# Patient Record
Sex: FEMALE | Race: WHITE | NOT HISPANIC OR LATINO | Employment: OTHER | URBAN - METROPOLITAN AREA
[De-identification: names, ages, dates, MRNs, and addresses within clinical notes are randomized per-mention and may not be internally consistent; named-entity substitution may affect disease eponyms.]

---

## 2017-01-09 DIAGNOSIS — F41.1 GENERALIZED ANXIETY DISORDER: ICD-10-CM

## 2017-01-09 DIAGNOSIS — G89.29 CHRONIC BILATERAL LOW BACK PAIN WITHOUT SCIATICA: Primary | ICD-10-CM

## 2017-01-09 DIAGNOSIS — M54.50 CHRONIC BILATERAL LOW BACK PAIN WITHOUT SCIATICA: Primary | ICD-10-CM

## 2017-01-10 NOTE — TELEPHONE ENCOUNTER
Ativan      Last Written Prescription Date:  11/29/16  Last Fill Quantity: 90,   # refills: 0  Last Office Visit with [facility], [facility] or [facility] prescribing provider: 11/28/16 Dr. Michael    Future Office visit:       Routing refill request to provider for review/approval because:  Drug not on the [facility], [facility] or [facility] refill protocol or controlled substance    Gabapentin      Last Written Prescription Date:  12/9/16  Last Fill Quantity: 270,   # refills: 0  Last Office Visit with [facility], [facility] or [facility] prescribing provider: 11/28/16 Dr. Michael    Future Office visit:       Routing refill request to provider for review/approval because:  Drug not on the [facility], [facility] or [facility] refill protocol or controlled substance    BESS [place] (R)

## 2017-01-11 RX ORDER — GABAPENTIN 300 MG/1
CAPSULE ORAL
Qty: 270 CAPSULE | Refills: 0 | Status: SHIPPED | OUTPATIENT
Start: 2017-01-11 | End: 2017-05-09

## 2017-01-12 RX ORDER — LORAZEPAM 1 MG/1
TABLET ORAL
Qty: 90 TABLET | Refills: 1 | Status: SHIPPED | OUTPATIENT
Start: 2017-01-12 | End: 2017-03-06

## 2017-01-18 DIAGNOSIS — M54.50 CHRONIC BILATERAL LOW BACK PAIN WITHOUT SCIATICA: Primary | ICD-10-CM

## 2017-01-18 DIAGNOSIS — G89.29 CHRONIC BILATERAL LOW BACK PAIN WITHOUT SCIATICA: Primary | ICD-10-CM

## 2017-01-18 NOTE — TELEPHONE ENCOUNTER
Needs a prescription for OxyContin and would also like the prescription for Percocet ready too so they don't have to drive to the clinic twice. She said that Lowell Miller will be picking up the prescriptions.     Thank you,  Jenny PELAYO   Central Scheduler

## 2017-01-19 RX ORDER — OXYCODONE AND ACETAMINOPHEN 10; 325 MG/1; MG/1
1 TABLET ORAL EVERY 6 HOURS PRN
Qty: 150 TABLET | Refills: 0 | Status: SHIPPED | OUTPATIENT
Start: 2017-01-19 | End: 2017-02-27

## 2017-01-19 RX ORDER — OXYCODONE HYDROCHLORIDE 60 MG/1
1 TABLET, FILM COATED, EXTENDED RELEASE ORAL EVERY 12 HOURS
Qty: 60 EACH | Refills: 0 | Status: SHIPPED | OUTPATIENT
Start: 2017-01-19 | End: 2017-02-20

## 2017-01-19 NOTE — TELEPHONE ENCOUNTER
Routing refill request to provider for review/approval because:  Drug not on the [facility] refill protocol   Jenny Olivia RN

## 2017-01-19 NOTE — TELEPHONE ENCOUNTER
oxycontin     60  Last Written Prescription Date:  12-20-16  Last Fill Quantity: 60,   # refills: 0  Last Office Visit with [facility] prescribing provider: 11-28-16  Future Office visit:       Routing refill request to provider for review/approval because:  Drug not on the [facility] or [facility] refill protocol or controlled substance      Oxycodone       Last Written Prescription Date:  12-27-16  Last Fill Quantity: 150,   # refills: 0  Last Office Visit with [facility] or  [facility] prescribing provider: 11-28-16  Future Office visit:       Routing refill request to provider for review/approval because:  Drug not on the [facility] or  [facility] refill protocol or controlled substance

## 2017-01-26 ENCOUNTER — TELEPHONE (OUTPATIENT)
Dept: FAMILY MEDICINE | Facility: CLINIC | Age: 42
End: 2017-01-26

## 2017-01-26 NOTE — TELEPHONE ENCOUNTER
Patient called regarding (reason for call): [facility] did not fill script for PERCOCET. Patient was told provider must complete prior authorization in order to be filled. Please contact patient once request is completed.  Is this regarding a medication?: yes  If yes, which medication?: oxyCODONE-acetaminophen (PERCOCET)  MG  Pt Provider?: Dr. Michael  Phone Number Pt can be reached at: [phone]  Best Time: any  Can we leave a detailed message on this number?: yes    Central Scheduling  Kim PRUITT

## 2017-01-27 NOTE — TELEPHONE ENCOUNTER
Started PA as requested - will await response from insurance  Disla: [id number]     Nelson Victor MA

## 2017-01-30 NOTE — TELEPHONE ENCOUNTER
..Reason for Call:  Checking status on the prior auth on the (percocet)    Detailed comments: [facility] prior auth shows denied on their end; they have some questions about frequency, etc; they are refaxing form    *Pts insurance expires tomorrow per caller    Phone Number Patient can be reached at: Other phone number:  [phone], Kaycee calling    Best Time: anytime    Can we leave a detailed message on this number? Not Applicable    Call taken on 1/30/2017 at 10:08 AM by Leah Nunn

## 2017-02-17 ENCOUNTER — TELEPHONE (OUTPATIENT)
Dept: FAMILY MEDICINE | Facility: CLINIC | Age: 42
End: 2017-02-17

## 2017-02-17 DIAGNOSIS — G89.29 CHRONIC BILATERAL LOW BACK PAIN WITHOUT SCIATICA: ICD-10-CM

## 2017-02-17 DIAGNOSIS — M54.50 CHRONIC BILATERAL LOW BACK PAIN WITHOUT SCIATICA: ICD-10-CM

## 2017-02-18 NOTE — TELEPHONE ENCOUNTER
Patient called regarding (reason for call): Refill request. Verbal consent given to have Lowell Crawford  prescription.  Is this regarding a medication?: yes  If yes, which medication?: oxyCODONE (OXYCONTIN) 60 MG T12A 12 hr  Pt Provider?: Dr. Michael  Phone Number Pt can be reached at: n/a  Best Time: n/a  Can we leave a detailed message on this number?: n/a    Central Scheduling  Kim PRUITT

## 2017-02-20 RX ORDER — OXYCODONE HYDROCHLORIDE 60 MG/1
1 TABLET, FILM COATED, EXTENDED RELEASE ORAL EVERY 12 HOURS
Qty: 60 EACH | Refills: 0 | Status: SHIPPED | OUTPATIENT
Start: 2017-02-20 | End: 2017-03-16

## 2017-02-20 NOTE — TELEPHONE ENCOUNTER
Script available for , notify patient/family - script to .  PSK    Ok for pickup as requested by patient -   Verbal consent given to have Lowell Crawford  prescription

## 2017-02-20 NOTE — TELEPHONE ENCOUNTER
Rx placed at . Called pt but phone gives dial tone.      Joana Javier, [facility]

## 2017-02-20 NOTE — TELEPHONE ENCOUNTER
Oxycodone      Last Written Prescription Date: 1/19/17  Last Fill Quantity: 60,  # refills: 0   Last Office Visit with [facility], [facility] or [facility] prescribing provider:  11/28/16    Nelson Victor MA

## 2017-02-21 NOTE — TELEPHONE ENCOUNTER
..Reason for Call:    checking status of the prior auth//oxycodone(oxycontin) 60 mg    Detailed comments: doesn't understand why this is the same thing every month;     Phone Number Patient can be reached at: Home number on file [phone] (home)    Best Time: anytime    Can we leave a detailed message on this number? YES    Call taken on 2/21/2017 at 12:17 PM by Leah Nunn

## 2017-02-21 NOTE — TELEPHONE ENCOUNTER
Reason for Call:  Medication or medication refill:    Do you use a [facility]?  Name of the pharmacy and phone number for the current request:      Name of the medication requested: oxycodone needs a prior authorization, unfortunately not sure what pharmacy script is at and called the patient back within 2 minutes when i did not see a pharmacy listed and her mail box was full.    Other request:     Can we leave a detailed message on this number? YES    Phone number patient can be reached at: Home number on file [phone] (home)    Best Time: any    Call taken on 2/21/2017 at 8:38 AM by Flor Mcwilliams

## 2017-02-22 NOTE — TELEPHONE ENCOUNTER
Received PA approval     Faxed to pharmacy. Placed in scan pile.    Informed pt    Will Kayleigh BRAVO

## 2017-02-27 DIAGNOSIS — M54.50 CHRONIC BILATERAL LOW BACK PAIN WITHOUT SCIATICA: ICD-10-CM

## 2017-02-27 DIAGNOSIS — G89.29 CHRONIC BILATERAL LOW BACK PAIN WITHOUT SCIATICA: ICD-10-CM

## 2017-02-27 RX ORDER — OXYCODONE AND ACETAMINOPHEN 10; 325 MG/1; MG/1
1 TABLET ORAL EVERY 6 HOURS PRN
Qty: 150 TABLET | Refills: 0 | Status: SHIPPED | OUTPATIENT
Start: 2017-02-27 | End: 2017-03-16

## 2017-02-27 NOTE — TELEPHONE ENCOUNTER
Reason for Call:  Medication or medication refill:    Do you use a [facility]?  Name of the pharmacy and phone number for the current request:  Pt will come to clinic to  hard copy of Rx    Name of the medication requested: Oxycodone-acetaminophen (PERCOCET)  MG per tablet    Can we leave a detailed message on this number? YES    Phone number patient can be reached at: Home number on file [phone] (home)    Best Time: Anytime    Call taken on 2/27/2017 at 8:10 AM by Nilson Buchanan

## 2017-03-06 DIAGNOSIS — F41.1 GENERALIZED ANXIETY DISORDER: ICD-10-CM

## 2017-03-07 RX ORDER — LORAZEPAM 1 MG/1
1 TABLET ORAL EVERY 8 HOURS PRN
Qty: 90 TABLET | Refills: 0 | Status: SHIPPED | OUTPATIENT
Start: 2017-03-07 | End: 2017-04-10

## 2017-03-16 DIAGNOSIS — M54.50 CHRONIC BILATERAL LOW BACK PAIN WITHOUT SCIATICA: ICD-10-CM

## 2017-03-16 DIAGNOSIS — G89.29 CHRONIC BILATERAL LOW BACK PAIN WITHOUT SCIATICA: ICD-10-CM

## 2017-03-16 RX ORDER — OXYCODONE HYDROCHLORIDE 60 MG/1
1 TABLET, FILM COATED, EXTENDED RELEASE ORAL EVERY 12 HOURS
Qty: 60 EACH | Refills: 0 | Status: SHIPPED | OUTPATIENT
Start: 2017-03-16 | End: 2017-04-19

## 2017-03-16 RX ORDER — OXYCODONE AND ACETAMINOPHEN 10; 325 MG/1; MG/1
1 TABLET ORAL EVERY 6 HOURS PRN
Qty: 150 TABLET | Refills: 0 | Status: SHIPPED | OUTPATIENT
Start: 2017-03-16 | End: 2017-04-19

## 2017-03-16 NOTE — TELEPHONE ENCOUNTER
Script available for , notify patient/family - script to .  Also notify her we need a drug screen in the next 24 hours for our periodic screening.    DAIVD

## 2017-03-16 NOTE — TELEPHONE ENCOUNTER
Reason for Call:  Medication or medication refill:    Do you use a [facility]?  Name of the pharmacy and phone number for the current request:     Name of the medication requested:  oxyCODONE-acetaminophen (PERCOCET)  MG per tablet, oxyCODONE (OXYCONTIN) 60 MG T12A 12 hr tablet      Other request: Patient will  at     Can we leave a detailed message on this number? YES    Phone number patient can be reached at: Cell number on file:    Telephone Information:   Mobile [phone]       Best Time: Any     Call taken on 3/16/2017 at 1:20 PM by Brian Oconnor

## 2017-03-16 NOTE — TELEPHONE ENCOUNTER
oxyCODONE (OXYCONTIN) 60 MG T12A 12 hr tablet      Last Written Prescription Date:  2/20/17  Last Fill Quantity: 60,   # refills: 0  Last Office Visit with [facility] or [facility] prescribing provider: 11/28/16  Future Office visit:       Routing refill request to provider for review/approval because:  Drug not on the [facility], [facility] or  [facility] refill protocol or controlled substance      oxyCODONE-acetaminophen (PERCOCET)  MG per tablet      Last Written Prescription Date:  2/27/17  Last Fill Quantity: 150,   # refills: 0  Last Office Visit with [facility], [facility] or [facility] prescribing provider: 11/28/16  Future Office visit:       Routing refill request to provider for review/approval because:  Drug not on the [facility], [facility] or  [facility] refill protocol or controlled substance

## 2017-03-17 DIAGNOSIS — M54.50 CHRONIC BILATERAL LOW BACK PAIN WITHOUT SCIATICA: ICD-10-CM

## 2017-03-17 DIAGNOSIS — G89.29 CHRONIC BILATERAL LOW BACK PAIN WITHOUT SCIATICA: ICD-10-CM

## 2017-03-17 LAB
AMPHETAMINES UR QL: ABNORMAL NG/ML
BARBITURATES UR QL SCN: ABNORMAL NG/ML
BENZODIAZ UR QL SCN: ABNORMAL NG/ML
BUPRENORPHINE UR QL: ABNORMAL NG/ML
CANNABINOIDS UR QL: ABNORMAL NG/ML
COCAINE UR QL SCN: ABNORMAL NG/ML
D-METHAMPHET UR QL: ABNORMAL NG/ML
METHADONE UR QL SCN: ABNORMAL NG/ML
OPIATES UR QL SCN: ABNORMAL NG/ML
OXYCODONE UR QL SCN: ABNORMAL NG/ML
PCP UR QL SCN: ABNORMAL NG/ML
PROPOXYPH UR QL: ABNORMAL NG/ML
TRICYCLICS UR QL SCN: ABNORMAL NG/ML

## 2017-03-17 PROCEDURE — 80306 DRUG TEST PRSMV INSTRMNT: CPT | Performed by: FAMILY MEDICINE

## 2017-03-19 NOTE — PROGRESS NOTES
Your urine drug screen result is as expected.  Please call or [employer] message me if you have any questions.   PSK

## 2017-03-22 ENCOUNTER — MYC MEDICAL ADVICE (OUTPATIENT)
Dept: FAMILY MEDICINE | Facility: CLINIC | Age: 42
End: 2017-03-22

## 2017-03-22 NOTE — TELEPHONE ENCOUNTER
Pt got her scripts and said she needs a prior authorization  Can you call her and let her know if that was started?    Thank you

## 2017-03-22 NOTE — TELEPHONE ENCOUNTER
Call back:  Please call Ria @ home [phone]  (cell) doesn't have enough minutes  Re: Prior auth  Thank you  NIKOS ROGERS

## 2017-03-22 NOTE — TELEPHONE ENCOUNTER
Pt adding to message:    Please check on meds, there are two similar meds, looking for the time release Oxycontin, not  to be confused to the percocet  Pt called [employer] today, waiting for the approval from the Dr for the time release  [phone]  Thank you  NIKOS Nunn  [facility]

## 2017-03-22 NOTE — TELEPHONE ENCOUNTER
Phone goes straight to busy    Sent My Chart to pt informing her PA was already completed for Oxycontin. Was approved on 2/22/17    Nelson Victor MA

## 2017-03-22 NOTE — TELEPHONE ENCOUNTER
Called patient - faxed PA approval to pharmacy per pt request. Pt will follow up if there are any other issues    Will Kayleigh BRAVO

## 2017-04-10 ENCOUNTER — TELEPHONE (OUTPATIENT)
Dept: FAMILY MEDICINE | Facility: CLINIC | Age: 42
End: 2017-04-10

## 2017-04-10 ENCOUNTER — OFFICE VISIT (OUTPATIENT)
Dept: FAMILY MEDICINE | Facility: CLINIC | Age: 42
End: 2017-04-10
Payer: MEDICARE

## 2017-04-10 ENCOUNTER — RADIANT APPOINTMENT (OUTPATIENT)
Dept: GENERAL RADIOLOGY | Facility: CLINIC | Age: 42
End: 2017-04-10
Attending: FAMILY MEDICINE
Payer: MEDICARE

## 2017-04-10 VITALS
HEART RATE: 102 BPM | OXYGEN SATURATION: 99 % | SYSTOLIC BLOOD PRESSURE: 124 MMHG | BODY MASS INDEX: 24.46 KG/M2 | WEIGHT: 147 LBS | TEMPERATURE: 97.8 F | DIASTOLIC BLOOD PRESSURE: 87 MMHG

## 2017-04-10 DIAGNOSIS — M25.552 BILATERAL HIP PAIN: ICD-10-CM

## 2017-04-10 DIAGNOSIS — F41.1 GENERALIZED ANXIETY DISORDER: ICD-10-CM

## 2017-04-10 DIAGNOSIS — F11.20 CONTINUOUS OPIOID DEPENDENCE (H): ICD-10-CM

## 2017-04-10 DIAGNOSIS — M25.551 BILATERAL HIP PAIN: ICD-10-CM

## 2017-04-10 DIAGNOSIS — G89.29 CHRONIC BILATERAL LOW BACK PAIN WITHOUT SCIATICA: ICD-10-CM

## 2017-04-10 DIAGNOSIS — M25.551 BILATERAL HIP PAIN: Primary | ICD-10-CM

## 2017-04-10 DIAGNOSIS — M54.50 CHRONIC BILATERAL LOW BACK PAIN WITHOUT SCIATICA: ICD-10-CM

## 2017-04-10 DIAGNOSIS — M25.552 BILATERAL HIP PAIN: Primary | ICD-10-CM

## 2017-04-10 PROCEDURE — 99213 OFFICE O/P EST LOW 20 MIN: CPT | Performed by: FAMILY MEDICINE

## 2017-04-10 PROCEDURE — 73522 X-RAY EXAM HIPS BI 3-4 VIEWS: CPT

## 2017-04-10 NOTE — TELEPHONE ENCOUNTER
Pt requesting for Lorazepam med refill.  Please send Rx to [facility] on [address]    What is the best number to contact you? Cell [phone]  What time works best to contact you? Anytime. Ok to flores Pazo

## 2017-04-10 NOTE — MR AVS SNAPSHOT
After Visit Summary   4/10/2017    Ria Nj    MRN: [id number]           Patient Information     Date Of Birth          1975        Visit Information        Provider Department      4/10/2017 11:20 AM Lyudmila Rojo MD [facility]        Today's Diagnoses     Bilateral hip pain    -  1    Continuous opioid dependence (H)           Follow-ups after your visit        Additional Services     PAIN MANAGEMENT CENTER ([place]) REFERRAL       Your provider has referred you to the [facility].    Reason for Referral: Comprehensive Evaluation and Management    Please complete the following questions:    What is your diagnosis for the patient's pain? Chronic back pain    Do you have any specific questions for the pain specialist? No    Are there any red flags that may impact the assessment or management of the patient? None    **ANY DIAGNOSTIC TESTS THAT ARE NOT IN EPIC SHOULD BE SENT TO THE PAIN CENTER**    Please note the Pre-Op Pain Consult must be scheduled 2-3 weeks prior to the patient's surgery.  Patient's trying to schedule within 2 weeks of surgery may not be accommodated.     Pre-Op Pain Consults are only good for 30 days.    REGARDING OPIOID MEDICATIONS:  We will always address appropriateness of opioid pain medications, but we generally will not automatically take on a prescribing role. When we do take on prescribing of opioids for chronic pain, it is in collaboration with the referring physician for an intermediate period of time (months), with an expectation that the primary physician or provider will assume the prescribing role if medications are effective at stable doses with demonstrated compliance.  Therefore, please do not assume that your prescribing responsibilities end on the day of pain clinic consultation.  Is this agreeable to you? YES    For any questions, contact the [facility] at [phone].    Please be  aware that coverage of these services is subject to the terms and limitations of your health insurance plan.  Call member services at your health plan with any benefit or coverage questions.      Please bring the following with you to your appointment:    (1) Any X-Rays, CTs or MRIs which have been performed.  Contact the facility where they were done to arrange for  prior to your scheduled appointment.    (2) List of current medications   (3) This referral request   (4) Any documents/labs given to you for this referral                  Who to contact     If you have questions or need follow up information about today's clinic visit or your schedule please contact [facility] directly at [phone].  Normal or non-critical lab and imaging results will be communicated to you by MyChart, letter or phone within 4 business days after the clinic has received the results. If you do not hear from us within 7 days, please contact the clinic through [employer] or phone. If you have a critical or abnormal lab result, we will notify you by phone as soon as possible.  Submit refill requests through [employer] or call your pharmacy and they will forward the refill request to us. Please allow 3 business days for your refill to be completed.          Additional Information About Your Visit        MyChart Information     [employer] gives you secure access to your electronic health record. If you see a primary care provider, you can also send messages to your care team and make appointments. If you have questions, please call your primary care clinic.  If you do not have a primary care provider, please call [phone] and they will assist you.        Care EveryWhere ID     This is your Care EveryWhere ID. This could be used by other organizations to access your [facility] medical records  [id number]        Your Vitals Were     Pulse Temperature Pulse Oximetry BMI (Body Mass Index)          102 97.8  F (36.6  C)  (Tympanic) 99% 24.46 kg/m2         Blood Pressure from Last 3 Encounters:   04/10/17 124/87   11/28/16 140/82   11/26/16 127/87    Weight from Last 3 Encounters:   04/10/17 147 lb (66.7 kg)   11/28/16 140 lb (63.5 kg)   11/26/16 140 lb (63.5 kg)              We Performed the Following     PAIN MANAGEMENT CENTER ([place]) REFERRAL        Primary Care Provider Office Phone # Fax #    Sunshine Michael [phone]       [facility] [address]  [place] [address]        Thank you!     Thank you for choosing [facility]  for your care. Our goal is always to provide you with excellent care. Hearing back from our patients is one way we can continue to improve our services. Please take a few minutes to complete the written survey that you may receive in the mail after your visit with us. Thank you!             Your Updated Medication List - Protect others around you: Learn how to safely use, store and throw away your medicines at www.disposemymeds.org.          This list is accurate as of: 4/10/17 12:30 PM.  Always use your most recent med list.                   Brand Name Dispense Instructions for use    * albuterol 108 (90 BASE) MCG/ACT Inhaler    PROAIR HFA/PROVENTIL HFA/VENTOLIN HFA    1 Inhaler    Inhale 2 puffs into the lungs every 4 hours as needed for shortness of breath / dyspnea or wheezing       * albuterol (2.5 MG/3ML) 0.083% neb solution     30 vial    Take 1 vial (2.5 mg) by nebulization every 4 hours as needed for shortness of breath / dyspnea or wheezing       calcium carbonate 500 MG tablet    OS-ADOLFO 500 mg [place]    100 tablet    Take 1 tablet by mouth 2 times daily.       EPINEPHrine 0.3 MG/0.3ML injection     2 each    Inject 0.3 mLs (0.3 mg) into the muscle once as needed for anaphylaxis       * gabapentin 300 MG capsule    NEURONTIN    270 capsule    TAKE 1 CAPSULE BY MOUTH THREE TIMES DAILY       * gabapentin 300 MG capsule    NEURONTIN    270  capsule    TAKE 1 CAPSULE BY MOUTH THREE TIMES DAILY       ibuprofen 200 MG tablet    ADVIL/MOTRIN     Pt is taking 4 TABLET EVERY 4 TO 6 HOURS AS NEEDED       LORazepam 1 MG tablet    ATIVAN    90 tablet    Take 1 tablet (1 mg) by mouth every 8 hours as needed for anxiety Fill on or after 3/13/17       magnesium 250 MG tablet     100 tablet    Take 1 tablet by mouth daily.       NICOTINE STEP 2 14 MG/24HR 24 hr patch   Generic drug:  nicotine     28 patch    PLACE 1 PATCH ONTO THE SKIN EVERY 24 HOURS       oxyCODONE 60 MG T12a 12 hr tablet    OXYCONTIN    60 each    Take 1 tablet by mouth every 12 hours Fill on or after 3/22/17       oxyCODONE-acetaminophen  MG per tablet    PERCOCET    150 tablet    Take 1 tablet by mouth every 6 hours as needed for pain Will approve up to 5 tablets a day or 150 tablets a month.  Fill on or after 3/29/17       prochlorperazine 10 MG tablet    COMPAZINE    10 tablet    Take 1 tablet (10 mg) by mouth every 6 hours as needed for nausea or vomiting       tiZANidine 4 MG tablet    ZANAFLEX    270 tablet    TAKE ONE TABLET BY MOUTH THREE TIMES DAILY AS NEEDED FOR SHOULDER AND UPPER BACK PAIN       vitamin D 2000 UNITS tablet     100 tablet    Take 2,000 Units by mouth daily       * Notice:  This list has 4 medication(s) that are the same as other medications prescribed for you. Read the directions carefully, and ask your doctor or other care provider to review them with you.

## 2017-04-10 NOTE — PROGRESS NOTES
SUBJECTIVE:                                                    Ria Nj is a 41 year old female who presents to clinic today for the following health issues:      Pt has been seeing [facility] and would like to transition to the . Pt has been experiencing bilateral hip pain for the last few months. She feels that she can't get out of bed due to the pain. She has been on the pain medication for 7 years, and wondering if she can adjust medication. Hip pain is constant, severe, aggravated by walking and not relieved with current pain medication. Pt has a h/o chronic back pain and is currently on opiods. Pt is currently using walker.       Problem list and histories reviewed & adjusted, as indicated.  Additional history: as documented        Reviewed and updated as needed this visit by clinical staff       Reviewed and updated as needed this visit by Provider         ROS:  Constitutional, HEENT, cardiovascular, pulmonary, gi and gu systems are negative, except as otherwise noted.    OBJECTIVE:                                                    /87 (BP Location: Right arm, Patient Position: Chair, Cuff Size: Adult Regular)  Pulse 102  Temp 97.8  F (36.6  C) (Tympanic)  Wt 147 lb (66.7 kg)  SpO2 99%  BMI 24.46 kg/m2  Body mass index is 24.46 kg/(m^2).  GENERAL: healthy, alert and no distress  EYES: Eyes grossly normal to inspection  HENT: nose and mouth without ulcers or lesions  MS: + bilateral posterior and anterior hip tenderness, limited flexion/extension of lower extremities due to pain    Diagnostic Test Results:  Xray pending      ASSESSMENT/PLAN:                                                      ## Bilateral hip pain  - xray pending     ## Chronic bilateral low back pain without sciatica: a/s continuous opioid dependence   - Pt has been on chronic narcotics for 7 years and wanted medication adjusted, referred to pain clinic for further evaluation.  - She also wanted to see specialist  to discuss if she is a good candidate for back surgery.   - ORTHO  REFERRAL  - PAIN MANAGEMENT CENTER ([place]) REFERRAL    Lyudmila Rojo MD  [facility]

## 2017-04-10 NOTE — TELEPHONE ENCOUNTER
Ativan      Last Written Prescription Date: 03/07/17  Last Fill Quantity: 90,  # refills: 0   Last Office Visit with [facility], [facility] or [facility] prescribing provider: 11/27/16                                             Routing refill request to provider for review/approval because:  Drug not on the [facility] refill protocol   Lynette Rajput RN

## 2017-04-10 NOTE — NURSING NOTE
"Chief Complaint   Patient presents with     Musculoskeletal Problem     hip change     Recheck Medication     nair medication        Initial /87 (BP Location: Right arm, Patient Position: Chair, Cuff Size: Adult Regular)  Pulse 102  Temp 97.8  F (36.6  C) (Tympanic)  Wt 147 lb (66.7 kg)  SpO2 99%  BMI 24.46 kg/m2 Estimated body mass index is 24.46 kg/(m^2) as calculated from the following:    Height as of 11/26/16: 5' 5\" (1.651 m).    Weight as of this encounter: 147 lb (66.7 kg).  Medication Reconciliation: complete     Shahriar Beck MA      "

## 2017-04-10 NOTE — LETTER
My Depression Action Plan  Name: Ria Nj   Date of Birth 1975  Date: 4/10/2017    My doctor: Sunshine Michael   My clinic: [address] [address] [phone]          GREEN    ZONE   Good Control    What it looks like:     Things are going generally well. You have normal up s and down s. You may even feel depressed from time to time, but bad moods usually last less than a day.   What you need to do:  1. Continue to care for yourself (see self care plan)  2. Check your depression survival kit and update it as needed  3. Follow your physician s recommendations including any medication.  4. Do not stop taking medication unless you consult with your physician first.           YELLOW         ZONE Getting Worse    What it looks like:     Depression is starting to interfere with your life.     It may be hard to get out of bed; you may be starting to isolate yourself from others.    Symptoms of depression are starting to last most all day and this has happened for several days.     You may have suicidal thoughts but they are not constant.   What you need to do:     1. Call your care team, your response to treatment will improve if you keep your care team informed of your progress. Yellow periods are signs an adjustment may need to be made.     2. Continue your self-care, even if you have to fake it!    3. Talk to someone in your support network    4. Open up your depression survival kit           RED    ZONE Medical Alert - Get Help    What it looks like:     Depression is seriously interfering with your life.     You may experience these or other symptoms: You can t get out of bed most days, can t work or engage in other necessary activities, you have trouble taking care of basic hygiene, or basic responsibilities, thoughts of suicide or death that will not go away, self-injurious behavior.     What you need to do:  1. Call your care team  and request a same-day appointment. If they are not available (weekends or after hours) call your local crisis line, emergency room or 911.      Electronically signed by: Shahriar Beck, April 10, 2017    Depression Self Care Plan / Survival Kit    Self-Care for Depression  Here s the deal. Your body and mind are really not as separate as most people think.  What you do and think affects how you feel and how you feel influences what you do and think. This means if you do things that people who feel good do, it will help you feel better.  Sometimes this is all it takes.  There is also a place for medication and therapy depending on how severe your depression is, so be sure to consult with your medical provider and/ or Behavioral Health Consultant if your symptoms are worsening or not improving.     In order to better manage my stress, I will:    Exercise  Get some form of exercise, every day. This will help reduce pain and release endorphins, the  feel good  chemicals in your brain. This is almost as good as taking antidepressants!  This is not the same as joining a gym and then never going! (they count on that by the way ) It can be as simple as just going for a walk or doing some gardening, anything that will get you moving.      Hygiene   Maintain good hygiene (Get out of bed in the morning, Make your bed, Brush your teeth, Take a shower, and Get dressed like you were going to work, even if you are unemployed).  If your clothes don't fit try to get ones that do.    Diet  I will strive to eat foods that are good for me, drink plenty of water, and avoid excessive sugar, caffeine, alcohol, and other mood-altering substances.  Some foods that are helpful in depression are: complex carbohydrates, B vitamins, flaxseed, fish or fish oil, fresh fruits and vegetables.    Psychotherapy  I agree to participate in Individual Therapy (if recommended).    Medication  If prescribed medications, I agree to take them.  Missing doses can  result in serious side effects.  I understand that drinking alcohol, or other illicit drug use, may cause potential side effects.  I will not stop my medication abruptly without first discussing it with my provider.    Staying Connected With Others  I will stay in touch with my friends, family members, and my primary care provider/team.    Use your imagination  Be creative.  We all have a creative side; it doesn t matter if it s oil painting, sand castles, or mud pies! This will also kick up the endorphins.    Witness Beauty  (AKA stop and smell the roses) Take a look outside, even in mid-winter. Notice colors, textures. Watch the squirrels and birds.     Service to others  Be of service to others.  There is always someone else in need.  By helping others we can  get out of ourselves  and remember the really important things.  This also provides opportunities for practicing all the other parts of the program.    Humor  Laugh and be silly!  Adjust your TV habits for less news and crime-drama and more comedy.    Control your stress  Try breathing deep, massage therapy, biofeedback, and meditation. Find time to relax each day.     My support system    Clinic Contact:  Phone number:    Contact 1:  Phone number:    Contact 2:  Phone number:    [employer]/:  Phone number:    Therapist:  Phone number:    Local crisis center:    Phone number:    Other community support:  Phone number:

## 2017-04-11 RX ORDER — LORAZEPAM 1 MG/1
1 TABLET ORAL EVERY 8 HOURS PRN
Qty: 90 TABLET | Refills: 0 | Status: SHIPPED | OUTPATIENT
Start: 2017-04-11 | End: 2017-05-09

## 2017-04-11 RX ORDER — LORAZEPAM 1 MG/1
TABLET ORAL
Qty: 90 TABLET | Refills: 0 | OUTPATIENT
Start: 2017-04-11

## 2017-04-11 ASSESSMENT — PATIENT HEALTH QUESTIONNAIRE - PHQ9: SUM OF ALL RESPONSES TO PHQ QUESTIONS 1-9: 9

## 2017-04-11 NOTE — TELEPHONE ENCOUNTER
Ativan      Last Written Prescription Date: 04/11/17  Last Fill Quantity: 90,  # refills: 0   [place] with [facility] or [facility] prescribing provider: 04/10/17                                           Request denied. Medication already filled today.    Lynette Rajput RN

## 2017-04-11 NOTE — TELEPHONE ENCOUNTER
Reason for Call:  Other prescription    Detailed comments: [facility] calling for they  received two med directions for ativan one to fill one for the 04/13/17 and one for today and they are not sure which one to go with.  They would like to get clarification as soon as possible as Pt has been waiting in pharmacy.    Phone Number Pharmacy can be reached at: Other phone number:  [phone]    Best Time: Anytime    Can we leave a detailed message on this number? YES    Call taken on 4/11/2017 at 2:31 PM by Nilson Buchanan

## 2017-04-12 NOTE — TELEPHONE ENCOUNTER
Reason for Call:  Other prescription    Detailed comments: Pt calling to remind Dr. Michael that the start date was written incorrectly for Ativan and she would like to see if Dr. Michael can get that corrected and sent to the pharmacy as soon as possible for Pt has been without medication for two days.    Phone Number Patient can be reached at: Home number on file [phone] (home)    Best Time: Anytime    Can we leave a detailed message on this number? NO    Call taken on 4/12/2017 at 9:20 AM by Nilson Buchanan

## 2017-04-19 ENCOUNTER — TELEPHONE (OUTPATIENT)
Dept: FAMILY MEDICINE | Facility: CLINIC | Age: 42
End: 2017-04-19

## 2017-04-19 DIAGNOSIS — M54.50 CHRONIC BILATERAL LOW BACK PAIN WITHOUT SCIATICA: ICD-10-CM

## 2017-04-19 DIAGNOSIS — G89.29 CHRONIC BILATERAL LOW BACK PAIN WITHOUT SCIATICA: ICD-10-CM
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
## 2017-04-20 RX ORDER — OXYCODONE AND ACETAMINOPHEN 10; 325 MG/1; MG/1
1 TABLET ORAL EVERY 6 HOURS PRN
Qty: 150 TABLET | Refills: 0 | Status: SHIPPED | OUTPATIENT
Start: 2017-04-20 | End: 2017-05-18

## 2017-04-20 RX ORDER — OXYCODONE HYDROCHLORIDE 60 MG/1
1 TABLET, FILM COATED, EXTENDED RELEASE ORAL EVERY 12 HOURS
Qty: 60 EACH | Refills: 0 | Status: SHIPPED | OUTPATIENT
Start: 2017-04-20 | End: 2017-05-18

## 2017-04-20 NOTE — TELEPHONE ENCOUNTER
Script available for , notify patient/family - script to . Notify patient due for follow up next month.     PSK
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
## 2017-04-20 NOTE — TELEPHONE ENCOUNTER
Patient called to check on status of request for Oxycodone and Percocet.    Please call patient to advise at [phone].  Ok to leave message.      Thank you,    Casie Schmidt

## 2017-05-09 DIAGNOSIS — F41.1 GENERALIZED ANXIETY DISORDER: ICD-10-CM

## 2017-05-09 DIAGNOSIS — G89.29 CHRONIC BILATERAL LOW BACK PAIN WITHOUT SCIATICA: ICD-10-CM

## 2017-05-09 DIAGNOSIS — M54.50 CHRONIC BILATERAL LOW BACK PAIN WITHOUT SCIATICA: ICD-10-CM

## 2017-05-10 RX ORDER — GABAPENTIN 300 MG/1
CAPSULE ORAL
Qty: 270 CAPSULE | Refills: 0 | Status: SHIPPED | OUTPATIENT
Start: 2017-05-10 | End: 2017-05-18

## 2017-05-10 RX ORDER — LORAZEPAM 1 MG/1
TABLET ORAL
Qty: 90 TABLET | Refills: 0 | OUTPATIENT
Start: 2017-05-10

## 2017-05-10 RX ORDER — LORAZEPAM 1 MG/1
1 TABLET ORAL EVERY 8 HOURS PRN
Qty: 90 TABLET | Refills: 0 | Status: SHIPPED | OUTPATIENT
Start: 2017-05-10 | End: 2017-06-12

## 2017-05-10 NOTE — TELEPHONE ENCOUNTER
tiZANidine (ZANAFLEX) 4 MG tablet      Last Written Prescription Date:  10/24/16  Last Fill Quantity: 270,   # refills: 0  Last Office Visit with [facility], [facility] or [facility] prescribing provider: 4/10/17  Future Office visit:    Next 5 appointments (look out 90 days)     May 16, 2017 12:40 PM CDT   Office Visit with Sunshine Michael MD   [facility] ([address] [address]   [phone]                   Routing refill request to provider for review/approval because:  Drug not on the [facility], [facility] or [facility] refill protocol or controlled substance      gabapentin (NEURONTIN) 300 MG capsule      Last Written Prescription Date:  1/11/17  Last Fill Quantity: 270,   # refills: 0  Last Office Visit with [facility], [facility] or [facility] prescribing provider: 4/10/17  Future Office visit:    Next 5 appointments (look out 90 days)     May 16, 2017 12:40 PM CDT   Office Visit with Sunshine Michael MD   [facility] ([address] [address]   [phone]                   Routing refill request to provider for review/approval because:  Drug not on the [facility], [facility] or [facility] refill protocol or controlled substance    LORazepam (ATIVAN) 1 MG tablet      Last Written Prescription Date:  4/11/17  Last Fill Quantity: 90,   # refills: 0  Last Office Visit with [facility], [facility] or [facility] prescribing provider: 4/10/17 Dr. Michael    Future Office visit:    Next 5 appointments (look out 90 days)     May 16, 2017 12:40 PM CDT   Office Visit with Sunshine Michael MD   [facility] ([address] [address]   [phone]                   Routing refill request to provider for review/approval because:  Drug not on the [facility], [facility] or [facility] refill protocol or controlled substance

## 2017-05-10 NOTE — TELEPHONE ENCOUNTER
Reason for Call:  Other prescription    Detailed comments: Ria called to follow up on the LORazepam (ATIVAN) 1 MG tablet refill. She says she has been out and has not taken any since 05/08    Phone Number Patient can be reached at: Home number on file [phone] (home)    Best Time: Any     Can we leave a detailed message on this number? YES    Call taken on 5/10/2017 at 4:31 PM by Brian Oconnor

## 2017-05-10 NOTE — TELEPHONE ENCOUNTER
Notify patient that the tizanidine and neurontin is sent in for her.  The xanax will be faxed tomorrow BUT it is not due for refill until 5/13/17 since last fill was for 4/13/17 (verified on )  It will be available for filling then.   DAVID

## 2017-05-11 NOTE — TELEPHONE ENCOUNTER
Called pt.  No answer.  Unable to LM as VM is full.  Ativan faxed to pharmacy.    Kimberlee NOEL, Patient Care

## 2017-05-18 ENCOUNTER — OFFICE VISIT (OUTPATIENT)
Dept: FAMILY MEDICINE | Facility: CLINIC | Age: 42
End: 2017-05-18
Payer: MEDICARE

## 2017-05-18 ENCOUNTER — TELEPHONE (OUTPATIENT)
Dept: PALLIATIVE MEDICINE | Facility: CLINIC | Age: 42
End: 2017-05-18

## 2017-05-18 VITALS
TEMPERATURE: 98.2 F | BODY MASS INDEX: 24.63 KG/M2 | WEIGHT: 148 LBS | HEART RATE: 97 BPM | DIASTOLIC BLOOD PRESSURE: 74 MMHG | OXYGEN SATURATION: 100 % | SYSTOLIC BLOOD PRESSURE: 114 MMHG

## 2017-05-18 DIAGNOSIS — M54.50 CHRONIC BILATERAL LOW BACK PAIN WITHOUT SCIATICA: ICD-10-CM

## 2017-05-18 DIAGNOSIS — G89.29 CHRONIC BILATERAL LOW BACK PAIN WITHOUT SCIATICA: ICD-10-CM

## 2017-05-18 DIAGNOSIS — M54.2 NECK PAIN: ICD-10-CM

## 2017-05-18 DIAGNOSIS — F33.1 MAJOR DEPRESSIVE DISORDER, RECURRENT EPISODE, MODERATE (H): ICD-10-CM

## 2017-05-18 DIAGNOSIS — M62.838 MUSCLE SPASMS OF NECK: ICD-10-CM

## 2017-05-18 DIAGNOSIS — F41.1 GENERALIZED ANXIETY DISORDER: Primary | ICD-10-CM

## 2017-05-18 PROCEDURE — 99215 OFFICE O/P EST HI 40 MIN: CPT | Performed by: FAMILY MEDICINE

## 2017-05-18 RX ORDER — OXYCODONE AND ACETAMINOPHEN 10; 325 MG/1; MG/1
1 TABLET ORAL EVERY 6 HOURS PRN
Qty: 150 TABLET | Refills: 0 | Status: SHIPPED | OUTPATIENT
Start: 2017-05-18 | End: 2017-06-16

## 2017-05-18 RX ORDER — OXYCODONE HYDROCHLORIDE 60 MG/1
1 TABLET, FILM COATED, EXTENDED RELEASE ORAL EVERY 12 HOURS
Qty: 60 EACH | Refills: 0 | Status: SHIPPED | OUTPATIENT
Start: 2017-05-18 | End: 2017-06-16

## 2017-05-18 ASSESSMENT — ANXIETY QUESTIONNAIRES
2. NOT BEING ABLE TO STOP OR CONTROL WORRYING: NEARLY EVERY DAY
1. FEELING NERVOUS, ANXIOUS, OR ON EDGE: NEARLY EVERY DAY
7. FEELING AFRAID AS IF SOMETHING AWFUL MIGHT HAPPEN: NEARLY EVERY DAY
6. BECOMING EASILY ANNOYED OR IRRITABLE: NEARLY EVERY DAY
3. WORRYING TOO MUCH ABOUT DIFFERENT THINGS: SEVERAL DAYS
5. BEING SO RESTLESS THAT IT IS HARD TO SIT STILL: NEARLY EVERY DAY
GAD7 TOTAL SCORE: 17

## 2017-05-18 ASSESSMENT — PATIENT HEALTH QUESTIONNAIRE - PHQ9: 5. POOR APPETITE OR OVEREATING: SEVERAL DAYS

## 2017-05-18 NOTE — NURSING NOTE
"Chief Complaint   Patient presents with     Recheck Medication       Initial /74 (BP Location: Right arm, Patient Position: Chair, Cuff Size: Adult Regular)  Pulse 97  Temp 98.2  F (36.8  C) (Oral)  Wt 67.1 kg (148 lb)  SpO2 100%  BMI 24.63 kg/m2 Estimated body mass index is 24.63 kg/(m^2) as calculated from the following:    Height as of 11/26/16: 1.651 m (5' 5\").    Weight as of this encounter: 67.1 kg (148 lb).  Medication Reconciliation: complete       Joana Javier CMA      "

## 2017-05-18 NOTE — MR AVS SNAPSHOT
After Visit Summary   5/18/2017    Ria Nj    MRN: [id number]           Patient Information     Date Of Birth          1975        Visit Information        Provider Department      5/18/2017 10:00 AM Sunshine Michael MD [facility]        Today's Diagnoses     Generalized anxiety disorder    -  1    Chronic bilateral low back pain without sciatica        Major depressive disorder, recurrent episode, moderate (H)        Neck pain        Muscle spasms of neck          Care Instructions    Referral for counseling recommended.  Someone will contact you to set up appointment.    Repeat MRI to update on back pain.  Referral to back specialist will be planned after the MRI is completed.    Refill medications today.    Ice after activity recommended.  Gentle heat in AM prior to activity can help to loosen up the muscles for the neck area.  Referral to PHYSICAL THERAPY for the neck recommended.    Referral to pain clinic for evaluation as well.          Follow-ups after your visit        Additional Services     [facility] PT, HAND, AND CHIROPRACTIC REFERRAL       **This order will print in the [facility] Scheduling Office**    Physical Therapy, Hand Therapy and Chiropractic Care are available through:    *[facility]  *[facility]  *[facility]    Call one number to schedule at any of the above locations: [phone].    Your provider has referred you to: Physical Therapy at [facility] or [facility]    Indication/Reason for Referral: Neck Pain  Onset of Illness: mid April  Therapy Orders: Evaluate and Treat  Special Programs: None  Special Request: None    Sarah Smith      Additional Comments for the Therapist or Chiropractor: HEP    Please be aware that coverage of these services is subject to the terms and limitations of your health insurance plan.  Call member services at your health plan with any benefit or coverage questions.      Please bring  the following to your appointment:    *Your personal calendar for scheduling future appointments  *Comfortable clothing            MENTAL HEALTH REFERRAL       Your provider has referred you to: [facility]: [facility] - Counseling (Individual/Couples/Family) - [facility] [phone]   [email or web address]   *Patient will be contacted by [facility] scheduling partner, [facility]), to schedule an appointment.  Patients may also call [facility] to schedule.    All scheduling is subject to the client's specific insurance plan & benefits, provider/location availability, and provider clinical specialities.  Please arrive 15 minutes early for your first appointment and bring your completed paperwork.    Please be aware that coverage of these services is subject to the terms and limitations of your health insurance plan.  Call member services at your health plan with any benefit or coverage questions.            PAIN MANAGEMENT CENTER ([place]) REFERRAL       Your provider has referred you to the [facility].    Reason for Referral: Comprehensive Evaluation and Management    Please complete the following questions:    What is your diagnosis for the patient's pain? Low back pain, narcotic dependence    Do you have any specific questions for the pain specialist? No    Are there any red flags that may impact the assessment or management of the patient? None    **ANY DIAGNOSTIC TESTS THAT ARE NOT IN EPIC SHOULD BE SENT TO THE PAIN CENTER**    Please note the Pre-Op Pain Consult must be scheduled 2-3 weeks prior to the patient's surgery.  Patient's trying to schedule within 2 weeks of surgery may not be accommodated.     Pre-Op Pain Consults are only good for 30 days.    REGARDING OPIOID MEDICATIONS:  We will always address appropriateness of opioid pain medications, but we generally will not automatically  take on a prescribing role. When we do take on prescribing of opioids for chronic pain, it is in collaboration with the referring physician for an intermediate period of time (months), with an expectation that the primary physician or provider will assume the prescribing role if medications are effective at stable doses with demonstrated compliance.  Therefore, please do not assume that your prescribing responsibilities end on the day of pain clinic consultation.  Is this agreeable to you? YES    For any questions, contact the [facility] at [phone].    Please be aware that coverage of these services is subject to the terms and limitations of your health insurance plan.  Call member services at your health plan with any benefit or coverage questions.      Please bring the following with you to your appointment:    (1) Any X-Rays, CTs or MRIs which have been performed.  Contact the facility where they were done to arrange for  prior to your scheduled appointment.    (2) List of current medications   (3) This referral request   (4) Any documents/labs given to you for this referral                  Future tests that were ordered for you today     Open Future Orders        Priority Expected Expires Ordered    MR Lumbar Spine w/o Contrast Routine  5/18/2018 5/18/2017            Who to contact     If you have questions or need follow up information about today's clinic visit or your schedule please contact [facility] directly at [phone].  Normal or non-critical lab and imaging results will be communicated to you by MyChart, letter or phone within 4 business days after the clinic has received the results. If you do not hear from us within 7 days, please contact the clinic through MyChart or phone. If you have a critical or abnormal lab result, we will notify you by phone as soon as possible.  Submit refill requests through [employer] or call your pharmacy and they will  "forward the refill request to us. Please allow 3 business days for your refill to be completed.          Additional Information About Your Visit        Managed Objectshar[employer]o Information     [employer] lets you send messages to your doctor, view your test results, renew your prescriptions, schedule appointments and more. To sign up, go to [email or web address] . Click on \"Log in\" on the left side of the screen, which will take you to the Welcome page. Then click on \"Sign up Now\" on the right side of the page.     You will be asked to enter the access code listed below, as well as some personal information. Please follow the directions to create your username and password.     Your access code is: [email or web address]  Expires: 2017 11:06 AM     Your access code will  in 90 days. If you need help or a new code, please call your [place] clinic or [phone].        Care EveryWhere ID     This is your Care EveryWhere ID. This could be used by other organizations to access your [place] medical records  [id number]        Your Vitals Were     Pulse Temperature Pulse Oximetry BMI (Body Mass Index)          97 98.2  F (36.8  C) (Oral) 100% 24.63 kg/m2         Blood Pressure from Last 3 Encounters:   17 114/74   04/10/17 124/87   16 140/82    Weight from Last 3 Encounters:   17 67.1 kg (148 lb)   04/10/17 66.7 kg (147 lb)   16 63.5 kg (140 lb)              We Performed the Following     [facility] PT, HAND, AND CHIROPRACTIC REFERRAL     MENTAL [facility] REFERRAL     PAIN MANAGEMENT CENTER ([place]) REFERRAL          Today's Medication Changes          These changes are accurate as of: 17 11:06 AM.  If you have any questions, ask your nurse or doctor.               These medicines have changed or have updated prescriptions.        Dose/Directions    oxyCODONE 60 MG T12a 12 hr tablet   Commonly known as:  OXYCONTIN   This may have changed:  additional instructions   Used for:  Chronic bilateral low back pain " without sciatica   Changed by:  Sunshine Michael MD        Dose:  1 tablet   Take 1 tablet by mouth every 12 hours Fill on or after 5/20/17   Quantity:  60 each   Refills:  0       oxyCODONE-acetaminophen  MG per tablet   Commonly known as:  PERCOCET   This may have changed:  additional instructions   Used for:  Chronic bilateral low back pain without sciatica   Changed by:  Sunshine Michael MD        Dose:  1 tablet   Take 1 tablet by mouth every 6 hours as needed for pain Will approve up to 5 tablets a day or 150 tablets a month.  Fill on or after 5/20/17   Quantity:  150 tablet   Refills:  0            Where to get your medicines      Some of these will need a paper prescription and others can be bought over the counter.  Ask your nurse if you have questions.     Bring a paper prescription for each of these medications     oxyCODONE 60 MG T12a 12 hr tablet    oxyCODONE-acetaminophen  MG per tablet                Primary Care Provider Office Phone # Fax #    Sunshine Michael [phone]       [address]  [facility] [address]        Thank you!     Thank you for choosing [facility]  for your care. Our goal is always to provide you with excellent care. Hearing back from our patients is one way we can continue to improve our services. Please take a few minutes to complete the written survey that you may receive in the mail after your visit with us. Thank you!             Your Updated Medication List - Protect others around you: Learn how to safely use, store and throw away your medicines at www.disposemymeds.org.          This list is accurate as of: 5/18/17 11:06 AM.  Always use your most recent med list.                   Brand Name Dispense Instructions for use    * albuterol 108 (90 BASE) MCG/ACT Inhaler    PROAIR HFA/PROVENTIL HFA/VENTOLIN HFA    1 Inhaler    Inhale 2 puffs into the lungs every 4 hours as needed for shortness of breath /  dyspnea or wheezing       * albuterol (2.5 MG/3ML) 0.083% neb solution     30 vial    Take 1 vial (2.5 mg) by nebulization every 4 hours as needed for shortness of breath / dyspnea or wheezing       calcium carbonate 500 MG tablet    OS-ADOLFO 500 mg [place]. [place]    100 tablet    Take 1 tablet by mouth 2 times daily.       EPINEPHrine 0.3 MG/0.3ML injection     2 each    Inject 0.3 mLs (0.3 mg) into the muscle once as needed for anaphylaxis       gabapentin 300 MG capsule    NEURONTIN    270 capsule    TAKE 1 CAPSULE BY MOUTH THREE TIMES DAILY       ibuprofen 200 MG tablet    ADVIL/MOTRIN     Pt is taking 4 TABLET EVERY 4 TO 6 HOURS AS NEEDED       LORazepam 1 MG tablet    ATIVAN    90 tablet    Take 1 tablet (1 mg) by mouth every 8 hours as needed for anxiety Fill on or after 5/13/17.       magnesium 250 MG tablet     100 tablet    Take 1 tablet by mouth daily.       NICOTINE STEP 2 14 MG/24HR 24 hr patch   Generic drug:  nicotine     28 patch    PLACE 1 PATCH ONTO THE SKIN EVERY 24 HOURS       oxyCODONE 60 MG T12a 12 hr tablet    OXYCONTIN    60 each    Take 1 tablet by mouth every 12 hours Fill on or after 5/20/17       oxyCODONE-acetaminophen  MG per tablet    PERCOCET    150 tablet    Take 1 tablet by mouth every 6 hours as needed for pain Will approve up to 5 tablets a day or 150 tablets a month.  Fill on or after 5/20/17       prochlorperazine 10 MG tablet    COMPAZINE    10 tablet    Take 1 tablet (10 mg) by mouth every 6 hours as needed for nausea or vomiting       tiZANidine 4 MG tablet    ZANAFLEX    270 tablet    TAKE ONE TABLET BY MOUTH THREE TIMES DAILY AS NEEDED FOR SHOULDER AND UPPER BACK PAIN       vitamin D 2000 UNITS tablet     100 tablet    Take 2,000 Units by mouth daily       * Notice:  This list has 2 medication(s) that are the same as other medications prescribed for you. Read the directions carefully, and ask your doctor or other care provider to review them with you.
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
## 2017-05-18 NOTE — LETTER
June 1, 2017    Ria Nj  [address] [place] [id number]    Dear Ria,                                                                 Welcome to the [facility] at the [facility] [place]. We are located on the 6th floor, [address], of the [facility] located at [address] [place] [address]. For general parking, the [address] is the closest to our building.     Your appointment at the [facility] has been scheduled on Wednesday July 5, 2017 at 1:00 PM with Sumi Ga NP and Francis Marie PhD.    At your first visit, you will meet your team of caregivers who will help you to develop pain management strategies that will last a lifetime. You will meet with our support staff to validate parking at a reduced rate, review your insurance information, and collect your co-payment if required by your insurance company. You will also meet with a medical pain specialist, health psychologist, and care coordinator who will assess your pain and develop a plan of care for your successful pain rehabilitation. You should expect to spend 2-3 hours at your first visit with us. Usually, patients work with us for a period of 6-12 months, and eventually return to their primary doctor once their pain management has stabilized.      To help us make your visit go as smoothly as possible, please bring the following items with you on your visit:   Completed Pain Questionnaire enclosed in this packet.  If you do not bring the completed questionnaire, we may have to reschedule your appointment.  List of any medicines that you are currently taking or have been prescribed  Important NON-[facility] medical information such as medical records or tests results (X-rays, or laboratory tests)  Your health insurance card  Financial resources to cover your co-payment or balance due at the time of service (cash,  personal check, Visa, and MasterCard are acceptable methods of payment)     Due to the demand for new patient evaluations, you must notify the scheduling department 48 hours in advance if you are not able to keep this appointment.  Failure to do so could affect your ability to reschedule with our clinic. Please do not assume that you will  receive any prescription medications at your first visit.    Please call [phone] with any questions regarding your appointment.  We look forward to meeting you and working to address your health care needs.       Sincerely,    [facility]

## 2017-05-18 NOTE — PATIENT INSTRUCTIONS
Referral for counseling recommended.  Someone will contact you to set up appointment.    Repeat MRI to update on back pain.  Referral to back specialist will be planned after the MRI is completed.    Refill medications today.    Ice after activity recommended.  Gentle heat in AM prior to activity can help to loosen up the muscles for the neck area.  Referral to PHYSICAL THERAPY for the neck recommended.    Referral to pain clinic for evaluation as well.

## 2017-05-18 NOTE — LETTER
May 18, 2017    Ria Nj  [address] [place] [id number]    Dear Ria,                 You have been referred to [facility]. We have been unable to contact you by telephone to schedule your appointment. Please call our clinic at [phone] to schedule this appointment.       Sincerely,        [facility]

## 2017-05-19 ASSESSMENT — ANXIETY QUESTIONNAIRES: GAD7 TOTAL SCORE: 17

## 2017-05-30 ENCOUNTER — MEDICAL CORRESPONDENCE (OUTPATIENT)
Dept: HEALTH INFORMATION MANAGEMENT | Facility: CLINIC | Age: 42
End: 2017-05-30

## 2017-05-30 ENCOUNTER — THERAPY VISIT (OUTPATIENT)
Dept: PHYSICAL THERAPY | Facility: CLINIC | Age: 42
End: 2017-05-30
Payer: MEDICARE

## 2017-05-30 DIAGNOSIS — M54.2 CERVICALGIA: Primary | ICD-10-CM

## 2017-05-30 PROCEDURE — G8982 BODY POS GOAL STATUS: HCPCS | Mod: GP | Performed by: PHYSICAL THERAPIST

## 2017-05-30 PROCEDURE — 97110 THERAPEUTIC EXERCISES: CPT | Mod: GP | Performed by: PHYSICAL THERAPIST

## 2017-05-30 PROCEDURE — G8981 BODY POS CURRENT STATUS: HCPCS | Mod: GP | Performed by: PHYSICAL THERAPIST

## 2017-05-30 PROCEDURE — 97163 PT EVAL HIGH COMPLEX 45 MIN: CPT | Mod: GP | Performed by: PHYSICAL THERAPIST

## 2017-05-30 NOTE — MR AVS SNAPSHOT
"              After Visit Summary   2017    iRa Nj    MRN: [id number]           Patient Information     Date Of Birth          1975        Visit Information        Provider Department      2017 12:50 PM Jo Dewey PT [facility] Athletic [facility] Physical [facility]        Today's Diagnoses     Cervicalgia    -  1       Follow-ups after your visit        Who to contact     If you have questions or need follow up information about today's clinic visit or your schedule please contact [facility] PHYSICAL [facility] directly at [phone].  Normal or non-critical lab and imaging results will be communicated to you by Vollyhart, letter or phone within 4 business days after the clinic has received the results. If you do not hear from us within 7 days, please contact the clinic through Vollyhart or phone. If you have a critical or abnormal lab result, we will notify you by phone as soon as possible.  Submit refill requests through [facility] or call your pharmacy and they will forward the refill request to us. Please allow 3 business days for your refill to be completed.          Additional Information About Your Visit        MyChart Information     [facility] lets you send messages to your doctor, view your test results, renew your prescriptions, schedule appointments and more. To sign up, go to [email or web address] . Click on \"Log in\" on the left side of the screen, which will take you to the Welcome page. Then click on \"Sign up Now\" on the right side of the page.     You will be asked to enter the access code listed below, as well as some personal information. Please follow the directions to create your username and password.     Your access code is: [email or web address]  Expires: 2017 11:06 AM     Your access code will  in 90 days. If you need help or a new code, please call your [facility] clinic or [phone].        Care EveryWhere ID     " This is your Care EveryWhere ID. This could be used by other organizations to access your [facility] medical records  [id number]         Blood Pressure from Last 3 Encounters:   05/18/17 114/74   04/10/17 124/87   11/28/16 140/82    Weight from Last 3 Encounters:   05/18/17 67.1 kg (148 lb)   04/10/17 66.7 kg (147 lb)   11/28/16 63.5 kg (140 lb)              We Performed the Following     [facility] CERT REPORT     PT Eval, High Complexity (09971)     Therapeutic Exercises        Primary Care Provider Office Phone # Fax #    Sunshine Michael [phone]       [facility] [address] [place] [address] N  [facility] [address]        Thank you!     Thank you for choosing [facility] PHYSICAL THERAPY  for your care. Our goal is always to provide you with excellent care. Hearing back from our patients is one way we can continue to improve our services. Please take a few minutes to complete the written survey that you may receive in the mail after your visit with us. Thank you!             Your Updated Medication List - Protect others around you: Learn how to safely use, store and throw away your medicines at www.disposemymeds.org.          This list is accurate as of: 5/30/17 11:59 PM.  Always use your most recent med list.                   Brand Name Dispense Instructions for use    * albuterol 108 (90 BASE) MCG/ACT Inhaler    PROAIR HFA/PROVENTIL HFA/VENTOLIN HFA    1 Inhaler    Inhale 2 puffs into the lungs every 4 hours as needed for shortness of breath / dyspnea or wheezing       * albuterol (2.5 MG/3ML) 0.083% neb solution     30 vial    Take 1 vial (2.5 mg) by nebulization every 4 hours as needed for shortness of breath / dyspnea or wheezing       calcium carbonate 500 MG tablet    OS-ADOLFO 500 mg [place]. Ca    100 tablet    Take 1 tablet by mouth 2 times daily.       EPINEPHrine 0.3 MG/0.3ML injection     2 each    Inject 0.3 mLs (0.3 mg) into the muscle once as needed for  anaphylaxis       gabapentin 300 MG capsule    NEURONTIN    270 capsule    TAKE 1 CAPSULE BY MOUTH THREE TIMES DAILY       ibuprofen 200 MG tablet    ADVIL/MOTRIN     Pt is taking 4 TABLET EVERY 4 TO 6 HOURS AS NEEDED       LORazepam 1 MG tablet    ATIVAN    90 tablet    Take 1 tablet (1 mg) by mouth every 8 hours as needed for anxiety Fill on or after 5/13/17.       magnesium 250 MG tablet     100 tablet    Take 1 tablet by mouth daily.       NICOTINE STEP 2 14 MG/24HR 24 hr patch   Generic drug:  nicotine     28 patch    PLACE 1 PATCH ONTO THE SKIN EVERY 24 HOURS       oxyCODONE 60 MG T12a 12 hr tablet    OXYCONTIN    60 each    Take 1 tablet by mouth every 12 hours Fill on or after 5/20/17       oxyCODONE-acetaminophen  MG per tablet    PERCOCET    150 tablet    Take 1 tablet by mouth every 6 hours as needed for pain Will approve up to 5 tablets a day or 150 tablets a month.  Fill on or after 5/20/17       prochlorperazine 10 MG tablet    COMPAZINE    10 tablet    Take 1 tablet (10 mg) by mouth every 6 hours as needed for nausea or vomiting       tiZANidine 4 MG tablet    ZANAFLEX    270 tablet    TAKE ONE TABLET BY MOUTH THREE TIMES DAILY AS NEEDED FOR SHOULDER AND UPPER BACK PAIN       vitamin D 2000 UNITS tablet     100 tablet    Take 2,000 Units by mouth daily       * Notice:  This list has 2 medication(s) that are the same as other medications prescribed for you. Read the directions carefully, and ask your doctor or other care provider to review them with you.
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
## 2017-05-31 PROBLEM — M54.2 CERVICALGIA: Status: ACTIVE | Noted: 2017-05-31

## 2017-06-06 ENCOUNTER — TELEPHONE (OUTPATIENT)
Dept: FAMILY MEDICINE | Facility: CLINIC | Age: 42
End: 2017-06-06

## 2017-06-12 DIAGNOSIS — F41.1 GENERALIZED ANXIETY DISORDER: ICD-10-CM

## 2017-06-12 NOTE — TELEPHONE ENCOUNTER
Ativan      Last Written Prescription Date: 05/10/17  Last Fill Quantity: 90,  # refills: 0   Last Office Visit with [facility], [facility] or [facility] prescribing provider: 05/18/17    Routing refill request to provider for review/approval because:  Drug not on the [facility] refill protocol   Lynette Rajput RN

## 2017-06-13 RX ORDER — LORAZEPAM 1 MG/1
TABLET ORAL
Qty: 90 TABLET | Refills: 0 | Status: SHIPPED | OUTPATIENT
Start: 2017-06-13 | End: 2017-07-20

## 2017-06-16 DIAGNOSIS — G89.29 CHRONIC BILATERAL LOW BACK PAIN WITHOUT SCIATICA: ICD-10-CM

## 2017-06-16 DIAGNOSIS — M54.50 CHRONIC BILATERAL LOW BACK PAIN WITHOUT SCIATICA: ICD-10-CM

## 2017-06-16 NOTE — TELEPHONE ENCOUNTER
oxyCODONE-acetaminophen (PERCOCET)  MG per tablet      Last Written Prescription Date:  5/18/17  Last Fill Quantity: 150,   # refills: 0  Last Office Visit with [facility], [facility] or [facility] prescribing provider: 5/18/17 Dr. Michael  Future Office visit:       Routing refill request to provider for review/approval because:  Drug not on the [facility], [facility] or  Health refill protocol or controlled substance        oxyCODONE (OXYCONTIN) 60 MG T12A 12 hr tablet      Last Written Prescription Date:  5/18/17  Last Fill Quantity: 60,   # refills: 0  Last Office Visit with [facility], [facility] or [facility] prescribing provider: 5/18/17 Dr. Michael  Future Office visit:       Routing refill request to provider for review/approval because:  Drug not on the [facility], [facility] or  Health refill protocol or controlled substance

## 2017-06-16 NOTE — TELEPHONE ENCOUNTER
Reason for Call:  Medication or medication refill:    Do you use a [facility]?  Name of the pharmacy and phone number for the current request:  Written RX    Name of the medication requested: Oxycontin 60 mg, Percocet  mg    Other request: Father - Toni read pickup please call when available. Father will only be able to pickup on Monday 19 so can they be ready then thank you    Can we leave a detailed message on this number? YES    Phone number patient can be reached at: Cell number on file:    Telephone Information:   Mobile [phone]       Best Time: any    Call taken on 6/16/2017 at 1:03 PM by Oneyda Nj

## 2017-06-19 RX ORDER — OXYCODONE AND ACETAMINOPHEN 10; 325 MG/1; MG/1
1 TABLET ORAL EVERY 6 HOURS PRN
Qty: 150 TABLET | Refills: 0 | Status: SHIPPED | OUTPATIENT
Start: 2017-06-19 | End: 2017-07-19

## 2017-06-19 RX ORDER — OXYCODONE HYDROCHLORIDE 60 MG/1
1 TABLET, FILM COATED, EXTENDED RELEASE ORAL EVERY 12 HOURS
Qty: 60 EACH | Refills: 0 | Status: SHIPPED | OUTPATIENT
Start: 2017-06-19 | End: 2017-07-19

## 2017-07-07 ENCOUNTER — TELEPHONE (OUTPATIENT)
Dept: FAMILY MEDICINE | Facility: CLINIC | Age: 42
End: 2017-07-07

## 2017-07-07 DIAGNOSIS — M54.50 CHRONIC BILATERAL LOW BACK PAIN WITHOUT SCIATICA: ICD-10-CM

## 2017-07-07 DIAGNOSIS — M54.5 LOW BACK PAIN, UNSPECIFIED BACK PAIN LATERALITY, UNSPECIFIED CHRONICITY, WITH SCIATICA PRESENCE UNSPECIFIED: ICD-10-CM

## 2017-07-07 DIAGNOSIS — G89.29 CHRONIC BILATERAL LOW BACK PAIN WITHOUT SCIATICA: ICD-10-CM

## 2017-07-07 DIAGNOSIS — F41.1 GENERALIZED ANXIETY DISORDER: ICD-10-CM
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
## 2017-07-08 ENCOUNTER — NURSE TRIAGE (OUTPATIENT)
Dept: NURSING | Facility: CLINIC | Age: 42
End: 2017-07-08

## 2017-07-08 ENCOUNTER — OFFICE VISIT (OUTPATIENT)
Dept: URGENT CARE | Facility: URGENT CARE | Age: 42
End: 2017-07-08
Payer: MEDICARE

## 2017-07-08 VITALS
WEIGHT: 140 LBS | SYSTOLIC BLOOD PRESSURE: 154 MMHG | OXYGEN SATURATION: 97 % | BODY MASS INDEX: 21.22 KG/M2 | HEIGHT: 68 IN | DIASTOLIC BLOOD PRESSURE: 100 MMHG | TEMPERATURE: 98.1 F | HEART RATE: 117 BPM

## 2017-07-08 DIAGNOSIS — G89.4 CHRONIC PAIN SYNDROME: Primary | ICD-10-CM

## 2017-07-08 PROCEDURE — 99212 OFFICE O/P EST SF 10 MIN: CPT | Performed by: FAMILY MEDICINE

## 2017-07-08 RX ORDER — KETOROLAC TROMETHAMINE 30 MG/ML
60 INJECTION, SOLUTION INTRAMUSCULAR; INTRAVENOUS ONCE
Qty: 2 ML | Refills: 0 | OUTPATIENT
Start: 2017-07-08 | End: 2017-07-08

## 2017-07-08 RX ORDER — KETOROLAC TROMETHAMINE 30 MG/ML
60 INJECTION, SOLUTION INTRAMUSCULAR; INTRAVENOUS ONCE
Qty: 2 ML | Refills: 0
Start: 2017-07-08 | End: 2017-07-08

## 2017-07-08 NOTE — TELEPHONE ENCOUNTER
See phone note documentation sent to clinic care team.    Gini Zimmerman RN  [facility]
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
## 2017-07-08 NOTE — MR AVS SNAPSHOT
After Visit Summary   7/8/2017    Ria Nj    MRN: [id number]           Patient Information     Date Of Birth          1975        Visit Information        Provider Department      7/8/2017 4:05 PM Freida Edwards MD [facility]        Today's Diagnoses     Chronic pain syndrome    -  1       Follow-ups after your visit        Follow-up notes from your care team     Return if symptoms worsen or fail to improve.      Your next 10 appointments already scheduled     Jul 13, 2017  6:00 PM CDT   MR LUMBAR SPINE W/O CONTRAST with [id number]   [facility] ([facility])    [address] [place] [id number] [phone]           Take your medicines as usual, unless your doctor tells you not to. Bring a list of your current medicines to your exam (including vitamins, minerals and over-the-counter drugs). Also bring the results of similar scans you may have had.  Please remove any body piercings and hair extensions before you arrive.  Follow your doctor s orders. If you do not, we may have to postpone your exam.  You will not have contrast for this exam. You do not need to do anything special to prepare.  The MRI machine uses a strong magnet. Please wear clothes without metal (snaps, zippers). A sweatsuit works well, or we may give you a hospital gown.   **IMPORTANT** THE INSTRUCTIONS BELOW ARE ONLY FOR THOSE PATIENTS WHO HAVE BEEN TOLD THEY WILL RECEIVE SEDATION OR GENERAL ANESTHESIA DURING THEIR MRI PROCEDURE:  IF YOU WILL RECEIVE SEDATION (take medicine to help you relax during your exam):   You must get the medicine from your doctor before you arrive. Bring the medicine to the exam. Do not take it at home.   Arrive one hour early. Bring someone who can take you home after the test. Your medicine will make you sleepy. After the exam, you may not drive, take a bus or take a taxi by yourself.   No eating 8 hours before  your exam. You may have clear liquids up until 4 hours before your exam. (Clear liquids include water, clear tea, black coffee and fruit juice without pulp.)  IF YOU WILL RECEIVE ANESTHESIA (be asleep for your exam):   Arrive 1 1/2 hours early. Bring someone who can take you home after the test. You may not drive, take a bus or take a taxi by yourself.   No eating 8 hours before your exam. You may have clear liquids up until 4 hours before your exam. (Clear liquids include water, clear tea, black coffee and fruit juice without pulp.)   You will spend four to five hours in the recovery room.  Please call the Imaging Department at your exam site with any questions.            Aug 09, 2017  1:00 PM CDT   New Visit with ELEANOR Conroy [facility] ([facility])    [address]  [address]  [facility] [address] [phone]            Aug 09, 2017  2:00 PM CDT   New Visit with Francis Arrington, PhD [facility] ([facility])    [address]  [address]  [facility] [id number] [phone]              Who to contact     If you have questions or need follow up information about today's clinic visit or your schedule please contact [facility] directly at [phone].  Normal or non-critical lab and imaging results will be communicated to you by [employer], letter or phone within 4 business days after the clinic has received the results. If you do not hear from us within 7 days, please contact the clinic through [employer] or phone. If you have a critical or abnormal lab result, we will notify you by phone as soon as possible.  Submit refill requests through [employer] or call your pharmacy and they will forward the refill request to us. Please allow 3 business days for your refill to be completed.          Additional Information About Your Visit        [employer] Information     [employer] lets you send messages to  "your doctor, view your test results, renew your prescriptions, schedule appointments and more. To sign up, go to [email or web address] . Click on \"Log in\" on the left side of the screen, which will take you to the Welcome page. Then click on \"Sign up Now\" on the right side of the page.     You will be asked to enter the access code listed below, as well as some personal information. Please follow the directions to create your username and password.     Your access code is: [email or web address]  Expires: 2017 11:06 AM     Your access code will  in 90 days. If you need help or a new code, please call your [facility] clinic or [phone].        Care EveryWhere ID     This is your Care EveryWhere ID. This could be used by other organizations to access your [facility] medical records  [id number]        Your Vitals Were     Pulse Temperature Height Pulse Oximetry BMI (Body Mass Index)       117 98.1  F (36.7  C) (Oral) 5' 8\" (1.727 m) 97% 21.29 kg/m2        Blood Pressure from Last 3 Encounters:   17 (!) 154/100   17 114/74   04/10/17 124/87    Weight from Last 3 Encounters:   17 140 lb (63.5 kg)   17 148 lb (67.1 kg)   04/10/17 147 lb (66.7 kg)              Today, you had the following     No orders found for display         Today's Medication Changes          These changes are accurate as of: 17  5:31 PM.  If you have any questions, ask your nurse or doctor.               Start taking these medicines.        Dose/Directions    ketorolac 60 MG/2ML Soln injection   Commonly known as:  TORADOL   Used for:  Chronic pain syndrome   Started by:  Freida Edwards MD        Dose:  60 mg   Inject 2 mLs (60 mg) into the muscle once for 1 dose   Quantity:  2 mL   Refills:  0            Where to get your medicines      Some of these will need a paper prescription and others can be bought over the counter.  Ask your nurse if you have questions.     You don't need a prescription " for these medications     ketorolac 60 MG/2ML Soln injection                Primary Care Provider Office Phone # Fax #    Sunshine Michael [phone]       [facility] [address] [place] [address] N  [facility] [address]        Equal Access to Services     MONO WATERS : Hadii aad ku hadasho Soomaali, waaxda luqadaha, qaybta kaalmada adeegyada, waxay idiin hayaan adeeg ellynnamita miranda. So [facility] [phone].    ATENCIÓN: Si habla español, tiene a freire disposición servicios gratuitos de asistencia lingüística. Llame al [phone].    We comply with applicable federal civil rights laws and [place] laws. We do not discriminate on the basis of race, color, national origin, age, disability sex, sexual orientation or gender identity.            Thank you!     Thank you for choosing [facility]  for your care. Our goal is always to provide you with excellent care. Hearing back from our patients is one way we can continue to improve our services. Please take a few minutes to complete the written survey that you may receive in the mail after your visit with us. Thank you!             Your Updated Medication List - Protect others around you: Learn how to safely use, store and throw away your medicines at www.disposemymeds.org.          This list is accurate as of: 7/8/17  5:31 PM.  Always use your most recent med list.                   Brand Name Dispense Instructions for use Diagnosis    * albuterol 108 (90 BASE) MCG/ACT Inhaler    PROAIR HFA/PROVENTIL HFA/VENTOLIN HFA    1 Inhaler    Inhale 2 puffs into the lungs every 4 hours as needed for shortness of breath / dyspnea or wheezing    Acute bronchospasm       * albuterol (2.5 MG/3ML) 0.083% neb solution     30 vial    Take 1 vial (2.5 mg) by nebulization every 4 hours as needed for shortness of breath / dyspnea or wheezing    Acute bronchospasm       calcium carbonate 1250 MG tablet    OS-ADOLFO 500 mg [place]. [place]    100 tablet    Take 1  tablet by mouth 2 times daily.    Routine general medical examination at a health care facility       EPINEPHrine 0.3 MG/0.3ML injection     2 each    Inject 0.3 mLs (0.3 mg) into the muscle once as needed for anaphylaxis    Bee sting-induced anaphylaxis, accidental or unintentional, subsequent encounter       gabapentin 300 MG capsule    NEURONTIN    270 capsule    TAKE 1 CAPSULE BY MOUTH THREE TIMES DAILY    Low back pain, unspecified back pain laterality, unspecified chronicity, with sciatica presence unspecified       ibuprofen 200 MG tablet    ADVIL/MOTRIN     Pt is taking 4 TABLET EVERY 4 TO 6 HOURS AS NEEDED        ketorolac 60 MG/2ML Soln injection    TORADOL    2 mL    Inject 2 mLs (60 mg) into the muscle once for 1 dose    Chronic pain syndrome       LORazepam 1 MG tablet    ATIVAN    90 tablet    TAKE 1 TABLET BY MOUTH EVERY 8 HOURS AS NEEDED FOR ANXIETY    Generalized anxiety disorder       magnesium 250 MG tablet     100 tablet    Take 1 tablet by mouth daily.    Routine general medical examination at a health care facility       NICOTINE STEP 2 14 MG/24HR 24 hr patch   Generic drug:  nicotine     28 patch    PLACE 1 PATCH ONTO THE SKIN EVERY 24 HOURS    Tobacco use disorder       oxyCODONE 60 MG T12a 12 hr tablet    OXYCONTIN    60 each    Take 1 tablet by mouth every 12 hours Fill on or after 6/19/17    Chronic bilateral low back pain without sciatica       oxyCODONE-acetaminophen  MG per tablet    PERCOCET    150 tablet    Take 1 tablet by mouth every 6 hours as needed for pain Will approve up to 5 tablets a day or 150 tablets a month.  Fill on or after 6/19/17    Chronic bilateral low back pain without sciatica       prochlorperazine 10 MG tablet    COMPAZINE    10 tablet    Take 1 tablet (10 mg) by mouth every 6 hours as needed for nausea or vomiting        tiZANidine 4 MG tablet    ZANAFLEX    270 tablet    TAKE ONE TABLET BY MOUTH THREE TIMES DAILY AS NEEDED FOR SHOULDER AND UPPER BACK PAIN     Chronic bilateral low back pain without sciatica       vitamin D 2000 UNITS tablet     100 tablet    Take 2,000 Units by mouth daily    Major depressive disorder, recurrent episode, moderate (H)       * Notice:  This list has 2 medication(s) that are the same as other medications prescribed for you. Read the directions carefully, and ask your doctor or other care provider to review them with you.
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
## 2017-07-09 ENCOUNTER — NURSE TRIAGE (OUTPATIENT)
Dept: NURSING | Facility: CLINIC | Age: 42
End: 2017-07-09

## 2017-07-09 ENCOUNTER — TELEPHONE (OUTPATIENT)
Dept: NURSING | Facility: CLINIC | Age: 42
End: 2017-07-09

## 2017-07-10 DIAGNOSIS — G89.29 CHRONIC BILATERAL LOW BACK PAIN WITHOUT SCIATICA: ICD-10-CM

## 2017-07-10 DIAGNOSIS — M54.50 CHRONIC BILATERAL LOW BACK PAIN WITHOUT SCIATICA: ICD-10-CM

## 2017-07-10 RX ORDER — GABAPENTIN 300 MG/1
CAPSULE ORAL
Qty: 270 CAPSULE | Refills: 0 | Status: SHIPPED | OUTPATIENT
Start: 2017-07-10 | End: 2017-08-17

## 2017-07-10 RX ORDER — TRAMADOL HYDROCHLORIDE 50 MG/1
50 TABLET ORAL EVERY 8 HOURS PRN
Qty: 24 TABLET | Refills: 0 | Status: SHIPPED | OUTPATIENT
Start: 2017-07-10 | End: 2017-08-17

## 2017-07-10 RX ORDER — OXYCODONE HYDROCHLORIDE 60 MG/1
1 TABLET, FILM COATED, EXTENDED RELEASE ORAL EVERY 12 HOURS
Qty: 60 EACH | Refills: 0 | Status: CANCELLED | OUTPATIENT
Start: 2017-07-10

## 2017-07-10 RX ORDER — OXYCODONE AND ACETAMINOPHEN 10; 325 MG/1; MG/1
1 TABLET ORAL EVERY 6 HOURS PRN
Qty: 150 TABLET | Refills: 0 | Status: CANCELLED | OUTPATIENT
Start: 2017-07-10

## 2017-07-10 RX ORDER — LORAZEPAM 1 MG/1
TABLET ORAL
Qty: 90 TABLET | Refills: 0 | Status: CANCELLED | OUTPATIENT
Start: 2017-07-10

## 2017-07-10 NOTE — TELEPHONE ENCOUNTER
Clinic Action Needed:Yes  Reason for Call: Patient would like a call about her pain, medications.  Patient Recommendations/Teaching:Referred to ER - another person to drive  Teaching per [employer] guidelines.  Routed to: MG AD[facility]BERTO [facility]
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
## 2017-07-10 NOTE — TELEPHONE ENCOUNTER
The tizanidine and neurontin can be filled as those have not been filled recently.  Verify pharmacy.  The ativan is due on 7/13/17.   For the pain medications, we cannot give early refill.   They are not due for filling until 7/19/17.    PSK

## 2017-07-10 NOTE — TELEPHONE ENCOUNTER
Fax script.  Call patient re:  I will send a script for Tramadol in for her - she can use it every 8 hours as needed.  Follow up appointment in office in next week is recommended.    DAVID

## 2017-07-10 NOTE — TELEPHONE ENCOUNTER
Patient is calling back and states due to her insurance they will not give her Gabapentin due to early refill along with Tizandidine. The only medication they'll accept is Tylenol 3's or Oxycontin. Patient is requesting a call back from care team. Please call and advise. Thanks.

## 2017-07-10 NOTE — TELEPHONE ENCOUNTER
This writer attempted to contact Ria on 07/10/17      Reason for call triage and left message to return call.      When patient calls back, please contact clinic RN team. If no one available, document that pt called and route to care team. routine priority.          Lynette Martinez, RN

## 2017-07-10 NOTE — TELEPHONE ENCOUNTER
Called patient - gave her this information. Verified pharmacy.    Pt wonders if anything can be given in the meantime - as an alternative?    Will Kayleigh BRAVO

## 2017-07-10 NOTE — TELEPHONE ENCOUNTER
Reason for Disposition    Nursing judgment    Protocols used: NO GUIDELINE OR REFERENCE AVAILABLE-ADULT-AH

## 2017-07-11 NOTE — TELEPHONE ENCOUNTER
Received PA form for pt's Tramadol - placed on Dr. Michael's desk for review    Will Kayleigh BRAVO

## 2017-07-11 NOTE — TELEPHONE ENCOUNTER
Denied. Pharmacy asking for 90 day supply, which was already sent and faxed on 7/10/17.    Zuleyma Mendoza RN

## 2017-07-11 NOTE — TELEPHONE ENCOUNTER
"Patient called back, was crying throughout conversation (greater than 20 minutes was spent on call with patient).  Patient reported that she is in extreme pain (rated 10 out of 10 on pain scale) and reported that due to the severity of the pain she is having difficulty getting out of bed.  Patient was informed of provider's message below - contact Joana at [facility] who stated prescription was on provider's desk waiting for signature - once signed prescription would be faxed tonight.  Patient verbalized understanding of this information but stated that the pain has gotten \"out of hand\" with not having her medications for the past few days that she does not think the medication will help.  Offered to schedule patient an office visit with primary provider for 7/11 or 7/13 - patient stated she was in too much pain to think right now and would call back to schedule an appointment later this week.  Patient was strongly verbalized that she did not feel the Tramadol would help her pain and stated she knew she was going to have to go to the ER for pain relief.  This nurse advised that if pain was 10/10 patient could be evaluated in ER setting but recommended she have another person drive her.  Patient requested the address of [facility] in [place] and stated that was likely where she was going to go tonight.  Recommended that patient inform ER staff who her primary provider is so that medical records of visit could be obtained to ensure continuity of care.  Advised that all of this information would be forwarded onto her provider.  Ruthie Souza RN    "
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
## 2017-07-19 ENCOUNTER — OFFICE VISIT (OUTPATIENT)
Dept: FAMILY MEDICINE | Facility: CLINIC | Age: 42
End: 2017-07-19
Payer: MEDICARE

## 2017-07-19 DIAGNOSIS — G89.29 CHRONIC BILATERAL LOW BACK PAIN WITHOUT SCIATICA: ICD-10-CM

## 2017-07-19 DIAGNOSIS — M54.50 CHRONIC BILATERAL LOW BACK PAIN WITHOUT SCIATICA: ICD-10-CM

## 2017-07-19 PROCEDURE — 99207 ZZC NO CHARGE LOS: CPT | Performed by: PHYSICIAN ASSISTANT

## 2017-07-19 RX ORDER — OXYCODONE HYDROCHLORIDE 60 MG/1
1 TABLET, FILM COATED, EXTENDED RELEASE ORAL EVERY 12 HOURS
Qty: 60 EACH | Refills: 0 | Status: SHIPPED | OUTPATIENT
Start: 2017-07-19 | End: 2017-08-17

## 2017-07-19 RX ORDER — OXYCODONE AND ACETAMINOPHEN 10; 325 MG/1; MG/1
1 TABLET ORAL EVERY 6 HOURS PRN
Qty: 150 TABLET | Refills: 0 | Status: SHIPPED | OUTPATIENT
Start: 2017-07-19 | End: 2017-08-17

## 2017-07-19 NOTE — MR AVS SNAPSHOT
After Visit Summary   7/19/2017    Ria Nj    MRN: [id number]           Patient Information     Date Of Birth          1975        Visit Information        Provider Department      7/19/2017 4:40 PM Luanne Horton PA-C [facility]        Today's Diagnoses     Chronic bilateral low back pain without sciatica           Follow-ups after your visit        Your next 10 appointments already scheduled     Jul 27, 2017  2:00 PM CDT   Office Visit with Sunshine Michael MD   [facility] ([facility])    [address] [facility] [id number] [phone]           Bring a current list of meds and any records pertaining to this visit.  For Physicals, please bring immunization records and any forms needing to be filled out.  Please arrive 10 minutes early to complete paperwork.            Aug 09, 2017  1:00 PM CDT   New Visit with ELEANOR Conroy CNP   [facility] ([facility]    [address]  [address]  [facility] [address] [phone]            Aug 09, 2017  2:00 PM CDT   New Visit with Francis Arrington, PhD LP   [facility] ([facility])    [address]  [address]  [facility] [address] [phone]              Who to contact     If you have questions or need follow up information about today's clinic visit or your schedule please contact [facility] directly at [phone].  Normal or non-critical lab and imaging results will be communicated to you by MyChart, letter or phone within 4 business days after the clinic has received the results. If you do not hear from us within 7 days, please contact the clinic through MyChart or phone. If you have a critical or abnormal lab result, we will notify you by phone as soon as possible.  Submit refill requests through [employer] or call your pharmacy and they will forward the  "refill request to us. Please allow 3 business days for your refill to be completed.          Additional Information About Your Visit        Consultant [employer] Information     [employer] lets you send messages to your doctor, view your test results, renew your prescriptions, schedule appointments and more. To sign up, go to [email or web address] . Click on \"Log in\" on the left side of the screen, which will take you to the Welcome page. Then click on \"Sign up Now\" on the right side of the page.     You will be asked to enter the access code listed below, as well as some personal information. Please follow the directions to create your username and password.     Your access code is: [email or web address]  Expires: 2017 11:06 AM     Your access code will  in 90 days. If you need help or a new code, please call your [facility] clinic or [phone].        Care EveryWhere ID     This is your Care EveryWhere ID. This could be used by other organizations to access your [facility] medical records  [id number]         Blood Pressure from Last 3 Encounters:   17 (!) 154/100   17 114/74   04/10/17 124/87    Weight from Last 3 Encounters:   17 63.5 kg (140 lb)   17 67.1 kg (148 lb)   04/10/17 66.7 kg (147 lb)              Today, you had the following     No orders found for display         Today's Medication Changes          These changes are accurate as of: 17 11:59 PM.  If you have any questions, ask your nurse or doctor.               These medicines have changed or have updated prescriptions.        Dose/Directions    oxyCODONE 60 MG T12a 12 hr tablet   Commonly known as:  OXYCONTIN   This may have changed:  additional instructions   Used for:  Chronic bilateral low back pain without sciatica        Dose:  1 tablet   Take 1 tablet by mouth every 12 hours Fill on or after 17   Quantity:  60 each   Refills:  0       oxyCODONE-acetaminophen  MG per tablet   Commonly known as:  PERCOCET   This may " have changed:  additional instructions   Used for:  Chronic bilateral low back pain without sciatica        Dose:  1 tablet   Take 1 tablet by mouth every 6 hours as needed for pain Will approve up to 5 tablets a day or 150 tablets a month.  Fill on or after 7/19/17   Quantity:  150 tablet   Refills:  0            Where to get your medicines      Some of these will need a paper prescription and others can be bought over the counter.  Ask your nurse if you have questions.     Bring a paper prescription for each of these medications     oxyCODONE 60 MG T12a 12 hr tablet    oxyCODONE-acetaminophen  MG per tablet                Primary Care Provider Office Phone # Fax #    Sunshine Michael [phone]       [facility] [address] [place] [address] N  [facility] [address]        Equal Access to Services     MONO WATERS : Hadii kandy beltrano Sosimon, waaxda luqadaha, qaybta kaalmada adeegyada, toy bowling . So [facility] [phone].    ATENCIÓN: Si habla español, tiene a freire disposición servicios gratuitos de asistencia lingüística. Llame al [phone].    We comply with applicable federal civil rights laws and [place] laws. We do not discriminate on the basis of race, color, national origin, age, disability sex, sexual orientation or gender identity.            Thank you!     Thank you for choosing [facility]  for your care. Our goal is always to provide you with excellent care. Hearing back from our patients is one way we can continue to improve our services. Please take a few minutes to complete the written survey that you may receive in the mail after your visit with us. Thank you!             Your Updated Medication List - Protect others around you: Learn how to safely use, store and throw away your medicines at www.disposemymeds.org.          This list is accurate as of: 7/19/17 11:59 PM.  Always use your most recent med list.                   Brand  Name Dispense Instructions for use Diagnosis    * albuterol 108 (90 BASE) MCG/ACT Inhaler    PROAIR HFA/PROVENTIL HFA/VENTOLIN HFA    1 Inhaler    Inhale 2 puffs into the lungs every 4 hours as needed for shortness of breath / dyspnea or wheezing    Acute bronchospasm       * albuterol (2.5 MG/3ML) 0.083% neb solution     30 vial    Take 1 vial (2.5 mg) by nebulization every 4 hours as needed for shortness of breath / dyspnea or wheezing    Acute bronchospasm       calcium carbonate 1250 MG tablet    OS-ADOLFO 500 mg [place]. Ca    100 tablet    Take 1 tablet by mouth 2 times daily.    Routine general medical examination at a health care facility       EPINEPHrine 0.3 MG/0.3ML injection 2-pack    EPIPEN/ADRENACLICK/or ANY BX GENERIC EQUIV    2 each    Inject 0.3 mLs (0.3 mg) into the muscle once as needed for anaphylaxis    Bee sting-induced anaphylaxis, accidental or unintentional, subsequent encounter       gabapentin 300 MG capsule    NEURONTIN    270 capsule    TAKE 1 CAPSULE BY MOUTH THREE TIMES DAILY    Low back pain, unspecified back pain laterality, unspecified chronicity, with sciatica presence unspecified       ibuprofen 200 MG tablet    ADVIL/MOTRIN     Pt is taking 4 TABLET EVERY 4 TO 6 HOURS AS NEEDED        magnesium 250 MG tablet     100 tablet    Take 1 tablet by mouth daily.    Routine general medical examination at a health care facility       NICOTINE STEP 2 14 MG/24HR 24 hr patch   Generic drug:  nicotine     28 patch    PLACE 1 PATCH ONTO THE SKIN EVERY 24 HOURS    Tobacco use disorder       oxyCODONE 60 MG T12a 12 hr tablet    OXYCONTIN    60 each    Take 1 tablet by mouth every 12 hours Fill on or after 7/19/17    Chronic bilateral low back pain without sciatica       oxyCODONE-acetaminophen  MG per tablet    PERCOCET    150 tablet    Take 1 tablet by mouth every 6 hours as needed for pain Will approve up to 5 tablets a day or 150 tablets a month.  Fill on or after 7/19/17    Chronic bilateral  low back pain without sciatica       prochlorperazine 10 MG tablet    COMPAZINE    10 tablet    Take 1 tablet (10 mg) by mouth every 6 hours as needed for nausea or vomiting        tiZANidine 4 MG tablet    ZANAFLEX    270 tablet    TAKE ONE TABLET BY MOUTH THREE TIMES DAILY AS NEEDED FOR SHOULDER AND UPPER BACK PAIN    Chronic bilateral low back pain without sciatica       traMADol 50 MG tablet    ULTRAM    24 tablet    Take 1 tablet (50 mg) by mouth every 8 hours as needed for pain maximum 3 tablet(s) per day    Chronic bilateral low back pain without sciatica, Low back pain, unspecified back pain laterality, unspecified chronicity, with sciatica presence unspecified       vitamin D 2000 UNITS tablet     100 tablet    Take 2,000 Units by mouth daily    Major depressive disorder, recurrent episode, moderate (H)       * Notice:  This list has 2 medication(s) that are the same as other medications prescribed for you. Read the directions carefully, and ask your doctor or other care provider to review them with you.

## 2017-07-19 NOTE — PROGRESS NOTES
Patient late for appointment- advised needs to reschedule.  Due for narcotic refills.  Medications refilled.  Follow up with PCP

## 2017-07-20 DIAGNOSIS — F41.1 GENERALIZED ANXIETY DISORDER: ICD-10-CM

## 2017-07-20 RX ORDER — LORAZEPAM 1 MG/1
TABLET ORAL
Qty: 90 TABLET | Refills: 0 | Status: SHIPPED | OUTPATIENT
Start: 2017-07-20 | End: 2017-08-17

## 2017-07-20 NOTE — TELEPHONE ENCOUNTER
LORazepam (ATIVAN) 1 MG tablet      Last Written Prescription Date:  6/13/17  Last Fill Quantity: 90,   # refills: 0  Last Office Visit with [facility] or [facility] prescribing provider: 7/19/17  Future Office visit:    Next 5 appointments (look out 90 days)     Jul 27, 2017  2:00 PM CDT   [facility] with Sunshine [facility] MD   [facility]    [address] [place] [id number] [phone]                   Routing refill request to provider for review/approval because:  Drug not on the [facility], [facility] or [facility] refill protocol or controlled substance

## 2017-08-17 ENCOUNTER — OFFICE VISIT (OUTPATIENT)
Dept: FAMILY MEDICINE | Facility: CLINIC | Age: 42
End: 2017-08-17
Payer: MEDICARE

## 2017-08-17 VITALS
OXYGEN SATURATION: 98 % | BODY MASS INDEX: 23.64 KG/M2 | SYSTOLIC BLOOD PRESSURE: 130 MMHG | TEMPERATURE: 97.9 F | WEIGHT: 156 LBS | HEIGHT: 68 IN | DIASTOLIC BLOOD PRESSURE: 86 MMHG | RESPIRATION RATE: 17 BRPM | HEART RATE: 90 BPM

## 2017-08-17 DIAGNOSIS — M54.50 CHRONIC BILATERAL LOW BACK PAIN WITHOUT SCIATICA: ICD-10-CM

## 2017-08-17 DIAGNOSIS — G89.29 CHRONIC BILATERAL LOW BACK PAIN WITHOUT SCIATICA: ICD-10-CM

## 2017-08-17 DIAGNOSIS — M54.5 LOW BACK PAIN, UNSPECIFIED BACK PAIN LATERALITY, UNSPECIFIED CHRONICITY, WITH SCIATICA PRESENCE UNSPECIFIED: ICD-10-CM

## 2017-08-17 DIAGNOSIS — F17.200 TOBACCO USE DISORDER: ICD-10-CM

## 2017-08-17 DIAGNOSIS — F41.1 GENERALIZED ANXIETY DISORDER: ICD-10-CM

## 2017-08-17 PROCEDURE — 99214 OFFICE O/P EST MOD 30 MIN: CPT | Performed by: PHYSICIAN ASSISTANT

## 2017-08-17 PROCEDURE — 80307 DRUG TEST PRSMV CHEM ANLYZR: CPT | Mod: 90 | Performed by: PHYSICIAN ASSISTANT

## 2017-08-17 PROCEDURE — 99000 SPECIMEN HANDLING OFFICE-LAB: CPT | Performed by: PHYSICIAN ASSISTANT

## 2017-08-17 RX ORDER — OXYCODONE AND ACETAMINOPHEN 10; 325 MG/1; MG/1
1 TABLET ORAL EVERY 6 HOURS PRN
Qty: 150 TABLET | Refills: 0 | Status: SHIPPED | OUTPATIENT
Start: 2017-08-17 | End: 2017-09-18

## 2017-08-17 RX ORDER — OXYCODONE HYDROCHLORIDE 60 MG/1
1 TABLET, FILM COATED, EXTENDED RELEASE ORAL EVERY 12 HOURS
Qty: 60 EACH | Refills: 0 | Status: SHIPPED | OUTPATIENT
Start: 2017-08-17 | End: 2017-09-18

## 2017-08-17 RX ORDER — LORAZEPAM 1 MG/1
TABLET ORAL
Qty: 90 TABLET | Refills: 0 | Status: SHIPPED | OUTPATIENT
Start: 2017-08-17 | End: 2017-09-20

## 2017-08-17 RX ORDER — NICOTINE 21 MG/24HR
PATCH, TRANSDERMAL 24 HOURS TRANSDERMAL
Qty: 30 PATCH | Refills: 0 | Status: SHIPPED | OUTPATIENT
Start: 2017-08-17 | End: 2017-10-17 | Stop reason: DRUGHIGH

## 2017-08-17 RX ORDER — GABAPENTIN 300 MG/1
CAPSULE ORAL
Qty: 270 CAPSULE | Refills: 0 | Status: SHIPPED | OUTPATIENT
Start: 2017-08-17 | End: 2017-10-17

## 2017-08-17 ASSESSMENT — ANXIETY QUESTIONNAIRES
3. WORRYING TOO MUCH ABOUT DIFFERENT THINGS: MORE THAN HALF THE DAYS
1. FEELING NERVOUS, ANXIOUS, OR ON EDGE: NEARLY EVERY DAY
2. NOT BEING ABLE TO STOP OR CONTROL WORRYING: NEARLY EVERY DAY
6. BECOMING EASILY ANNOYED OR IRRITABLE: NEARLY EVERY DAY
7. FEELING AFRAID AS IF SOMETHING AWFUL MIGHT HAPPEN: NEARLY EVERY DAY
GAD7 TOTAL SCORE: 20
IF YOU CHECKED OFF ANY PROBLEMS ON THIS QUESTIONNAIRE, HOW DIFFICULT HAVE THESE PROBLEMS MADE IT FOR YOU TO DO YOUR WORK, TAKE CARE OF THINGS AT HOME, OR GET ALONG WITH OTHER PEOPLE: SOMEWHAT DIFFICULT
5. BEING SO RESTLESS THAT IT IS HARD TO SIT STILL: NEARLY EVERY DAY

## 2017-08-17 ASSESSMENT — PATIENT HEALTH QUESTIONNAIRE - PHQ9
SUM OF ALL RESPONSES TO PHQ QUESTIONS 1-9: 11
5. POOR APPETITE OR OVEREATING: NEARLY EVERY DAY

## 2017-08-17 ASSESSMENT — PAIN SCALES - GENERAL: PAINLEVEL: MODERATE PAIN (5)
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
## 2017-08-17 NOTE — MR AVS SNAPSHOT
After Visit Summary   8/17/2017    Ria Nj    MRN: [id number]           Patient Information     Date Of Birth          1975        Visit Information        Provider Department      8/17/2017 11:40 AM Luanne Horton PA-C [facility]        Today's Diagnoses     Low back pain, unspecified back pain laterality, unspecified chronicity, with sciatica presence unspecified        Chronic bilateral low back pain without sciatica        Generalized anxiety disorder        Tobacco use disorder          Care Instructions    Follow up with pain clinic as scheduled.     Continue current medications              Follow-ups after your visit        Your next 10 appointments already scheduled     Sep 27, 2017  1:00 PM CDT   New Visit with ELEANOR Conroy CNP   [facility] ([facility])    [address]  [address]  [facility] [address] [phone]            Sep 27, 2017  2:00 PM CDT   New Visit with Francis Arrington, PhD [facility] ([facility])    [address]  [address]  [facility] [address] [phone]              Who to contact     If you have questions or need follow up information about today's clinic visit or your schedule please contact [facility] directly at [phone].  Normal or non-critical lab and imaging results will be communicated to you by MyChart, letter or phone within 4 business days after the clinic has received the results. If you do not hear from us within 7 days, please contact the clinic through MyChart or phone. If you have a critical or abnormal lab result, we will notify you by phone as soon as possible.  Submit refill requests through [employer] or call your pharmacy and they will forward the refill request to us. Please allow 3 business days for your refill to be completed.          Additional Information About Your Visit        [facility]  "Information     [employer] lets you send messages to your doctor, view your test results, renew your prescriptions, schedule appointments and more. To sign up, go to [email or web address] . Click on \"Log in\" on the left side of the screen, which will take you to the Welcome page. Then click on \"Sign up Now\" on the right side of the page.     You will be asked to enter the access code listed below, as well as some personal information. Please follow the directions to create your username and password.     Your access code is: [email or web address]  Expires: 11/15/2017 12:11 PM     Your access code will  in 90 days. If you need help or a new code, please call your [place] clinic or [phone].        Care EveryWhere ID     This is your Care EveryWhere ID. This could be used by other organizations to access your [place] medical records  [id number]        Your Vitals Were     Pulse Temperature Respirations Height Pulse Oximetry Breastfeeding?    90 97.9  F (36.6  C) (Oral) 17 1.727 m (5' 8\") 98% No    BMI (Body Mass Index)                   23.72 kg/m2            Blood Pressure from Last 3 Encounters:   17 130/86   17 (!) 154/100   17 114/74    Weight from Last 3 Encounters:   17 70.8 kg (156 lb)   17 63.5 kg (140 lb)   17 67.1 kg (148 lb)              We Performed the Following     Drug  Screen Comprehensive, Urine w/o Reported Meds (Pain Care Package)          Today's Medication Changes          These changes are accurate as of: 17 12:11 PM.  If you have any questions, ask your nurse or doctor.               These medicines have changed or have updated prescriptions.        Dose/Directions    LORazepam 1 MG tablet   Commonly known as:  ATIVAN   This may have changed:  See the new instructions.   Used for:  Generalized anxiety disorder   Changed by:  Luanne Horton PA-C        TAKE 1 TABLET BY MOUTH EVERY 8 HOURS AS NEEDED FOR ANXIETY   Quantity:  90 tablet   Refills:  " 0       nicotine 14 MG/24HR 24 hr patch   Commonly known as:  NICOTINE STEP 2   This may have changed:  See the new instructions.   Used for:  Tobacco use disorder   Changed by:  Luanne Horton PA-C        PLACE 1 PATCH ONTO THE SKIN EVERY 24 HOURS   Quantity:  30 patch   Refills:  0       oxyCODONE 60 MG T12a 12 hr tablet   Commonly known as:  OXYCONTIN   This may have changed:  additional instructions   Used for:  Chronic bilateral low back pain without sciatica   Changed by:  Luanne Horton PA-C        Dose:  1 tablet   Take 1 tablet by mouth every 12 hours Fill on or after 8/18/17   Quantity:  60 each   Refills:  0       oxyCODONE-acetaminophen  MG per tablet   Commonly known as:  PERCOCET   This may have changed:  additional instructions   Used for:  Chronic bilateral low back pain without sciatica   Changed by:  Luanne Horton PA-C        Dose:  1 tablet   Take 1 tablet by mouth every 6 hours as needed for pain Will approve up to 5 tablets a day or 150 tablets a month.  Fill on or after 8/18/17   Quantity:  150 tablet   Refills:  0            Where to get your medicines      These medications were sent to [facility] [address] [place] [address] AT [address] [address]    Hours:  24-hours Phone:  [phone]     gabapentin 300 MG capsule    nicotine 14 MG/24HR 24 hr patch    tiZANidine 4 MG tablet         Some of these will need a paper prescription and others can be bought over the counter.  Ask your nurse if you have questions.     Bring a paper prescription for each of these medications     LORazepam 1 MG tablet    oxyCODONE 60 MG T12a 12 hr tablet    oxyCODONE-acetaminophen  MG per tablet                Primary Care Provider Office Phone # Fax #    Sunshine Michael [phone] [address] [facility] [address]        Equal Access to Services     MONO WATERS AH: Vladimir  kandy Moreno, waavisda elinaadaha, qaarmandota kadevaughn zacklashonda, waxkatarina manasa edmondsonfranknamita bowling ruben. So [facility] [phone].    ATENCIÓN: Si habla riky, tiene a freire disposición servicios gratuitos de asistencia lingüística. Jono al [phone].    We comply with applicable federal civil rights laws and [place] laws. We do not discriminate on the basis of race, color, national origin, age, disability sex, sexual orientation or gender identity.            Thank you!     Thank you for choosing [facility]  for your care. Our goal is always to provide you with excellent care. Hearing back from our patients is one way we can continue to improve our services. Please take a few minutes to complete the written survey that you may receive in the mail after your visit with us. Thank you!             Your Updated Medication List - Protect others around you: Learn how to safely use, store and throw away your medicines at www.disposemymeds.org.          This list is accurate as of: 8/17/17 12:11 PM.  Always use your most recent med list.                   Brand Name Dispense Instructions for use Diagnosis    * albuterol 108 (90 BASE) MCG/ACT Inhaler    PROAIR HFA/PROVENTIL HFA/VENTOLIN HFA    1 Inhaler    Inhale 2 puffs into the lungs every 4 hours as needed for shortness of breath / dyspnea or wheezing    Acute bronchospasm       * albuterol (2.5 MG/3ML) 0.083% neb solution     30 vial    Take 1 vial (2.5 mg) by nebulization every 4 hours as needed for shortness of breath / dyspnea or wheezing    Acute bronchospasm       calcium carbonate 1250 MG tablet    OS-ADOLFO 500 mg [place]. Ca    100 tablet    Take 1 tablet by mouth 2 times daily.    Routine general medical examination at a health care facility       EPINEPHrine 0.3 MG/0.3ML injection 2-pack    EPIPEN/ADRENACLICK/or ANY BX GENERIC EQUIV    2 each    Inject 0.3 mLs (0.3 mg) into the muscle once as needed for anaphylaxis    Bee sting-induced  anaphylaxis, accidental or unintentional, subsequent encounter       gabapentin 300 MG capsule    NEURONTIN    270 capsule    TAKE 1 CAPSULE BY MOUTH THREE TIMES DAILY    Low back pain, unspecified back pain laterality, unspecified chronicity, with sciatica presence unspecified       ibuprofen 200 MG tablet    ADVIL/MOTRIN     Pt is taking 4 TABLET EVERY 4 TO 6 HOURS AS NEEDED        LORazepam 1 MG tablet    ATIVAN    90 tablet    TAKE 1 TABLET BY MOUTH EVERY 8 HOURS AS NEEDED FOR ANXIETY    Generalized anxiety disorder       magnesium 250 MG tablet     100 tablet    Take 1 tablet by mouth daily.    Routine general medical examination at a health care facility       nicotine 14 MG/24HR 24 hr patch    NICOTINE STEP 2    30 patch    PLACE 1 PATCH ONTO THE SKIN EVERY 24 HOURS    Tobacco use disorder       oxyCODONE 60 MG T12a 12 hr tablet    OXYCONTIN    60 each    Take 1 tablet by mouth every 12 hours Fill on or after 8/18/17    Chronic bilateral low back pain without sciatica       oxyCODONE-acetaminophen  MG per tablet    PERCOCET    150 tablet    Take 1 tablet by mouth every 6 hours as needed for pain Will approve up to 5 tablets a day or 150 tablets a month.  Fill on or after 8/18/17    Chronic bilateral low back pain without sciatica       tiZANidine 4 MG tablet    ZANAFLEX    270 tablet    TAKE ONE TABLET BY MOUTH THREE TIMES DAILY AS NEEDED FOR SHOULDER AND UPPER BACK PAIN    Chronic bilateral low back pain without sciatica       vitamin D 2000 UNITS tablet     100 tablet    Take 2,000 Units by mouth daily    Major depressive disorder, recurrent episode, moderate (H)       * Notice:  This list has 2 medication(s) that are the same as other medications prescribed for you. Read the directions carefully, and ask your doctor or other care provider to review them with you.

## 2017-08-17 NOTE — NURSING NOTE
"Chief Complaint   Patient presents with     Pain     Depression       Initial /86 (BP Location: Right arm, Patient Position: Chair, Cuff Size: Adult Large)  Pulse 90  Temp 97.9  F (36.6  C) (Oral)  Resp 17  Ht 1.727 m (5' 8\")  Wt 70.8 kg (156 lb)  SpO2 98%  Breastfeeding? No  BMI 23.72 kg/m2 Estimated body mass index is 23.72 kg/(m^2) as calculated from the following:    Height as of this encounter: 1.727 m (5' 8\").    Weight as of this encounter: 70.8 kg (156 lb).  Medication Reconciliation: complete     Marisol Molina MA       "

## 2017-08-18 ASSESSMENT — ANXIETY QUESTIONNAIRES: GAD7 TOTAL SCORE: 20

## 2017-08-25 LAB — COMPREHEN DRUG ANALYSIS UR: NORMAL

## 2017-09-18 DIAGNOSIS — M54.50 CHRONIC BILATERAL LOW BACK PAIN WITHOUT SCIATICA: ICD-10-CM

## 2017-09-18 DIAGNOSIS — G89.29 CHRONIC BILATERAL LOW BACK PAIN WITHOUT SCIATICA: ICD-10-CM

## 2017-09-18 RX ORDER — OXYCODONE HYDROCHLORIDE 60 MG/1
1 TABLET, FILM COATED, EXTENDED RELEASE ORAL EVERY 12 HOURS
Qty: 60 EACH | Refills: 0 | Status: SHIPPED | OUTPATIENT
Start: 2017-09-18 | End: 2017-10-17

## 2017-09-18 RX ORDER — OXYCODONE AND ACETAMINOPHEN 10; 325 MG/1; MG/1
1 TABLET ORAL EVERY 6 HOURS PRN
Qty: 150 TABLET | Refills: 0 | Status: SHIPPED | OUTPATIENT
Start: 2017-09-18 | End: 2017-10-17

## 2017-09-18 NOTE — TELEPHONE ENCOUNTER
Script available for , notify patient/family - script to . Patient has appointment with pain clinic later this month.  Confirm her appointment with her.   JEANK

## 2017-09-18 NOTE — TELEPHONE ENCOUNTER
oxyCODONE (OXYCONTIN) 60 MG T12A 12 hr tablet      Last Written Prescription Date:  8/17/17  Last Fill Quantity: 60,   # refills: 0  Last Office Visit with [facility], [facility] or  Health prescribing provider: 8/17/17  Future Office visit:       Routing refill request to provider for review/approval because:  Drug not on the [facility], [facility] or  [facility] refill protocol or controlled substance        oxyCODONE-acetaminophen (PERCOCET)  MG per tablet      Last Written Prescription Date:  8/17/17  Last Fill Quantity: 150,   # refills: 0  Last Office Visit with [facility], [facility] or  [facility] prescribing provider: 8/17/17  Future Office visit:       Routing refill request to provider for review/approval because:  Drug not on the [facility], [facility] or  [facility] refill protocol or controlled substance

## 2017-09-18 NOTE — TELEPHONE ENCOUNTER
Pending Prescriptions:                       Disp   Refills    oxyCODONE-acetaminophen (PERCOCET) *150 ta*0            Sig: Take 1 tablet by mouth every 6 hours as needed           for pain Will approve up to 5 tablets a day or           150 tablets a month.  Fill on or after 8/18/17    oxyCODONE (OXYCONTIN) 60 MG T12A 12 hr ta*60 each0            Sig: Take 1 tablet by mouth every 12 hours Fill on or           after 8/18/17    Call when ready to  and her dad will be coming  Toni Weiss will     Ok to leave voicemail  [phone]

## 2017-09-19 NOTE — TELEPHONE ENCOUNTER
Rx placed at .  LM informing pt.    Kimberlee Donohue, Patient Care     Pt has appt with pain clinic 9/27/17.    Kimberlee NOEL, Patient Care

## 2017-09-20 ENCOUNTER — TELEPHONE (OUTPATIENT)
Dept: FAMILY MEDICINE | Facility: CLINIC | Age: 42
End: 2017-09-20

## 2017-09-20 DIAGNOSIS — F41.1 GENERALIZED ANXIETY DISORDER: ICD-10-CM

## 2017-09-20 RX ORDER — LORAZEPAM 1 MG/1
TABLET ORAL
Qty: 90 TABLET | Refills: 0 | Status: SHIPPED | OUTPATIENT
Start: 2017-09-20 | End: 2017-10-17

## 2017-09-20 NOTE — TELEPHONE ENCOUNTER
LORazepam (ATIVAN) 1 MG tablet      Last Written Prescription Date:  8/17/17  Last Fill Quantity: 90,   # refills: 0  Last Office Visit with [facility] or [facility] prescribing provider: 8/17/17  Future Office visit:       Routing refill request to provider for review/approval because:  Drug not on the [facility], [facility] or [facility] refill protocol or controlled substance

## 2017-10-17 ENCOUNTER — OFFICE VISIT (OUTPATIENT)
Dept: FAMILY MEDICINE | Facility: CLINIC | Age: 42
End: 2017-10-17
Payer: MEDICARE

## 2017-10-17 VITALS
BODY MASS INDEX: 23.51 KG/M2 | TEMPERATURE: 98.3 F | DIASTOLIC BLOOD PRESSURE: 80 MMHG | WEIGHT: 155.1 LBS | HEART RATE: 95 BPM | HEIGHT: 68 IN | SYSTOLIC BLOOD PRESSURE: 118 MMHG | OXYGEN SATURATION: 98 %

## 2017-10-17 DIAGNOSIS — D75.89 MACROCYTOSIS WITHOUT ANEMIA: ICD-10-CM

## 2017-10-17 DIAGNOSIS — J98.01 ACUTE BRONCHOSPASM: ICD-10-CM

## 2017-10-17 DIAGNOSIS — F41.1 GENERALIZED ANXIETY DISORDER: Primary | ICD-10-CM

## 2017-10-17 DIAGNOSIS — Z71.6 TOBACCO ABUSE COUNSELING: ICD-10-CM

## 2017-10-17 DIAGNOSIS — F32.1 MODERATE MAJOR DEPRESSION (H): ICD-10-CM

## 2017-10-17 DIAGNOSIS — M79.10 MYALGIA: ICD-10-CM

## 2017-10-17 DIAGNOSIS — F41.1 GENERALIZED ANXIETY DISORDER: ICD-10-CM

## 2017-10-17 DIAGNOSIS — R59.9 ENLARGED LYMPH NODES: ICD-10-CM

## 2017-10-17 DIAGNOSIS — M54.50 CHRONIC BILATERAL LOW BACK PAIN WITHOUT SCIATICA: ICD-10-CM

## 2017-10-17 DIAGNOSIS — F17.200 TOBACCO USE DISORDER: ICD-10-CM

## 2017-10-17 DIAGNOSIS — Z72.0 TOBACCO ABUSE: ICD-10-CM

## 2017-10-17 DIAGNOSIS — G89.29 CHRONIC BILATERAL LOW BACK PAIN WITHOUT SCIATICA: ICD-10-CM

## 2017-10-17 LAB
ALBUMIN SERPL-MCNC: 4.1 G/DL (ref 3.4–5)
ALP SERPL-CCNC: 62 U/L (ref 40–150)
ALT SERPL W P-5'-P-CCNC: 16 U/L (ref 0–50)
AMPHETAMINES UR QL: NOT DETECTED NG/ML
ANION GAP SERPL CALCULATED.3IONS-SCNC: 7 MMOL/L (ref 3–14)
AST SERPL W P-5'-P-CCNC: 5 U/L (ref 0–45)
BARBITURATES UR QL SCN: NOT DETECTED NG/ML
BENZODIAZ UR QL SCN: ABNORMAL NG/ML
BILIRUB SERPL-MCNC: 0.1 MG/DL (ref 0.2–1.3)
BUN SERPL-MCNC: 12 MG/DL (ref 7–30)
BUPRENORPHINE UR QL: NOT DETECTED NG/ML
CALCIUM SERPL-MCNC: 9 MG/DL (ref 8.5–10.1)
CANNABINOIDS UR QL: NOT DETECTED NG/ML
CHLORIDE SERPL-SCNC: 101 MMOL/L (ref 94–109)
CO2 SERPL-SCNC: 26 MMOL/L (ref 20–32)
COCAINE UR QL SCN: NOT DETECTED NG/ML
CREAT SERPL-MCNC: 0.8 MG/DL (ref 0.52–1.04)
CRP SERPL-MCNC: <2.9 MG/L (ref 0–8)
D-METHAMPHET UR QL: NOT DETECTED NG/ML
ERYTHROCYTE [DISTWIDTH] IN BLOOD BY AUTOMATED COUNT: 13.2 % (ref 10–15)
ERYTHROCYTE [SEDIMENTATION RATE] IN BLOOD BY WESTERGREN METHOD: 6 MM/H (ref 0–20)
FOLATE SERPL-MCNC: 10.5 NG/ML
GFR SERPL CREATININE-BSD FRML MDRD: 79 ML/MIN/1.7M2
GLUCOSE SERPL-MCNC: 91 MG/DL (ref 70–99)
HCT VFR BLD AUTO: 45.4 % (ref 35–47)
HGB BLD-MCNC: 15 G/DL (ref 11.7–15.7)
MCH RBC QN AUTO: 33.6 PG (ref 26.5–33)
MCHC RBC AUTO-ENTMCNC: 33 G/DL (ref 31.5–36.5)
MCV RBC AUTO: 102 FL (ref 78–100)
METHADONE UR QL SCN: NOT DETECTED NG/ML
OPIATES UR QL SCN: NOT DETECTED NG/ML
OXYCODONE UR QL SCN: ABNORMAL NG/ML
PCP UR QL SCN: NOT DETECTED NG/ML
PLATELET # BLD AUTO: 278 10E9/L (ref 150–450)
POTASSIUM SERPL-SCNC: 4.2 MMOL/L (ref 3.4–5.3)
PROPOXYPH UR QL: NOT DETECTED NG/ML
PROT SERPL-MCNC: 8 G/DL (ref 6.8–8.8)
RBC # BLD AUTO: 4.46 10E12/L (ref 3.8–5.2)
SODIUM SERPL-SCNC: 134 MMOL/L (ref 133–144)
TRICYCLICS UR QL SCN: NOT DETECTED NG/ML
VIT B12 SERPL-MCNC: 292 PG/ML (ref 193–986)
WBC # BLD AUTO: 10 10E9/L (ref 4–11)

## 2017-10-17 PROCEDURE — 82607 VITAMIN B-12: CPT | Performed by: FAMILY MEDICINE

## 2017-10-17 PROCEDURE — 80306 DRUG TEST PRSMV INSTRMNT: CPT | Performed by: FAMILY MEDICINE

## 2017-10-17 PROCEDURE — 86140 C-REACTIVE PROTEIN: CPT | Performed by: FAMILY MEDICINE

## 2017-10-17 PROCEDURE — 99215 OFFICE O/P EST HI 40 MIN: CPT | Performed by: FAMILY MEDICINE

## 2017-10-17 PROCEDURE — 85652 RBC SED RATE AUTOMATED: CPT | Performed by: FAMILY MEDICINE

## 2017-10-17 PROCEDURE — 82746 ASSAY OF FOLIC ACID SERUM: CPT | Performed by: FAMILY MEDICINE

## 2017-10-17 PROCEDURE — 85027 COMPLETE CBC AUTOMATED: CPT | Performed by: FAMILY MEDICINE

## 2017-10-17 PROCEDURE — 80053 COMPREHEN METABOLIC PANEL: CPT | Performed by: FAMILY MEDICINE

## 2017-10-17 PROCEDURE — 36415 COLL VENOUS BLD VENIPUNCTURE: CPT | Performed by: FAMILY MEDICINE

## 2017-10-17 RX ORDER — VENLAFAXINE HYDROCHLORIDE 37.5 MG/1
CAPSULE, EXTENDED RELEASE ORAL
Qty: 60 CAPSULE | Refills: 1 | Status: SHIPPED | OUTPATIENT
Start: 2017-10-17 | End: 2017-11-03

## 2017-10-17 RX ORDER — LORAZEPAM 1 MG/1
TABLET ORAL
Qty: 90 TABLET | Refills: 0 | Status: SHIPPED | OUTPATIENT
Start: 2017-10-17 | End: 2017-11-18

## 2017-10-17 RX ORDER — NICOTINE 21 MG/24HR
1 PATCH, TRANSDERMAL 24 HOURS TRANSDERMAL EVERY 24 HOURS
Qty: 30 PATCH | Refills: 0 | Status: SHIPPED | OUTPATIENT
Start: 2017-10-17 | End: 2018-11-19

## 2017-10-17 RX ORDER — GABAPENTIN 300 MG/1
CAPSULE ORAL
Qty: 270 CAPSULE | Refills: 0 | Status: SHIPPED | OUTPATIENT
Start: 2017-10-17 | End: 2018-01-29

## 2017-10-17 RX ORDER — OXYCODONE HYDROCHLORIDE 60 MG/1
1 TABLET, FILM COATED, EXTENDED RELEASE ORAL EVERY 12 HOURS
Qty: 60 EACH | Refills: 0 | Status: SHIPPED | OUTPATIENT
Start: 2017-10-17 | End: 2017-11-15

## 2017-10-17 RX ORDER — OXYCODONE AND ACETAMINOPHEN 10; 325 MG/1; MG/1
1 TABLET ORAL EVERY 6 HOURS PRN
Qty: 150 TABLET | Refills: 0 | Status: SHIPPED | OUTPATIENT
Start: 2017-10-17 | End: 2017-11-15

## 2017-10-17 RX ORDER — NICOTINE 21 MG/24HR
PATCH, TRANSDERMAL 24 HOURS TRANSDERMAL
Qty: 30 PATCH | Refills: 0 | Status: CANCELLED | OUTPATIENT
Start: 2017-10-17

## 2017-10-17 RX ORDER — ALBUTEROL SULFATE 0.83 MG/ML
1 SOLUTION RESPIRATORY (INHALATION) EVERY 4 HOURS PRN
Qty: 30 VIAL | Refills: 11 | Status: SHIPPED | OUTPATIENT
Start: 2017-10-17

## 2017-10-17 RX ORDER — OXYCODONE AND ACETAMINOPHEN 10; 325 MG/1; MG/1
1 TABLET ORAL EVERY 6 HOURS PRN
Qty: 150 TABLET | Refills: 0 | Status: SHIPPED | OUTPATIENT
Start: 2017-10-17 | End: 2017-10-17

## 2017-10-17 RX ORDER — OXYCODONE HYDROCHLORIDE 60 MG/1
1 TABLET, FILM COATED, EXTENDED RELEASE ORAL EVERY 12 HOURS
Qty: 60 EACH | Refills: 0 | Status: SHIPPED | OUTPATIENT
Start: 2017-10-17 | End: 2017-10-17

## 2017-10-17 ASSESSMENT — ANXIETY QUESTIONNAIRES
6. BECOMING EASILY ANNOYED OR IRRITABLE: NEARLY EVERY DAY
GAD7 TOTAL SCORE: 18
7. FEELING AFRAID AS IF SOMETHING AWFUL MIGHT HAPPEN: NOT AT ALL
3. WORRYING TOO MUCH ABOUT DIFFERENT THINGS: NEARLY EVERY DAY
IF YOU CHECKED OFF ANY PROBLEMS ON THIS QUESTIONNAIRE, HOW DIFFICULT HAVE THESE PROBLEMS MADE IT FOR YOU TO DO YOUR WORK, TAKE CARE OF THINGS AT HOME, OR GET ALONG WITH OTHER PEOPLE: EXTREMELY DIFFICULT
5. BEING SO RESTLESS THAT IT IS HARD TO SIT STILL: NEARLY EVERY DAY
2. NOT BEING ABLE TO STOP OR CONTROL WORRYING: NEARLY EVERY DAY
1. FEELING NERVOUS, ANXIOUS, OR ON EDGE: NEARLY EVERY DAY

## 2017-10-17 ASSESSMENT — PATIENT HEALTH QUESTIONNAIRE - PHQ9
5. POOR APPETITE OR OVEREATING: NEARLY EVERY DAY
SUM OF ALL RESPONSES TO PHQ QUESTIONS 1-9: 7

## 2017-10-17 NOTE — MR AVS SNAPSHOT
After Visit Summary   10/17/2017    Ria Nj    MRN: [id number]           Patient Information     Date Of Birth          1975        Visit Information        Provider Department      10/17/2017 10:00 AM Sunshine Michael MD [facility]        Today's Diagnoses     Need for prophylactic vaccination and inoculation against influenza    -  1    Low back pain, unspecified back pain laterality, unspecified chronicity, with sciatica presence unspecified        Generalized anxiety disorder        Chronic bilateral low back pain without sciatica        Tobacco use disorder        Acute bronchospasm        Moderate major depression (H)        Myalgia          Care Instructions    Schedule MRI of back as soon as possible. I will place a new order. When the MRI has been completed, I can place referral for appropriate orthopedic specialist     Start taking Venlafaxine 37.5 mg. Take 1 capsule (37.5 mg) daily for 1 week. Then increase to 2 capsules (75 mg) daily. Follow up in 4 weeks for a medication check.     We will recheck lump on your neck in 4 weeks as well. If it has not improved by then, will place an order for ultrasound.     Goal in the coming months will be to change in the management of your pain.     Refills of other medications provided.                   Follow-ups after your visit        Who to contact     If you have questions or need follow up information about today's clinic visit or your schedule please contact [facility] directly at [phone].  Normal or non-critical lab and imaging results will be communicated to you by MyChart, letter or phone within 4 business days after the clinic has received the results. If you do not hear from us within 7 days, please contact the clinic through MyChart or phone. If you have a critical or abnormal lab result, we will notify you by phone as soon as possible.  Submit refill requests through [employer] or call your  "pharmacy and they will forward the refill request to us. Please allow 3 business days for your refill to be completed.          Additional Information About Your Visit        [employer] Information     [employer] lets you send messages to your doctor, view your test results, renew your prescriptions, schedule appointments and more. To sign up, go to [email or web address] . Click on \"Log in\" on the left side of the screen, which will take you to the Welcome page. Then click on \"Sign up Now\" on the right side of the page.     You will be asked to enter the access code listed below, as well as some personal information. Please follow the directions to create your username and password.     Your access code is: [email or web address]  Expires: 1/15/2018 11:19 AM     Your access code will  in 90 days. If you need help or a new code, please call your [place] clinic or [phone].        Care EveryWhere ID     This is your [facility] EveryWhere ID. This could be used by other organizations to access your [place] medical records  [id number]        Your Vitals Were     Pulse Temperature Height Pulse Oximetry BMI (Body Mass Index)       95 98.3  F (36.8  C) (Oral) 1.727 m (5' 8\") 98% 23.58 kg/m2        Blood Pressure from Last 3 Encounters:   10/17/17 118/80   17 130/86   17 (!) 154/100    Weight from Last 3 Encounters:   10/17/17 70.4 kg (155 lb 1.6 oz)   17 70.8 kg (156 lb)   17 63.5 kg (140 lb)              We Performed the Following     CBC with platelets     Comprehensive metabolic panel (BMP + Alb, Alk Phos, ALT, AST, Total. Bili, TP)     CRP, inflammation     Drug Abuse Screen Panel 13, Urine (Pain Care Package)     ESR: Erythrocyte sedimentation rate          Today's Medication Changes          These changes are accurate as of: 10/17/17 11:21 AM.  If you have any questions, ask your nurse or doctor.               Start taking these medicines.        Dose/Directions    nicotine 21 MG/24HR 24 hr patch "   Commonly known as:  NICODERM CQ   Used for:  Tobacco use disorder   Replaces:  nicotine 14 MG/24HR 24 hr patch   Started by:  Sunshine Michael MD        Dose:  1 patch   Place 1 patch onto the skin every 24 hours   Quantity:  30 patch   Refills:  0       oxyCODONE 60 MG T12a 12 hr tablet   Commonly known as:  OXYCONTIN   Used for:  Chronic bilateral low back pain without sciatica   Started by:  Sunshine Michael MD        Dose:  1 tablet   Take 1 tablet by mouth every 12 hours Fill on or after 10/18/17   Quantity:  60 each   Refills:  0       oxyCODONE-acetaminophen  MG per tablet   Commonly known as:  PERCOCET   Used for:  Chronic bilateral low back pain without sciatica   Started by:  Sunshine Michael MD        Dose:  1 tablet   Take 1 tablet by mouth every 6 hours as needed for pain Will approve up to 5 tablets a day or 150 tablets a month.  Fill on or after 10/18/17   Quantity:  150 tablet   Refills:  0       venlafaxine 37.5 MG 24 hr capsule   Commonly known as:  EFFEXOR-XR   Used for:  Generalized anxiety disorder, Moderate major depression (H)   Started by:  Sunshine Michael MD        Take 1 capsule daily for 7 days, then take 2 capsules daily.   Quantity:  60 capsule   Refills:  1         Stop taking these medicines if you haven't already. Please contact your care team if you have questions.     nicotine 14 MG/24HR 24 hr patch   Commonly known as:  NICOTINE STEP 2   Replaced by:  nicotine 21 MG/24HR 24 hr patch   Stopped by:  Sunshine Michael MD                Where to get your medicines      These medications were sent to [facility] [id number] [place] [address] AT [facility] & [address] [place] [address]    Hours:  24-hours Phone:  [phone]     albuterol (2.5 MG/3ML) 0.083% neb solution    gabapentin 300 MG capsule    nicotine 21 MG/24HR 24 hr patch    venlafaxine 37.5 MG 24 hr capsule         Some of these will need a paper  prescription and others can be bought over the counter.  Ask your nurse if you have questions.     Bring a paper prescription for each of these medications     LORazepam 1 MG tablet    oxyCODONE 60 MG T12a 12 hr tablet    oxyCODONE-acetaminophen  MG per tablet                Primary Care Provider Office Phone # Fax #    Sunshine Michael [phone] [address] [facility] [address]  [facility] [address]        Equal Access to Services     [facility] : Hadii aad ku hadasho Soomaali, waaxda luqadaha, qaybta kaalmada adeegyada, waxay idiin hayaan adeeg kharash lalizzy . So [facility] [phone].    ATENCIÓN: Si ajitla espsamantha, tiene a freire disposición servicios gratuitos de asistencia lingüística. [facility] [phone].    We comply with applicable federal civil rights laws and [place] laws. We do not discriminate on the basis of race, color, national origin, age, disability, sex, sexual orientation, or gender identity.            Thank you!     Thank you for choosing [facility]  for your care. Our goal is always to provide you with excellent care. Hearing back from our patients is one way we can continue to improve our services. Please take a few minutes to complete the written survey that you may receive in the mail after your visit with us. Thank you!             Your Updated Medication List - Protect others around you: Learn how to safely use, store and throw away your medicines at www.disposemymeds.org.          This list is accurate as of: 10/17/17 11:21 AM.  Always use your most recent med list.                   Brand Name Dispense Instructions for use Diagnosis    * albuterol 108 (90 BASE) MCG/ACT Inhaler    PROAIR HFA/PROVENTIL HFA/VENTOLIN HFA    1 Inhaler    Inhale 2 puffs into the lungs every 4 hours as needed for shortness of breath / dyspnea or wheezing    Acute bronchospasm       * albuterol (2.5 MG/3ML) 0.083% neb solution     30 vial    Take 1 vial (2.5 mg) by nebulization  every 4 hours as needed for shortness of breath / dyspnea or wheezing    Acute bronchospasm       calcium carbonate 1250 MG tablet    OS-ADOLFO 500 mg [place]. Ca    100 tablet    Take 1 tablet by mouth 2 times daily.    Routine general medical examination at a health care facility       EPINEPHrine 0.3 MG/0.3ML injection 2-pack    EPIPEN/ADRENACLICK/or ANY BX GENERIC EQUIV    2 each    Inject 0.3 mLs (0.3 mg) into the muscle once as needed for anaphylaxis    Bee sting-induced anaphylaxis, accidental or unintentional, subsequent encounter       gabapentin 300 MG capsule    NEURONTIN    270 capsule    TAKE 1 CAPSULE BY MOUTH THREE TIMES DAILY    Low back pain, unspecified back pain laterality, unspecified chronicity, with sciatica presence unspecified       ibuprofen 200 MG tablet    ADVIL/MOTRIN     Pt is taking 4 TABLET EVERY 4 TO 6 HOURS AS NEEDED        LORazepam 1 MG tablet    ATIVAN    90 tablet    TAKE 1 TABLET BY MOUTH EVERY 8 HOURS AS NEEDED FOR ANXIETY    Generalized anxiety disorder       magnesium 250 MG tablet     100 tablet    Take 1 tablet by mouth daily.    Routine general medical examination at a health care facility       nicotine 21 MG/24HR 24 hr patch    NICODERM CQ    30 patch    Place 1 patch onto the skin every 24 hours    Tobacco use disorder       oxyCODONE 60 MG T12a 12 hr tablet    OXYCONTIN    60 each    Take 1 tablet by mouth every 12 hours Fill on or after 10/18/17    Chronic bilateral low back pain without sciatica       oxyCODONE-acetaminophen  MG per tablet    PERCOCET    150 tablet    Take 1 tablet by mouth every 6 hours as needed for pain Will approve up to 5 tablets a day or 150 tablets a month.  Fill on or after 10/18/17    Chronic bilateral low back pain without sciatica       tiZANidine 4 MG tablet    ZANAFLEX    270 tablet    TAKE ONE TABLET BY MOUTH THREE TIMES DAILY AS NEEDED FOR SHOULDER AND UPPER BACK PAIN    Chronic bilateral low back pain without sciatica        venlafaxine 37.5 MG 24 hr capsule    EFFEXOR-XR    60 capsule    Take 1 capsule daily for 7 days, then take 2 capsules daily.    Generalized anxiety disorder, Moderate major depression (H)       vitamin D 2000 UNITS tablet     100 tablet    Take 2,000 Units by mouth daily    Major depressive disorder, recurrent episode, moderate (H)       * Notice:  This list has 2 medication(s) that are the same as other medications prescribed for you. Read the directions carefully, and ask your doctor or other care provider to review them with you.

## 2017-10-17 NOTE — LETTER
October 23, 2017      Ria Nj  [address]  [facility] [id number]        Dear ,    We are writing to inform you of your test results.    Attached you will find a copy of your recent laboratory report.   Your urine testing shows signs of the oxycodone and lorazepam but no other substances.   Your vitamin B12 level is normal.   The testing for inflammation is negative/normal.  This would suggest that there is not an inflammatory or rheumatologic cause for pain.     Your blood sugar, kidney function and liver function are normal.     Your blood cell counts are normal.  The cells are mildly enlarged (which can be due to vitamin B deficiency which is why that was tested). I would recommend a recheck in 6 months.   Please call or Shoot Extremehart message me if you have any questions.     Resulted Orders   Drug Abuse Screen Panel 13, Urine (Pain Care Package)   Result Value Ref Range    Cannabinoids (16-wqy-5-carboxy-9-THC) Not Detected NDET^Not Detected ng/mL      Comment:      Cutoff for a negative cannabinoid is 50 ng/mL or less.    Phencyclidine (Phencyclidine) Not Detected NDET^Not Detected ng/mL      Comment:      Cutoff for a negative PCP is 25 ng/mL or less.    Cocaine (Benzoylecgonine) Not Detected NDET^Not Detected ng/mL      Comment:      Cutoff for a negative cocaine is 150 ng/ml or less.    Methamphetamine (d-Methamphetamine) Not Detected NDET^Not Detected ng/mL      Comment:      Cutoff for a negative methamphetamine is 500 ng/ml or less.    Opiates (Morphine) Not Detected NDET^Not Detected ng/mL      Comment:      Cutoff for a negative opiate is 100 ng/ml or less.    Amphetamine (d-Amphetamine) Not Detected NDET^Not Detected ng/mL      Comment:      Cutoff for a negative amphetamine is 500 ng/mL or less.    Benzodiazepines (Nordiazepam) Detected, Abnormal Result (A) NDET^Not Detected ng/mL      Comment:      Cutoff for a positive benzodiazepines is greater than 150 ng/ml.  This is an  unconfirmed screening result to be used for medical purposes only.   Order [id number] for confirmation or individual confirmation tests to [facility].      Tricyclic Antidepressants (Desipramine) Not Detected NDET^Not Detected ng/mL      Comment:      Cutoff for a negative tricyclic antidepressant is 300 ng/ml or less.    Methadone (Methadone) Not Detected NDET^Not Detected ng/mL      Comment:      Cutoff for a negative methadone is 200 ng/ml or less.    Barbiturates (Butalbital) Not Detected NDET^Not Detected ng/mL      Comment:      Cutoff for a negative barbituate is 200 ng/ml or less.    Oxycodone (Oxycodone) Detected, Abnormal Result (A) NDET^Not Detected ng/mL      Comment:      Cutoff for a positive oxycodone is greater than 100 ng/ml.  This is an unconfirmed screening result to be used for medical purposes only.   Order [id number] for confirmation or individual confirmation tests to [facility].      Propoxyphene (Norpropoxyphene) Not Detected NDET^Not Detected ng/mL      Comment:      Cutoff for a negative propoxyphene is 300 ng/ml or less    Buprenorphine (Buprenorphine) Not Detected NDET^Not Detected ng/mL      Comment:      Cutoff for a negative buprenorphine is 10 ng/ml or less   ESR: Erythrocyte sedimentation rate   Result Value Ref Range    Sed Rate 6 0 - 20 mm/h   CRP, inflammation   Result Value Ref Range    CRP Inflammation <2.9 0.0 - 8.0 mg/L   Comprehensive metabolic panel (BMP + Alb, Alk Phos, ALT, AST, Total. Bili, TP)   Result Value Ref Range    Sodium 134 133 - 144 mmol/L    Potassium 4.2 3.4 - 5.3 mmol/L    Chloride 101 94 - 109 mmol/L    Carbon Dioxide 26 20 - 32 mmol/L    Anion Gap 7 3 - 14 mmol/L    Glucose 91 70 - 99 mg/dL    Urea Nitrogen 12 7 - 30 mg/dL    Creatinine 0.80 0.52 - 1.04 mg/dL    GFR Estimate 79 >60 mL/min/1.7m2      Comment:      Non  GFR Calc    GFR Estimate If Black >90 >60 mL/min/1.7m2      Comment:       GFR Calc    Calcium 9.0 8.5 - 10.1 mg/dL     Bilirubin Total 0.1 (L) 0.2 - 1.3 mg/dL    Albumin 4.1 3.4 - 5.0 g/dL    Protein Total 8.0 6.8 - 8.8 g/dL    Alkaline Phosphatase 62 40 - 150 U/L    ALT 16 0 - 50 U/L    AST 5 0 - 45 U/L   CBC with platelets   Result Value Ref Range    WBC 10.0 4.0 - 11.0 10e9/L    RBC Count 4.46 3.8 - 5.2 10e12/L    Hemoglobin 15.0 11.7 - 15.7 g/dL    Hematocrit 45.4 35.0 - 47.0 %     (H) 78 - 100 fl    MCH 33.6 (H) 26.5 - 33.0 pg    MCHC 33.0 31.5 - 36.5 g/dL    RDW 13.2 10.0 - 15.0 %    Platelet Count 278 150 - 450 10e9/L   Vitamin B12   Result Value Ref Range    Vitamin B12 292 193 - 986 pg/mL   Folate   Result Value Ref Range    Folate 10.5 >5.4 ng/mL       If you have any questions or concerns, please call the clinic at the number listed above.       Sincerely,        Sunshine Michael MD

## 2017-10-17 NOTE — TELEPHONE ENCOUNTER
[facility] Referral      1. Confirmed address with patient? Yes  2. Confirmed phone number with patient? Yes  3. Confirmed referring provider? Yes  4. Is the PCP the same as the referring provider? Yes  5. Has the patient been to any previous pain clinics? Yes-NOT SURE WHERE OR WHEN   (If yes, send ANT with welcome letter)  6. Which insurance are we to bill for this appointment?  MEDICA/MEDICARE    7. Informed pt of cancellation (48 hour) policy? Yes    REGARDING OPIOID MEDICATIONS: We will always address appropriateness of opioid pain medications, but we generally will not automatically take on a prescribing role. When we do take on prescribing of opioids for chronic pain, it is in collaboration with the referring physician for an intermediate period of time (months), with an expectation that the primary physician or provider will assume the prescribing role if medications are effective at stable doses with demonstrated compliance. Therefore, please do not assume that your prescribing responsibilities end on the day of pain clinic consultation.  7. Informed pt of prescribing policy? Yes      8. Referring Provider: Susnhine Michael     9. Criteria for Triage Eval:   -Missed/Failed 1st DUAL appointment? N/A   -Medication Focused? N/A   -Mental Health Concerns? (e.g. Recent psych hospitalization/snap shot)? N/A   -Active substance abuse? N/A   -Patient behaviors (e.g. Offensive language/raised voice)? N/A    
Called pt to schedule new eval. Mailbox full. Unable to LM. Letter sent.    Miracle Owens    [facility]    
unchanged from 9/2017

## 2017-10-17 NOTE — NURSING NOTE
"Chief Complaint   Patient presents with     Refill Request     Back Pain     Anxiety       Initial /80 (BP Location: Right arm, Patient Position: Sitting, Cuff Size: Adult Regular)  Pulse 95  Temp 98.3  F (36.8  C) (Oral)  Ht 1.727 m (5' 8\")  Wt 70.4 kg (155 lb 1.6 oz)  SpO2 98%  BMI 23.58 kg/m2 Estimated body mass index is 23.58 kg/(m^2) as calculated from the following:    Height as of this encounter: 1.727 m (5' 8\").    Weight as of this encounter: 70.4 kg (155 lb 1.6 oz).  Medication Reconciliation: complete   Emilia Ye MA      "

## 2017-10-17 NOTE — PROGRESS NOTES
SUBJECTIVE:   Ria Nj is a 42 year old female who presents to clinic today for the following health issues:      Anxiety Follow-Up  Ria feels depressed and anxious secondary to chronic pain. She has not attended any counseling session. She was supposed to be scheduled for counseling through Pain Clinic, however her appointment had been rescheduled. Takes lorazepam as needed for panic attacks. She is not currently taking a preventative medication. Previously has taken Paxil (discontinued as she was unable to stop crying), Prozac (did not like), and Cymbalta (no benefit).      Status since last visit: No change    Other associated symptoms: None    Complicating factors:   Significant life event: YES - death of close friend - she suspects it may have been an overdose  Current substance abuse: None  Depression symptoms: No  MICHAEL-7 SCORE 5/18/2017 8/17/2017 10/17/2017   Total Score - - -   Total Score 17 20 18     PHQ-9 SCORE 4/10/2017 8/17/2017 10/17/2017   Total Score - - -   Total Score 9 11 7       GAD7    Back Pain Follow Up  She has been unable to reschedule her Pain Clinic appointment because she has missed several appointments. Patient requests referral for orthopedics. She has not had MRI of back that was previously ordered in May 2017. She takes gabapentin 300 mg TID, and takes 60 mg Oxycontin at midnight, and 11 in the morning. She takes Percocet was needed for pain. Patient reports constant pain daily that is interfering with quality of life.     Description:   Location of pain:  bilateral  Character of pain: sharp, dull ache, stabbing, burning and cramping  Pain radiation: radiates below the knee   Since last visit, pain is:  Unchanged, getting worse because of weather change   New numbness or weakness in legs, not attributed to pain:  no     Intensity: Currently 6/10, At its worst 10/10, moderate    History:   Pain interferes with job: YES  Therapies tried without relief: none  Therapies tried  "with relief: cold     Accompanying Signs & Symptoms:  Risk of Fracture:  None  Risk of Cauda Equina:  None  Risk of Infection:  None  Risk of Cancer:  None      Amount of exercise or physical activity: Active throughout the day, rides bike     Problems taking medications regularly: No    Medication side effects: none    Diet: regular (no restrictions), low salt, low fat/cholesterol and carbohydrate counting      Bilateral Hip Pain  Patient reports bilateral hip pain, that is exacerbated with walking and being sedentary. Pain improves with biking. She had XR of hips that was unremarkable in 2016.     Tobacco use disorder  She smokes up to 2 packs per day. She is interested in smoking cessation.     Wheezing  Uses nebulizer twice daily for wheezing and shortness of breath. Wheezing has worsened since she has been smoking more frequently. Denies post nasal drainage.     Neck lump  Lump on right side of neck for 4 months. She states it is slightly tender to touch. It is reducing in size and becoming \"tighter\". No tooth pain.       Problem list and histories reviewed & adjusted, as indicated.  Additional history: as documented    BP Readings from Last 3 Encounters:   10/17/17 118/80   08/17/17 130/86   07/08/17 (!) 154/100    Wt Readings from Last 3 Encounters:   10/17/17 70.4 kg (155 lb 1.6 oz)   08/17/17 70.8 kg (156 lb)   07/08/17 63.5 kg (140 lb)          Reviewed and updated as needed this visit by clinical staff  Tobacco  Allergies  Meds  Med Hx  Surg Hx  Fam Hx  Soc Hx      Reviewed and updated as needed this visit by Provider  Tobacco  Allergies  Meds  Med Hx  Surg Hx  Fam Hx  Soc Hx      ROS:  Constitutional, HEENT, cardiovascular, pulmonary, GI, , musculoskeletal, neuro, skin, endocrine and psych systems are negative, except as otherwise noted.    This document serves as a record of the services and decisions personally performed and made by Sunshine Michael MD. It was created on her behalf by " "Hyacinth Desai, a trained medical scribe. The creation of this document is based on the provider's statements to the medical scribe.  Hyacinth Desai 10:32 AM October 17, 2017    OBJECTIVE:   /80 (BP Location: Right arm, Patient Position: Sitting, Cuff Size: Adult Regular)  Pulse 95  Temp 98.3  F (36.8  C) (Oral)  Ht 1.727 m (5' 8\")  Wt 70.4 kg (155 lb 1.6 oz)  SpO2 98%  BMI 23.58 kg/m2  Body mass index is 23.58 kg/(m^2).  GENERAL: healthy, alert and no distress  HENT: ear canals and TM's normal, nose and mouth without ulcers or lesions  NECK: 2 cm mass on right side of neck - anterior cervical, tender to palpation, no asymmetry, masses, or scars and thyroid normal to palpation  RESP: lungs clear to auscultation - no rales, rhonchi or wheezes, coarse  CV: regular rate and rhythm, normal S1 S2, no S3 or S4, no murmur, click or rub, no peripheral edema and peripheral pulses strong  ABDOMEN: soft, nontender, no hepatosplenomegaly, no masses and bowel sounds normal  MS: complaints of discomfort with palpation of the paravertebral muscles lumbar spine.  Negative SLR.  multiple soft tissue tender spots noted: lateral epicondyles, greater trochanters, biceps tendons, paravertebral muscles of Cervical and upper thoracic spine, medial tibial plateau.     no gross musculoskeletal defects noted, no edema  SKIN: no suspicious lesions or rashes  BACK: no CVA tenderness.  PSYCH: mentation appears normal, affect flat with anxiety noted.     Diagnostic Test Results:  Results for orders placed or performed in visit on 10/17/17 (from the past 24 hour(s))   ESR: Erythrocyte sedimentation rate   Result Value Ref Range    Sed Rate 6 0 - 20 mm/h   CBC with platelets   Result Value Ref Range    WBC 10.0 4.0 - 11.0 10e9/L    RBC Count 4.46 3.8 - 5.2 10e12/L    Hemoglobin 15.0 11.7 - 15.7 g/dL    Hematocrit 45.4 35.0 - 47.0 %     (H) 78 - 100 fl    MCH 33.6 (H) 26.5 - 33.0 pg    MCHC 33.0 31.5 - 36.5 g/dL    RDW " 13.2 10.0 - 15.0 %    Platelet Count 278 150 - 450 10e9/L       ASSESSMENT/PLAN:     Tobacco Cessation:   reports that she has been smoking Cigarettes.  She has a 30.00 pack-year smoking history. She has never used smokeless tobacco. She has been gradually smoking more and more daily, up to 2 packs per day.  Tobacco Cessation Action Plan: Pharmacotherapies : Nicotine patch        3. Generalized anxiety disorder/7. Moderate major depression (H)  She reports worsening anxiety and depression lately due to chronic pain and recent loss of a close friend. Starting Venlafaxine, tapering up to 75 mg. Refill provided of lorazepam to take as needed for panic attacks. Follow up in 4 weeks for medication check.   - LORazepam (ATIVAN) 1 MG tablet; TAKE 1 TABLET BY MOUTH EVERY 8 HOURS AS NEEDED FOR ANXIETY  Dispense: 90 tablet; Refill: 0  - venlafaxine (EFFEXOR-XR) 37.5 MG 24 hr capsule; Take 1 capsule daily for 7 days, then take 2 capsules daily.  Dispense: 60 capsule; Refill: 1    4. Chronic bilateral low back pain without sciatica  Chronic pain is not well controlled with current medications. Pain is interfering significantly with quality of life. Reviewed pain medication use. Discussed with patient that eventually she will need to decrease pain medication dosages. Plan is to obtain MRI, follow up with orthopedics and pain clinic. Patient has previously been referred to pain clinic however she missed two appointments and is unable to schedule another appointment there. Repeat order placed for MRI as patient did not schedule MRI in 5/2017. Encouraged patient to have MRI done prior to our next visit in 4 weeks.   - Drug Abuse Screen Panel 13, Urine (Pain Care Package)  - oxyCODONE (OXYCONTIN) 60 MG T12A 12 hr tablet; Take 1 tablet by mouth every 12 hours Fill on or after 10/18/17  Dispense: 60 each; Refill: 0  - oxyCODONE-acetaminophen (PERCOCET)  MG per tablet; Take 1 tablet by mouth every 6 hours as needed for pain Will  approve up to 5 tablets a day or 150 tablets a month.  Fill on or after 10/18/17  Dispense: 150 tablet; Refill: 0  - gabapentin (NEURONTIN) 300 MG capsule; TAKE 1 CAPSULE BY MOUTH THREE TIMES DAILY  Dispense: 270 capsule; Refill: 0    5. Tobacco use disorder/ tobacco abuse counseling  Patient is interested in tobacco cessation. Script provided for nicotine patches. Stressed the importance of not smoking cigarettes while nicotine patch is in place due to risk of palpitations with large amount of nicotine.   - nicotine (NICODERM CQ) 21 MG/24HR 24 hr patch; Place 1 patch onto the skin every 24 hours  Dispense: 30 patch; Refill: 0    6. Acute bronchospasm - nebs for prn use.   Exacerbated wheezing recently. She has been smoking more frequently. She requests refill of neb solution.   - albuterol (2.5 MG/3ML) 0.083% neb solution; Take 1 vial (2.5 mg) by nebulization every 4 hours as needed for shortness of breath / dyspnea or wheezing  Dispense: 30 vial; Refill: 11    8. Myalgia - chronic - ?worsening.    Myalgia reported by patient, primarily localized to bilateral hips. Will check if there is any inflammatory process involved.   - ESR: Erythrocyte sedimentation rate  - CRP, inflammation  - Comprehensive metabolic panel (BMP + Alb, Alk Phos, ALT, AST, Total. Bili, TP)  - CBC with platelets    Enlarged LN neck -   Patient reports decreased in size over the last month.  Was previously twice as big per her report.  Discussed U/S evaluation.  Patient prefers to wait until next appointment to recheck lump in 4 weeks.    Patient Instructions   Schedule MRI of back as soon as possible. I will place a new order. When the MRI has been completed, I can place referral for appropriate orthopedic specialist     Start taking Venlafaxine 37.5 mg. Take 1 capsule (37.5 mg) daily for 1 week. Then increase to 2 capsules (75 mg) daily. Follow up in 4 weeks for a medication check.     We will recheck lump on your neck in 4 weeks as well. If  it has not improved by then, will place an order for ultrasound.     Goal in the coming months will be to change in the management of your pain.     Refills of other medications provided.       Time with patient 50 minutes, greater than 50% of which was counseling and coordination of care.     The information in this document, created by the medical scribe for me, accurately reflects the services I personally performed and the decisions made by me. I have reviewed and approved this document for accuracy prior to leaving the patient care area.  October 17, 2017 11:21 AM    Sunshine Michael MD  [facility]

## 2017-10-17 NOTE — PATIENT INSTRUCTIONS
Schedule MRI of back as soon as possible. I will place a new order. When the MRI has been completed, I can place referral for appropriate orthopedic specialist     Start taking Venlafaxine 37.5 mg. Take 1 capsule (37.5 mg) daily for 1 week. Then increase to 2 capsules (75 mg) daily. Follow up in 4 weeks for a medication check.     We will recheck lump on your neck in 4 weeks as well. If it has not improved by then, will place an order for ultrasound.     Goal in the coming months will be to change in the management of your pain.     Refills of other medications provided.

## 2017-10-18 RX ORDER — VENLAFAXINE HYDROCHLORIDE 37.5 MG/1
CAPSULE, EXTENDED RELEASE ORAL
Qty: 180 CAPSULE | Refills: 1 | OUTPATIENT
Start: 2017-10-18

## 2017-10-18 ASSESSMENT — ANXIETY QUESTIONNAIRES: GAD7 TOTAL SCORE: 18

## 2017-10-23 NOTE — PROGRESS NOTES
Please print letter and attach results.  PSK      Attached you will find a copy of your recent laboratory report.  Your urine testing shows signs of the oxycodone and lorazepam but no other substances.  Your vitamin B12 level is normal.  The testing for inflammation is negative/normal.  This would suggest that there is not an inflammatory or rheumatologic cause for pain.    Your blood sugar, kidney function and liver function are normal.    Your blood cell counts are normal.  The cells are mildly enlarged (which can be due to vitamin B deficiency which is why that was tested). I would recommend a recheck in 6 months.  Please call or MyChart message me if you have any questions.    Sincerely,         Sunshine Michael MD

## 2017-10-31 ENCOUNTER — TELEPHONE (OUTPATIENT)
Dept: FAMILY MEDICINE | Facility: CLINIC | Age: 42
End: 2017-10-31

## 2017-10-31 DIAGNOSIS — F41.1 GENERALIZED ANXIETY DISORDER: ICD-10-CM

## 2017-10-31 DIAGNOSIS — F32.1 MODERATE MAJOR DEPRESSION (H): ICD-10-CM

## 2017-10-31 NOTE — TELEPHONE ENCOUNTER
Prior Auth Needed per [place] on  Venlafaxine ER 37.5MG capsules.    Message: Plan does not cover this medication at the dose prescribed.  Please call plan at [phone] to initiate prior auth or call/fax pharmacy to change medication.  Patient ID# is [id number].        Precious KELLOGG (R)(JORDAN)

## 2017-11-01 NOTE — TELEPHONE ENCOUNTER
Spoke with pharmacy and she states the rx is covered but there has been a Rejection for quantity limits.     Would need PA for this to be covered.    Awaiting PA form    Carlee Kumar MA

## 2017-11-01 NOTE — TELEPHONE ENCOUNTER
Please call insurance - may be that I need to send 1 week of 37.5  And another script for 75 instead of having her take 2 pills for the rest of the month.  JEANK

## 2017-11-03 ENCOUNTER — TELEPHONE (OUTPATIENT)
Dept: FAMILY MEDICINE | Facility: CLINIC | Age: 42
End: 2017-11-03

## 2017-11-03 DIAGNOSIS — F41.1 GENERALIZED ANXIETY DISORDER: ICD-10-CM

## 2017-11-03 DIAGNOSIS — F32.1 MODERATE MAJOR DEPRESSION (H): ICD-10-CM

## 2017-11-03 RX ORDER — VENLAFAXINE HYDROCHLORIDE 75 MG/1
75 TABLET, EXTENDED RELEASE ORAL DAILY
Qty: 30 TABLET | Refills: 0 | Status: SHIPPED | OUTPATIENT
Start: 2017-11-03 | End: 2018-05-15

## 2017-11-03 RX ORDER — VENLAFAXINE HYDROCHLORIDE 37.5 MG/1
CAPSULE, EXTENDED RELEASE ORAL
Qty: 7 CAPSULE | Refills: 0 | Status: SHIPPED | OUTPATIENT
Start: 2017-11-03 | End: 2018-05-15

## 2017-11-03 NOTE — TELEPHONE ENCOUNTER
Spoke to Duyen at [facility] and doing an opoid review and this patient has received 90 on consecutive days from different providers (Alec and Clifford- advised they are both in the same office and cover for one another), Also state patient is using multiple pharmacies:    Oxycodone 10/325 mg #150 on 7/19; 8/17; 9/19  Morphine 15 mg  OcyContin #60 on 7/20; 8/19, 9/19  Tramadol #24 7/11    Faxing a detailed report and would like a response faxed back to [phone]. Sent to [place] fax number  Routing to providers to review and advise.  Lynette Martinez RN.

## 2017-11-03 NOTE — TELEPHONE ENCOUNTER
Call patient re:  I am sending a new script for 1 week of the 37.5 mg pills then she should start the 75 mg pills - new script for this.  DAVID

## 2017-11-03 NOTE — TELEPHONE ENCOUNTER
..Reason for Call:  Other Opioid Concerns    Detailed comments: Sherry from [facility] calling about patient Opioid concerns and they will fax forms for the provider to complete and fax it back to them. They are concerns that the patient is prescribe to many Opioid medications and they have some safety concerns and would like for someone to contact them about it.      Phone Number Patient can be reached at: Other phone number:  [phone] and fax # [phone]    Best Time: any    Can we leave a detailed message on this number? Not Applicable    Call taken on 11/3/2017 at 12:02 PM by Aide Blanc

## 2017-11-06 NOTE — TELEPHONE ENCOUNTER
Have not received form.  Patient has not been prescribed morphine through our office since 2011.  No morphine noted on the [facility] website.    DAVID

## 2017-11-07 RX ORDER — VENLAFAXINE HYDROCHLORIDE 37.5 MG/1
CAPSULE, EXTENDED RELEASE ORAL
Qty: 90 CAPSULE | Refills: 0 | OUTPATIENT
Start: 2017-11-07

## 2017-11-07 RX ORDER — VENLAFAXINE HYDROCHLORIDE 75 MG/1
TABLET, EXTENDED RELEASE ORAL
Qty: 90 TABLET | Refills: 0 | OUTPATIENT
Start: 2017-11-07

## 2017-11-07 NOTE — TELEPHONE ENCOUNTER
Refill request was for 90 day supply. Sent back denied - patient was started on this medication and told to follow up in 4 weeks for recheck. Noted in comments attached to denial

## 2017-11-15 ENCOUNTER — NURSE TRIAGE (OUTPATIENT)
Dept: NURSING | Facility: CLINIC | Age: 42
End: 2017-11-15

## 2017-11-15 DIAGNOSIS — G89.29 CHRONIC BILATERAL LOW BACK PAIN WITHOUT SCIATICA: ICD-10-CM

## 2017-11-15 DIAGNOSIS — M54.50 CHRONIC BILATERAL LOW BACK PAIN WITHOUT SCIATICA: ICD-10-CM

## 2017-11-15 NOTE — TELEPHONE ENCOUNTER
Patient requesting prescriptions for oxycontin and percocet be at clinic  so her father, Toni Weiss, can pick them up as she needs refills.  Please call patient at [phone] when ready for  or if any questions/concerns.   Patient would also like PCP advice regarding lab tests done on 10/17/17 as far as what is the next step for her pain.  Routed to Clinic Pool.

## 2017-11-16 ENCOUNTER — TELEPHONE (OUTPATIENT)
Dept: FAMILY MEDICINE | Facility: CLINIC | Age: 42
End: 2017-11-16

## 2017-11-16 RX ORDER — OXYCODONE AND ACETAMINOPHEN 10; 325 MG/1; MG/1
1 TABLET ORAL EVERY 6 HOURS PRN
Qty: 150 TABLET | Refills: 0 | Status: SHIPPED | OUTPATIENT
Start: 2017-11-16 | End: 2017-12-14

## 2017-11-16 RX ORDER — OXYCODONE HYDROCHLORIDE 60 MG/1
1 TABLET, FILM COATED, EXTENDED RELEASE ORAL EVERY 12 HOURS
Qty: 60 EACH | Refills: 0 | Status: SHIPPED | OUTPATIENT
Start: 2017-11-16 | End: 2017-12-14

## 2017-11-16 NOTE — TELEPHONE ENCOUNTER
Routing comments from FNA nurse copied below:    Clinic Action Needed:  Please call patient at [phone].   Reason for Call:  Patient requesting prescriptions for oxycontin and percocet be at clinic  so her father, Toni Weiss, can pick them up as she needs refills.  Please call patient at [phone] when ready for  or if any questions/concerns.   Patient would also like PCP advice regarding lab tests done on 10/17/17 as far as what is the next step for her pain.   Routed to:  Clinic Pool (Routing comment)

## 2017-11-16 NOTE — TELEPHONE ENCOUNTER
Reason for Call:  Medication or medication refill:    Do you use a [facility]?  Name of the pharmacy and phone number for the current request:      Name of the medication requested:     Other request: OPIOID SAFETY FROM [facility] IS FAXING A DETAILED REPORT TODAY AND NEEDS THIS RETURNED WITHIN 72 HOURS. FAX [phone], ALSO SENDING A REPORT TO DR BASSETT. PATIENT HAS EXCEEDED THE OPOID AMOUNT    Can we leave a detailed message on this number? YES    Phone number patient can be reached at: Other phone number:    [facility] [place]/JOSE [phone]         Best Time: ANY    Call taken on 11/16/2017 at 8:55 AM by Flor Mcwilliams

## 2017-11-16 NOTE — TELEPHONE ENCOUNTER
Script available for , notify patient/family - script to .  PSK  Call patient re:  Regarding her pain symptoms - see message sent with lab results below.  Her MRI result note is below as well.    I would recommend referral to pain clinic to manage her pain.  I can give her a referral to ortho as well if she desires.  Injection may be beneficial for pain but I know she has been resistant to this in past.    If she does not want to pursue pain clinic - she can follow up with me here but I will be starting a weaning process for her pain medication here.  PSK          Attached you will find a copy of your recent laboratory report.  Your urine testing shows signs of the oxycodone and lorazepam but no other substances.  Your vitamin B12 level is normal.  The testing for inflammation is negative/normal.  This would suggest that there is not an inflammatory or rheumatologic cause for pain.    Your blood sugar, kidney function and liver function are normal.    Your blood cell counts are normal.  The cells are mildly enlarged (which can be due to vitamin B deficiency which is why that was tested). I would recommend a recheck in 6 months.  Please call or MyChart message me if you have any questions.    Sincerely,         Sunshine Michael MD         Attached you will find a copy of your recent MRI report.  Your scan shows disc bulging at L3-4, L4-5 and L5-S1.  There is not significant change from back in 2015.  We need to find a better way to deal with your ongoing pain than the current narcotic doses you are using.  As we have discussed you are not getting good relief from the medication and it is not advisable to use this ongoing as you have been.    I would recommend follow up with pain clinic.  I can provide a referral for this.  You can certainly see orthopedics to discuss other treatment with them if you would like.  I would recommend a follow up appointment with me if you have concerns about the  recommended appointment with pain clinic OR have other questions.  Please call or MyChart message me if you have any questions.     Sincerely,         Sunshine Michael MD

## 2017-11-18 DIAGNOSIS — F41.1 GENERALIZED ANXIETY DISORDER: ICD-10-CM

## 2017-11-20 RX ORDER — LORAZEPAM 1 MG/1
TABLET ORAL
Qty: 90 TABLET | Refills: 0 | Status: SHIPPED | OUTPATIENT
Start: 2017-11-20 | End: 2017-12-14

## 2017-11-20 NOTE — TELEPHONE ENCOUNTER
LORazepam (ATIVAN) 1 MG tablet      Last Written Prescription Date:  10/17/17  Last Fill Quantity: 90,   # refills: 0  Future Office visit:       Routing refill request to provider for review/approval because:  Drug not on the [facility] or [facility] refill protocol or controlled substance

## 2017-12-14 ENCOUNTER — TELEPHONE (OUTPATIENT)
Dept: FAMILY MEDICINE | Facility: CLINIC | Age: 42
End: 2017-12-14

## 2017-12-14 DIAGNOSIS — M54.50 CHRONIC BILATERAL LOW BACK PAIN WITHOUT SCIATICA: ICD-10-CM

## 2017-12-14 DIAGNOSIS — F41.1 GENERALIZED ANXIETY DISORDER: ICD-10-CM

## 2017-12-14 DIAGNOSIS — G89.29 CHRONIC BILATERAL LOW BACK PAIN WITHOUT SCIATICA: ICD-10-CM

## 2017-12-14 RX ORDER — LORAZEPAM 1 MG/1
TABLET ORAL
Qty: 90 TABLET | Refills: 0 | Status: SHIPPED | OUTPATIENT
Start: 2017-12-14 | End: 2018-01-18

## 2017-12-14 RX ORDER — OXYCODONE HYDROCHLORIDE 60 MG/1
1 TABLET, FILM COATED, EXTENDED RELEASE ORAL EVERY 12 HOURS
Qty: 60 EACH | Refills: 0 | Status: SHIPPED | OUTPATIENT
Start: 2017-12-14 | End: 2018-03-16

## 2017-12-14 RX ORDER — OXYCODONE AND ACETAMINOPHEN 10; 325 MG/1; MG/1
1 TABLET ORAL EVERY 6 HOURS PRN
Qty: 150 TABLET | Refills: 0 | Status: SHIPPED | OUTPATIENT
Start: 2017-12-14 | End: 2018-01-15

## 2017-12-14 NOTE — TELEPHONE ENCOUNTER
Provider will need to advise and romie up medications as there are BPA that popped up and RN could not reorder.    Bethany Polanco RN, [facility]

## 2017-12-14 NOTE — TELEPHONE ENCOUNTER
..Reason for Call:  Medication or medication refill:    Do you use a [facility]?  Name of the pharmacy and phone number for the current request:  BA     Name of the medication requested: Lorazepam, OxyContin 60, and Percocet     Other request: Patient stated that she will send her Dad Toni Barajas to pick her medication.     Can we leave a detailed message on this number? YES    Phone number patient can be reached at: Cell number on file:    Telephone Information:   Mobile [phone]       Best Time: any    Call taken on 12/14/2017 at 1:18 PM by Aide Blanc

## 2017-12-15 NOTE — TELEPHONE ENCOUNTER
Called pt multiple times, # is busy.    Rxs placed at .    If pt calls back please relay message.    .  Notify patient that either referral to pain clinic or follow up here for weaning of pain medication is recommended for next script.      Kimberlee NOEL, Patient Care

## 2017-12-15 NOTE — TELEPHONE ENCOUNTER
Patient returned call    Best number to reach caller: Home number on file [phone] (home)    Is it ok to leave a detailed message: Not Applicable   *Pt notified of message below, Patient states she will f/u with pcp

## 2017-12-15 NOTE — TELEPHONE ENCOUNTER
Reason for Call:  Other prescription    Detailed comments: Pt calling to follow up on Rx requests and would like to be notified when Rx's have been brought up to the  so that she can notify her Father to come to clinic to  .    Phone Number Patient can be reached at: Home number on file [phone] (home)    Best Time: anytime    Can we leave a detailed message on this number? YES    Call taken on 12/15/2017 at 10:39 AM by Nilson Buchanan

## 2017-12-15 NOTE — TELEPHONE ENCOUNTER
Refill printed.  Notify patient that either referral to pain clinic or follow up here for weaning of pain medication is recommended for next script.      JEANK

## 2017-12-18 ENCOUNTER — TELEPHONE (OUTPATIENT)
Dept: FAMILY MEDICINE | Facility: CLINIC | Age: 42
End: 2017-12-18

## 2017-12-18 NOTE — TELEPHONE ENCOUNTER
Reason for Call:  Other Referral    Detailed comments: Pt would like a referral to see a back specialist and she was notified that she has enlarged blood vessels but never received word regarding those results and would also like a call back to discuss.    Phone Number Patient can be reached at: Work number on file:  [phone] (work)    Best Time: anytime    Can we leave a detailed message on this number? NO    Call taken on 12/18/2017 at 8:20 AM by Nilson Buchanan

## 2017-12-18 NOTE — TELEPHONE ENCOUNTER
Pt given number to call ortho.  We reviewed last lab letter she received.  Advised to f/u in 6 mo as advised in the letter.  Pt verbalized understanding.  Janessa Hollins RN

## 2018-01-08 ENCOUNTER — OFFICE VISIT (OUTPATIENT)
Dept: NEUROSURGERY | Facility: CLINIC | Age: 43
End: 2018-01-08
Attending: PHYSICIAN ASSISTANT
Payer: MEDICARE

## 2018-01-08 VITALS — BODY MASS INDEX: 25.09 KG/M2 | WEIGHT: 165 LBS

## 2018-01-08 DIAGNOSIS — M43.06 LUMBAR SPONDYLOLYSIS: Primary | ICD-10-CM

## 2018-01-08 PROCEDURE — 99204 OFFICE O/P NEW MOD 45 MIN: CPT | Performed by: PHYSICIAN ASSISTANT

## 2018-01-08 PROCEDURE — G0463 HOSPITAL OUTPT CLINIC VISIT: HCPCS | Performed by: PHYSICIAN ASSISTANT

## 2018-01-08 ASSESSMENT — PAIN SCALES - GENERAL: PAINLEVEL: SEVERE PAIN (6)

## 2018-01-08 NOTE — NURSING NOTE
"Ria Nj is a 42 year old female who presents for:  Chief Complaint   Patient presents with     Neurologic Problem        Initial Vitals:  Wt 165 lb (74.8 kg)  BMI 25.09 kg/m2 Estimated body mass index is 25.09 kg/(m^2) as calculated from the following:    Height as of 10/17/17: 5' 8\" (1.727 m).    Weight as of this encounter: 165 lb (74.8 kg).. Body surface area is 1.89 meters squared.   Severe Pain (6)    Do you feel safe in your environment?  Yes  Do you need any refills today?     Nursing Comments: chronic low back pain. Injections years ago. PT within last year         Kori Lyon    "

## 2018-01-08 NOTE — PROGRESS NOTES
Dr. Talib Oshea  [facility]  Neurosurgery Clinic Visit      CC: Chronic low back pain    Primary care Provider: Sunshine Michael      Reason For Visit:   I was asked by Sunshine Michael MD to consult on the patient for chronic bilateral low back pain without sciatica.      HPI: Ria Nj is a 42 year old female who presents for evaluation of chronic bilateral low back pain. Pain is located in bilateral low back R>L and radiates down anterior and posterior legs to the feet. She states the pain also radiates up toward her neck. Cannot recall anything that alleviates or aggravates the pain. She completed physical therapy last May without relief. She has been taking taking chronic narcotics which have been mildly helpful. She has had epidural and facet injections which have not been helpful. Denies bladder/bowel incontinence.    Current pain: 6/10 At worst: 10/10    Past Medical History:   Diagnosis Date     Adjustment disorder with anxiety 10/7/2010     Bee sting-induced anaphylaxis      CARDIOVASCULAR SCREENING; LDL GOAL LESS THAN 160 5/9/2010     Chronic low back pain     has narcotic agreement on file     MICHAEL (generalised anxiety disorder)      Lumbar disc herniation with myelopathy 10/28/2010     Moderate major depression (H)      Tobacco use disorder     Smokes a 1/2 ppd. Started         Past Medical History reviewed with patient during visit.    Past Surgical History:   Procedure Laterality Date     OVARY SURGERY N/A 2004    Ovarian cyst removed.      SURGICAL HISTORY OF -   2007    ovarian cyst removal      Past Surgical History reviewed with patient during visit.    Current Outpatient Prescriptions   Medication     LORazepam (ATIVAN) 1 MG tablet     oxyCODONE (OXYCONTIN) 60 MG T12A 12 hr tablet     oxyCODONE-acetaminophen (PERCOCET)  MG per tablet     venlafaxine (EFFEXOR-XR) 37.5 MG 24 hr capsule     venlafaxine (EFFEXOR-ER) 75 MG TB24 24 hr tablet     gabapentin (NEURONTIN) 300  MG capsule     nicotine (NICODERM CQ) 21 MG/24HR 24 hr patch     albuterol (2.5 MG/3ML) 0.083% neb solution     tiZANidine (ZANAFLEX) 4 MG tablet     Cholecalciferol (VITAMIN D) 2000 UNITS tablet     EPINEPHrine (EPIPEN) 0.3 MG/0.3ML injection     albuterol (PROAIR HFA, PROVENTIL HFA, VENTOLIN HFA) 108 (90 BASE) MCG/ACT inhaler     Magnesium 250 MG tablet     calcium carbonate 500 MG tablet     IBUPROFEN 200 MG OR TABS     No current facility-administered medications for this visit.        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     Seat Belt Yes     Self-Exams Yes     Parent/Sibling W/ Cabg, Mi Or Angioplasty Before 65f 55m? No     Social History Narrative       Family History   Problem Relation Age of Onset     CANCER Maternal Grandmother      CANCER Maternal Grandfather      CANCER Paternal Grandmother      CANCER Paternal Grandfather           ROS: 10 point ROS neg other than the symptoms noted above in the HPI.    Vital Signs: Wt 165 lb (74.8 kg)  BMI 25.09 kg/m2    Examination:  Constitutional:  Alert, well nourished, NAD.  HEENT: Normocephalic, atraumatic.   Pulmonary:  Without shortness of breath, normal effort.   Lymph: no lymphadenopathy to low back or LE.   Integumentary: Skin  is free of rashes or lesions.   Cardiovascular:  No pitting edema of BLE.      Neurological:  Awake  Alert  Oriented x 3  Speech clear  Cranial nerves II - XII grossly intact  PERRL  EOMI  Face symmetric  Tongue midline  Motor exam   Hip Flexor:                Right: 5/5  Left:  5/5  Hip Adductor:             Right:  5/5  Left:  5/5  Hip Abductor:             Right:  5/5  Left:  5/5  Gastroc Soleus:        Right:  5/5  Left:  5/5  Tib/Ant:                      Right:  5/5  Left:  5/5  EHL:                          Right:  5/5  Left:  5/5       Sensation normal to bilateral upper and lower extremities.    Reflexes are 2+ in the patellar and Achilles. There is no clonus. Downgoing Babinski.  Musculoskeletal:  Gait: Able to stand from a seated position. Normal non-antalgic, non-myelopathic gait.  Able to heel/toe walk without loss of balance  Lumbar examination reveals no tenderness of the spine. Tenderness noted to right sided paraspinous muscles.  Hip height is symmetrical. Negative SI joint, sciatic notch or greater trochanteric tenderness to palpation bilaterally.  Straight leg raise causes pain in low back bilaterally.    Imaging:   MR LUMBAR SPINE W/O CONTRAST 11/9/2017 7:38 PM     History: chronic low back pain, worsening right sided; Chronic  bilateral low back pain without sciatica; Chronic bilateral low back pain without sciatica.     ICD-10: Chronic bilateral low back pain without sciatica; Chronic  bilateral low back pain without sciatica     Comparison: Spine MRI 8/27/2015     Technique: Sagittal STIR, T1-weighted turbo spin-echo, 3-D volumetric T2-weighted and axial reconstructed T2-weighted images of the lumbar spine were obtained without intravenous contrast.      Findings: There are 5 lumbar-type vertebrae assumed for the purposes of this dictation. The tip of the conus medullaris is at L1. The lumbar vertebral column is normally aligned. Unchanged mild  disc space narrowing and disc degeneration at  L4-5 and L5-S1. Unchanged minimal disc degeneration at L3-4. Normal bone marrow signal throughout the entire lumbar spine. On a level by level basis:     T12-L1: No focal abnormality.     L1-2: No focal abnormality.     L2-3: No spinal canal or neuroforaminal stenosis. Right-sided dorsal synovial cyst associated with the right facet joint measuring 3 mm, without any clinical significance. Findings are unchanged since 8/27/2016.     L3-4: Unchanged disc bulge and bilateral facet hypertrophy resulting in bilateral borderline foraminal narrowing without any significant spinal canal stenosis.      L4-5: Disc bulge and superimposed central disc protrusion with  annular fissure, indenting the ventral thecal sac and partially  effacing both lateral recesses. Findings are unchanged since  8/27/2015. In addition, there is bilateral facet joint hypertrophy,  contributing to bilateral mild foraminal narrowing and borderline  spinal canal narrowing. No neural impingement.     L5-S1: Disc bulge and small central disc protrusion with associated left central annular fissure. In addition the disc slightly indents the ventral thecal sac. This may abut the left S1 nerve root within the lateral recess. No spinal canal or neuroforaminal stenosis.     Visualized paraspinous tissues are unremarkable.     Impression: Multilevel degenerative disc disease, grossly stable since 8/27/2015. The most significant findings are again at L4-5, where there is mild bilateral neural foraminal narrowing. Also, at L5-S1, a left paracentral protrusion may again again slightly abut the left S1 nerve root.     I have personally reviewed the examination and initial interpretation and I agree with the findings.     DEMETRIO PENDLETON MD    Assessment/Plan:   Ria Nj is a 42 year old female who presents for evaluation of chronic bilateral low back pain. Pain is located in bilateral low back R>L and radiates down anterior and posterior legs to the  "feet. She states the pain also radiates up toward her neck. MRI reviewed in detail with patient and she does understand she has a small disc protrusion at L5-S1 possibly abutting the left S1 nerve root, which does not correlate with her worsening right side symptoms. She has done PT and injections in the past without relief and does not wish to have anymore. She states \"can't I just get some surgery or something?\" She does have referral to pain management from her PCP, which I am in agreement with and discussed with patient that she should get set up with them to help with her chronic back pain and ongoing narcotic use. Patient voiced understanding and agreement.           Frances Watts PA-C  [facility]  [address] [address]    Tel [phone]  Pager [phone]    "

## 2018-01-08 NOTE — MR AVS SNAPSHOT
"              After Visit Summary   2018    Ria Nj    MRN: [id number]           Patient Information     Date Of Birth          1975        Visit Information        Provider Department      2018 1:30 PM Frances Watts PA-C [facility] Neurosurgery [facility]        Today's Diagnoses     Lumbar spondylolysis    -  1       Follow-ups after your visit        Who to contact     If you have questions or need follow up information about today's clinic visit or your schedule please contact [facility] directly at [phone].  Normal or non-critical lab and imaging results will be communicated to you by Status4hart, letter or phone within 4 business days after the clinic has received the results. If you do not hear from us within 7 days, please contact the clinic through [employer] or phone. If you have a critical or abnormal lab result, we will notify you by phone as soon as possible.  Submit refill requests through [employer] or call your pharmacy and they will forward the refill request to us. Please allow 3 business days for your refill to be completed.          Additional Information About Your Visit        MyChart Information     [employer] lets you send messages to your doctor, view your test results, renew your prescriptions, schedule appointments and more. To sign up, go to [email or web address] . Click on \"Log in\" on the left side of the screen, which will take you to the Welcome page. Then click on \"Sign up Now\" on the right side of the page.     You will be asked to enter the access code listed below, as well as some personal information. Please follow the directions to create your username and password.     Your access code is: [email or web address]  Expires: 1/15/2018 10:19 AM     Your access code will  in 90 days. If you need help or a new code, please call your [place] clinic or [phone].        Care EveryWhere ID     This is your Care EveryWhere ID. This " could be used by other organizations to access your [place] medical records  [id number]        Your Vitals Were     BMI (Body Mass Index)                   25.09 kg/m2            Blood Pressure from Last 3 Encounters:   10/17/17 118/80   08/17/17 130/86   07/08/17 (!) 154/100    Weight from Last 3 Encounters:   01/08/18 165 lb (74.8 kg)   10/17/17 155 lb 1.6 oz (70.4 kg)   08/17/17 156 lb (70.8 kg)              Today, you had the following     No orders found for display       Primary Care Provider Office Phone # Fax #    Sunshine Michael [phone] [address]  [facility] [address]        Equal Access to Services     MONO WATERS : Hadii kandy carey hadasho Soomaali, waaxda luqadaha, qaybta kaalmada adeegyada, toy bowling . So [facility] [phone].    ATENCIÓN: Si habla español, tiene a freire disposición servicios gratuitos de asistencia lingüística. Llame al [phone].    We comply with applicable federal civil rights laws and [place] laws. We do not discriminate on the basis of race, color, national origin, age, disability, sex, sexual orientation, or gender identity.            Thank you!     Thank you for choosing [facility]  for your care. Our goal is always to provide you with excellent care. Hearing back from our patients is one way we can continue to improve our services. Please take a few minutes to complete the written survey that you may receive in the mail after your visit with us. Thank you!             Your Updated Medication List - Protect others around you: Learn how to safely use, store and throw away your medicines at www.disposemymeds.org.          This list is accurate as of: 1/8/18  2:29 PM.  Always use your most recent med list.                   Brand Name Dispense Instructions for use Diagnosis    * albuterol 108 (90 BASE) MCG/ACT Inhaler    PROAIR HFA/PROVENTIL HFA/VENTOLIN HFA    1 Inhaler    Inhale 2  puffs into the lungs every 4 hours as needed for shortness of breath / dyspnea or wheezing    Acute bronchospasm       * albuterol (2.5 MG/3ML) 0.083% neb solution     30 vial    Take 1 vial (2.5 mg) by nebulization every 4 hours as needed for shortness of breath / dyspnea or wheezing    Acute bronchospasm       calcium carbonate 1250 MG tablet    OS-ADOLFO 500 mg [place]. Ca    100 tablet    Take 1 tablet by mouth 2 times daily.    Routine general medical examination at a health care facility       EPINEPHrine 0.3 MG/0.3ML injection 2-pack    EPIPEN/ADRENACLICK/or ANY BX GENERIC EQUIV    2 each    Inject 0.3 mLs (0.3 mg) into the muscle once as needed for anaphylaxis    Bee sting-induced anaphylaxis, accidental or unintentional, subsequent encounter       gabapentin 300 MG capsule    NEURONTIN    270 capsule    TAKE 1 CAPSULE BY MOUTH THREE TIMES DAILY    Chronic bilateral low back pain without sciatica       ibuprofen 200 MG tablet    ADVIL/MOTRIN     Pt is taking 4 TABLET EVERY 4 TO 6 HOURS AS NEEDED        LORazepam 1 MG tablet    ATIVAN    90 tablet    TAKE 1 TABLET BY MOUTH EVERY 8 HOURS AS NEEDED FOR ANXIETY,  Fill on or after 12/20/17.    Generalized anxiety disorder       magnesium 250 MG tablet     100 tablet    Take 1 tablet by mouth daily.    Routine general medical examination at a health care facility       nicotine 21 MG/24HR 24 hr patch    NICODERM CQ    30 patch    Place 1 patch onto the skin every 24 hours    Tobacco use disorder       oxyCODONE 60 MG T12a 12 hr tablet    OXYCONTIN    60 each    Take 1 tablet by mouth every 12 hours Fill on or after 12/18/17    Chronic bilateral low back pain without sciatica       oxyCODONE-acetaminophen  MG per tablet    PERCOCET    150 tablet    Take 1 tablet by mouth every 6 hours as needed for pain Will approve up to 5 tablets a day or 150 tablets a month.  Fill on or after 12/18/17    Chronic bilateral low back pain without sciatica       tiZANidine 4 MG  tablet    ZANAFLEX    270 tablet    TAKE ONE TABLET BY MOUTH THREE TIMES DAILY AS NEEDED FOR SHOULDER AND UPPER BACK PAIN    Chronic bilateral low back pain without sciatica       * venlafaxine 37.5 MG 24 hr capsule    EFFEXOR-XR    7 capsule    Take 1 capsule daily for 7 days    Generalized anxiety disorder, Moderate major depression (H)       * venlafaxine 75 MG Tb24 24 hr tablet    EFFEXOR-ER    30 tablet    Take 1 tablet (75 mg) by mouth daily Start after the 37.5 mg pills completed.    Generalized anxiety disorder, Moderate major depression (H)       vitamin D 2000 UNITS tablet     100 tablet    Take 2,000 Units by mouth daily    Major depressive disorder, recurrent episode, moderate (H)       * Notice:  This list has 4 medication(s) that are the same as other medications prescribed for you. Read the directions carefully, and ask your doctor or other care provider to review them with you.

## 2018-01-08 NOTE — LETTER
1/8/2018         RE: Ria Nj  [address]  [place] [address]        Dear Colleague,    Thank you for referring your patient, Ria Nj, to the [facility]UR[facility]. Please see a copy of my visit note below.    Dr. Talib Oshea  [facility]  Neurosurgery Clinic Visit      CC: Chronic low back pain    Primary care Provider: Sunshine Michael      Reason For Visit:   I was asked by Sunshine Michael MD to consult on the patient for chronic bilateral low back pain without sciatica.      HPI: Ria Nj is a 42 year old female who presents for evaluation of chronic bilateral low back pain. Pain is located in bilateral low back R>L and radiates down anterior and posterior legs to the feet. She states the pain also radiates up toward her neck. Cannot recall anything that alleviates or aggravates the pain. She completed physical therapy last May without relief. She has been taking taking chronic narcotics which have been mildly helpful. She has had epidural and facet injections which have not been helpful. Denies bladder/bowel incontinence.    Current pain: 6/10 At worst: 10/10    Past Medical History:   Diagnosis Date     Adjustment disorder with anxiety 10/7/2010     Bee sting-induced anaphylaxis      CARDIOVASCULAR SCREENING; LDL GOAL LESS THAN 160 5/9/2010     Chronic low back pain     has narcotic agreement on file     MICHAEL (generalised anxiety disorder)      Lumbar disc herniation with myelopathy 10/28/2010     Moderate major depression (H)      Tobacco use disorder     Smokes a 1/2 ppd. Started         Past Medical History reviewed with patient during visit.    Past Surgical History:   Procedure Laterality Date     OVARY SURGERY N/A 2004    Ovarian cyst removed.      SURGICAL HISTORY OF -   2007    ovarian cyst removal      Past Surgical History reviewed with patient during visit.    Current Outpatient Prescriptions   Medication     LORazepam  (ATIVAN) 1 MG tablet     oxyCODONE (OXYCONTIN) 60 MG T12A 12 hr tablet     oxyCODONE-acetaminophen (PERCOCET)  MG per tablet     venlafaxine (EFFEXOR-XR) 37.5 MG 24 hr capsule     venlafaxine (EFFEXOR-ER) 75 MG TB24 24 hr tablet     gabapentin (NEURONTIN) 300 MG capsule     nicotine (NICODERM CQ) 21 MG/24HR 24 hr patch     albuterol (2.5 MG/3ML) 0.083% neb solution     tiZANidine (ZANAFLEX) 4 MG tablet     Cholecalciferol (VITAMIN D) 2000 UNITS tablet     EPINEPHrine (EPIPEN) 0.3 MG/0.3ML injection     albuterol (PROAIR HFA, PROVENTIL HFA, VENTOLIN HFA) 108 (90 BASE) MCG/ACT inhaler     Magnesium 250 MG tablet     calcium carbonate 500 MG tablet     IBUPROFEN 200 MG OR TABS     No current facility-administered medications for this visit.        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     Seat Belt Yes     Self-Exams Yes     Parent/Sibling W/ Cabg, Mi Or Angioplasty Before 65f 55m? No     Social History Narrative       Family History   Problem Relation Age of Onset     CANCER Maternal Grandmother      CANCER Maternal Grandfather      CANCER Paternal Grandmother      CANCER Paternal Grandfather           ROS: 10 point ROS neg other than the symptoms noted above in the HPI.    Vital  Signs: Wt 165 lb (74.8 kg)  BMI 25.09 kg/m2    Examination:  Constitutional:  Alert, well nourished, NAD.  HEENT: Normocephalic, atraumatic.   Pulmonary:  Without shortness of breath, normal effort.   Lymph: no lymphadenopathy to low back or LE.   Integumentary: Skin is free of rashes or lesions.   Cardiovascular:  No pitting edema of BLE.      Neurological:  Awake  Alert  Oriented x 3  Speech clear  Cranial nerves II - XII grossly intact  PERRL  EOMI  Face symmetric  Tongue midline  Motor exam   Hip Flexor:                Right: 5/5  Left:  5/5  Hip Adductor:             Right:  5/5  Left:  5/5  Hip Abductor:             Right:  5/5  Left:  5/5  Gastroc Soleus:        Right:  5/5  Left:  5/5  Tib/Ant:                      Right:  5/5  Left:  5/5  EHL:                          Right:  5/5  Left:  5/5       Sensation normal to bilateral upper and lower extremities.    Reflexes are 2+ in the patellar and Achilles. There is no clonus. Downgoing Babinski.  Musculoskeletal:  Gait: Able to stand from a seated position. Normal non-antalgic, non-myelopathic gait.  Able to heel/toe walk without loss of balance  Lumbar examination reveals no tenderness of the spine. Tenderness noted to right sided paraspinous muscles.  Hip height is symmetrical. Negative SI joint, sciatic notch or greater trochanteric tenderness to palpation bilaterally.  Straight leg raise causes pain in low back bilaterally.    Imaging:   MR LUMBAR SPINE W/O CONTRAST 11/9/2017 7:38 PM     History: chronic low back pain, worsening right sided; Chronic  bilateral low back pain without sciatica; Chronic bilateral low back pain without sciatica.     ICD-10: Chronic bilateral low back pain without sciatica; Chronic  bilateral low back pain without sciatica     Comparison: Spine MRI 8/27/2015     Technique: Sagittal STIR, T1-weighted turbo spin-echo, 3-D volumetric T2-weighted and axial reconstructed T2-weighted images of the lumbar spine were obtained without  intravenous contrast.      Findings: There are 5 lumbar-type vertebrae assumed for the purposes of this dictation. The tip of the conus medullaris is at L1. The lumbar vertebral column is normally aligned. Unchanged mild  disc space narrowing and disc degeneration at L4-5 and L5-S1. Unchanged minimal disc degeneration at L3-4. Normal bone marrow signal throughout the entire lumbar spine. On a level by level basis:     T12-L1: No focal abnormality.     L1-2: No focal abnormality.     L2-3: No spinal canal or neuroforaminal stenosis. Right-sided dorsal synovial cyst associated with the right facet joint measuring 3 mm, without any clinical significance. Findings are unchanged since 8/27/2016.     L3-4: Unchanged disc bulge and bilateral facet hypertrophy resulting in bilateral borderline foraminal narrowing without any significant spinal canal stenosis.      L4-5: Disc bulge and superimposed central disc protrusion with  annular fissure, indenting the ventral thecal sac and partially  effacing both lateral recesses. Findings are unchanged since  8/27/2015. In addition, there is bilateral facet joint hypertrophy,  contributing to bilateral mild foraminal narrowing and borderline  spinal canal narrowing. No neural impingement.     L5-S1: Disc bulge and small central disc protrusion with associated left central annular fissure. In addition the disc slightly indents the ventral thecal sac. This may abut the left S1 nerve root within the lateral recess. No spinal canal or neuroforaminal stenosis.     Visualized paraspinous tissues are unremarkable.     Impression: Multilevel degenerative disc disease, grossly stable since 8/27/2015. The most significant findings are again at L4-5, where there is mild bilateral neural foraminal narrowing. Also, at L5-S1, a left paracentral protrusion may again again slightly abut the left S1 nerve root.     I have personally reviewed the examination and initial interpretation and I agree  "with the findings.     DEMETRIO PENDLETON MD    Assessment/Plan:   Ria Nj is a 42 year old female who presents for evaluation of chronic bilateral low back pain. Pain is located in bilateral low back R>L and radiates down anterior and posterior legs to the feet. She states the pain also radiates up toward her neck. MRI reviewed in detail with patient and she does understand she has a small disc protrusion at L5-S1 possibly abutting the left S1 nerve root, which does not correlate with her worsening right side symptoms. She has done PT and injections in the past without relief and does not wish to have anymore. She states \"can't I just get some surgery or something?\" She does have referral to pain management from her PCP, which I am in agreement with and discussed with patient that she should get set up with them to help with her chronic back pain and ongoing narcotic use. Patient voiced understanding and agreement.           Frances Watts PA-C  [place] [address]    Tel [phone]  Pager [phone]      Again, thank you for allowing me to participate in the care of your patient.        Sincerely,        Frances Watts PA-C    "

## 2018-01-15 ENCOUNTER — TELEPHONE (OUTPATIENT)
Dept: FAMILY MEDICINE | Facility: CLINIC | Age: 43
End: 2018-01-15

## 2018-01-15 DIAGNOSIS — M54.50 CHRONIC BILATERAL LOW BACK PAIN WITHOUT SCIATICA: ICD-10-CM

## 2018-01-15 DIAGNOSIS — G89.29 CHRONIC BILATERAL LOW BACK PAIN WITHOUT SCIATICA: ICD-10-CM

## 2018-01-15 RX ORDER — OXYCODONE HYDROCHLORIDE 60 MG/1
1 TABLET, FILM COATED, EXTENDED RELEASE ORAL EVERY 12 HOURS
Qty: 60 EACH | Refills: 0 | Status: CANCELLED | OUTPATIENT
Start: 2018-01-15

## 2018-01-16 RX ORDER — OXYCODONE HCL 40 MG/1
40 TABLET, FILM COATED, EXTENDED RELEASE ORAL EVERY 12 HOURS
Qty: 60 TABLET | Refills: 0 | Status: SHIPPED | OUTPATIENT
Start: 2018-01-16 | End: 2018-02-16

## 2018-01-16 RX ORDER — OXYCODONE AND ACETAMINOPHEN 10; 325 MG/1; MG/1
1 TABLET ORAL EVERY 6 HOURS PRN
Qty: 120 TABLET | Refills: 0 | Status: SHIPPED | OUTPATIENT
Start: 2018-01-16 | End: 2018-02-16

## 2018-01-18 ENCOUNTER — TELEPHONE (OUTPATIENT)
Dept: FAMILY MEDICINE | Facility: CLINIC | Age: 43
End: 2018-01-18

## 2018-01-18 DIAGNOSIS — F41.1 GENERALIZED ANXIETY DISORDER: ICD-10-CM

## 2018-01-18 RX ORDER — LORAZEPAM 1 MG/1
TABLET ORAL
Qty: 90 TABLET | Refills: 0 | Status: SHIPPED | OUTPATIENT
Start: 2018-01-18 | End: 2018-02-16

## 2018-01-18 NOTE — TELEPHONE ENCOUNTER
..Reason for Call: prescription    Detailed comments: Patient called to see if her script for LOREZAPAM can be sent to the pharmacy.    Phone Number Patient can be reached at: Home number on file [phone] (home)    Best Time: anytime    Can we leave a detailed message on this number? YES    Call taken on 1/18/2018 at 2:30 PM by Jenny Sequeira

## 2018-01-26 DIAGNOSIS — G89.29 CHRONIC BILATERAL LOW BACK PAIN WITHOUT SCIATICA: ICD-10-CM

## 2018-01-26 DIAGNOSIS — M54.50 CHRONIC BILATERAL LOW BACK PAIN WITHOUT SCIATICA: ICD-10-CM

## 2018-01-26 RX ORDER — GABAPENTIN 300 MG/1
CAPSULE ORAL
Qty: 270 CAPSULE | Refills: 0 | Status: CANCELLED | OUTPATIENT
Start: 2018-01-26

## 2018-01-29 ENCOUNTER — OFFICE VISIT (OUTPATIENT)
Dept: FAMILY MEDICINE | Facility: CLINIC | Age: 43
End: 2018-01-29
Payer: MEDICARE

## 2018-01-29 VITALS
OXYGEN SATURATION: 98 % | TEMPERATURE: 98.7 F | DIASTOLIC BLOOD PRESSURE: 86 MMHG | SYSTOLIC BLOOD PRESSURE: 132 MMHG | HEIGHT: 68 IN | HEART RATE: 117 BPM | BODY MASS INDEX: 24.25 KG/M2 | RESPIRATION RATE: 16 BRPM | WEIGHT: 160 LBS

## 2018-01-29 DIAGNOSIS — F11.20 CONTINUOUS OPIOID DEPENDENCE (H): ICD-10-CM

## 2018-01-29 DIAGNOSIS — M54.50 CHRONIC BILATERAL LOW BACK PAIN WITHOUT SCIATICA: Primary | ICD-10-CM

## 2018-01-29 DIAGNOSIS — G89.4 CHRONIC PAIN SYNDROME: ICD-10-CM

## 2018-01-29 DIAGNOSIS — F11.93 OPIOID WITHDRAWAL (H): ICD-10-CM

## 2018-01-29 DIAGNOSIS — F41.1 GENERALIZED ANXIETY DISORDER: ICD-10-CM

## 2018-01-29 DIAGNOSIS — G89.29 CHRONIC BILATERAL LOW BACK PAIN WITHOUT SCIATICA: Primary | ICD-10-CM

## 2018-01-29 PROCEDURE — 99215 OFFICE O/P EST HI 40 MIN: CPT | Performed by: FAMILY MEDICINE

## 2018-01-29 RX ORDER — GABAPENTIN 300 MG/1
CAPSULE ORAL
Qty: 90 CAPSULE | Refills: 0 | Status: SHIPPED | OUTPATIENT
Start: 2018-01-29 | End: 2018-02-16

## 2018-01-29 RX ORDER — OXYCODONE HCL 10 MG/1
10 TABLET, FILM COATED, EXTENDED RELEASE ORAL EVERY 12 HOURS
Qty: 36 TABLET | Refills: 0 | Status: SHIPPED | OUTPATIENT
Start: 2018-01-29 | End: 2018-02-16

## 2018-01-29 NOTE — MR AVS SNAPSHOT
After Visit Summary   1/29/2018    Ria Nj    MRN: [id number]           Patient Information     Date Of Birth          1975        Visit Information        Provider Department      1/29/2018 11:00 AM Wegener, Joel Daniel Irwin, MD [facility]        Today's Diagnoses     Chronic bilateral low back pain without sciatica    -  1    Continuous opioid dependence (H)        Chronic pain syndrome        Generalized anxiety disorder          Care Instructions    ASSESSMENT AND PLAN  1. Chronic bilateral low back pain without sciatica  Multiple complex issues disscussed today.  With h/o chronic severe back pain from labor/epidural and low back arthritis.     Recent visit with [facility] pain clinic - considering suboxone but does not feel she has to do low back injections to pursue this.     Likely contribution of psychological factors and addiction given chronic use of lorazepam, high level of pain despite clear severe cause.     Was weaned fairly quickly from 170mg oxycodone/day to 120mg/day.     Plan:    1.  Ok to fill oxycodone 10mg twice daily to last until follow up appointment 8:20 am February 16th.     2.  I am also recommending weaning opiods with goal of transitioning to suboxone.  Addiction referral made.  Side benefit of suboxone treatment may be less pain.     3.  We will continue to wean at next visit appropriately.       4.  For me to prescribe opiods even for weaning I recommend working with a psychiatrist to fully address mental health.  Referral given .      - oxyCODONE (OXYCONTIN) 10 MG 12 hr tablet; Take 1 tablet (10 mg) by mouth every 12 hours for 18 days maximum 2 tablet(s) per day  Dispense: 36 tablet; Refill: 0  - gabapentin (NEURONTIN) 300 MG capsule; TAKE 1 CAPSULE BY MOUTH THREE TIMES DAILY  Dispense: 90 capsule; Refill: 0    2. Continuous opioid dependence (H)      3. Chronic pain syndrome          MYCHART SIGNUP FOR E-VISITS AND EASIER COMMUNICATION:   [email or web address]     Zipnosis:  [email or web address].  Sign up for e-visits for common illnesses!     RADIOLOGY:   [facility]:  [phone] to schedule any radiology tests at [facility]: [phone]    Mammograms/colonoscopies:  [phone]    CONSUMER PRICE LINE for estimates of test costs:  [phone]               Follow-ups after your visit        Additional Services     ADDICTION MEDICINE REFERRAL       The Addiction Medicine Service is prepared to provide consultation for and, if necessary, ongoing care for patients with the disease of addiction, ages 16 and up.     For acute detox, please send your patient to the ER or contact [facility] at [phone].     Common problems that may warrant referral to the Addiction Medicine Service are:  Alcohol use disorder - diagnosis, treatment referral, acute and protracted withdrawal management, and ongoing medication assisted treatment including Antabuse and Naltrexone.  Opioid Use Disorder - medication assisted treatment including Buprenorphine (Suboxone) or extended release Naltrexone (Vivitrol)  Benzodiazepine dependence - extended outpatient detoxification  Many other issues pertaining to addiction, relapse, and recovery    Please contact our scheduling staff at [phone] with any questions.    Referrals to the Addiction Medicine Service assume that the referring provider has discussed the referral with the patient.  Referral will be reviewed and if appropriate, the patient will be contacted to schedule appointment.  If not appropriate, the provider will be contacted with further information.    Please answer the following questions so we can better service your patient:    Drug of choice: opioids  Do they have a [facility] PCP:  Yes     Please bring the following to your appointment:  >>   List of current medications   >>   Any relevant history            MENTAL HEALTH REFERRAL  - Adult; Psychiatry and  "Medication Management; Psychiatry; Other: [facility] [phone]; We will contact you to schedule the appointment or please call with any questions       All scheduling is subject to the client's specific insurance plan & benefits, provider/location availability, and provider clinical specialities.  Please arrive 15 minutes early for your first appointment and bring your completed paperwork.    Please be aware that coverage of these services is subject to the terms and limitations of your health insurance plan.  Call member services at your health plan with any benefit or coverage questions.                            Who to contact     If you have questions or need follow up information about today's clinic visit or your schedule please contact [facility] directly at [phone].  Normal or non-critical lab and imaging results will be communicated to you by MyChart, letter or phone within 4 business days after the clinic has received the results. If you do not hear from us within 7 days, please contact the clinic through MyChart or phone. If you have a critical or abnormal lab result, we will notify you by phone as soon as possible.  Submit refill requests through [facility] or call your pharmacy and they will forward the refill request to us. Please allow 3 business days for your refill to be completed.          Additional Information About Your Visit        Care EveryWhere ID     This is your Care EveryWhere ID. This could be used by other organizations to access your [facility] medical records  [id number]        Your Vitals Were     Pulse Temperature Respirations Height Pulse Oximetry BMI (Body Mass Index)    117 98.7  F (37.1  C) (Oral) 16 5' 8\" (1.727 m) 98% 24.33 kg/m2       Blood Pressure from Last 3 Encounters:   01/29/18 132/86   10/17/17 118/80   08/17/17 130/86    Weight from Last 3 Encounters:   01/29/18 160 lb (72.6 kg)   01/08/18 165 lb (74.8 kg)   10/17/17 155 lb " 1.6 oz (70.4 kg)              We Performed the Following     ADDICTION MEDICINE REFERRAL     DEPRESSION ACTION PLAN (DAP)     MENTAL HEALTH REFERRAL  - Adult; Psychiatry and Medication Management; Psychiatry; Other: [facility] [phone]; We will contact you to schedule the appointment or please call with any questions          Today's Medication Changes          These changes are accurate as of 1/29/18 12:36 PM.  If you have any questions, ask your nurse or doctor.               These medicines have changed or have updated prescriptions.        Dose/Directions    * oxyCODONE 60 MG T12a 12 hr tablet   Commonly known as:  OXYCONTIN   This may have changed:  Another medication with the same name was added. Make sure you understand how and when to take each.   Used for:  Chronic bilateral low back pain without sciatica   Changed by:  Wegener, Joel Daniel Irwin, MD        Dose:  1 tablet   Take 1 tablet by mouth every 12 hours Fill on or after 12/18/17   Quantity:  60 each   Refills:  0       * oxyCODONE 40 MG 12 hr tablet   Commonly known as:  OxyCONTIN   This may have changed:  Another medication with the same name was added. Make sure you understand how and when to take each.   Used for:  Chronic bilateral low back pain without sciatica   Changed by:  Wegener, Joel Daniel Irwin, MD        Dose:  40 mg   Take 1 tablet (40 mg) by mouth every 12 hours   Quantity:  60 tablet   Refills:  0       * oxyCODONE 10 MG 12 hr tablet   Commonly known as:  OxyCONTIN   This may have changed:  You were already taking a medication with the same name, and this prescription was added. Make sure you understand how and when to take each.   Used for:  Chronic bilateral low back pain without sciatica   Changed by:  Wegener, Joel Daniel Irwin, MD        Dose:  10 mg   Take 1 tablet (10 mg) by mouth every 12 hours for 18 days maximum 2 tablet(s) per day   Quantity:  36 tablet   Refills:  0       * Notice:  This list  has 3 medication(s) that are the same as other medications prescribed for you. Read the directions carefully, and ask your doctor or other care provider to review them with you.         Where to get your medicines      These medications were sent to [facility] [place], [place] - [address]  [address], [facility] [address]     Phone:  [phone]     gabapentin 300 MG capsule         Some of these will need a paper prescription and others can be bought over the counter.  Ask your nurse if you have questions.     Bring a paper prescription for each of these medications     oxyCODONE 10 MG 12 hr tablet                Primary Care Provider Office Phone # Fax #    Sunshine Michael [phone] [address]  [facility] [address]        Equal Access to Services     MONO WATERS : Hadii kandy carey hadasho Soomaali, waaxda luqadaha, qaybta kaalmada adeegyada, toy bowling . So [facility] [phone].    ATENCIÓN: Si habla español, tiene a freire disposición servicios gratuitos de asistencia lingüística. Llame al [phone].    We comply with applicable federal civil rights laws and [place] laws. We do not discriminate on the basis of race, color, national origin, age, disability, sex, sexual orientation, or gender identity.            Thank you!     Thank you for choosing [facility][note 3]  for your care. Our goal is always to provide you with excellent care. Hearing back from our patients is one way we can continue to improve our services. Please take a few minutes to complete the written survey that you may receive in the mail after your visit with us. Thank you!             Your Updated Medication List - Protect others around you: Learn how to safely use, store and throw away your medicines at www.disposemymeds.org.          This list is accurate as of 1/29/18 12:36 PM.  Always use your most recent med list.                   Brand Name  Dispense Instructions for use Diagnosis    * albuterol 108 (90 BASE) MCG/ACT Inhaler    PROAIR HFA/PROVENTIL HFA/VENTOLIN HFA    1 Inhaler    Inhale 2 puffs into the lungs every 4 hours as needed for shortness of breath / dyspnea or wheezing    Acute bronchospasm       * albuterol (2.5 MG/3ML) 0.083% neb solution     30 vial    Take 1 vial (2.5 mg) by nebulization every 4 hours as needed for shortness of breath / dyspnea or wheezing    Acute bronchospasm       calcium carbonate 1250 MG tablet    OS-ADOLFO 500 mg [place]. [place]    100 tablet    Take 1 tablet by mouth 2 times daily.    Routine general medical examination at a health care facility       EPINEPHrine 0.3 MG/0.3ML injection 2-pack    EPIPEN/ADRENACLICK/or ANY BX GENERIC EQUIV    2 each    Inject 0.3 mLs (0.3 mg) into the muscle once as needed for anaphylaxis    Bee sting-induced anaphylaxis, accidental or unintentional, subsequent encounter       gabapentin 300 MG capsule    NEURONTIN    90 capsule    TAKE 1 CAPSULE BY MOUTH THREE TIMES DAILY    Chronic bilateral low back pain without sciatica       ibuprofen 200 MG tablet    ADVIL/MOTRIN     Pt is taking 4 TABLET EVERY 4 TO 6 HOURS AS NEEDED        LORazepam 1 MG tablet    ATIVAN    90 tablet    TAKE 1 TABLET BY MOUTH EVERY 8 HOURS AS NEEDED FOR ANXIETY,  Fill on or after 1/20/18.    Generalized anxiety disorder       magnesium 250 MG tablet     100 tablet    Take 1 tablet by mouth daily.    Routine general medical examination at a health care facility       nicotine 21 MG/24HR 24 hr patch    NICODERM CQ    30 patch    Place 1 patch onto the skin every 24 hours    Tobacco use disorder       * oxyCODONE 60 MG T12a 12 hr tablet    OXYCONTIN    60 each    Take 1 tablet by mouth every 12 hours Fill on or after 12/18/17    Chronic bilateral low back pain without sciatica       * oxyCODONE 40 MG 12 hr tablet    OxyCONTIN    60 tablet    Take 1 tablet (40 mg) by mouth every 12 hours    Chronic bilateral low back pain  without sciatica       * oxyCODONE 10 MG 12 hr tablet    OxyCONTIN    36 tablet    Take 1 tablet (10 mg) by mouth every 12 hours for 18 days maximum 2 tablet(s) per day    Chronic bilateral low back pain without sciatica       oxyCODONE-acetaminophen  MG per tablet    PERCOCET    120 tablet    Take 1 tablet by mouth every 6 hours as needed for pain Will approve up to 4 tablets a day.  Fill on or after 1/18/18    Chronic bilateral low back pain without sciatica       tiZANidine 4 MG tablet    ZANAFLEX    270 tablet    TAKE ONE TABLET BY MOUTH THREE TIMES DAILY AS NEEDED FOR SHOULDER AND UPPER BACK PAIN    Chronic bilateral low back pain without sciatica       * venlafaxine 37.5 MG 24 hr capsule    EFFEXOR-XR    7 capsule    Take 1 capsule daily for 7 days    Generalized anxiety disorder, Moderate major depression (H)       * venlafaxine 75 MG Tb24 24 hr tablet    EFFEXOR-ER    30 tablet    Take 1 tablet (75 mg) by mouth daily Start after the 37.5 mg pills completed.    Generalized anxiety disorder, Moderate major depression (H)       vitamin D 2000 UNITS tablet     100 tablet    Take 2,000 Units by mouth daily    Major depressive disorder, recurrent episode, moderate (H)       * Notice:  This list has 7 medication(s) that are the same as other medications prescribed for you. Read the directions carefully, and ask your doctor or other care provider to review them with you.

## 2018-01-29 NOTE — TELEPHONE ENCOUNTER
Requested Prescriptions   Pending Prescriptions Disp Refills     gabapentin (NEURONTIN) 300 MG capsule [Pharmacy Med Name: GABAPENTIN 300MG CAPSULES] 270 capsule 0     Sig: TAKE 1 CAPSULE BY MOUTH THREE TIMES DAILY    There is no refill protocol information for this order        gabapentin (NEURONTIN) 300 MG capsule      Last Written Prescription Date:  10/17/17  Last Fill Quantity: 270,   # refills: 0  Last Office Visit: 10/17/17    Future Office visit:    Next 5 appointments (look out 90 days)     Jan 29, 2018 11:00 AM CST   SHORT with Joel Daniel Irwin Wegener, [facility]   [facility]    [address] [place] [address] [phone]                   Routing refill request to provider for review/approval because:  Drug not on the [facility], [facility] or [facility] refill protocol or controlled substance

## 2018-01-29 NOTE — NURSING NOTE
"Chief Complaint   Patient presents with     Eval/Assessment       Initial /86  Pulse 117  Temp 98.7  F (37.1  C) (Oral)  Resp 16  Ht 5' 8\" (1.727 m)  Wt 160 lb (72.6 kg)  SpO2 98%  BMI 24.33 kg/m2 Estimated body mass index is 24.33 kg/(m^2) as calculated from the following:    Height as of this encounter: 5' 8\" (1.727 m).    Weight as of this encounter: 160 lb (72.6 kg).  Medication Reconciliation: complete   Shahriar Beck MA      "

## 2018-01-29 NOTE — PATIENT INSTRUCTIONS
ASSESSMENT AND PLAN  1. Chronic bilateral low back pain without sciatica  Multiple complex issues disscussed today.  With h/o chronic severe back pain from labor/epidural and low back arthritis.     Recent visit with [facility] pain clinic - considering suboxone but does not feel she has to do low back injections to pursue this.     Likely contribution of psychological factors and addiction given chronic use of lorazepam, high level of pain despite clear severe cause.     Was weaned fairly quickly from 170mg oxycodone/day to 120mg/day.     Plan:    1.  Ok to fill oxycodone 10mg twice daily to last until follow up appointment 8:20 am February 16th.     2.  I am also recommending weaning opiods with goal of transitioning to suboxone.  Addiction referral made.  Side benefit of suboxone treatment may be less pain.     3.  We will continue to wean at next visit appropriately.       4.  For me to prescribe opiods even for weaning I recommend working with a psychiatrist to fully address mental health.  Referral given .      - oxyCODONE (OXYCONTIN) 10 MG 12 hr tablet; Take 1 tablet (10 mg) by mouth every 12 hours for 18 days maximum 2 tablet(s) per day  Dispense: 36 tablet; Refill: 0  - gabapentin (NEURONTIN) 300 MG capsule; TAKE 1 CAPSULE BY MOUTH THREE TIMES DAILY  Dispense: 90 capsule; Refill: 0    2. Continuous opioid dependence (H)      3. Chronic pain syndrome          MYCHART SIGNUP FOR E-VISITS AND EASIER COMMUNICATION:  [email or web address]     Zipnosis:  [email or web address].  Sign up for e-visits for common illnesses!     RADIOLOGY:   [facility]:  [phone] to schedule any radiology tests at [facility]: [phone]    Mammograms/colonoscopies:  [phone]    CONSUMER PRICE LINE for estimates of test costs:  [phone]

## 2018-01-29 NOTE — PROGRESS NOTES
SUBJECTIVE:   Ria Nj is a 42 year old female who presents to clinic today for the following health issues:      Pt in to establish care with new provider and want to find other pain management for her back pain. Current medication are not helping her.    Additional history/life update:       Chronic bilateral low back pain without sciatica: Patient is here to establish care with new provider.  She is previously seen by Dr. Vickey BASSETT at [facility].  She states that she appreciated her as a provider but felt that she became less understanding about her back pain.  She is here mainly to discuss care for chronic back pain which started after the birth of her child she attributed attributes the pain to childbirth and epidural and remembers the pain starting immediately after epidural.  She has worked with the pain clinic in the past, done physical therapy, and over the years slowly increased opioid use.  She never felt that her pain was fully addressed however she reports being relatively happy on her previous pain regimen which included 60 mg of OxyContin twice a day and 150 tablets of Percocet 10 mg per month.  It is her perception that the current culture and change in medical care with focus on reducing opioids has specifically affected her and she feels her providers have become less comfortable prescribing for her.  To that end per her report and per chart review of available records and [place] it appears that several times over the past 6-12 months she had been referred to our pain clinic.  However after multiple no shows and scheduling mishaps she was not allowed to return there.  It appears that due to this in December her previous provider began to wean her and decreased her to OxyContin 40 mg twice daily and 120 of the 10 mg Percocet per month these were filled on 1/18/2018.  Per the chart notes she was in agreement with doing this and did see the goal of decreasing opioids.   Today she does feel that she did get to a very high dose she wonders how effective it has been.  She does not specifically endorse drug abuse although wishes she did not have to be reliant on them because she sees how difficult it is to be on these medications now.  She reports since her last contact with Dr. Hurd asked that she has seen a neurosurgeon which is verified in the chart had an MRI.  And also had a visit at [facility] pain clinic.  Those records are not available for me review she does not have them.  She reports however that they are recommending Suboxone therapy which she is interested in.  However for some reason which she is not necessarily clear on their wanting her to try further back injections prior to Suboxone therapy.  She is very hesitant to do so as she feels a spinal injection with epidural that with original cause of her pain and she does not want to do this and thus does not want to follow those recommendations.  Opioid withdrawal (H): In addition her pain which she feels is increased now she is concerned that she is having withdrawal symptoms she has slight loose stools this started about space 5 days after dose reduction and continues now which is about 10 days after her dose reduction.  Continuous opioid dependence (H)  Chronic pain syndrome  Generalized anxiety disorder : Reviewed medication list which she reports as being up-to-date and includes lorazepam.        Problem list, Medication list, Allergies, and Medical/Social/Surgical histories reviewed in [facility] and updated as appropriate.  Labs reviewed in EPIC  BP Readings from Last 3 Encounters:   01/29/18 132/86   10/17/17 118/80   08/17/17 130/86    Wt Readings from Last 3 Encounters:   01/29/18 160 lb (72.6 kg)   01/08/18 165 lb (74.8 kg)   10/17/17 155 lb 1.6 oz (70.4 kg)                  Patient Active Problem List   Diagnosis     CARDIOVASCULAR SCREENING; LDL GOAL LESS THAN 160     Lumbar disc herniation with myelopathy      Chronic low back pain     Bee sting-induced anaphylaxis     Generalized anxiety disorder     Tobacco use disorder     Moderate major depression (H)     Continuous opioid dependence (H)     Cervicalgia     Past Surgical History:   Procedure Laterality Date     OVARY SURGERY N/A 2004    Ovarian cyst removed.      SURGICAL HISTORY OF -   2007    ovarian cyst removal        Social History   Substance Use Topics     Smoking status: Current Every Day Smoker     Packs/day: 2.00     Years: 15.00     Types: Cigarettes     Smokeless tobacco: Never Used     Alcohol use No     Family History   Problem Relation Age of Onset     CANCER Maternal Grandmother      CANCER Maternal Grandfather      CANCER Paternal Grandmother      CANCER Paternal Grandfather          Current Outpatient Prescriptions   Medication Sig Dispense Refill     oxyCODONE (OXYCONTIN) 10 MG 12 hr tablet Take 1 tablet (10 mg) by mouth every 12 hours for 18 days maximum 2 tablet(s) per day 36 tablet 0     gabapentin (NEURONTIN) 300 MG capsule TAKE 1 CAPSULE BY MOUTH THREE TIMES DAILY 90 capsule 0     LORazepam (ATIVAN) 1 MG tablet TAKE 1 TABLET BY MOUTH EVERY 8 HOURS AS NEEDED FOR ANXIETY,  Fill on or after 1/20/18. 90 tablet 0     oxyCODONE-acetaminophen (PERCOCET)  MG per tablet Take 1 tablet by mouth every 6 hours as needed for pain Will approve up to 4 tablets a day.  Fill on or after 1/18/18 120 tablet 0     oxyCODONE (OXYCONTIN) 40 MG 12 hr tablet Take 1 tablet (40 mg) by mouth every 12 hours 60 tablet 0     oxyCODONE (OXYCONTIN) 60 MG T12A 12 hr tablet Take 1 tablet by mouth every 12 hours Fill on or after 12/18/17 60 each 0     venlafaxine (EFFEXOR-XR) 37.5 MG 24 hr capsule Take 1 capsule daily for 7 days 7 capsule 0     venlafaxine (EFFEXOR-ER) 75 MG TB24 24 hr tablet Take 1 tablet (75 mg) by mouth daily Start after the 37.5 mg pills completed. 30 tablet 0     nicotine (NICODERM CQ) 21 MG/24HR 24 hr patch Place 1 patch onto the skin every 24 hours  "30 patch 0     albuterol (2.5 MG/3ML) 0.083% neb solution Take 1 vial (2.5 mg) by nebulization every 4 hours as needed for shortness of breath / dyspnea or wheezing 30 vial 11     tiZANidine (ZANAFLEX) 4 MG tablet TAKE ONE TABLET BY MOUTH THREE TIMES DAILY AS NEEDED FOR SHOULDER AND UPPER BACK PAIN 270 tablet 0     Cholecalciferol (VITAMIN D) 2000 UNITS tablet Take 2,000 Units by mouth daily 100 tablet 3     EPINEPHrine (EPIPEN) 0.3 MG/0.3ML injection Inject 0.3 mLs (0.3 mg) into the muscle once as needed for anaphylaxis 2 each 1     albuterol (PROAIR HFA, PROVENTIL HFA, VENTOLIN HFA) 108 (90 BASE) MCG/ACT inhaler Inhale 2 puffs into the lungs every 4 hours as needed for shortness of breath / dyspnea or wheezing 1 Inhaler 0     Magnesium 250 MG tablet Take 1 tablet by mouth daily. 100 tablet 3     calcium carbonate 500 MG tablet Take 1 tablet by mouth 2 times daily. 100 tablet 3     IBUPROFEN 200 MG OR TABS Pt is taking 4 TABLET EVERY 4 TO 6 HOURS AS NEEDED       Allergies   Allergen Reactions     Bee Venom      Vicodin [Hydrocodone-Acetaminophen] Swelling     Recent Labs   Lab Test  10/17/17   1122   ALT  16   CR  0.80   GFRESTIMATED  79   GFRESTBLACK  >90   POTASSIUM  4.2        ROS:  Constitutional, HEENT, cardiovascular, pulmonary, GI, , musculoskeletal, neuro, skin, endocrine and psych systems are negative, except as otherwise noted.        OBJECTIVE:  /86  Pulse 117  Temp 98.7  F (37.1  C) (Oral)  Resp 16  Ht 5' 8\" (1.727 m)  Wt 160 lb (72.6 kg)  SpO2 98%  BMI 24.33 kg/m2    EXAM:  GENERAL APPEARANCE: healthy, alert and no distress  RESP: lungs clear to auscultation - no rales, rhonchi or wheezes  CV: regular rates and rhythm, normal S1 S2, no S3 or S4 and no murmur, click or rub -  Clear speech today  Follow the conversation well.  She does appear to be in pain and winces when she gets up and sits down she has a normal gait today but does use a cane.  Low back pain with palpation she is able to " stand on her toes and heels.  She does appear anxious and slightly pressured speech but is not tangential.  She is slightly tearful at times and recounting the history of her pain.      ASSESSMENT AND PLAN  Patient Instructions   ASSESSMENT AND PLAN  1. Chronic bilateral low back pain without sciatica  Multiple complex issues disscussed today.  With h/o chronic severe back pain from labor/epidural and low back arthritis.     Recent visit with [facility] pain clinic - considering suboxone but does not feel she has to do low back injections to pursue this.     Likely contribution of psychological factors and addiction given chronic use of lorazepam, high level of pain despite no clear severe cause.     [place] the [place]  however does not reveal a history of prescriptions by multiple providers or filling substantially early.  Reviewing available drug screens does not recently show any evidence of illicit drug abuse. One drug screen 2005 available in care everywhere does have cocaine.     Was weaned fairly quickly from 170mg oxycodone/day to 120mg/day which I do feel was a reasonable attempt/plan but also may have been somewhat too fast for her to tolerate.     Plan:    1.  Ok to fill oxycodone 10mg twice daily to last until follow up appointment 8:20 am February 16th which would be about when her next set of monthly refills would be.     2.  I am also recommending weaning opiods with goal of transitioning to suboxone.  Addiction referral made.  Side benefit of suboxone treatment may be less pain as well.  Alternately if we are able to stabilize on dosing within cdc safety guidelines and potentially decrease high risk lorazepam (increased risk of death with combo therapy) then we may be able to safely continue.     3.  We will continue to wean/discuss at next visit appropriately.       4.  For me to prescribe opiods even for weaning I recommend working with a psychiatrist to fully address mental health.  Referral  given .      - oxyCODONE (OXYCONTIN) 10 MG 12 hr tablet; Take 1 tablet (10 mg) by mouth every 12 hours for 18 days maximum 2 tablet(s) per day  Dispense: 36 tablet; Refill: 0  - gabapentin (NEURONTIN) 300 MG capsule; TAKE 1 CAPSULE BY MOUTH THREE TIMES DAILY  Dispense: 90 capsule; Refill: 0    2. Continuous opioid dependence (H)/opiod withdrawal. High risk of overdose due to high dose opiod and also benzodiazepine use.       3. Chronic pain syndrome    I spent more than 1/2 of a 50 minute face to face visit counseling regarding the rational for my assessment and treatment plan today and discussing global options for mental health care and self-care of back pain.     Joel Wegener,MD

## 2018-02-16 ENCOUNTER — OFFICE VISIT (OUTPATIENT)
Dept: FAMILY MEDICINE | Facility: CLINIC | Age: 43
End: 2018-02-16
Payer: MEDICARE

## 2018-02-16 VITALS
TEMPERATURE: 98.6 F | OXYGEN SATURATION: 98 % | SYSTOLIC BLOOD PRESSURE: 132 MMHG | HEART RATE: 130 BPM | RESPIRATION RATE: 12 BRPM | BODY MASS INDEX: 25.16 KG/M2 | WEIGHT: 165.5 LBS | DIASTOLIC BLOOD PRESSURE: 80 MMHG

## 2018-02-16 DIAGNOSIS — F11.20 CONTINUOUS OPIOID DEPENDENCE (H): ICD-10-CM

## 2018-02-16 DIAGNOSIS — F41.1 GENERALIZED ANXIETY DISORDER: ICD-10-CM

## 2018-02-16 DIAGNOSIS — G89.29 CHRONIC BILATERAL LOW BACK PAIN WITHOUT SCIATICA: ICD-10-CM

## 2018-02-16 DIAGNOSIS — F11.21 OPIOID DEPENDENCE IN REMISSION (H): Primary | ICD-10-CM

## 2018-02-16 DIAGNOSIS — M54.50 CHRONIC BILATERAL LOW BACK PAIN WITHOUT SCIATICA: ICD-10-CM

## 2018-02-16 PROCEDURE — 99215 OFFICE O/P EST HI 40 MIN: CPT | Performed by: FAMILY MEDICINE

## 2018-02-16 RX ORDER — OXYCODONE HCL 10 MG/1
10 TABLET, FILM COATED, EXTENDED RELEASE ORAL EVERY 12 HOURS
Qty: 36 TABLET | Refills: 0 | Status: CANCELLED | OUTPATIENT
Start: 2018-02-16

## 2018-02-16 RX ORDER — GABAPENTIN 300 MG/1
600 CAPSULE ORAL 3 TIMES DAILY
Qty: 180 CAPSULE | Refills: 0 | Status: SHIPPED | OUTPATIENT
Start: 2018-02-16 | End: 2018-03-16

## 2018-02-16 RX ORDER — OXYCODONE HCL 40 MG/1
40 TABLET, FILM COATED, EXTENDED RELEASE ORAL EVERY 12 HOURS
Qty: 60 TABLET | Refills: 0 | Status: SHIPPED | OUTPATIENT
Start: 2018-02-16 | End: 2018-03-16

## 2018-02-16 RX ORDER — LORAZEPAM 1 MG/1
TABLET ORAL
Qty: 90 TABLET | Refills: 0 | Status: SHIPPED | OUTPATIENT
Start: 2018-02-19 | End: 2018-03-16

## 2018-02-16 RX ORDER — OXYCODONE AND ACETAMINOPHEN 10; 325 MG/1; MG/1
1 TABLET ORAL EVERY 6 HOURS PRN
Qty: 120 TABLET | Refills: 0 | Status: SHIPPED | OUTPATIENT
Start: 2018-02-16 | End: 2018-03-16

## 2018-02-16 NOTE — NURSING NOTE
"Chief Complaint   Patient presents with     Recheck Medication       Initial /80 (Cuff Size: Adult Regular)  Pulse 130  Temp 98.6  F (37  C) (Oral)  Resp 12  Wt 165 lb 8 oz (75.1 kg)  SpO2 98%  BMI 25.16 kg/m2 Estimated body mass index is 25.16 kg/(m^2) as calculated from the following:    Height as of 1/29/18: 5' 8\" (1.727 m).    Weight as of this encounter: 165 lb 8 oz (75.1 kg).  Medication Reconciliation: complete     Tara Hill, RG      "

## 2018-02-16 NOTE — PROGRESS NOTES
"  SUBJECTIVE:   Ria Nj is a 42 year old female who presents to clinic today for the following health issues:      Medication Followup of Oxycodone    Taking Medication as prescribed: yes    Side Effects:  None    Medication Helping Symptoms:  NO-pt states she feels anxiety and pain is worsen since dosage change        Additional history/life update:       Opioid dependence in remission (H): here to follow up several issues.  Is transferring care to me from Dr. Michael.  Last visit I gave extra 10mg oxycodone twice daily after she had a dose reduction.  Chronic dose was 60mg oxycodone twice daily plus 150/month 10mg percocet.   She had her dose decreased to 40mg twice daily and same percocet after failing to follow up with pain clinic as requested on several occasions.  She states today this was not her fault.     Withdrawal symptoms she was having have resolved (jittery, loose stools, sweats.)      Received call from addiction clinic I referred to stating could not start suboxone while on opiods and she wanted to clarify this.         Chronic bilateral low back pain without sciatica: again she attributes to epidural and \"herniated discs\" which she feels is severe.       Continuous opioid dependence (H): she denies any history of chemical dependency and does not feel she has opiod addiction.     Generalized anxiety disorder :from multiple severe episodes in life including deaths of family/friends in past and her chronic pain.  She feels she has panic attacks.  Initially stated \"no problems with mental health, just pain\" until pressed on why she uses lorazepam and then she did endorse panic attacks and high level of anxiety.           Problem list, Medication list, Allergies, and Medical/Social/Surgical histories reviewed in [facility] and updated as appropriate.  Labs reviewed in EPIC  BP Readings from Last 3 Encounters:   02/16/18 132/80   01/29/18 132/86   10/17/17 118/80    Wt Readings from Last 3 Encounters: "   02/16/18 165 lb 8 oz (75.1 kg)   01/29/18 160 lb (72.6 kg)   01/08/18 165 lb (74.8 kg)                  Patient Active Problem List   Diagnosis     CARDIOVASCULAR SCREENING; LDL GOAL LESS THAN 160     Lumbar disc herniation with myelopathy     Chronic low back pain     Bee sting-induced anaphylaxis     Generalized anxiety disorder     Tobacco use disorder     Moderate major depression (H)     Continuous opioid dependence (H)     Cervicalgia     Past Surgical History:   Procedure Laterality Date     OVARY SURGERY N/A 2004    Ovarian cyst removed.      SURGICAL HISTORY OF -   2007    ovarian cyst removal        Social History   Substance Use Topics     Smoking status: Current Every Day Smoker     Packs/day: 2.00     Years: 15.00     Types: Cigarettes     Smokeless tobacco: Never Used     Alcohol use No     Family History   Problem Relation Age of Onset     CANCER Maternal Grandmother      CANCER Maternal Grandfather      CANCER Paternal Grandmother      CANCER Paternal Grandfather          Current Outpatient Prescriptions   Medication Sig Dispense Refill     oxyCODONE (OXYCONTIN) 40 MG 12 hr tablet Take 1 tablet (40 mg) by mouth every 12 hours 60 tablet 0     [START ON 2/19/2018] LORazepam (ATIVAN) 1 MG tablet TAKE 1 TABLET BY MOUTH EVERY 8 HOURS AS NEEDED FOR ANXIETY,  Fill on or after 1/20/18. 90 tablet 0     oxyCODONE-acetaminophen (PERCOCET)  MG per tablet Take 1 tablet by mouth every 6 hours as needed for pain Will approve up to 4 tablets a day.  Fill on or after 1/18/18 120 tablet 0     naloxone (EVZIO) auto-injector Inject 0.4 mLs (0.4 mg) into the muscle as needed for opioid reversal every 2-3 minutes until assistance arrives 0.8 mL 0     naloxone (NARCAN) nasal spray Spray 1 spray (4 mg) into one nostril alternating nostrils as needed for opioid reversal every 2-3 minutes until assistance arrives 0.2 mL 0     gabapentin (NEURONTIN) 300 MG capsule Take 2 capsules (600 mg) by mouth 3 times daily 180  capsule 0     oxyCODONE (OXYCONTIN) 60 MG T12A 12 hr tablet Take 1 tablet by mouth every 12 hours Fill on or after 12/18/17 60 each 0     venlafaxine (EFFEXOR-XR) 37.5 MG 24 hr capsule Take 1 capsule daily for 7 days 7 capsule 0     venlafaxine (EFFEXOR-ER) 75 MG TB24 24 hr tablet Take 1 tablet (75 mg) by mouth daily Start after the 37.5 mg pills completed. 30 tablet 0     nicotine (NICODERM CQ) 21 MG/24HR 24 hr patch Place 1 patch onto the skin every 24 hours 30 patch 0     albuterol (2.5 MG/3ML) 0.083% neb solution Take 1 vial (2.5 mg) by nebulization every 4 hours as needed for shortness of breath / dyspnea or wheezing 30 vial 11     tiZANidine (ZANAFLEX) 4 MG tablet TAKE ONE TABLET BY MOUTH THREE TIMES DAILY AS NEEDED FOR SHOULDER AND UPPER BACK PAIN 270 tablet 0     Cholecalciferol (VITAMIN D) 2000 UNITS tablet Take 2,000 Units by mouth daily 100 tablet 3     EPINEPHrine (EPIPEN) 0.3 MG/0.3ML injection Inject 0.3 mLs (0.3 mg) into the muscle once as needed for anaphylaxis 2 each 1     albuterol (PROAIR HFA, PROVENTIL HFA, VENTOLIN HFA) 108 (90 BASE) MCG/ACT inhaler Inhale 2 puffs into the lungs every 4 hours as needed for shortness of breath / dyspnea or wheezing 1 Inhaler 0     Magnesium 250 MG tablet Take 1 tablet by mouth daily. 100 tablet 3     calcium carbonate 500 MG tablet Take 1 tablet by mouth 2 times daily. 100 tablet 3     IBUPROFEN 200 MG OR TABS Pt is taking 4 TABLET EVERY 4 TO 6 HOURS AS NEEDED       [DISCONTINUED] gabapentin (NEURONTIN) 300 MG capsule TAKE 1 CAPSULE BY MOUTH THREE TIMES DAILY 90 capsule 0     Allergies   Allergen Reactions     Bee Venom      Recent Labs   Lab Test  10/17/17   1122   ALT  16   CR  0.80   GFRESTIMATED  79   GFRESTBLACK  >90   POTASSIUM  4.2        ROS:  Constitutional, HEENT, cardiovascular, pulmonary, GI, , musculoskeletal, neuro, skin, endocrine and psych systems are negative, except as otherwise noted.        OBJECTIVE:  /80 (Cuff Size: Adult Regular)   Pulse 130  Temp 98.6  F (37  C) (Oral)  Resp 12  Wt 165 lb 8 oz (75.1 kg)  SpO2 98%  BMI 25.16 kg/m2    EXAM:  GENERAL APPEARANCE: healthy, alert and no distress  Clear speech today.     Dire score 10 today.     ASSESSMENT AND PLAN  Patient Instructions   ASSESSMENT AND PLAN  1.  Opiod withdrawal: resolved.         1. Chronic bilateral low back pain without sciatica    I performed comprehensive review today of available information, diagnosis and plan including drug screens since 2005 which were all approprate except cocaine on 2005 screen in care everywhere. (I did not discuss that today since so long ago).     Is in high risk overdose category with high dose opiod and benzo combination.     My thoughts:  1.  By objective criteria only has minimal to moderate back arthritis without severe stenosis, severe arthritis, previous back surgeries, etc.  Herniated discs are not infact a chronic issue. This highlights rational for feeling that mental health factors and coping are significantly contributing to pain and can be improved.  Thus loses points for diagnosis on dire score.  Loses points for risk for chemical dependency just based on high dose alone and not being fully effective.    2.  Loses points for uncontrolled mental health - anxiety and possibly depression.  To me makes little sense to be on high risk chronic benzos without on-going psychiatric care and psychotherapy.   3.  Reliability - loses points on dire score given failure to follow up with pain clinic.     My goals for her would be to:  1.  At least wean down slowly into CDC limits of prescribing of 100mg or 80mg total daily dosing morphine equivalent.  Currently is on over 200.  Was at nearly 300 before she started her wean.   2.  We can continue to discuss suboxone as we lower dose (cannot be started until off opiods).   3.  Engage in some sort of formal pain management either via our clinic/addiction clinic or [facility] pain clinic.   4.   Engage in mental health care with a psychiatrist and psychologist to eliminate need for chronic benzo use and to better cope with pain and to improve mental coping pain strategies.     She did endorse understanding my assessment and plan today.     Current plan:    Continuing wean with goal of getting in range of cdc guideline dosing.     Gave/reviewed narcan,, high risk overdose settings and filling this and discussing with family/friends where it is kept and how to use.     Follow up with addiciton clinic to discuss suboxone. I see per telephone outreach that an opinion may be given as well regarding pain management strategies.     Continue to follow with [facility] pain clinic.     Overall though I feel most benefit to come from getting a psychiatrist and psychotherapist to address anxiety and to help with strategies to stop alprazolam (very high risk med with high dose opiods. )    Referral made already for that and she is working on.     Decrease to oxycodone 40mg twice daily (from 50) and keep percocet 10mg at 120/month.     Refilled lorazepam three times daily for now.     Ok to increase gabapentin to 600mg three times daily.     [place] and there have been no unauthorized prescriptions.     Follow up one month scheduled.       - oxyCODONE (OXYCONTIN) 40 MG 12 hr tablet; Take 1 tablet (40 mg) by mouth every 12 hours  Dispense: 60 tablet; Refill: 0  - oxyCODONE-acetaminophen (PERCOCET)  MG per tablet; Take 1 tablet by mouth every 6 hours as needed for pain Will approve up to 4 tablets a day.  Fill on or after 1/18/18  Dispense: 120 tablet; Refill: 0  - gabapentin (NEURONTIN) 300 MG capsule; Take 2 capsules (600 mg) by mouth 3 times daily  Dispense: 180 capsule; Refill: 0    2. Generalized anxiety disorder    - LORazepam (ATIVAN) 1 MG tablet; TAKE 1 TABLET BY MOUTH EVERY 8 HOURS AS NEEDED FOR ANXIETY,  Fill on or after 1/20/18.  Dispense: 90  tablet; Refill: 0    3. Continuous opioid dependence (H)  - naloxone (EVZIO) auto-injector; Inject 0.4 mLs (0.4 mg) into the muscle as needed for opioid reversal every 2-3 minutes until assistance arrives  Dispense: 0.8 mL; Refill: 0  - naloxone (NARCAN) nasal spray; Spray 1 spray (4 mg) into one nostril alternating nostrils as needed for opioid reversal every 2-3 minutes until assistance arrives  Dispense: 0.2 mL; Refill: 0      I spent more than 1/2 of a 50 minute visit counseling today regarding my assessment and treatment options for chronic pain, opiod use, use of narcan and future plan for addressing anxiety.     Joel Wegener,MD

## 2018-02-16 NOTE — PATIENT INSTRUCTIONS
ASSESSMENT AND PLAN  1. Chronic bilateral low back pain without sciatica  Continuing wean with goal of getting in range of cdc guideline dosing.     Gave/reviewed narcan.     Follow up with addiciton clinic to discuss suboxone.     Continue to follow with [facility] pain clinic.     Overall though I feel most benefit to come from getting a psychiatrist and psychotherapist to address anxiety and to help with strategies to stop alprazolam (very high risk med with high dose opiods. )    Referral made already for that and she is working on.     Decrease to oxycodone 40mg twice daily (from 50) and keep percocet 10mg at 120/month.     Refilled lorazepam three times daily.     Ok to increase gabapentin to 600mg three times daily.     Follow up one month scheduled.       - oxyCODONE (OXYCONTIN) 40 MG 12 hr tablet; Take 1 tablet (40 mg) by mouth every 12 hours  Dispense: 60 tablet; Refill: 0  - oxyCODONE-acetaminophen (PERCOCET)  MG per tablet; Take 1 tablet by mouth every 6 hours as needed for pain Will approve up to 4 tablets a day.  Fill on or after 1/18/18  Dispense: 120 tablet; Refill: 0  - gabapentin (NEURONTIN) 300 MG capsule; Take 2 capsules (600 mg) by mouth 3 times daily  Dispense: 180 capsule; Refill: 0    2. Generalized anxiety disorder    - LORazepam (ATIVAN) 1 MG tablet; TAKE 1 TABLET BY MOUTH EVERY 8 HOURS AS NEEDED FOR ANXIETY,  Fill on or after 1/20/18.  Dispense: 90 tablet; Refill: 0    3. Continuous opioid dependence (H)  - naloxone (EVZIO) auto-injector; Inject 0.4 mLs (0.4 mg) into the muscle as needed for opioid reversal every 2-3 minutes until assistance arrives  Dispense: 0.8 mL; Refill: 0  - naloxone (NARCAN) nasal spray; Spray 1 spray (4 mg) into one nostril alternating nostrils as needed for opioid reversal every 2-3 minutes until assistance arrives  Dispense: 0.2 mL; Refill: 0        MYCHART SIGNUP FOR E-VISITS AND EASIER COMMUNICATION:  [email or web address]     Zipnosis:   [place] / [employer].  Sign up for e-visits for common illnesses!     RADIOLOGY:   [facility]:  [phone] to schedule any radiology tests at [facility]: [phone]    Mammograms/colonoscopies:  [phone]    CONSUMER PRICE LINE for estimates of test costs:  [phone]

## 2018-02-16 NOTE — MR AVS SNAPSHOT
After Visit Summary   2/16/2018    Ria Nj    MRN: [id number]           Patient Information     Date Of Birth          1975        Visit Information        Provider Department      2/16/2018 10:20 AM Wegener, Joel Daniel Irwin, MD [facility]        Today's Diagnoses     Continuous opioid dependence (H)    -  1    Chronic bilateral low back pain without sciatica        Generalized anxiety disorder          Care Instructions    ASSESSMENT AND PLAN  1. Chronic bilateral low back pain without sciatica  Continuing wean with goal of getting in range of cdc guideline dosing.     Gave/reviewed narcan.     Follow up with addiciton clinic to discuss suboxone.     Continue to follow with [facility] pain clinic.     Overall though I feel most benefit to come from getting a psychiatrist and psychotherapist to address anxiety and to help with strategies to stop alprazolam (very high risk med with high dose opiods. )    Referral made already for that and she is working on.     Decrease to oxycodone 40mg twice daily (from 50) and keep percocet 10mg at 120/month.     Refilled lorazepam three times daily.     Ok to increase gabapentin to 600mg three times daily.     Follow up one month scheduled.       - oxyCODONE (OXYCONTIN) 40 MG 12 hr tablet; Take 1 tablet (40 mg) by mouth every 12 hours  Dispense: 60 tablet; Refill: 0  - oxyCODONE-acetaminophen (PERCOCET)  MG per tablet; Take 1 tablet by mouth every 6 hours as needed for pain Will approve up to 4 tablets a day.  Fill on or after 1/18/18  Dispense: 120 tablet; Refill: 0  - gabapentin (NEURONTIN) 300 MG capsule; Take 2 capsules (600 mg) by mouth 3 times daily  Dispense: 180 capsule; Refill: 0    2. Generalized anxiety disorder    - LORazepam (ATIVAN) 1 MG tablet; TAKE 1 TABLET BY MOUTH EVERY 8 HOURS AS NEEDED FOR ANXIETY,  Fill on or after 1/20/18.  Dispense: 90 tablet; Refill: 0    3. Continuous opioid dependence (H)  - naloxone  (EVZIO) auto-injector; Inject 0.4 mLs (0.4 mg) into the muscle as needed for opioid reversal every 2-3 minutes until assistance arrives  Dispense: 0.8 mL; Refill: 0  - naloxone (NARCAN) nasal spray; Spray 1 spray (4 mg) into one nostril alternating nostrils as needed for opioid reversal every 2-3 minutes until assistance arrives  Dispense: 0.2 mL; Refill: 0        MYCHART SIGNUP FOR E-VISITS AND EASIER COMMUNICATION:  [email or web address]     Zipnosis:  [place].[employer].  Sign up for e-visits for common illnesses!     RADIOLOGY:   [facility]:  [phone] to schedule any radiology tests at [facility]: [phone]    Mammograms/colonoscopies:  [phone]    CONSUMER PRICE LINE for estimates of test costs:  [phone]               Follow-ups after your visit        Your next 10 appointments already scheduled     Feb 16, 2018 10:20 AM CST   Office Visit with Joel Daniel Irwin Wegener, MD   [facility] ([facility])    [address] [place] [id number] [phone]           Bring a current list of meds and any records pertaining to this visit. For Physicals, please bring immunization records and any forms needing to be filled out. Please arrive 10 minutes early to complete paperwork.            Feb 20, 2018  2:00 PM CST   New Visit with Scarlet Uribe MD   [facility] ([facility])    [address] [place] [address]  [address] [place] [id number] [phone]              Who to contact     If you have questions or need follow up information about today's clinic visit or your schedule please contact [facility] directly at [phone].  Normal or non-critical lab and imaging results will be communicated to you by MyChart, letter or phone within 4 business days after the clinic has received the results. If you do not hear from us within 7 days, please contact the  clinic through centrose or phone. If you have a critical or abnormal lab result, we will notify you by phone as soon as possible.  Submit refill requests through centrose or call your pharmacy and they will forward the refill request to us. Please allow 3 business days for your refill to be completed.          Additional Information About Your Visit        Care EveryWhere ID     This is your Care EveryWhere ID. This could be used by other organizations to access your [facility] medical records  [id number]        Your Vitals Were     Pulse Temperature Respirations Pulse Oximetry BMI (Body Mass Index)       130 98.6  F (37  C) (Oral) 12 98% 25.16 kg/m2        Blood Pressure from Last 3 Encounters:   02/16/18 132/80   01/29/18 132/86   10/17/17 118/80    Weight from Last 3 Encounters:   02/16/18 165 lb 8 oz (75.1 kg)   01/29/18 160 lb (72.6 kg)   01/08/18 165 lb (74.8 kg)              Today, you had the following     No orders found for display         Today's Medication Changes          These changes are accurate as of 2/16/18  9:27 AM.  If you have any questions, ask your nurse or doctor.               Start taking these medicines.        Dose/Directions    * naloxone auto-injector   Commonly known as:  EVZIO   Used for:  Continuous opioid dependence (H)   Started by:  Wegener, Joel Daniel Irwin, MD        Dose:  0.4 mg   Inject 0.4 mLs (0.4 mg) into the muscle as needed for opioid reversal every 2-3 minutes until assistance arrives   Quantity:  0.8 mL   Refills:  0       * naloxone nasal spray   Commonly known as:  NARCAN   Used for:  Continuous opioid dependence (H)   Started by:  Wegener, Joel Daniel Irwin, MD        Dose:  4 mg   Spray 1 spray (4 mg) into one nostril alternating nostrils as needed for opioid reversal every 2-3 minutes until assistance arrives   Quantity:  0.2 mL   Refills:  0       * Notice:  This list has 2 medication(s) that are the same as other medications prescribed for you. Read the  directions carefully, and ask your doctor or other care provider to review them with you.      These medicines have changed or have updated prescriptions.        Dose/Directions    gabapentin 300 MG capsule   Commonly known as:  NEURONTIN   This may have changed:    - how much to take  - how to take this  - when to take this  - additional instructions   Used for:  Chronic bilateral low back pain without sciatica   Changed by:  Wegener, Joel Daniel Irwin, MD        Dose:  600 mg   Take 2 capsules (600 mg) by mouth 3 times daily   Quantity:  180 capsule   Refills:  0       * oxyCODONE 60 MG T12a 12 hr tablet   Commonly known as:  OXYCONTIN   This may have changed:  Another medication with the same name was removed. Continue taking this medication, and follow the directions you see here.   Used for:  Chronic bilateral low back pain without sciatica   Changed by:  Wegener, Joel Daniel Irwin, MD        Dose:  1 tablet   Take 1 tablet by mouth every 12 hours Fill on or after 12/18/17   Quantity:  60 each   Refills:  0       * oxyCODONE 40 MG 12 hr tablet   Commonly known as:  OxyCONTIN   This may have changed:  Another medication with the same name was removed. Continue taking this medication, and follow the directions you see here.   Used for:  Chronic bilateral low back pain without sciatica   Changed by:  Wegener, Joel Daniel Irwin, MD        Dose:  40 mg   Take 1 tablet (40 mg) by mouth every 12 hours   Quantity:  60 tablet   Refills:  0       * Notice:  This list has 2 medication(s) that are the same as other medications prescribed for you. Read the directions carefully, and ask your doctor or other care provider to review them with you.         Where to get your medicines      These medications were sent to [facility] [place], [place] - [address]  [address], [facility] [address]     Phone:  [phone]     gabapentin 300 MG capsule         Some of these will need a paper prescription  and others can be bought over the counter.  Ask your nurse if you have questions.     Bring a paper prescription for each of these medications     LORazepam 1 MG tablet    naloxone auto-injector    naloxone nasal spray    oxyCODONE 40 MG 12 hr tablet    oxyCODONE-acetaminophen  MG per tablet                Primary Care Provider Office Phone # Fax #    Sunshine Michael [phone]       [address]  [facility] [address]        Equal Access to Services     MONO WATERS : Hadii aad ku hadasho Soomaali, waaxda luqadaha, qaybta kaalmada adeegyada, waxay idiin hayaan adeeg kharash la'morgann . [facility] [phone].    ATENCIÓN: Si habla español, tiene a freire disposición servicios gratuitos de asistencia lingüística. [facility] [phone].    We comply with applicable federal civil rights laws and [place] laws. We do not discriminate on the basis of race, color, national origin, age, disability, sex, sexual orientation, or gender identity.            Thank you!     Thank you for choosing [facility]  for your care. Our goal is always to provide you with excellent care. Hearing back from our patients is one way we can continue to improve our services. Please take a few minutes to complete the written survey that you may receive in the mail after your visit with us. Thank you!             Your Updated Medication List - Protect others around you: Learn how to safely use, store and throw away your medicines at www.disposemymeds.org.          This list is accurate as of 2/16/18  9:27 AM.  Always use your most recent med list.                   Brand Name Dispense Instructions for use Diagnosis    * albuterol 108 (90 BASE) MCG/ACT Inhaler    PROAIR HFA/PROVENTIL HFA/VENTOLIN HFA    1 Inhaler    Inhale 2 puffs into the lungs every 4 hours as needed for shortness of breath / dyspnea or wheezing    Acute bronchospasm       * albuterol (2.5 MG/3ML) 0.083% neb solution     30 vial    Take 1  vial (2.5 mg) by nebulization every 4 hours as needed for shortness of breath / dyspnea or wheezing    Acute bronchospasm       calcium carbonate 1250 MG tablet    OS-ADOLFO 500 mg [place]. [place]    100 tablet    Take 1 tablet by mouth 2 times daily.    Routine general medical examination at a health care facility       EPINEPHrine 0.3 MG/0.3ML injection 2-pack    EPIPEN/ADRENACLICK/or ANY BX GENERIC EQUIV    2 each    Inject 0.3 mLs (0.3 mg) into the muscle once as needed for anaphylaxis    Bee sting-induced anaphylaxis, accidental or unintentional, subsequent encounter       gabapentin 300 MG capsule    NEURONTIN    180 capsule    Take 2 capsules (600 mg) by mouth 3 times daily    Chronic bilateral low back pain without sciatica       ibuprofen 200 MG tablet    ADVIL/MOTRIN     Pt is taking 4 TABLET EVERY 4 TO 6 HOURS AS NEEDED        LORazepam 1 MG tablet   Start taking on:  2/19/2018    ATIVAN    90 tablet    TAKE 1 TABLET BY MOUTH EVERY 8 HOURS AS NEEDED FOR ANXIETY,  Fill on or after 1/20/18.    Generalized anxiety disorder       magnesium 250 MG tablet     100 tablet    Take 1 tablet by mouth daily.    Routine general medical examination at a health care facility       * naloxone auto-injector    EVZIO    0.8 mL    Inject 0.4 mLs (0.4 mg) into the muscle as needed for opioid reversal every 2-3 minutes until assistance arrives    Continuous opioid dependence (H)       * naloxone nasal spray    NARCAN    0.2 mL    Spray 1 spray (4 mg) into one nostril alternating nostrils as needed for opioid reversal every 2-3 minutes until assistance arrives    Continuous opioid dependence (H)       nicotine 21 MG/24HR 24 hr patch    NICODERM CQ    30 patch    Place 1 patch onto the skin every 24 hours    Tobacco use disorder       * oxyCODONE 60 MG T12a 12 hr tablet    OXYCONTIN    60 each    Take 1 tablet by mouth every 12 hours Fill on or after 12/18/17    Chronic bilateral low back pain without sciatica       * oxyCODONE 40 MG 12  hr tablet    OxyCONTIN    60 tablet    Take 1 tablet (40 mg) by mouth every 12 hours    Chronic bilateral low back pain without sciatica       oxyCODONE-acetaminophen  MG per tablet    PERCOCET    120 tablet    Take 1 tablet by mouth every 6 hours as needed for pain Will approve up to 4 tablets a day.  Fill on or after 1/18/18    Chronic bilateral low back pain without sciatica       tiZANidine 4 MG tablet    ZANAFLEX    270 tablet    TAKE ONE TABLET BY MOUTH THREE TIMES DAILY AS NEEDED FOR SHOULDER AND UPPER BACK PAIN    Chronic bilateral low back pain without sciatica       * venlafaxine 37.5 MG 24 hr capsule    EFFEXOR-XR    7 capsule    Take 1 capsule daily for 7 days    Generalized anxiety disorder, Moderate major depression (H)       * venlafaxine 75 MG Tb24 24 hr tablet    EFFEXOR-ER    30 tablet    Take 1 tablet (75 mg) by mouth daily Start after the 37.5 mg pills completed.    Generalized anxiety disorder, Moderate major depression (H)       vitamin D 2000 UNITS tablet     100 tablet    Take 2,000 Units by mouth daily    Major depressive disorder, recurrent episode, moderate (H)       * Notice:  This list has 8 medication(s) that are the same as other medications prescribed for you. Read the directions carefully, and ask your doctor or other care provider to review them with you.

## 2018-03-13 ENCOUNTER — TELEPHONE (OUTPATIENT)
Dept: FAMILY MEDICINE | Facility: CLINIC | Age: 43
End: 2018-03-13

## 2018-03-13 DIAGNOSIS — G89.29 CHRONIC BILATERAL LOW BACK PAIN WITHOUT SCIATICA: ICD-10-CM

## 2018-03-13 DIAGNOSIS — F41.1 GENERALIZED ANXIETY DISORDER: ICD-10-CM

## 2018-03-13 DIAGNOSIS — M54.50 CHRONIC BILATERAL LOW BACK PAIN WITHOUT SCIATICA: ICD-10-CM

## 2018-03-13 RX ORDER — OXYCODONE AND ACETAMINOPHEN 10; 325 MG/1; MG/1
1 TABLET ORAL EVERY 6 HOURS PRN
Qty: 120 TABLET | Refills: 0 | Status: CANCELLED | OUTPATIENT
Start: 2018-03-13

## 2018-03-13 RX ORDER — OXYCODONE HCL 40 MG/1
40 TABLET, FILM COATED, EXTENDED RELEASE ORAL EVERY 12 HOURS
Qty: 60 TABLET | Refills: 0 | Status: CANCELLED | OUTPATIENT
Start: 2018-03-13

## 2018-03-13 RX ORDER — LORAZEPAM 1 MG/1
TABLET ORAL
Qty: 90 TABLET | Refills: 0 | Status: CANCELLED | OUTPATIENT
Start: 2018-03-13

## 2018-03-13 NOTE — TELEPHONE ENCOUNTER
Patient called explaining for the Percocet she was shorted 20 tabs last month and Dr Wegener should remember    Patient is requesting 150 tabs of Percocet

## 2018-03-13 NOTE — TELEPHONE ENCOUNTER
Routing this request to ETHAN.   Looked like pt was to f/u in one month with JW.3/16/18 appox time.    DUC Beaulieu

## 2018-03-13 NOTE — TELEPHONE ENCOUNTER
Plan was to continue 50mg long-acting oxy twice daily and #120/month percocet 10mg on 1/27/18 when I saw her.     When I saw her for follow up on 02-16-18 the plan was to continue oxycontin 40mg long-acting twice daily and continue #120/month (4/day) of percocet 10mg.     This is what was dispensed per [place]  and also per our pharmacy (double count.)     She was to make one month follow up appointment with me (no appointment made.)     I called patient and left voicemail.  I did not provide all that detail above.     I simply stated that I wanted to clarify her prescriptions, per my note , state database and our pharmacy correct amount was prescribed.     I also stated I asked her to make follow up appointment for one month which was not done.      Rx refill would usually be 2-18-18 at earliest since February has 28 days.  (this is a Sunday).      My next openings right now are for 03/20/18.  I asked her to call back and schedule ASAP and to make medications stretch until then.     I did not offer a same day hold appointment.     Joel Wegener,MD

## 2018-03-14 NOTE — TELEPHONE ENCOUNTER
"I did review the provider response from different 3/13/18 TE.    Pt did make appt for 3/20/18.      In JW's message-\" Rx refill would usually be 2-18-18 at earliest since February has 28 days.  (this is a Sunday). \"  Do you mean 3/18/18 as earliest refill?    Pt reports she will be out of meds on 3/15/18.  Please advise.  Brittnee RN    "

## 2018-03-14 NOTE — TELEPHONE ENCOUNTER
IN response to question below.  Filled 2/16 for 30 day supply which would be 03/18 as the usual fill date then.       Reception/triage/team fyi: I called back, no answer.     I let her know she could make an appointment Friday or Monday if needed to discuss further.     Ok to use same -day  Hold spot.     Joel Wegener,MD

## 2018-03-14 NOTE — TELEPHONE ENCOUNTER
Please note there are two telephone encounters regarding these medication refills dated 3/13/18.    Routing to PCP to follow up with patient.    Thank you!  FELICITA BlackN, RN

## 2018-03-14 NOTE — TELEPHONE ENCOUNTER
Reason for call:  Medication   If this is a refill request, has the caller requested the refill from the pharmacy already? Yes  Will the patient be using a [facility]? Yes  Name of the pharmacy and phone number for the current request: [facility]     Name of the medication requested: oxycontin and Percocet    Other request: Patient states that she will be completely out of requested medication on 3/15 and it's very difficult for her to go without.    Phone number to reach patient:  Home number on file [phone] (home)    Best Time:  Anytime    Can we leave a detailed message on this number?  YES

## 2018-03-14 NOTE — TELEPHONE ENCOUNTER
"Patient called back and stated:  1. Extremely frustrated with [place]   A. May need to get a   2. Previously on oxycontin 80 mg  3. Provider in [place] decreased oxycontin to 60 mg  4. Got a letter from [place] provider \"out of the blue\" stating medications need to be changed in order to provide better pain relief  5. Had a back injury and once was successfully weaned down to oxycontin 60 mg, felt like she got her life back  6. Also on Percocet and now oxycontin dose down to 40 mg tablets  7. Finds it troublesome how quickly she is being weaned  8. Was under assumption last percocet dispense was going to be #150 instead of #120  9. Feels she is being weaned for no reason  10. Feels she has gone backwards in her back pain to 8 years ago when back pain was not well controlled  11. Has enough pain medications for tomorrow but will be out on 3/16/18 and does not have appt with PCP until 3/20/18      Writer offered patient office visit with PCP on 3/16/18.    Appt scheduled on 3/16/18 with Dr. Wegener.  Appt date, time and location confirmed with patient.    3/20/18 appt with PCP cancelled per patient's request.    Dr. Wegener-Patient asked for you to review above and she will discuss further at appt.  Please close encounter when finished reviewing.    Thank you!  FELICITA BlackN, RN        "

## 2018-03-14 NOTE — TELEPHONE ENCOUNTER
Reception/triage/team fyi: I called back, no answer.     I let her know she could make an appointment Friday or Monday if needed to discuss further.     Ok to use same -day  Hold spot.     Joel Wegener,MD

## 2018-03-14 NOTE — TELEPHONE ENCOUNTER
Pt is calling requesting a call back from Dr. Wegener regarding the VM he left yesterday. Refused to speak with a triage nurse.  Kyra NUGENT  Organ

## 2018-03-16 ENCOUNTER — OFFICE VISIT (OUTPATIENT)
Dept: FAMILY MEDICINE | Facility: CLINIC | Age: 43
End: 2018-03-16
Payer: MEDICARE

## 2018-03-16 VITALS
DIASTOLIC BLOOD PRESSURE: 86 MMHG | WEIGHT: 168 LBS | RESPIRATION RATE: 20 BRPM | SYSTOLIC BLOOD PRESSURE: 126 MMHG | TEMPERATURE: 98.4 F | OXYGEN SATURATION: 100 % | BODY MASS INDEX: 25.54 KG/M2 | HEART RATE: 102 BPM

## 2018-03-16 DIAGNOSIS — F41.1 GENERALIZED ANXIETY DISORDER: ICD-10-CM

## 2018-03-16 DIAGNOSIS — M54.50 CHRONIC BILATERAL LOW BACK PAIN WITHOUT SCIATICA: Primary | ICD-10-CM

## 2018-03-16 DIAGNOSIS — G89.29 CHRONIC BILATERAL LOW BACK PAIN WITHOUT SCIATICA: Primary | ICD-10-CM

## 2018-03-16 PROCEDURE — 99215 OFFICE O/P EST HI 40 MIN: CPT | Performed by: FAMILY MEDICINE

## 2018-03-16 RX ORDER — GABAPENTIN 300 MG/1
600 CAPSULE ORAL 3 TIMES DAILY
Qty: 180 CAPSULE | Refills: 11 | Status: SHIPPED | OUTPATIENT
Start: 2018-03-16 | End: 2018-10-22

## 2018-03-16 RX ORDER — LORAZEPAM 1 MG/1
TABLET ORAL
Qty: 90 TABLET | Refills: 0 | Status: SHIPPED | OUTPATIENT
Start: 2018-03-16 | End: 2018-04-18

## 2018-03-16 RX ORDER — OXYCODONE HCL 40 MG/1
40 TABLET, FILM COATED, EXTENDED RELEASE ORAL EVERY 12 HOURS
Qty: 60 TABLET | Refills: 0 | Status: SHIPPED | OUTPATIENT
Start: 2018-03-16 | End: 2018-04-18

## 2018-03-16 RX ORDER — OXYCODONE AND ACETAMINOPHEN 10; 325 MG/1; MG/1
1 TABLET ORAL EVERY 6 HOURS PRN
Qty: 120 TABLET | Refills: 0 | Status: SHIPPED | OUTPATIENT
Start: 2018-03-16 | End: 2018-04-18

## 2018-03-16 NOTE — MR AVS SNAPSHOT
After Visit Summary   3/16/2018    Ria Nj    MRN: [id number]           Patient Information     Date Of Birth          1975        Visit Information        Provider Department      3/16/2018 2:00 PM Wegener, Joel Daniel Irwin, MD [facility]        Today's Diagnoses     Chronic bilateral low back pain without sciatica    -  1    Generalized anxiety disorder          Care Instructions    ASSESSMENT AND PLAN  1. Chronic bilateral low back pain without sciatica  Baseline dose ws oxy 60mg twice daily and 150/month of percocet 10mg.       Today:  Continue oxycontin 40mg twice daily.  Continue percocet 10mg four/day = 120/month.      Plan:     To continue medication therapy I am requirin. Obtain a psychiatrist and psychotherapist to engage in care.  I recommend focusing psychotherapy on pain management/coping strategies.  Goal of psychiatrist care will be to better control anxiety with goal of reducing lorazepam some.         2. Generalized anxiety disorder  As above.  Referrals given again.      I expect appointments to be at least scheduled by you next visit with me.       - MENTAL HEALTH REFERRAL  - Adult; Outpatient Treatment; Individual/Couples/Family/Group Therapy/Health Psychology; Other: [facility] [phone]; We will contact you to schedule the appointment or please call with any questi...        [employer] SIGNUP FOR E-VISITS AND EASIER COMMUNICATION:  [email or web address]     Zipnosis:  [email or web address].  Sign up for e-visits for common illnesses!     RADIOLOGY:   [facility]:  [phone] to schedule any radiology tests at [facility]: [phone]    Mammograms/colonoscopies:  [phone]    CONSUMER PRICE LINE for estimates of test costs:  [phone]               Follow-ups after your visit        Additional Services     MENTAL HEALTH REFERRAL  - Adult; Outpatient Treatment;  Individual/Couples/Family/Group Therapy/Health Psychology; Other: [facility] [phone]; We will contact you to schedule the appointment or please call with any questi...       All scheduling is subject to the client's specific insurance plan & benefits, provider/location availability, and provider clinical specialities.  Please arrive 15 minutes early for your first appointment and bring your completed paperwork.    Please be aware that coverage of these services is subject to the terms and limitations of your health insurance plan.  Call member services at your health plan with any benefit or coverage questions.                            Your next 10 appointments already scheduled     Apr 09, 2018 10:00 AM CDT   New Visit with Scarlet Uribe MD   [facility]    [address] [place] [address]  [address]  [facility] [address] [phone]              Who to contact     If you have questions or need follow up information about today's clinic visit or your schedule please contact [facility] directly at [phone].  Normal or non-critical lab and imaging results will be communicated to you by MyChart, letter or phone within 4 business days after the clinic has received the results. If you do not hear from us within 7 days, please contact the clinic through MyChart or phone. If you have a critical or abnormal lab result, we will notify you by phone as soon as possible.  Submit refill requests through [employer] or call your pharmacy and they will forward the refill request to us. Please allow 3 business days for your refill to be completed.          Additional Information About Your Visit        Care EveryWhere ID     This is your Care EveryWhere ID. This could be used by other organizations to access your [place] medical records  [id number]        Your Vitals Were     Pulse Temperature Respirations Pulse  Oximetry BMI (Body Mass Index)       102 98.4  F (36.9  C) (Oral) 20 100% 25.54 kg/m2        Blood Pressure from Last 3 Encounters:   03/16/18 126/86   02/16/18 132/80   01/29/18 132/86    Weight from Last 3 Encounters:   03/16/18 168 lb (76.2 kg)   02/16/18 165 lb 8 oz (75.1 kg)   01/29/18 160 lb (72.6 kg)              We Performed the Following     MENTAL HEALTH REFERRAL  - Adult; Outpatient Treatment; Individual/Couples/Family/Group Therapy/Health Psychology; Other: [facility] [phone]; We will contact you to schedule the appointment or please call with any questi...          Today's Medication Changes          These changes are accurate as of 3/16/18  2:31 PM.  If you have any questions, ask your nurse or doctor.               These medicines have changed or have updated prescriptions.        Dose/Directions    oxyCODONE 40 MG 12 hr tablet   Commonly known as:  OxyCONTIN   This may have changed:  Another medication with the same name was removed. Continue taking this medication, and follow the directions you see here.   Used for:  Chronic bilateral low back pain without sciatica   Changed by:  Wegener, Joel Daniel Irwin, MD        Dose:  40 mg   Take 1 tablet (40 mg) by mouth every 12 hours   Quantity:  60 tablet   Refills:  0       oxyCODONE-acetaminophen  MG per tablet   Commonly known as:  PERCOCET   This may have changed:  additional instructions   Used for:  Chronic bilateral low back pain without sciatica   Changed by:  Wegener, Joel Daniel Irwin, MD        Dose:  1 tablet   Take 1 tablet by mouth every 6 hours as needed for pain Will approve up to 4 tablets a day.   Quantity:  120 tablet   Refills:  0            Where to get your medicines      Some of these will need a paper prescription and others can be bought over the counter.  Ask your nurse if you have questions.     Bring a paper prescription for each of these medications     oxyCODONE 40 MG 12 hr tablet     oxyCODONE-acetaminophen  MG per tablet                Primary Care Provider Office Phone # Fax #    Sunshine Michael [phone] [address]  [facility] [address]        Equal Access to Services     MONO WATERS : Hadii aad ku hadmaria isabelo Soomaali, waaxda luqadaha, qaybta kaalmada adeegyada, toy aguilarn edward robles lazacjade . So [facility] [phone].    ATENCIÓN: Si habla español, tiene a freire disposición servicios gratuitos de asistencia lingüística. Llame al [phone].    We comply with applicable federal civil rights laws and [place] laws. We do not discriminate on the basis of race, color, national origin, age, disability, sex, sexual orientation, or gender identity.            Thank you!     Thank you for choosing [facility]  for your care. Our goal is always to provide you with excellent care. Hearing back from our patients is one way we can continue to improve our services. Please take a few minutes to complete the written survey that you may receive in the mail after your visit with us. Thank you!             Your Updated Medication List - Protect others around you: Learn how to safely use, store and throw away your medicines at www.disposemymeds.org.          This list is accurate as of 3/16/18  2:31 PM.  Always use your most recent med list.                   Brand Name Dispense Instructions for use Diagnosis    * albuterol 108 (90 BASE) MCG/ACT Inhaler    PROAIR HFA/PROVENTIL HFA/VENTOLIN HFA    1 Inhaler    Inhale 2 puffs into the lungs every 4 hours as needed for shortness of breath / dyspnea or wheezing    Acute bronchospasm       * albuterol (2.5 MG/3ML) 0.083% neb solution     30 vial    Take 1 vial (2.5 mg) by nebulization every 4 hours as needed for shortness of breath / dyspnea or wheezing    Acute bronchospasm       calcium carbonate 1250 MG tablet    OS-ADOLFO 500 mg [place]    100 tablet    Take 1 tablet by mouth 2 times daily.    Routine  general medical examination at a health care facility       EPINEPHrine 0.3 MG/0.3ML injection 2-pack    EPIPEN/ADRENACLICK/or ANY BX GENERIC EQUIV    2 each    Inject 0.3 mLs (0.3 mg) into the muscle once as needed for anaphylaxis    Bee sting-induced anaphylaxis, accidental or unintentional, subsequent encounter       gabapentin 300 MG capsule    NEURONTIN    180 capsule    Take 2 capsules (600 mg) by mouth 3 times daily    Chronic bilateral low back pain without sciatica       ibuprofen 200 MG tablet    ADVIL/MOTRIN     Pt is taking 4 TABLET EVERY 4 TO 6 HOURS AS NEEDED        LORazepam 1 MG tablet    ATIVAN    90 tablet    TAKE 1 TABLET BY MOUTH EVERY 8 HOURS AS NEEDED FOR ANXIETY,  Fill on or after 1/20/18.    Generalized anxiety disorder       magnesium 250 MG tablet     100 tablet    Take 1 tablet by mouth daily.    Routine general medical examination at a health care facility       naloxone nasal spray    NARCAN    0.2 mL    Spray 1 spray (4 mg) into one nostril alternating nostrils as needed for opioid reversal every 2-3 minutes until assistance arrives    Continuous opioid dependence (H)       nicotine 21 MG/24HR 24 hr patch    NICODERM CQ    30 patch    Place 1 patch onto the skin every 24 hours    Tobacco use disorder       oxyCODONE 40 MG 12 hr tablet    OxyCONTIN    60 tablet    Take 1 tablet (40 mg) by mouth every 12 hours    Chronic bilateral low back pain without sciatica       oxyCODONE-acetaminophen  MG per tablet    PERCOCET    120 tablet    Take 1 tablet by mouth every 6 hours as needed for pain Will approve up to 4 tablets a day.    Chronic bilateral low back pain without sciatica       tiZANidine 4 MG tablet    ZANAFLEX    270 tablet    TAKE ONE TABLET BY MOUTH THREE TIMES DAILY AS NEEDED FOR SHOULDER AND UPPER BACK PAIN    Chronic bilateral low back pain without sciatica       * venlafaxine 37.5 MG 24 hr capsule    EFFEXOR-XR    7 capsule    Take 1 capsule daily for 7 days     Generalized anxiety disorder, Moderate major depression (H)       * venlafaxine 75 MG Tb24 24 hr tablet    EFFEXOR-ER    30 tablet    Take 1 tablet (75 mg) by mouth daily Start after the 37.5 mg pills completed.    Generalized anxiety disorder, Moderate major depression (H)       vitamin D 2000 UNITS tablet     100 tablet    Take 2,000 Units by mouth daily    Major depressive disorder, recurrent episode, moderate (H)       * Notice:  This list has 4 medication(s) that are the same as other medications prescribed for you. Read the directions carefully, and ask your doctor or other care provider to review them with you.

## 2018-03-16 NOTE — PATIENT INSTRUCTIONS
ASSESSMENT AND PLAN  1. Chronic bilateral low back pain without sciatica  Baseline dose ws oxy 60mg twice daily and 150/month of percocet 10mg.       Today:  Continue oxycontin 40mg twice daily.  Continue percocet 10mg four/day = 120/month.      Plan:     To continue medication therapy I am requirin. Obtain a psychiatrist and psychotherapist to engage in care.  I recommend focusing psychotherapy on pain management/coping strategies.  Goal of psychiatrist care will be to better control anxiety with goal of reducing lorazepam some.         2. Generalized anxiety disorder  As above.  Referrals given again.      I expect appointments to be at least scheduled by you next visit with me.       - MENTAL HEALTH REFERRAL  - Adult; Outpatient Treatment; Individual/Couples/Family/Group Therapy/Health Psychology; Other: [facility] [phone]; We will contact you to schedule the appointment or please call with any questi...        [employer] SIGNUP FOR E-VISITS AND EASIER COMMUNICATION:  [email or web address]     Zipnosis:  [email or web address].  Sign up for e-visits for common illnesses!     RADIOLOGY:   [facility]:  [phone] to schedule any radiology tests at [facility]: [phone]    Mammograms/colonoscopies:  [phone]    CONSUMER PRICE LINE for estimates of test costs:  [phone]

## 2018-03-16 NOTE — PROGRESS NOTES
"  SUBJECTIVE:   Ria Nj is a 42 year old female who presents to clinic today for the following health issues:      Medication Followup of     Taking Medication as prescribed: yes    Side Effects:  None    Medication Helping Symptoms:  yes     Additional history/life update:       Chronic bilateral low back pain without sciatica  Generalized anxiety disorder :here to follow up for controlled substance refills.  Very happy I am seeing her today earlier since about to run out of medication (the short acting pain meds.)  Was confused at last visit about the decrease.  Did make an appointment with addiction medicine/suboxone provider but this was cancelled (not due to her) and re-scheduled.  Although calm and interactive today and not upset or aggressive with me she again is voicing confusion as to why she is being weaned.  Per her report she had worked for years to get her pain under control and to get on an appropriate amount of pain medication which helped her to function with Dr. Michael.  She is not sure why Dr. Michael started to wean her as at her lat appointment with Dr. Michael no dose adjustments were made.      From her perspective she was fairly stable on her current medications. She feels the lorazepam is also essential.  Without it she gets \"anxious, panicky.\"  She has worked with a psychiatrist in the distant past and a therapist but not recently.  Reports trying several mental health medications most of which she had side effects from or were not effective.  She sees the lorazepam as essential.  She does have a lot of stress in her life but feels is coping.     We reviewed symptoms of opiod hyperalgesia and right now is not reporting headaches, tremors, ticks, abdominal pain or \"spells\" or sleep disturbance which is out of the ordinary for her.         Problem list, Medication list, Allergies, and Medical/Social/Surgical histories reviewed in EPIC and updated as appropriate.  Labs reviewed in " EPIC  BP Readings from Last 3 Encounters:   03/16/18 126/86   02/16/18 132/80   01/29/18 132/86    Wt Readings from Last 3 Encounters:   03/16/18 168 lb (76.2 kg)   02/16/18 165 lb 8 oz (75.1 kg)   01/29/18 160 lb (72.6 kg)                  Patient Active Problem List   Diagnosis     CARDIOVASCULAR SCREENING; LDL GOAL LESS THAN 160     Lumbar disc herniation with myelopathy     Chronic low back pain     Bee sting-induced anaphylaxis     Generalized anxiety disorder     Tobacco use disorder     Moderate major depression (H)     Continuous opioid dependence (H)     Cervicalgia     Past Surgical History:   Procedure Laterality Date     OVARY SURGERY N/A 2004    Ovarian cyst removed.      SURGICAL HISTORY OF -   2007    ovarian cyst removal        Social History   Substance Use Topics     Smoking status: Current Every Day Smoker     Packs/day: 2.00     Years: 15.00     Types: Cigarettes     Smokeless tobacco: Never Used     Alcohol use No     Family History   Problem Relation Age of Onset     CANCER Maternal Grandmother      CANCER Maternal Grandfather      CANCER Paternal Grandmother      CANCER Paternal Grandfather          Current Outpatient Prescriptions   Medication Sig Dispense Refill     oxyCODONE (OXYCONTIN) 40 MG 12 hr tablet Take 1 tablet (40 mg) by mouth every 12 hours 60 tablet 0     oxyCODONE-acetaminophen (PERCOCET)  MG per tablet Take 1 tablet by mouth every 6 hours as needed for pain Will approve up to 4 tablets a day. 120 tablet 0     gabapentin (NEURONTIN) 300 MG capsule Take 2 capsules (600 mg) by mouth 3 times daily 180 capsule 11     LORazepam (ATIVAN) 1 MG tablet TAKE 1 TABLET BY MOUTH EVERY 8 HOURS AS NEEDED FOR ANXIETY,  Fill on or after 1/20/18. 90 tablet 0     naloxone (NARCAN) nasal spray Spray 1 spray (4 mg) into one nostril alternating nostrils as needed for opioid reversal every 2-3 minutes until assistance arrives 0.2 mL 0     venlafaxine (EFFEXOR-XR) 37.5 MG 24 hr capsule Take 1  capsule daily for 7 days 7 capsule 0     venlafaxine (EFFEXOR-ER) 75 MG TB24 24 hr tablet Take 1 tablet (75 mg) by mouth daily Start after the 37.5 mg pills completed. 30 tablet 0     nicotine (NICODERM CQ) 21 MG/24HR 24 hr patch Place 1 patch onto the skin every 24 hours 30 patch 0     albuterol (2.5 MG/3ML) 0.083% neb solution Take 1 vial (2.5 mg) by nebulization every 4 hours as needed for shortness of breath / dyspnea or wheezing 30 vial 11     tiZANidine (ZANAFLEX) 4 MG tablet TAKE ONE TABLET BY MOUTH THREE TIMES DAILY AS NEEDED FOR SHOULDER AND UPPER BACK PAIN 270 tablet 0     Cholecalciferol (VITAMIN D) 2000 UNITS tablet Take 2,000 Units by mouth daily 100 tablet 3     EPINEPHrine (EPIPEN) 0.3 MG/0.3ML injection Inject 0.3 mLs (0.3 mg) into the muscle once as needed for anaphylaxis 2 each 1     albuterol (PROAIR HFA, PROVENTIL HFA, VENTOLIN HFA) 108 (90 BASE) MCG/ACT inhaler Inhale 2 puffs into the lungs every 4 hours as needed for shortness of breath / dyspnea or wheezing 1 Inhaler 0     Magnesium 250 MG tablet Take 1 tablet by mouth daily. 100 tablet 3     calcium carbonate 500 MG tablet Take 1 tablet by mouth 2 times daily. 100 tablet 3     IBUPROFEN 200 MG OR TABS Pt is taking 4 TABLET EVERY 4 TO 6 HOURS AS NEEDED       Allergies   Allergen Reactions     Bee Venom      Recent Labs   Lab Test  10/17/17   1122   ALT  16   CR  0.80   GFRESTIMATED  79   GFRESTBLACK  >90   POTASSIUM  4.2        ROS:  Constitutional, HEENT, cardiovascular, pulmonary, GI, , musculoskeletal, neuro, skin, endocrine and psych systems are negative, except as otherwise noted.        OBJECTIVE:  /86  Pulse 102  Temp 98.4  F (36.9  C) (Oral)  Resp 20  Wt 168 lb (76.2 kg)  SpO2 100%  BMI 25.54 kg/m2    EXAM:  GENERAL APPEARANCE: healthy, alert and no distress  Clear speech today.  Little pain behaviors today.     ASSESSMENT AND PLAN  Patient Instructions   ASSESSMENT AND PLAN  1. Chronic bilateral low back pain without  sciatica - uncontrolled    Performed another comprehensive chart review with her and reviewed the [place] .  Of course it is difficult to tell for sure how much her pain was controlled or not on previous dosing.  As far as I can tell from reviewing her chart her previous primary physician recommended review of her care by a pain clinic on several occasions in light of new CDC dosing guidelines.  However aside from her failure to comply with this I do not see any specific concerns about opiod addiction/abuse (early refills, lost prescriptions, etc) aside from her relatively high dose.  I explained to her that her ability to be compliant with med checks but not other parts of care is also seen as a potential indicator of addiction.      In any event right now her perception is that she has not had opiod addiction/abuse and at this point I agree with her.  To that end I do not see suboxone to be a good/necessary option for her at this time especially since she would view this as a pain treatment.     Again -   Baseline dose ws oxy 60mg twice daily and 150/month of percocet 10mg.  = 170mg oxycodone/day      Today:  Continue oxycontin 40mg twice daily.  Continue percocet 10mg four/day = 120/month.   = 120mg oxycodone/day which is 180mg morphine equivalent/day and above CDC high risk overdose cut off of 100mg/day and has additional overdose risk due to benzo use which I discussed with her.     Plan:     To continue medication therapy I am requirin. Obtain a psychiatrist and psychotherapist to engage in care.  I recommend focusing psychotherapy on pain management/coping strategies.  Goal of psychiatrist care will be to better control anxiety with goal of reducing lorazepam some. I told her I would expect her to see a psychiatrist at least twice yearly for review and also a psychologist monthly.  She in agreement to do this. At this point I am not requiring her to see a pain specialist as our pain clinic will not see her  at this time.  Per her report only injections were discussed at [facility] pain clinic and given her history of pain starting with an epidural I concur with her not to pursue this. Again, my opinion is suboxone (also discussed at [facility] pain clinic) is not a good option for her.       Current DIRE score is still low - about 10/11.  To continue opiods on a long term basis I am looking for improvements in reliability and engagement in broader care.  She understands this.     We scheduled one month follow up visit with me.     mn  reviewed and no suspicious activity noted.     Again, I confirmed that she filled the narcan prescription for overdose and has discussed with those near to her.     2. Generalized anxiety disorder - uncontrolled  As above.  Referrals given again.      I expect appointments to be at least scheduled by you next visit with me.       - MENTAL HEALTH REFERRAL  - Adult; Outpatient Treatment; Individual/Couples/Family/Group Therapy/Health Psychology; Other: [facility] [phone]; We will contact you to schedule the appointment or please call with any questi...      I spent more than 1/2 of 50 minute visit today face to face counseling regarding her pain management plan, mental health plan, appropriate use of controlled substance medication, safety issues regarding this and importance of narcan accessability.     Joel Wegener,MD

## 2018-03-22 ENCOUNTER — TELEPHONE (OUTPATIENT)
Dept: FAMILY MEDICINE | Facility: CLINIC | Age: 43
End: 2018-03-22

## 2018-03-22 NOTE — TELEPHONE ENCOUNTER
Team please clarify with pharmacy or insurance.  I am not sure what this means.  The message says Lorazepam is not covered but the preferred alternative is Lorazepam.     Joel Wegener,MD

## 2018-03-26 DIAGNOSIS — F41.1 GENERALIZED ANXIETY DISORDER: ICD-10-CM

## 2018-03-26 RX ORDER — LORAZEPAM 1 MG/1
TABLET ORAL
Qty: 90 TABLET | Refills: 0 | Status: CANCELLED | OUTPATIENT
Start: 2018-03-26

## 2018-03-26 NOTE — TELEPHONE ENCOUNTER
Actually it was filled 03/23/18 per .     I am aware there are no refills.  She has appointment with me 04/11.  No further items needed on this, closing encounter.   Joel Wegener,MD

## 2018-03-26 NOTE — TELEPHONE ENCOUNTER
Walked over and talked pharmacy tech, they state that medication is cover and pt have  prescription on 2/26/2018. There is no refill the med however.       Sudhir Beck MA

## 2018-04-13 ENCOUNTER — OFFICE VISIT (OUTPATIENT)
Dept: FAMILY MEDICINE | Facility: CLINIC | Age: 43
End: 2018-04-13
Payer: MEDICARE

## 2018-04-13 VITALS
HEART RATE: 107 BPM | TEMPERATURE: 98.9 F | RESPIRATION RATE: 18 BRPM | DIASTOLIC BLOOD PRESSURE: 60 MMHG | OXYGEN SATURATION: 96 % | WEIGHT: 164 LBS | SYSTOLIC BLOOD PRESSURE: 146 MMHG | BODY MASS INDEX: 24.94 KG/M2

## 2018-04-13 DIAGNOSIS — M51.06 LUMBAR DISC HERNIATION WITH MYELOPATHY: ICD-10-CM

## 2018-04-13 DIAGNOSIS — F32.1 MODERATE MAJOR DEPRESSION (H): ICD-10-CM

## 2018-04-13 DIAGNOSIS — F41.1 GENERALIZED ANXIETY DISORDER: ICD-10-CM

## 2018-04-13 DIAGNOSIS — F11.20 CONTINUOUS OPIOID DEPENDENCE (H): Primary | ICD-10-CM

## 2018-04-13 PROCEDURE — 99213 OFFICE O/P EST LOW 20 MIN: CPT | Performed by: FAMILY MEDICINE

## 2018-04-13 NOTE — LETTER
[facility]  [address] [facility] [address]  Phone: [phone]    April 13, 2018        Ria Nj  [address] [facility] [address]          To whom it may concern:    RE: Ria Nj    I did not refill Ms. Nj's opiate or benzodiazepine refills today.    Please contact me for questions or concerns.      Sincerely,        Angelic Martínez MD

## 2018-04-13 NOTE — MR AVS SNAPSHOT
After Visit Summary   4/13/2018    Ria Nj    MRN: [id number]           Patient Information     Date Of Birth          1975        Visit Information        Provider Department      4/13/2018 2:40 PM Angelic Martínez MD [facility]        Today's Diagnoses     Continuous opioid dependence (H)    -  1    Lumbar disc herniation with myelopathy        Generalized anxiety disorder        Moderate major depression (H)          Care Instructions    Please schedule an appointment with Dr. Wegener as soon as possible.          Follow-ups after your visit        Who to contact     If you have questions or need follow up information about today's clinic visit or your schedule please contact [facility] directly at [phone].  Normal or non-critical lab and imaging results will be communicated to you by MyChart, letter or phone within 4 business days after the clinic has received the results. If you do not hear from us within 7 days, please contact the clinic through MyChart or phone. If you have a critical or abnormal lab result, we will notify you by phone as soon as possible.  Submit refill requests through [employer] or call your pharmacy and they will forward the refill request to us. Please allow 3 business days for your refill to be completed.          Additional Information About Your Visit        Care EveryWhere ID     This is your Care EveryWhere ID. This could be used by other organizations to access your [place] medical records  [id number]        Your Vitals Were     Pulse Temperature Respirations Pulse Oximetry BMI (Body Mass Index)       107 98.9  F (37.2  C) (Oral) 18 96% 24.94 kg/m2        Blood Pressure from Last 3 Encounters:   04/13/18 146/60   03/16/18 126/86   02/16/18 132/80    Weight from Last 3 Encounters:   04/13/18 164 lb (74.4 kg)   03/16/18 168 lb (76.2 kg)   02/16/18 165 lb 8 oz (75.1 kg)              Today, you had the following      No orders found for display       Primary Care Provider Office Phone # Fax #    Jose A Daniel Irwin Wegener, [phone] [address] [place] [id number]        Equal Access to Services     MONO WATERS : Hadii kandy ku devino Solionelali, waaxda luqadaha, qaybta kaalmada adeegyada, toy aguilarn edward robles laTorstenbooker miranda. So [facility] [phone].    ATENCIÓN: Si habla español, tiene a freire disposición servicios gratuitos de asistencia lingüística. Llame al [phone].    We comply with applicable federal civil rights laws and [place] laws. We do not discriminate on the basis of race, color, national origin, age, disability, sex, sexual orientation, or gender identity.            Thank you!     Thank you for choosing [facility]  for your care. Our goal is always to provide you with excellent care. Hearing back from our patients is one way we can continue to improve our services. Please take a few minutes to complete the written survey that you may receive in the mail after your visit with us. Thank you!             Your Updated Medication List - Protect others around you: Learn how to safely use, store and throw away your medicines at www.disposemymeds.org.          This list is accurate as of 4/13/18  4:01 PM.  Always use your most recent med list.                   Brand Name Dispense Instructions for use Diagnosis    * albuterol 108 (90 Base) MCG/ACT Inhaler    PROAIR HFA/PROVENTIL HFA/VENTOLIN HFA    1 Inhaler    Inhale 2 puffs into the lungs every 4 hours as needed for shortness of breath / dyspnea or wheezing    Acute bronchospasm       * albuterol (2.5 MG/3ML) 0.083% neb solution     30 vial    Take 1 vial (2.5 mg) by nebulization every 4 hours as needed for shortness of breath / dyspnea or wheezing    Acute bronchospasm       calcium carbonate 1250 MG tablet    OS-ADOLFO 500 mg [place] [place]    100 tablet    Take 1 tablet by mouth 2 times daily.    Routine general medical  examination at a health care facility       EPINEPHrine 0.3 MG/0.3ML injection 2-pack    EPIPEN/ADRENACLICK/or ANY BX GENERIC EQUIV    2 each    Inject 0.3 mLs (0.3 mg) into the muscle once as needed for anaphylaxis    Bee sting-induced anaphylaxis, accidental or unintentional, subsequent encounter       gabapentin 300 MG capsule    NEURONTIN    180 capsule    Take 2 capsules (600 mg) by mouth 3 times daily    Chronic bilateral low back pain without sciatica       ibuprofen 200 MG tablet    ADVIL/MOTRIN     Pt is taking 4 TABLET EVERY 4 TO 6 HOURS AS NEEDED        LORazepam 1 MG tablet    ATIVAN    90 tablet    TAKE 1 TABLET BY MOUTH EVERY 8 HOURS AS NEEDED FOR ANXIETY,  Fill on or after 1/20/18.    Generalized anxiety disorder       magnesium 250 MG tablet     100 tablet    Take 1 tablet by mouth daily.    Routine general medical examination at a health care facility       naloxone nasal spray    NARCAN    0.2 mL    Spray 1 spray (4 mg) into one nostril alternating nostrils as needed for opioid reversal every 2-3 minutes until assistance arrives    Continuous opioid dependence (H)       nicotine 21 MG/24HR 24 hr patch    NICODERM CQ    30 patch    Place 1 patch onto the skin every 24 hours    Tobacco use disorder       oxyCODONE 40 MG 12 hr tablet    OxyCONTIN    60 tablet    Take 1 tablet (40 mg) by mouth every 12 hours    Chronic bilateral low back pain without sciatica       oxyCODONE-acetaminophen  MG per tablet    PERCOCET    120 tablet    Take 1 tablet by mouth every 6 hours as needed for pain Will approve up to 4 tablets a day.    Chronic bilateral low back pain without sciatica       tiZANidine 4 MG tablet    ZANAFLEX    270 tablet    TAKE ONE TABLET BY MOUTH THREE TIMES DAILY AS NEEDED FOR SHOULDER AND UPPER BACK PAIN    Chronic bilateral low back pain without sciatica       * venlafaxine 37.5 MG 24 hr capsule    EFFEXOR-XR    7 capsule    Take 1 capsule daily for 7 days    Generalized anxiety  disorder, Moderate major depression (H)       * venlafaxine 75 MG Tb24 24 hr tablet    EFFEXOR-ER    30 tablet    Take 1 tablet (75 mg) by mouth daily Start after the 37.5 mg pills completed.    Generalized anxiety disorder, Moderate major depression (H)       vitamin D 2000 units tablet     100 tablet    Take 2,000 Units by mouth daily    Major depressive disorder, recurrent episode, moderate (H)       * Notice:  This list has 4 medication(s) that are the same as other medications prescribed for you. Read the directions carefully, and ask your doctor or other care provider to review them with you.

## 2018-04-13 NOTE — PROGRESS NOTES
SUBJECTIVE:   Ria Nj is a 42 year old female who presents to clinic today for the following health issues:    Medication Followup of Oxycodone, Ativan    Taking Medication as prescribed: yes; being weaned    She feels that doctors prescribed far too high a dose of opiates to her initially and now it's been really hard to wean, she feels [place] caused her problems. She didn't mention this today, but past notes have noted she feels she's had back pain since epidural for delivery of her son 8 years ago.    She states she switched from [facility] to our clinic because ours is closer to her    She's had back pain for 8 years, with multiple bulging discs    Side Effects:  None    Medication Helping Symptoms:  Yes    She's been to  Pain Clinic but subuxone was recommended, but she can't take it because she's on Percocet    She's been to [facility] as well but they couldn't do anything for her    She doesn't want to do any back injections because of bad experience with epidural.    She was seeing VERITO Navarro at [facility] for several years, then Dr. Michael, switched to our clinic ([facility]) in Jan because she felt Dr. Michael wasn't being understanding, and that the drop in medication was too severe. She's been seen by Dr. Wegener at [facility] monthly, 3 times total, missed her appointment 2 days ago with him.  See visit note below. On visit 3.16.18 she had not made any mental health appts.  Visit note 2/16/18  My thoughts:  1.  By objective criteria only has minimal to moderate back arthritis without severe stenosis, severe arthritis, previous back surgeries, etc.  Herniated discs are not infact a chronic issue. This highlights rational for feeling that mental health factors and coping are significantly contributing to pain and can be improved.  Thus loses points for diagnosis on dire score.  Loses points for risk for chemical dependency just based on high dose alone and not being fully effective.    2.   Loses points for uncontrolled mental health - anxiety and possibly depression.  To me makes little sense to be on high risk chronic benzos without on-going psychiatric care and psychotherapy.   3.  Reliability - loses points on dire score given failure to follow up with pain clinic.      My goals for her would be to:  1.  At least wean down slowly into CDC limits of prescribing of 100mg or 80mg total daily dosing morphine equivalent.  Currently is on over 200.  Was at nearly 300 before she started her wean.   2.  We can continue to discuss suboxone as we lower dose (cannot be started until off opiods).   3.  Engage in some sort of formal pain management either via our clinic/addiction clinic or [facility] pain clinic.   4.  Engage in mental health care with a psychiatrist and psychologist to eliminate need for chronic benzo use and to better cope with pain and to improve mental coping pain strategies.      She did endorse understanding my assessment and plan today.            Anxiety Follow-Up    Status since last visit: No change    Other associated symptoms:None    Complicating factors:   Significant life event: No   Current substance abuse: None  Depression symptoms: No  She's made an appointment with Seven Lenz April 17th Daisy ; made through [facility] scheduling  She will be seeing a therapist at the same place  MICHAEL-7 SCORE 5/18/2017 8/17/2017 10/17/2017   Total Score - - -   Total Score 17 20 18       MICHAEL-7  Problem list and histories reviewed & adjusted, as indicated.  Additional history: as documented    Patient Active Problem List   Diagnosis     CARDIOVASCULAR SCREENING; LDL GOAL LESS THAN 160     Lumbar disc herniation with myelopathy     Chronic low back pain     Bee sting-induced anaphylaxis     Generalized anxiety disorder     Tobacco use disorder     Moderate major depression (H)     Continuous opioid dependence (H)     Cervicalgia     Past Surgical History:   Procedure Laterality  Date     OVARY SURGERY N/A 2004    Ovarian cyst removed.      SURGICAL HISTORY OF -   2007    ovarian cyst removal        Social History   Substance Use Topics     Smoking status: Current Every Day Smoker     Packs/day: 2.00     Years: 15.00     Types: Cigarettes     Smokeless tobacco: Never Used     Alcohol use No     Family History   Problem Relation Age of Onset     CANCER Maternal Grandmother      CANCER Maternal Grandfather      CANCER Paternal Grandmother      CANCER Paternal Grandfather          Current Outpatient Prescriptions   Medication Sig Dispense Refill     oxyCODONE (OXYCONTIN) 40 MG 12 hr tablet Take 1 tablet (40 mg) by mouth every 12 hours 60 tablet 0     oxyCODONE-acetaminophen (PERCOCET)  MG per tablet Take 1 tablet by mouth every 6 hours as needed for pain Will approve up to 4 tablets a day. 120 tablet 0     gabapentin (NEURONTIN) 300 MG capsule Take 2 capsules (600 mg) by mouth 3 times daily 180 capsule 11     LORazepam (ATIVAN) 1 MG tablet TAKE 1 TABLET BY MOUTH EVERY 8 HOURS AS NEEDED FOR ANXIETY,  Fill on or after 1/20/18. 90 tablet 0     naloxone (NARCAN) nasal spray Spray 1 spray (4 mg) into one nostril alternating nostrils as needed for opioid reversal every 2-3 minutes until assistance arrives 0.2 mL 0     venlafaxine (EFFEXOR-XR) 37.5 MG 24 hr capsule Take 1 capsule daily for 7 days 7 capsule 0     venlafaxine (EFFEXOR-ER) 75 MG TB24 24 hr tablet Take 1 tablet (75 mg) by mouth daily Start after the 37.5 mg pills completed. 30 tablet 0     nicotine (NICODERM CQ) 21 MG/24HR 24 hr patch Place 1 patch onto the skin every 24 hours 30 patch 0     albuterol (2.5 MG/3ML) 0.083% neb solution Take 1 vial (2.5 mg) by nebulization every 4 hours as needed for shortness of breath / dyspnea or wheezing 30 vial 11     tiZANidine (ZANAFLEX) 4 MG tablet TAKE ONE TABLET BY MOUTH THREE TIMES DAILY AS NEEDED FOR SHOULDER AND UPPER BACK PAIN 270 tablet 0     Cholecalciferol (VITAMIN D) 2000 UNITS tablet  "Take 2,000 Units by mouth daily 100 tablet 3     EPINEPHrine (EPIPEN) 0.3 MG/0.3ML injection Inject 0.3 mLs (0.3 mg) into the muscle once as needed for anaphylaxis 2 each 1     albuterol (PROAIR HFA, PROVENTIL HFA, VENTOLIN HFA) 108 (90 BASE) MCG/ACT inhaler Inhale 2 puffs into the lungs every 4 hours as needed for shortness of breath / dyspnea or wheezing 1 Inhaler 0     Magnesium 250 MG tablet Take 1 tablet by mouth daily. 100 tablet 3     calcium carbonate 500 MG tablet Take 1 tablet by mouth 2 times daily. 100 tablet 3     IBUPROFEN 200 MG OR TABS Pt is taking 4 TABLET EVERY 4 TO 6 HOURS AS NEEDED       Allergies   Allergen Reactions     Bee Venom      Recent Labs   Lab Test  10/17/17   1122   ALT  16   CR  0.80   GFRESTIMATED  79   GFRESTBLACK  >90   POTASSIUM  4.2      BP Readings from Last 3 Encounters:   04/13/18 146/60   03/16/18 126/86   02/16/18 132/80    Wt Readings from Last 3 Encounters:   04/13/18 164 lb (74.4 kg)   03/16/18 168 lb (76.2 kg)   02/16/18 165 lb 8 oz (75.1 kg)                    Reviewed and updated as needed this visit by clinical staff       Reviewed and updated as needed this visit by Provider         ROS:  Constitutional, HEENT, cardiovascular, pulmonary, GI, , musculoskeletal, neuro, skin, endocrine and psych systems are negative, except as otherwise noted.    OBJECTIVE:     /60 (BP Location: Right arm, Patient Position: Sitting, Cuff Size: Adult Large)  Pulse 107  Temp 98.9  F (37.2  C) (Oral)  Resp 18  Wt 164 lb (74.4 kg)  SpO2 96%  BMI 24.94 kg/m2  Body mass index is 24.94 kg/(m^2).  Gen: anxious appearing, minimal eye contact, disheveled  History slightly difficult to obtain, tangential, uses short hand terminology I have to ask her to clarify \"the drop\", for instance.  Threatens legal action, requests letter for her to bring to her .    MRI 11/9/2017  L3-4: Unchanged disc bulge and bilateral facet hypertrophy resulting  in bilateral borderline foraminal " narrowing without any significant  spinal canal stenosis.      L4-5: Disc bulge and superimposed central disc protrusion with  annular fissure, indenting the ventral thecal sac and partially  effacing both lateral recesses. Findings are unchanged since  8/27/2015. In addition, there is bilateral facet joint hypertrophy,  contributing to bilateral mild foraminal narrowing and borderline  spinal canal narrowing. No neural impingement.     L5-S1: Disc bulge and small central disc protrusion with associated  left central annular fissure. In addition the disc slightly indents  the ventral thecal sac. This may abut the left S1 nerve root within  the lateral recess. No spinal canal or neuroforaminal stenosis.    ASSESSMENT/PLAN:       1. Continuous opioid dependence (H)  I discussed with her that I could not refill controlled substances for her as she needs to follow up with her primary and has not followed through with other criteria of her pain contract.   She states she has a mental health appointment but doesn't know if she's seeing a pyschologist or a psychiatrist. She can tell me a name and office so it does seem like she's made an appointment,  I'm not sure why I can't seen this appointment if she has made one.     I offered and strongly recommended treatment that could help her manage withdrawal, I offered clonidine, offered inpatient detox, offered to have her tell me other symptoms of withdrawal with treatment options that could include benadryl, trazodone, dicyclomine, zofran, flexeril) so that I could provide treatment options, she declined all options.  She left rather abruptly stating she was going to go to her other doctor or the ER to get her meds.    Note that refill is due 4/16/2018 so she should not be in withdrawal right now, although she was tachycardic and hypertensive today, suspect anxiety/stress. Her PCP doesn't have availability until 4.24.2018. I strongly encouraged her to schedule next available  appointment with him.    2. Lumbar disc herniation with myelopathy    #. Probably significant anxiety and depression  Unfortunately presentation and treatment clouded by intervening opiate dependency. Mention of MH issues for over 10 years. Dual diagnosis treatment would be ideal for her, perhaps with social work intervention as she cites her son as major reason she's not getting help, but she doesn't seem ready for this yet.      Angelic Martínez MD  [facility]

## 2018-04-14 ENCOUNTER — NURSE TRIAGE (OUTPATIENT)
Dept: NURSING | Facility: CLINIC | Age: 43
End: 2018-04-14

## 2018-04-15 ENCOUNTER — NURSE TRIAGE (OUTPATIENT)
Dept: NURSING | Facility: CLINIC | Age: 43
End: 2018-04-15

## 2018-04-15 NOTE — TELEPHONE ENCOUNTER
Ria called asking about how to get her medications. The meds are percocet, oxycontin and lorazepam. She saw a provider yesterday as her pcp wasn't available and this provider would not write prescriptions for these meds.  Ria wants to know what she's supposed to do.  I told Ria that this is not something an on call provider will prescribe. Ria is going to make an appointment to see another provider at  where she has been seeing Dr Wegener.   Danielle AKINS RN [facility]

## 2018-04-16 ENCOUNTER — TELEPHONE (OUTPATIENT)
Dept: FAMILY MEDICINE | Facility: CLINIC | Age: 43
End: 2018-04-16

## 2018-04-16 ENCOUNTER — NURSE TRIAGE (OUTPATIENT)
Dept: NURSING | Facility: CLINIC | Age: 43
End: 2018-04-16

## 2018-04-16 ENCOUNTER — HOSPITAL ENCOUNTER (EMERGENCY)
Facility: CLINIC | Age: 43
Discharge: HOME | End: 2018-04-16
Attending: EMERGENCY MEDICINE | Admitting: EMERGENCY MEDICINE
Payer: MEDICARE

## 2018-04-16 VITALS
SYSTOLIC BLOOD PRESSURE: 105 MMHG | WEIGHT: 152 LBS | OXYGEN SATURATION: 98 % | TEMPERATURE: 98.8 F | DIASTOLIC BLOOD PRESSURE: 63 MMHG | BODY MASS INDEX: 23.11 KG/M2 | HEART RATE: 95 BPM | RESPIRATION RATE: 14 BRPM

## 2018-04-16 DIAGNOSIS — M54.50 CHRONIC MIDLINE LOW BACK PAIN WITHOUT SCIATICA: ICD-10-CM

## 2018-04-16 DIAGNOSIS — G89.29 CHRONIC MIDLINE LOW BACK PAIN WITHOUT SCIATICA: ICD-10-CM

## 2018-04-16 PROCEDURE — 99283 EMERGENCY DEPT VISIT LOW MDM: CPT | Mod: Z6 | Performed by: EMERGENCY MEDICINE

## 2018-04-16 PROCEDURE — 25000132 ZZH RX MED GY IP 250 OP 250 PS 637: Mod: GY | Performed by: EMERGENCY MEDICINE

## 2018-04-16 PROCEDURE — A9270 NON-COVERED ITEM OR SERVICE: HCPCS | Mod: GY | Performed by: EMERGENCY MEDICINE

## 2018-04-16 PROCEDURE — 99283 EMERGENCY DEPT VISIT LOW MDM: CPT | Performed by: EMERGENCY MEDICINE

## 2018-04-16 RX ORDER — ONDANSETRON 4 MG/1
4 TABLET, ORALLY DISINTEGRATING ORAL EVERY 8 HOURS PRN
Qty: 6 TABLET | Refills: 0 | Status: SHIPPED | OUTPATIENT
Start: 2018-04-16 | End: 2019-02-21

## 2018-04-16 RX ORDER — CLONIDINE HYDROCHLORIDE 0.1 MG/1
0.1 TABLET ORAL 2 TIMES DAILY
Qty: 4 TABLET | Refills: 0 | Status: SHIPPED | OUTPATIENT
Start: 2018-04-16 | End: 2018-11-19

## 2018-04-16 RX ORDER — OXYCODONE HYDROCHLORIDE 5 MG/1
5 TABLET ORAL ONCE
Status: COMPLETED | OUTPATIENT
Start: 2018-04-16 | End: 2018-04-16

## 2018-04-16 RX ADMIN — OXYCODONE HYDROCHLORIDE 5 MG: 5 TABLET ORAL at 14:19

## 2018-04-16 ASSESSMENT — ENCOUNTER SYMPTOMS
WEAKNESS: 0
ABDOMINAL PAIN: 0
SHORTNESS OF BREATH: 0
NUMBNESS: 0
FEVER: 0
BACK PAIN: 1

## 2018-04-16 NOTE — DISCHARGE INSTRUCTIONS
It is very important that you follow-up with your primary care provider on Wednesday as scheduled.  Your primary care provider needs to be the primary source for chronic pain prescriptions.    You may use Zofran as needed for nausea and clonidine as prescribed for any withdrawal symptoms in the interim.    If you have intractable vomiting, new weakness in the lower extremities, loss of bowel or bladder control or other acute concerns return to the emergency department for reevaluation.

## 2018-04-16 NOTE — TELEPHONE ENCOUNTER
Patient calling again for pain meds.  I told her that the covering providers are not able to prescribe narcotics for her.  She hung up.  See other TE today for this content.    Silvio XAVIER

## 2018-04-16 NOTE — TELEPHONE ENCOUNTER
"Patient called and spoke with writer.    Per patient:  1. Wondering how long until a provider will address these refill requests  2. Is in a lot of pain-multiple herniated discs  3. Wrote down wrong appt date for 4/11/18 appt with PCP  4. Does not understand why a covering provider will not prescribe her Percocet and Oxycontin    Writer explained to patient:  1. When a patient has a controlled substance agreement with a provider, it is very important to follow up with that specific provider  2. Prescribing of these medications by other providers in clinic is per provider discretion, so cannot guarantee any other provider will prescribe these medications  3. Writer will follow up with covering providers in clinic regarding this request, but cannot guarantee exact turnaround time for response    Patient stated \"that's fine\" and that she may take ambulance to ER.    Writer huddled with Dr. Reeves, a covering provider for PCP.    Per Dr. Reeves:  1. Would be able to prescribe medications for withdrawal symptoms but will not refill Oxycontin nor Percocet    Writer called patient and relayed message per Dr. Reeves.    Patient expressed frustration, using expletive language, that medications are not being filled given her injuries, she is on disability and that she has an 8 year old child.    \"I didn't put myself on this medication.\"    Patient informed writer she will \"definitely\" contact a .    Writer explained to patient four times that it is provider discretion what and how much to prescribe of any medication, so writer cannot guarantee any covering provider in clinic will refill medication.    Writer did offer to schedule patient follow up appt with PCP and patient declined.  Patient then hung up on writer.    Routing to PCP, Clinic Administrator, Medical Director, and Patient Care Supervisor.    FELICITA BlackN, RN                "

## 2018-04-16 NOTE — ED AVS SNAPSHOT
[facility], [place], Emergency Department    [address]    [facility] [address]    Phone:  [phone]    Fax:  [phone]                                       Ria Nj   MRN: [id number]    Department:  [facility], Emergency Department   Date of Visit:  4/16/2018           After Visit Summary Signature Page     I have received my discharge instructions, and my questions have been answered. I have discussed any challenges I see with this plan with the nurse or doctor.    ..........................................................................................................................................  Patient/Patient Representative Signature      ..........................................................................................................................................  Patient Representative Print Name and Relationship to Patient    ..................................................               ................................................  Date                                            Time    ..........................................................................................................................................  Reviewed by Signature/Title    ...................................................              ..............................................  Date                                                            Time

## 2018-04-16 NOTE — ED PROVIDER NOTES
History     Chief Complaint   Patient presents with     Medication Refill     requesting refills of her oxycontin 40 mg and percocet 4x daily. MD is out of town. used last pills yesterday.     HPI  Ria Nj is a 42 year old female with a history of chronic back pain, anxiety, opiate dependence, who presents the emergency department today for medication refill.  The patient states that she has had back pain over the past 8 years related to L4-L5 disc herniation.  She states that she takes OxyContin 40 mg per day and Percocet as needed. She states that she was previously on 80 mg OxyContin but has been weaning, which she has found difficult. The patient states that she contacted her clinic today and attempted to have her medications refilled but states that her provider was out of clinic until Friday and that she would have to go to the ER for this. The patient denies any new fall or other injury. She denies any numbness, weakness, loss of bowel or bladder control. She has no other medical concerns or complaints.     Current Facility-Administered Medications   Medication     oxyCODONE IR (ROXICODONE) tablet 5 mg     Current Outpatient Prescriptions   Medication     oxyCODONE (OXYCONTIN) 40 MG 12 hr tablet     oxyCODONE-acetaminophen (PERCOCET)  MG per tablet     gabapentin (NEURONTIN) 300 MG capsule     LORazepam (ATIVAN) 1 MG tablet     naloxone (NARCAN) nasal spray     venlafaxine (EFFEXOR-XR) 37.5 MG 24 hr capsule     venlafaxine (EFFEXOR-ER) 75 MG TB24 24 hr tablet     nicotine (NICODERM CQ) 21 MG/24HR 24 hr patch     albuterol (2.5 MG/3ML) 0.083% neb solution     tiZANidine (ZANAFLEX) 4 MG tablet     Cholecalciferol (VITAMIN D) 2000 UNITS tablet     EPINEPHrine (EPIPEN) 0.3 MG/0.3ML injection     albuterol (PROAIR HFA, PROVENTIL HFA, VENTOLIN HFA) 108 (90 BASE) MCG/ACT inhaler     Magnesium 250 MG tablet     calcium carbonate 500 MG tablet     IBUPROFEN 200 MG OR TABS     Past Medical History:    Diagnosis Date     Adjustment disorder with anxiety 10/7/2010     Bee sting-induced anaphylaxis      CARDIOVASCULAR SCREENING; LDL GOAL LESS THAN 160 5/9/2010     Chronic low back pain     has narcotic agreement on file     MICHAEL (generalised anxiety disorder)      Lumbar disc herniation with myelopathy 10/28/2010     Moderate major depression (H)      Tobacco use disorder     Smokes a 1/2 ppd. Started         Past Surgical History:   Procedure Laterality Date     OVARY SURGERY N/A 2004    Ovarian cyst removed.      SURGICAL HISTORY OF -   2007    ovarian cyst removal        Family History   Problem Relation Age of Onset     CANCER Maternal Grandmother      CANCER Maternal Grandfather      CANCER Paternal Grandmother      CANCER Paternal Grandfather        Social History   Substance Use Topics     Smoking status: Current Every Day Smoker     Packs/day: 2.00     Years: 15.00     Types: Cigarettes     Smokeless tobacco: Never Used     Alcohol use No     Allergies   Allergen Reactions     Bee Venom        I have reviewed the Medications, Allergies, Past Medical and Surgical History, and Social History in the Epic system.    Review of Systems   Constitutional: Negative for fever.   Respiratory: Negative for shortness of breath.    Cardiovascular: Negative for chest pain.   Gastrointestinal: Negative for abdominal pain.   Musculoskeletal: Positive for back pain.   Neurological: Negative for weakness and numbness.   All other systems reviewed and are negative.      Physical Exam   BP: 105/63  Pulse: 95  Temp: 98.8  F (37.1  C)  Resp: 14  Weight: 68.9 kg (152 lb)  SpO2: 98 %      Physical Exam   General: patient is initially sleeping, in no acute distress  Head: atraumatic and normocephalic   EENT: moist mucus membranes, sclerae anicteric  Neck: supple   Cardiovascular: regular rate and rhythm  Pulmonary: No respiratory distress  Musculoskeletal: normal range of motion of extremities without gross deformity  Neurological:  alert and oriented, moving all extremities symmetrically, gait normal   Skin: warm, dry       ED Course     ED Course     Procedures             Critical Care time:  none             Labs Ordered and Resulted from Time of ED Arrival Up to the Time of Departure from the ED - No data to display         Assessments & Plan (with Medical Decision Making)   Ms. Nj is a 42 year old female with a history of chronic back pain, anxiety, opiate dependence, who presents the emergency department today for medication refill.   She reports no worsening symptoms or new symptoms.  She denies any fevers recently and denies any traumatic injury.  She reports she is only here for a refill of her medications.  Upon initial evaluation she is sleeping comfortably on the couch and does not appear to be experiencing any acute withdrawal symptoms.  I did explain that we do not provide narcotic refills for chronic medications and that this would need to be completed by her primary care provider.  I did call her clinic and she does have follow-up with her primary care provider this Wednesday (2 days from now).  I did also clarified that the emergency department cannot refill chronic narcotics as this is not safe for the patient to have multiple providers prescribing for her.  She was given a prescription for Zofran and 4 tablets of clonidine if she should develop withdrawal symptoms however she does have outpatient follow-up very soon and currently does not seem to be displaying any of the symptoms.  She is tolerating p.o. without any difficulty and does not have any have any evidence of dehydration.  Will plan to discharge to home.      I have reviewed the nursing notes.    I have reviewed the findings, diagnosis, plan and need for follow up with the patient.    Discharge Medication List as of 4/16/2018  2:26 PM      START taking these medications    Details   ondansetron (ZOFRAN ODT) 4 MG ODT tab Take 1 tablet (4 mg) by mouth every 8  hours as needed for nausea, Disp-6 tablet, R-0, Local Print      cloNIDine (CATAPRES) 0.1 MG tablet Take 1 tablet (0.1 mg) by mouth 2 times daily, Disp-4 tablet, R-0, Local Print             Final diagnoses:   Chronic midline low back pain without sciatica     IAdam, am serving as a trained medical scribe to document services personally performed by Sherry Lawrence MD, based on the provider's statements to me.      Sherry KNOTT MD, was physically present and have reviewed and verified the accuracy of this note documented by Adam Washington.    4/16/2018   [facility], [place], EMERGENCY DEPARTMENT     Sherry Lawrence MD  04/16/18 1993

## 2018-04-16 NOTE — TELEPHONE ENCOUNTER
Dr Fung from the ER called.  Pt is in the ER.  Discussed case.  ER doesn't rx any chronic pains.  They will rx meds for w/d sx.  Emphasize f/u with  pcp on 4/18/18.  DUC Beaulieu

## 2018-04-16 NOTE — TELEPHONE ENCOUNTER
Reason for Call:  Medication or medication refill:    Do you use a [facility]?  Name of the pharmacy and phone number for the current request:  [facility] [place] - [phone]    Name of the medication requested: oxyCODONE-acetaminophen (PERCOCET)  MG per tablet and oxyCODONE (OXYCONTIN) 40 MG 12 hr tablet     Other request: Pt was seen 04/13/2018 by Mauricio regarding this. No meds were given at this time. Please contact pt regarding this. THanks!    Can we leave a detailed message on this number? YES    Phone number patient can be reached at: Cell number on file:    Telephone Information:   Mobile [phone]       Best Time: Anytime    Call taken on 4/16/2018 at 9:16 AM by Rabia Schaffer

## 2018-04-16 NOTE — ED AVS SNAPSHOT
[facility], Emergency Department    [address]    [place] [address]    Phone:  [phone]    Fax:  [phone]                                       Ria Nj   MRN: [id number]    Department:  [facility], Emergency Department   Date of Visit:  4/16/2018           Patient Information     Date Of Birth          1975        Your diagnoses for this visit were:     Chronic midline low back pain without sciatica        You were seen by Sherry Lawrence MD.        Discharge Instructions       It is very important that you follow-up with your primary care provider on Wednesday as scheduled.  Your primary care provider needs to be the primary source for chronic pain prescriptions.    You may use Zofran as needed for nausea and clonidine as prescribed for any withdrawal symptoms in the interim.    If you have intractable vomiting, new weakness in the lower extremities, loss of bowel or bladder control or other acute concerns return to the emergency department for reevaluation.        Your next 10 appointments already scheduled     Apr 18, 2018 11:20 AM CDT   SHORT with Joel Daniel Irwin Wegener, MD   [facility] ([facility]    [address] [place] [id number] [phone]              24 Hour Appointment Hotline       To make an appointment at any [facility], call [phone] ([phone]). If you don't have a family doctor or clinic, we will help you find one. [facility] are conveniently located to serve the needs of you and your family.             Review of your medicines      START taking        Dose / Directions Last dose taken    cloNIDine 0.1 MG tablet   Commonly known as:  CATAPRES   Dose:  0.1 mg   Quantity:  4 tablet        Take 1 tablet (0.1 mg) by mouth 2 times daily   Refills:  0        ondansetron 4 MG ODT tab   Commonly known as:  ZOFRAN ODT   Dose:  4 mg   Quantity:  6 tablet        Take 1 tablet (4 mg) by  mouth every 8 hours as needed for nausea   Refills:  0          Our records show that you are taking the medicines listed below. If these are incorrect, please call your family doctor or clinic.        Dose / Directions Last dose taken    * albuterol 108 (90 Base) MCG/ACT Inhaler   Commonly known as:  PROAIR HFA/PROVENTIL HFA/VENTOLIN HFA   Dose:  2 puff   Quantity:  1 Inhaler        Inhale 2 puffs into the lungs every 4 hours as needed for shortness of breath / dyspnea or wheezing   Refills:  0        * albuterol (2.5 MG/3ML) 0.083% neb solution   Dose:  1 vial   Quantity:  30 vial        Take 1 vial (2.5 mg) by nebulization every 4 hours as needed for shortness of breath / dyspnea or wheezing   Refills:  11        calcium carbonate 1250 MG tablet   Commonly known as:  OS-ADOLFO 500 mg [place]. Ca   Dose:  1 tablet   Quantity:  100 tablet        Take 1 tablet by mouth 2 times daily.   Refills:  3        EPINEPHrine 0.3 MG/0.3ML injection 2-pack   Commonly known as:  EPIPEN/ADRENACLICK/or ANY BX GENERIC EQUIV   Dose:  0.3 mg   Quantity:  2 each        Inject 0.3 mLs (0.3 mg) into the muscle once as needed for anaphylaxis   Refills:  1        gabapentin 300 MG capsule   Commonly known as:  NEURONTIN   Dose:  600 mg   Quantity:  180 capsule        Take 2 capsules (600 mg) by mouth 3 times daily   Refills:  11        ibuprofen 200 MG tablet   Commonly known as:  ADVIL/MOTRIN        Pt is taking 4 TABLET EVERY 4 TO 6 HOURS AS NEEDED   Refills:  0        LORazepam 1 MG tablet   Commonly known as:  ATIVAN   Quantity:  90 tablet        TAKE 1 TABLET BY MOUTH EVERY 8 HOURS AS NEEDED FOR ANXIETY,  Fill on or after 1/20/18.   Refills:  0        magnesium 250 MG tablet   Dose:  1 tablet   Quantity:  100 tablet        Take 1 tablet by mouth daily.   Refills:  3        naloxone nasal spray   Commonly known as:  NARCAN   Dose:  4 mg   Quantity:  0.2 mL        Spray 1 spray (4 mg) into one nostril alternating nostrils as needed for  opioid reversal every 2-3 minutes until assistance arrives   Refills:  0        nicotine 21 MG/24HR 24 hr patch   Commonly known as:  NICODERM CQ   Dose:  1 patch   Quantity:  30 patch        Place 1 patch onto the skin every 24 hours   Refills:  0        oxyCODONE 40 MG 12 hr tablet   Commonly known as:  OxyCONTIN   Dose:  40 mg   Quantity:  60 tablet        Take 1 tablet (40 mg) by mouth every 12 hours   Refills:  0        oxyCODONE-acetaminophen  MG per tablet   Commonly known as:  PERCOCET   Dose:  1 tablet   Quantity:  120 tablet        Take 1 tablet by mouth every 6 hours as needed for pain Will approve up to 4 tablets a day.   Refills:  0        tiZANidine 4 MG tablet   Commonly known as:  ZANAFLEX   Quantity:  270 tablet        TAKE ONE TABLET BY MOUTH THREE TIMES DAILY AS NEEDED FOR SHOULDER AND UPPER BACK PAIN   Refills:  0        * venlafaxine 37.5 MG 24 hr capsule   Commonly known as:  EFFEXOR-XR   Quantity:  7 capsule        Take 1 capsule daily for 7 days   Refills:  0        * venlafaxine 75 MG Tb24 24 hr tablet   Commonly known as:  EFFEXOR-ER   Dose:  75 mg   Quantity:  30 tablet        Take 1 tablet (75 mg) by mouth daily Start after the 37.5 mg pills completed.   Refills:  0        vitamin D 2000 units tablet   Dose:  2000 Units   Quantity:  100 tablet        Take 2,000 Units by mouth daily   Refills:  3        * Notice:  This list has 4 medication(s) that are the same as other medications prescribed for you. Read the directions carefully, and ask your doctor or other care provider to review them with you.            Prescriptions were sent or printed at these locations (2 Prescriptions)                   Other Prescriptions                Printed at Department/Unit printer (2 of 2)         ondansetron (ZOFRAN ODT) 4 MG ODT tab               cloNIDine (CATAPRES) 0.1 MG tablet                Orders Needing Specimen Collection     None      Pending Results     No orders found from 4/14/2018 to  4/17/2018.            Pending Culture Results     No orders found from 4/14/2018 to 4/17/2018.            Pending Results Instructions     If you had any lab results that were not finalized at the time of your Discharge, you can call the ED Lab Result RN at [phone]. You will be contacted by this team for any positive Lab results or changes in treatment. The nurses are available 7 days a week from 10A to 6:30P.  You can leave a message 24 hours per day and they will return your call.        Thank you for choosing [facility]       Thank you for choosing [facility] for your care. Our goal is always to provide you with excellent care. Hearing back from our patients is one way we can continue to improve our services. Please take a few minutes to complete the written survey that you may receive in the mail after you visit with us. Thank you!        Care EveryWhere ID     This is your Care EveryWhere ID. This could be used by other organizations to access your [facility] medical records  [id number]        Equal Access to Services     MONO WATERS AH: Vladimir Moreno, lucinda reyna, brenda bustillos, toy miranda. So [facility] [phone].    ATENCIÓN: Si habla español, tiene a freire disposición servicios gratuitos de asistencia lingüística. Llame al [phone].    We comply with applicable federal civil rights laws and [place] laws. We do not discriminate on the basis of race, color, national origin, age, disability, sex, sexual orientation, or gender identity.            After Visit Summary       This is your record. Keep this with you and show to your community pharmacist(s) and doctor(s) at your next visit.

## 2018-04-16 NOTE — TELEPHONE ENCOUNTER
Reason for Disposition    [1] SEVERE back pain (e.g., excruciating, unable to do any normal activities) AND [2] not improved 2 hours after pain medicine    Additional Information    Negative: Passed out (i.e., lost consciousness, collapsed and was not responding)    Negative: Shock suspected (e.g., cold/pale/clammy skin, too weak to stand, low BP, rapid pulse)    Negative: Sounds like a life-threatening emergency to the triager    Negative: Major injury to the back (e.g., MVA, fall > 10 feet or 3 meters, penetrating injury, etc.)    Negative: Followed a tailbone injury    Negative: [1] Pain in the upper back over the ribs (rib cage) AND [2] radiates (travels, goes) into chest    Negative: [1] Pain in the upper back over the ribs (rib cage) AND [2] worsened by coughing (or clearly increases with breathing)    Negative: Back pain during pregnancy    Negative: Pain mainly in flank (i.e., in the side, over the lower ribs or just below the ribs)    Negative: [1] SEVERE back pain (e.g., excruciating) AND [2] sudden onset AND [3] age > 60    Negative: [1] Unable to urinate (or only a few drops) > 4 hours AND     [2] bladder feels very full (e.g., palpable bladder or strong urge to urinate)    Negative: [1] Urinary or bowel incontinence (i.e., loss of bladder or bowel control) AND [2] new onset    Negative: Numbness in groin or rectal area (i.e., loss of sensation)    Negative: [1] SEVERE abdominal pain AND [2] present > 1 hour    Negative: [1] Abdominal pain AND [2] age > 60    Negative: Weakness of a leg or foot (e.g., unable to bear weight, dragging foot)    Negative: Unable to walk    Negative: Patient sounds very sick or weak to the triager    Protocols used: BACK PAIN-ADULT-  Caller states she is having severe back pain and that provider is out of town. Caller states she was seen by another provider in clinic and was treated rudely along with the provider not given prescription for narcotic pain meds. Caller rates  pain 10/10. Triage guidelines recommend to see provider within 4 hours.

## 2018-04-16 NOTE — ED NOTES
Pt expresses dissatisfaction with care received both here and at clinic.  Pt informed by writer that ER MD would not be prescribing her any narcotic pain meds. Pt states she is going to pricila her clinic. Pt did take her 2 rx's and stated that she would go to other ers to seek pain medicine. Did received 1 pain pill here without any relief.

## 2018-04-16 NOTE — TELEPHONE ENCOUNTER
PT called back.  She specifically asked for DUC Diaz. Emily was not available.    Pt didn't refer to previous conversation below.    Pt reports being stressed and crying.  Out of pain meds.  Last pain meds were last evening on 4/15/18.  Pt is in pain.    I reviewed plan from previous conversation below.  W/d meds could be rx,but not pain meds.    Plan- pt will go to the ER today. Pt is in pain from her back. Concerned with future w/d sx.  Pt made f/u appt with pcp for 4/18/18.  DUC Beaulieu

## 2018-04-17 ENCOUNTER — TELEPHONE (OUTPATIENT)
Dept: FAMILY MEDICINE | Facility: CLINIC | Age: 43
End: 2018-04-17

## 2018-04-17 NOTE — TELEPHONE ENCOUNTER
Patient called and spoke with writer.    Per patient:  1. In a lot of pain and her pain medications are not being prescribed  2. Wondering if she can get script for Tramadol  3. Wondering if clinic administrator has followed up from yesterday's conversation    Writer reminded patient:  1. Writer spoke with patient yesterday  2. Prescription medications are not prescribed via phone request  3. Office visit would be needed to address any prescription medications/alternatives to opioids.    4. Even with office visit, no guarantee of a particular medication being prescribed      Patient declined office visit and stated she would wait for PCP appt tomorrow.  Confirmed appt time for tomorrow, 4/18/18.      FELICITA BlackN, RN

## 2018-04-18 ENCOUNTER — OFFICE VISIT (OUTPATIENT)
Dept: FAMILY MEDICINE | Facility: CLINIC | Age: 43
End: 2018-04-18
Payer: MEDICARE

## 2018-04-18 VITALS
TEMPERATURE: 98.1 F | SYSTOLIC BLOOD PRESSURE: 137 MMHG | OXYGEN SATURATION: 100 % | BODY MASS INDEX: 24.78 KG/M2 | DIASTOLIC BLOOD PRESSURE: 86 MMHG | RESPIRATION RATE: 20 BRPM | HEART RATE: 108 BPM | WEIGHT: 163 LBS

## 2018-04-18 DIAGNOSIS — F11.23 OPIOID DEPENDENCE WITH WITHDRAWAL (H): ICD-10-CM

## 2018-04-18 DIAGNOSIS — G89.29 CHRONIC BILATERAL LOW BACK PAIN WITHOUT SCIATICA: Primary | ICD-10-CM

## 2018-04-18 DIAGNOSIS — F41.1 GENERALIZED ANXIETY DISORDER: ICD-10-CM

## 2018-04-18 DIAGNOSIS — M54.50 CHRONIC BILATERAL LOW BACK PAIN WITHOUT SCIATICA: Primary | ICD-10-CM

## 2018-04-18 PROCEDURE — 99214 OFFICE O/P EST MOD 30 MIN: CPT | Performed by: FAMILY MEDICINE

## 2018-04-18 RX ORDER — OXYCODONE HCL 40 MG/1
40 TABLET, FILM COATED, EXTENDED RELEASE ORAL EVERY 12 HOURS
Qty: 60 TABLET | Refills: 0 | Status: SHIPPED | OUTPATIENT
Start: 2018-04-18 | End: 2018-05-15

## 2018-04-18 RX ORDER — LORAZEPAM 1 MG/1
TABLET ORAL
Qty: 90 TABLET | Refills: 0 | Status: SHIPPED | OUTPATIENT
Start: 2018-04-18 | End: 2018-05-15

## 2018-04-18 RX ORDER — OXYCODONE AND ACETAMINOPHEN 10; 325 MG/1; MG/1
1 TABLET ORAL EVERY 6 HOURS PRN
Qty: 120 TABLET | Refills: 0 | Status: SHIPPED | OUTPATIENT
Start: 2018-04-18 | End: 2018-05-15

## 2018-04-18 ASSESSMENT — PATIENT HEALTH QUESTIONNAIRE - PHQ9: 5. POOR APPETITE OR OVEREATING: SEVERAL DAYS

## 2018-04-18 ASSESSMENT — ANXIETY QUESTIONNAIRES
1. FEELING NERVOUS, ANXIOUS, OR ON EDGE: SEVERAL DAYS
GAD7 TOTAL SCORE: 7
2. NOT BEING ABLE TO STOP OR CONTROL WORRYING: SEVERAL DAYS
7. FEELING AFRAID AS IF SOMETHING AWFUL MIGHT HAPPEN: SEVERAL DAYS
3. WORRYING TOO MUCH ABOUT DIFFERENT THINGS: SEVERAL DAYS
6. BECOMING EASILY ANNOYED OR IRRITABLE: SEVERAL DAYS
5. BEING SO RESTLESS THAT IT IS HARD TO SIT STILL: SEVERAL DAYS

## 2018-04-18 NOTE — PROGRESS NOTES
"  SUBJECTIVE:   Ria Nj is a 42 year old female who presents to clinic today for the following health issues:      Medication Followup of Oxycodone    Taking Medication as prescribed: yes    Side Effects:  None    Medication Helping Symptoms:  yes            Chronic bilateral low back pain without sciatica and   Generalized anxiety disorder and  opiod withdrawal. :  Missed follow up with me/wrote appointment down wrong and felt was on same day as re-fill date.  Very apologetic today.  Frustrated no one could help her. Bad timing with me being out of town. States knows was very upset and she is apologetic for \"how she acted.\"  But was just fearful of being in pain.     Did  Have psychology appointment set up via [facility] ([facility]) .  Missed that yesterday/re-scheduled due to pain, withdrawal symptoms now of shakes, sweats, slight diarrhea.     No longer taking venlafaxine as never helped.  Multiple side effects to multiple anxiety prevention medications in the past. Reports trying again cymbalta, venlafaxine, others can't remember.         Problem list, Medication list, Allergies, and Medical/Social/Surgical histories reviewed in [facility] and updated as appropriate.  Labs reviewed in EPIC  BP Readings from Last 3 Encounters:   04/18/18 137/86   04/16/18 105/63   04/13/18 146/60    Wt Readings from Last 3 Encounters:   04/18/18 163 lb (73.9 kg)   04/16/18 152 lb (68.9 kg)   04/13/18 164 lb (74.4 kg)                  Patient Active Problem List   Diagnosis     CARDIOVASCULAR SCREENING; LDL GOAL LESS THAN 160     Lumbar disc herniation with myelopathy     Chronic low back pain     Bee sting-induced anaphylaxis     Generalized anxiety disorder     Tobacco use disorder     Moderate major depression (H)     Continuous opioid dependence (H)     Cervicalgia     Past Surgical History:   Procedure Laterality Date     OVARY SURGERY N/A 2004    Ovarian cyst removed.      SURGICAL HISTORY OF -   2007    ovarian cyst removal  "       Social History   Substance Use Topics     Smoking status: Current Every Day Smoker     Packs/day: 2.00     Years: 15.00     Types: Cigarettes     Smokeless tobacco: Never Used     Alcohol use No     Family History   Problem Relation Age of Onset     CANCER Maternal Grandmother      CANCER Maternal Grandfather      CANCER Paternal Grandmother      CANCER Paternal Grandfather          Current Outpatient Prescriptions   Medication Sig Dispense Refill     albuterol (2.5 MG/3ML) 0.083% neb solution Take 1 vial (2.5 mg) by nebulization every 4 hours as needed for shortness of breath / dyspnea or wheezing 30 vial 11     albuterol (PROAIR HFA, PROVENTIL HFA, VENTOLIN HFA) 108 (90 BASE) MCG/ACT inhaler Inhale 2 puffs into the lungs every 4 hours as needed for shortness of breath / dyspnea or wheezing 1 Inhaler 0     calcium carbonate 500 MG tablet Take 1 tablet by mouth 2 times daily. 100 tablet 3     Cholecalciferol (VITAMIN D) 2000 UNITS tablet Take 2,000 Units by mouth daily 100 tablet 3     cloNIDine (CATAPRES) 0.1 MG tablet Take 1 tablet (0.1 mg) by mouth 2 times daily 4 tablet 0     EPINEPHrine (EPIPEN) 0.3 MG/0.3ML injection Inject 0.3 mLs (0.3 mg) into the muscle once as needed for anaphylaxis 2 each 1     gabapentin (NEURONTIN) 300 MG capsule Take 2 capsules (600 mg) by mouth 3 times daily 180 capsule 11     IBUPROFEN 200 MG OR TABS Pt is taking 4 TABLET EVERY 4 TO 6 HOURS AS NEEDED       LORazepam (ATIVAN) 1 MG tablet TAKE 1 TABLET BY MOUTH EVERY 8 HOURS AS NEEDED FOR ANXIETY 90 tablet 0     Magnesium 250 MG tablet Take 1 tablet by mouth daily. 100 tablet 3     naloxone (NARCAN) nasal spray Spray 1 spray (4 mg) into one nostril alternating nostrils as needed for opioid reversal every 2-3 minutes until assistance arrives 0.2 mL 0     nicotine (NICODERM CQ) 21 MG/24HR 24 hr patch Place 1 patch onto the skin every 24 hours 30 patch 0     ondansetron (ZOFRAN ODT) 4 MG ODT tab Take 1 tablet (4 mg) by mouth every 8  hours as needed for nausea 6 tablet 0     oxyCODONE (OXYCONTIN) 40 MG 12 hr tablet Take 1 tablet (40 mg) by mouth every 12 hours 60 tablet 0     oxyCODONE-acetaminophen (PERCOCET)  MG per tablet Take 1 tablet by mouth every 6 hours as needed for pain Will approve up to 4 tablets a day. 120 tablet 0     tiZANidine (ZANAFLEX) 4 MG tablet TAKE ONE TABLET BY MOUTH THREE TIMES DAILY AS NEEDED FOR SHOULDER AND UPPER BACK PAIN 270 tablet 0     venlafaxine (EFFEXOR-ER) 75 MG TB24 24 hr tablet Take 1 tablet (75 mg) by mouth daily Start after the 37.5 mg pills completed. 30 tablet 0     venlafaxine (EFFEXOR-XR) 37.5 MG 24 hr capsule Take 1 capsule daily for 7 days 7 capsule 0     Allergies   Allergen Reactions     Bee Venom      Recent Labs   Lab Test  10/17/17   1122   ALT  16   CR  0.80   GFRESTIMATED  79   GFRESTBLACK  >90   POTASSIUM  4.2        ROS:  Constitutional, HEENT, cardiovascular, pulmonary, GI, , musculoskeletal, neuro, skin, endocrine and psych systems are negative, except as otherwise noted.        OBJECTIVE:  /86  Pulse 108  Temp 98.1  F (36.7  C) (Oral)  Resp 20  Wt 163 lb (73.9 kg)  SpO2 100%  BMI 24.78 kg/m2    EXAM:  GENERAL APPEARANCE: healthy, alert and no distress  Increased pain behaviors today with rocking back and forth in chair.  Holding stomach.   Slightly diaphoretic.   Clear speech.  Follows conversation well.       ASSESSMENT AND PLAN  Patient Instructions   ASSESSMENT AND PLAN  1. Chronic bilateral low back pain without sciatica/opiod withdrawal.   Has psychology appointment now via [facility] April 27, has scheduling letter with her.     Refilled currently contracted opiod medication of oxycontin 40mg twice daily, percocet 10mg four times daily .  This is 180MME in high risk cdc risk zone and also with benzo use with increased risk.  Has narcan already.     Withdrawal symptoms today including increased pain, nausea, sweats, loose stool.     No change to dose today.  Will plan to  decrease percocet to three times daily next month.     Follow up with me May 16th at noon for next med check.     Reviewed/signed controlled substance contract again.  For some reason the one we did was not scanned.       - oxyCODONE (OXYCONTIN) 40 MG 12 hr tablet; Take 1 tablet (40 mg) by mouth every 12 hours  Dispense: 60 tablet; Refill: 0  - oxyCODONE-acetaminophen (PERCOCET)  MG per tablet; Take 1 tablet by mouth every 6 hours as needed for pain Will approve up to 4 tablets a day.  Dispense: 120 tablet; Refill: 0    2. Generalized anxiety disorder  Refilled one month supply.  Again, psychology appointment made with goal of decreasing need for lorazepam as this increases overdose risk.     Plans to follow up with Aleah Freire for [employer] testing to help guide therapy.  I am happy with this plan and not seeing a psychiatrist for now since has the psychotherapy appointment set up.     My goals for psychotherapy is to improve self management of chronic pain and anxiety/panic attack to lessen need for lorazepam.     Will help make follow up with Aleah freire today as well.         - LORazepam (ATIVAN) 1 MG tablet; TAKE 1 TABLET BY MOUTH EVERY 8 HOURS AS NEEDED FOR ANXIETY  Dispense: 90 tablet; Refill: 0      Joel Wegener,MD

## 2018-04-18 NOTE — PATIENT INSTRUCTIONS
"ASSESSMENT AND PLAN  1. Chronic bilateral low back pain without sciatica  Has psychology appointment now via [facility] April 27, has scheduling letter with her.     Refilled currently contracted opiod medication.      Withdrawal symptoms today including increased pain, nausea, sweats, loose stool.     No change to dose today.  Will plan to decrease percocet to three times daily next month.     Follow up with me May 16th at noon for next med check.     Reviewed/signed controlled substance contract again.  For some reason the one we did was not scanned.       - oxyCODONE (OXYCONTIN) 40 MG 12 hr tablet; Take 1 tablet (40 mg) by mouth every 12 hours  Dispense: 60 tablet; Refill: 0  - oxyCODONE-acetaminophen (PERCOCET)  MG per tablet; Take 1 tablet by mouth every 6 hours as needed for pain Will approve up to 4 tablets a day.  Dispense: 120 tablet; Refill: 0    2. Generalized anxiety disorder  Refilled one month supply.  Again, psychology appointment made with goal of decreasing need for lorazepam as this increases overdose risk.     Plans to follow up with Aleah Freire for [employer] testing to help guide therapy.  I am happy with this plan and not seeing a psychiatrist for now since has the psychotherapy appointment set up.     My goals for psychotherapy is to improve self management of chronic pain and anxiety/panic attack to lessen need for lorazepam.     Will help make follow up with Aleah freire today as well.         - LORazepam (ATIVAN) 1 MG tablet; TAKE 1 TABLET BY MOUTH EVERY 8 HOURS AS NEEDED FOR ANXIETY  Dispense: 90 tablet; Refill: 0        MYCHART FOR ON-LINE CARE(VISITS), LABS, REFILLS, MESSAGING, ETC [email or web address] , [phone]    E-VISIT: click \"on-line care, then request e-visit\".  E-visits work well for following up on issues we have discussed in clinic previously which may need new prescriptions, new prescriptions or substantial discussion. These are always done by me (Dr. Wegener). "     ONCARE VISIT:  [email or web address]  - we treat nearly 50 common conditions through on-care.  These are done in an hour by on-call staff.     RADIOLOGY:  [facility]:  [phone]   [facility] [place]: [phone]    Mammogram and Colonoscopy [phone]    Smoking Cessation: www.quitplan.org, 4-606-213-PLAN (5939)      CONSUMER PRICE LINE for estimates of test costs:  [phone]

## 2018-04-18 NOTE — MR AVS SNAPSHOT
After Visit Summary   4/18/2018    Ria Nj    MRN: [id number]           Patient Information     Date Of Birth          1975        Visit Information        Provider Department      4/18/2018 11:20 AM Wegener, Joel Daniel Irwin, MD [facility]        Today's Diagnoses     Chronic bilateral low back pain without sciatica    -  1    Generalized anxiety disorder          Care Instructions    ASSESSMENT AND PLAN  1. Chronic bilateral low back pain without sciatica  Has psychology appointment now via [facility] April 27, has scheduling letter with her.     Refilled currently contracted opiod medication.      Withdrawal symptoms today including increased pain, nausea, sweats, loose stool.     No change to dose today.  Will plan to decrease percocet to three times daily next month.     Follow up with me May 16th at noon for next med check.     Reviewed/signed controlled substance contract again.  For some reason the one we did was not scanned.       - oxyCODONE (OXYCONTIN) 40 MG 12 hr tablet; Take 1 tablet (40 mg) by mouth every 12 hours  Dispense: 60 tablet; Refill: 0  - oxyCODONE-acetaminophen (PERCOCET)  MG per tablet; Take 1 tablet by mouth every 6 hours as needed for pain Will approve up to 4 tablets a day.  Dispense: 120 tablet; Refill: 0    2. Generalized anxiety disorder  Refilled one month supply.  Again, psychology appointment made with goal of decreasing need for lorazepam as this increases overdose risk.     Plans to follow up with Aleah Freire for [employer] testing to help guide therapy.  I am happy with this plan and not seeing a psychiatrist for now since has the psychotherapy appointment set up.     My goals for psychotherapy is to improve self management of chronic pain and anxiety/panic attack to lessen need for lorazepam.     Will help make follow up with Aleah freire today as well.         - LORazepam (ATIVAN) 1 MG tablet; TAKE 1 TABLET BY MOUTH EVERY 8 HOURS AS  "NEEDED FOR ANXIETY  Dispense: 90 tablet; Refill: 0        [employer] FOR ON-LINE CARE(VISITS), LABS, REFILLS, MESSAGING, ETC [email or web address] , [phone]    E-VISIT: click \"on-line care, then request e-visit\".  E-visits work well for following up on issues we have discussed in clinic previously which may need new prescriptions, new prescriptions or substantial discussion. These are always done by me (Dr. Wegener).     ONCARE VISIT:  [email or web address]  - we treat nearly 50 common conditions through on-care.  These are done in an hour by on-call staff.     RADIOLOGY:  [facility]:  [phone]   [facility]: [phone]    Mammogram and Colonoscopy [phone]    Smoking Cessation: www.quitplan.org, 5-941-168-PLAN (6456)      CONSUMER PRICE LINE for estimates of test costs:  [phone]               Follow-ups after your visit        Who to contact     If you have questions or need follow up information about today's clinic visit or your schedule please contact [facility] directly at [phone].  Normal or non-critical lab and imaging results will be communicated to you by MyChart, letter or phone within 4 business days after the clinic has received the results. If you do not hear from us within 7 days, please contact the clinic through MyChart or phone. If you have a critical or abnormal lab result, we will notify you by phone as soon as possible.  Submit refill requests through [email or web address] or call your pharmacy and they will forward the refill request to us. Please allow 3 business days for your refill to be completed.          Additional Information About Your Visit        Care EveryWhere ID     This is your Care EveryWhere ID. This could be used by other organizations to access your [place] medical records  [id number]        Your Vitals Were     Pulse Temperature Respirations Pulse Oximetry BMI (Body Mass Index)       108 98.1  F (36.7  C) (Oral) 20 " 100% 24.78 kg/m2        Blood Pressure from Last 3 Encounters:   04/18/18 137/86   04/16/18 105/63   04/13/18 146/60    Weight from Last 3 Encounters:   04/18/18 163 lb (73.9 kg)   04/16/18 152 lb (68.9 kg)   04/13/18 164 lb (74.4 kg)              Today, you had the following     No orders found for display         Today's Medication Changes          These changes are accurate as of 4/18/18 11:50 AM.  If you have any questions, ask your nurse or doctor.               These medicines have changed or have updated prescriptions.        Dose/Directions    LORazepam 1 MG tablet   Commonly known as:  ATIVAN   This may have changed:  additional instructions   Used for:  Generalized anxiety disorder   Changed by:  Wegener, Joel Daniel Irwin, MD        TAKE 1 TABLET BY MOUTH EVERY 8 HOURS AS NEEDED FOR ANXIETY   Quantity:  90 tablet   Refills:  0            Where to get your medicines      Some of these will need a paper prescription and others can be bought over the counter.  Ask your nurse if you have questions.     Bring a paper prescription for each of these medications     LORazepam 1 MG tablet    oxyCODONE 40 MG 12 hr tablet    oxyCODONE-acetaminophen  MG per tablet               Information about OPIOIDS     PRESCRIPTION OPIOIDS: WHAT YOU NEED TO KNOW   You have a prescription for an opioid (narcotic) pain medicine. Opioids can cause addiction. If you have a history of chemical dependency of any type, you are at a higher risk of becoming addicted to opioids. Only take this medicine after all other options have been tried. Take it for as short a time and as few doses as possible.     Do not:    Drive. If you drive while taking these medicines, you could be arrested for driving under the influence (DUI).    Operate heavy machinery    Do any other dangerous activities while taking these medicines.     Drink any alcohol while taking these medicines.      Take with any other medicines that contain acetaminophen. Read  all labels carefully. Look for the word  acetaminophen  or  Tylenol.  Ask your pharmacist if you have questions or are unsure.    Store your pills in a secure place, locked if possible. We will not replace any lost or stolen medicine. If you don t finish your medicine, please throw away (dispose) as directed by your pharmacist. The [place] Pollution Control Agency has more information about safe disposal: https://www.pca.[facility].[place].us/living-green/managing-unwanted-medications    All opioids tend to cause constipation. Drink plenty of water and eat foods that have a lot of fiber, such as fruits, vegetables, prune juice, apple juice and high-fiber cereal. Take a laxative (Miralax, milk of magnesia, Colace, Senna) if you don t move your bowels at least every other day.          Primary Care Provider Office Phone # Fax #    Joel Daniel Irwin Wegener, [phone] [address] [address]        Equal Access to Services     MONO WATERS : Hadii aad ku hadasho Soomaali, waaxda luqadaha, qaybta kaalmada adeegyada, waxay idiin hayaan adekatya bowling . So [facility] [phone].    ATENCIÓN: Si habla español, tiene a freire disposición servicios gratuitos de asistencia lingüística. Jono al [phone].    We comply with applicable federal civil rights laws and [place] laws. We do not discriminate on the basis of race, color, national origin, age, disability, sex, sexual orientation, or gender identity.            Thank you!     Thank you for choosing [facility]  for your care. Our goal is always to provide you with excellent care. Hearing back from our patients is one way we can continue to improve our services. Please take a few minutes to complete the written survey that you may receive in the mail after your visit with us. Thank you!             Your Updated Medication List - Protect others around you: Learn how to safely use, store and throw away your medicines at  www.disposemymeds.org.          This list is accurate as of 4/18/18 11:50 AM.  Always use your most recent med list.                   Brand Name Dispense Instructions for use Diagnosis    * albuterol 108 (90 Base) MCG/ACT Inhaler    PROAIR HFA/PROVENTIL HFA/VENTOLIN HFA    1 Inhaler    Inhale 2 puffs into the lungs every 4 hours as needed for shortness of breath / dyspnea or wheezing    Acute bronchospasm       * albuterol (2.5 MG/3ML) 0.083% neb solution     30 vial    Take 1 vial (2.5 mg) by nebulization every 4 hours as needed for shortness of breath / dyspnea or wheezing    Acute bronchospasm       calcium carbonate 1250 MG tablet    OS-ADOLFO 500 mg [place]    100 tablet    Take 1 tablet by mouth 2 times daily.    Routine general medical examination at a health care facility       cloNIDine 0.1 MG tablet    CATAPRES    4 tablet    Take 1 tablet (0.1 mg) by mouth 2 times daily        EPINEPHrine 0.3 MG/0.3ML injection 2-pack    EPIPEN/ADRENACLICK/or ANY BX GENERIC EQUIV    2 each    Inject 0.3 mLs (0.3 mg) into the muscle once as needed for anaphylaxis    Bee sting-induced anaphylaxis, accidental or unintentional, subsequent encounter       gabapentin 300 MG capsule    NEURONTIN    180 capsule    Take 2 capsules (600 mg) by mouth 3 times daily    Chronic bilateral low back pain without sciatica       ibuprofen 200 MG tablet    ADVIL/MOTRIN     Pt is taking 4 TABLET EVERY 4 TO 6 HOURS AS NEEDED        LORazepam 1 MG tablet    ATIVAN    90 tablet    TAKE 1 TABLET BY MOUTH EVERY 8 HOURS AS NEEDED FOR ANXIETY    Generalized anxiety disorder       magnesium 250 MG tablet     100 tablet    Take 1 tablet by mouth daily.    Routine general medical examination at a health care facility       naloxone nasal spray    NARCAN    0.2 mL    Spray 1 spray (4 mg) into one nostril alternating nostrils as needed for opioid reversal every 2-3 minutes until assistance arrives    Continuous opioid dependence (H)       nicotine 21  MG/24HR 24 hr patch    NICODERM CQ    30 patch    Place 1 patch onto the skin every 24 hours    Tobacco use disorder       ondansetron 4 MG ODT tab    ZOFRAN ODT    6 tablet    Take 1 tablet (4 mg) by mouth every 8 hours as needed for nausea        oxyCODONE 40 MG 12 hr tablet    OxyCONTIN    60 tablet    Take 1 tablet (40 mg) by mouth every 12 hours    Chronic bilateral low back pain without sciatica       oxyCODONE-acetaminophen  MG per tablet    PERCOCET    120 tablet    Take 1 tablet by mouth every 6 hours as needed for pain Will approve up to 4 tablets a day.    Chronic bilateral low back pain without sciatica       tiZANidine 4 MG tablet    ZANAFLEX    270 tablet    TAKE ONE TABLET BY MOUTH THREE TIMES DAILY AS NEEDED FOR SHOULDER AND UPPER BACK PAIN    Chronic bilateral low back pain without sciatica       * venlafaxine 37.5 MG 24 hr capsule    EFFEXOR-XR    7 capsule    Take 1 capsule daily for 7 days    Generalized anxiety disorder, Moderate major depression (H)       * venlafaxine 75 MG Tb24 24 hr tablet    EFFEXOR-ER    30 tablet    Take 1 tablet (75 mg) by mouth daily Start after the 37.5 mg pills completed.    Generalized anxiety disorder, Moderate major depression (H)       vitamin D 2000 units tablet     100 tablet    Take 2,000 Units by mouth daily    Major depressive disorder, recurrent episode, moderate (H)       * Notice:  This list has 4 medication(s) that are the same as other medications prescribed for you. Read the directions carefully, and ask your doctor or other care provider to review them with you.

## 2018-04-18 NOTE — LETTER
[facility] [place]    04/18/18    Patient: Ria Nj  YOB: 1975  Medical Record Number: [id number]                                                                  Controlled Substance Agreement  I understand that my care provider has prescribed controlled substances (narcotics, tranquilizers, and/or stimulants) to help manage my condition(s).  I am taking this medicine to help me function or work.  I know that this is strong medicine.  It could have serious side effects and even cause a dependency on the drug.  If I stop these medicines suddenly, I could have severe withdrawal symptoms.    The risks, benefits, and side effects of these medication(s) were explained to me.  I agree that:  1. I will take part in other treatments as advised by my provider.  This may be psychiatry or counseling, physical therapy, behavioral therapy, group treatment, or a referral to a pain clinic.  I will reduce or stop my medicine when my provider tells me to do so.   2. I will take my medicines as prescribed.  I will not change the dose or schedule unless my provider tells me to.  There will be no refills if I  run out early.   I may be contacted at any time without warning and asked to complete a drug test or pill count.   3. I will keep all my appointments at the clinic.  If I miss appointments or fail to follow instructions, my provider may stop my medicine.  4. I will not ask other providers to prescribe controlled substances. And I will not accept controlled substances from other people. If I need another prescribed controlled substance for a new reason, I will notify my provider within one business day.  5. If I enroll in the [place] Medical Marijuana program, I will tell my provider.  I will also sign an agreement to share my medical records with my provider.  6. I will use one pharmacy to fill all of my controlled substance prescriptions.  If my prescription is mailed to my pharmacy, it may take 5  to 7 days for my medicine to be ready.  7. I understand that my provider, clinic care team, and pharmacy can track controlled substance prescriptions from other providers through a central database (prescription monitoring program).  8. I will bring in my list of medications (or my medicine bottles) each time I come to the clinic.  REV- 04/2016                                                                                                                                            Page 1 of 2      [facility]    04/18/18    Patient: Ria Nj  YOB: 1975  Medical Record Number: [id number]    9. Refills of controlled substances will be made only during office hours.  It is up to me to make sure that I do not run out of my medicines on weekends or holidays.    10. I am responsible for my prescriptions.  If the medicine is lost or stolen, it will not be replaced.   I also agree not to share these medicines with anyone.  11. I agree to not use ANY illegal or recreational drugs.  This includes marijuana, cocaine, bath salts or other drugs.  I agree not to use alcohol unless my provider says I may.  I agree to give urine samples whenever asked.  If I fail to give a urine sample, the provider may stop my medicine.     12. I will tell my nurse or provider right away if I become pregnant or have a new medical problem treated outside of [facility].  13. I understand that this medicine can affect my thinking and judgment.  It may be unsafe for me to drive, use machinery and do dangerous tasks.  I will not do any of these things until I know how the medicine affects me.  If my dose changes, I will wait to see how it affects me.  I will contact my provider if I have concerns about medicine side effects.  I understand that if I do not follow any of the conditions above, my prescriptions or treatment may be stopped.    I agree that my provider, clinic care team, and pharmacy may work with any  city, state or federal law enforcement agency that investigates the misuse, sale, or other diversion of my controlled medicine. I will allow my provider to discuss my care with or share a copy of this agreement with any other treating provider, pharmacy or emergency room where I receive care.  I agree to give up (waive) any right of privacy or confidentiality with respect to these authorizations.   I have read this agreement and have asked questions about anything I did not understand.   ___________________________________    ___________________________  Patient Signature                                                           Date and Time  ___________________________________     ____________________________  Witness                                                                            Date and Time  ___________________________________  Joel Daniel Wegener, MD  REV-  04/2016                                                                                                                                                                 Page 2 of 2

## 2018-04-19 ENCOUNTER — TELEPHONE (OUTPATIENT)
Dept: FAMILY MEDICINE | Facility: CLINIC | Age: 43
End: 2018-04-19

## 2018-04-19 ASSESSMENT — PATIENT HEALTH QUESTIONNAIRE - PHQ9: SUM OF ALL RESPONSES TO PHQ QUESTIONS 1-9: 9

## 2018-04-19 ASSESSMENT — ANXIETY QUESTIONNAIRES: GAD7 TOTAL SCORE: 7

## 2018-04-19 NOTE — TELEPHONE ENCOUNTER
Form given to TC from provider to mail copy of form to patient and copy to be abstracted  nonformulary medication alert change request [employer]

## 2018-04-30 DIAGNOSIS — F41.1 GENERALIZED ANXIETY DISORDER: ICD-10-CM

## 2018-04-30 DIAGNOSIS — M54.50 CHRONIC BILATERAL LOW BACK PAIN WITHOUT SCIATICA: ICD-10-CM

## 2018-04-30 DIAGNOSIS — G89.29 CHRONIC BILATERAL LOW BACK PAIN WITHOUT SCIATICA: ICD-10-CM

## 2018-04-30 RX ORDER — GABAPENTIN 300 MG/1
600 CAPSULE ORAL 3 TIMES DAILY
Qty: 180 CAPSULE | Refills: 11 | Status: CANCELLED | OUTPATIENT
Start: 2018-04-30

## 2018-04-30 RX ORDER — LORAZEPAM 1 MG/1
TABLET ORAL
Qty: 90 TABLET | Refills: 0 | OUTPATIENT
Start: 2018-04-30

## 2018-04-30 NOTE — TELEPHONE ENCOUNTER
Routing refill request to provider for review/approval because:  Drug not on the [facility] refill protocol   Katlyn Latham RN

## 2018-04-30 NOTE — TELEPHONE ENCOUNTER
Pt called back.  She was not needing a refill on the lorazepam.  She does use the [facility] pharmacy now. She is not using the [facility] pharmacy.  Called [facility] to let them know. This was done.  DUC Beaulieu

## 2018-04-30 NOTE — TELEPHONE ENCOUNTER
Tried to call pt.  [facility] is full.  Pt will need to call us back when she sees the missed call.  DUC Beaulieu

## 2018-04-30 NOTE — TELEPHONE ENCOUNTER
LORAZEPAM 1MG TABLETS        Last Written Prescription Date:  4/18/2018  Last Fill Quantity: 90 tablet,   # refills: 0  Last Office Visit: 4/18/2018  Future Office visit:    Next 5 appointments (look out 90 days)     May 16, 2018 12:00 PM CDT   Office Visit with Joel Daniel Irwin Wegener, MD   [facility]    [address] [place] [id number] [phone]                   Routing refill request to provider for review/approval because:  Drug not on the [facility], [facility] or [facility] refill protocol or controlled substance

## 2018-04-30 NOTE — TELEPHONE ENCOUNTER
Call pt.     Please clarify, did she request this refill?     Received 90 pills on 04/18 at last visit at our pharmacy, is no longer using [facility].     This was one month supply per our contract.     Has been filling once a month for some time now.     Call [facility] and inform them after confirming with patient please.     Joel Wegener,MD

## 2018-04-30 NOTE — TELEPHONE ENCOUNTER
Requested Prescriptions   Pending Prescriptions Disp Refills     gabapentin (NEURONTIN) 300 MG capsule 180 capsule 11     Sig: Take 2 capsules (600 mg) by mouth 3 times daily    There is no refill protocol information for this order        gabapentin (NEURONTIN) 300 MG capsule      Last Written Prescription Date:  3/16/18  Last Fill Quantity: 180,   # refills: 11  Last Office Visit: 4/18/18  Future Office visit:    Next 5 appointments (look out 90 days)     May 16, 2018 12:00 PM CDT   Office Visit with Joel Daniel Irwin Wegener, MD   [facility]    [address] [place] [id number] [phone]                   Routing refill request to provider for review/approval because:  Drug not on the [facility], [facility] or [facility] refill protocol or controlled substance

## 2018-05-01 RX ORDER — LORAZEPAM 1 MG/1
TABLET ORAL
Qty: 90 TABLET | Refills: 0 | OUTPATIENT
Start: 2018-05-01

## 2018-05-01 NOTE — TELEPHONE ENCOUNTER
Call [facility] again (we called yesterday) [facility] and already filled at our pharmacy this month please stop sending refill requests.     Joel Wegener,MD

## 2018-05-02 NOTE — TELEPHONE ENCOUNTER
Call [facility].  She is no longer using their pharmacy.  Please have them discontinue any prescriptions they have for her.     Joel Wegener,MD

## 2018-05-15 ENCOUNTER — OFFICE VISIT (OUTPATIENT)
Dept: FAMILY MEDICINE | Facility: CLINIC | Age: 43
End: 2018-05-15
Payer: MEDICARE

## 2018-05-15 VITALS
HEART RATE: 99 BPM | HEIGHT: 68 IN | TEMPERATURE: 97.9 F | WEIGHT: 162 LBS | SYSTOLIC BLOOD PRESSURE: 142 MMHG | DIASTOLIC BLOOD PRESSURE: 93 MMHG | BODY MASS INDEX: 24.55 KG/M2 | OXYGEN SATURATION: 99 %

## 2018-05-15 DIAGNOSIS — G89.29 CHRONIC BILATERAL LOW BACK PAIN WITHOUT SCIATICA: Primary | ICD-10-CM

## 2018-05-15 DIAGNOSIS — F41.1 GENERALIZED ANXIETY DISORDER: ICD-10-CM

## 2018-05-15 DIAGNOSIS — M54.50 CHRONIC BILATERAL LOW BACK PAIN WITHOUT SCIATICA: Primary | ICD-10-CM

## 2018-05-15 PROCEDURE — 99214 OFFICE O/P EST MOD 30 MIN: CPT | Performed by: FAMILY MEDICINE

## 2018-05-15 RX ORDER — OXYCODONE AND ACETAMINOPHEN 10; 325 MG/1; MG/1
1 TABLET ORAL 3 TIMES DAILY
Qty: 90 TABLET | Refills: 0 | Status: SHIPPED | OUTPATIENT
Start: 2018-05-15 | End: 2018-06-12

## 2018-05-15 RX ORDER — OXYCODONE HCL 40 MG/1
40 TABLET, FILM COATED, EXTENDED RELEASE ORAL EVERY 12 HOURS
Qty: 60 TABLET | Refills: 0 | Status: SHIPPED | OUTPATIENT
Start: 2018-05-15 | End: 2018-06-12

## 2018-05-15 RX ORDER — LORAZEPAM 1 MG/1
TABLET ORAL
Qty: 90 TABLET | Refills: 0 | Status: SHIPPED | OUTPATIENT
Start: 2018-05-15 | End: 2018-06-12

## 2018-05-15 NOTE — PATIENT INSTRUCTIONS
"ASSESSMENT AND PLAN  1. Chronic bilateral low back pain without sciatica  Continuing slow wean.  Continue oxycontin 40mg twice daily     Decrease percocet to three times daily from four times daily.       - oxyCODONE (OXYCONTIN) 40 MG 12 hr tablet; Take 1 tablet (40 mg) by mouth every 12 hours  Dispense: 60 tablet; Refill: 0  - oxyCODONE-acetaminophen (PERCOCET)  MG per tablet; Take 1 tablet by mouth 3 times daily Will approve up to 3 tablets a day.  Dispense: 90 tablet; Refill: 0    2. Generalized anxiety disorder  Did not fit well with the therapist she saw.  I recommend finding a new one then.     Will schedule [facility] testing for mental health medications and pain medications with Aleah Tani - did not schedule last time states did not know it was her responsibility.     Follow up one month.     Refilled lorazepam.     Goal is to be within CDC prescribing guidelines of less than 100 Mg morphine equivalent a day at least and reduce benzo use for safety and improve self-management through psychotherapy.     If unable to connect with psychotherapist we may need to make another effort to connect with our pain clinic for pain psychotherapy.       - LORazepam (ATIVAN) 1 MG tablet; TAKE 1 TABLET BY MOUTH EVERY 8 HOURS AS NEEDED FOR ANXIETY  Dispense: 90 tablet; Refill: 0        MYCHART FOR ON-LINE CARE(VISITS), LABS, REFILLS, MESSAGING, ETC [email or web address] , [phone]    E-VISIT: click \"on-line care, then request e-visit\".  E-visits work well for following up on issues we have discussed in clinic previously which may need new prescriptions, new prescriptions or substantial discussion. These are always done by me (Dr. Wegener).     ONCARE VISIT:  [email or web address]  - we treat nearly 50 common conditions through on-care.  These are done in an hour by on-call staff.     RADIOLOGY:  [facility]:  [phone]   [facility]: [phone]    Mammogram and Colonoscopy Scheduling:  " [phone]    Smoking Cessation: www.[email or web address], 5-987-858-PLAN ([phone])      CONSUMER PRICE LINE for estimates of test costs:  [phone]

## 2018-05-15 NOTE — MR AVS SNAPSHOT
"              After Visit Summary   5/15/2018    Ria Nj    MRN: [id number]           Patient Information     Date Of Birth          1975        Visit Information        Provider Department      5/15/2018 11:00 AM Wegener, Joel Daniel Irwin, MD [facility]        Today's Diagnoses     Chronic bilateral low back pain without sciatica    -  1    Generalized anxiety disorder          Care Instructions    ASSESSMENT AND PLAN  1. Chronic bilateral low back pain without sciatica  Continuing slow wean.  Continue oxycontin 40mg twice daily     Decrease percocet to three times daily from four times daily.       - oxyCODONE (OXYCONTIN) 40 MG 12 hr tablet; Take 1 tablet (40 mg) by mouth every 12 hours  Dispense: 60 tablet; Refill: 0  - oxyCODONE-acetaminophen (PERCOCET)  MG per tablet; Take 1 tablet by mouth 3 times daily Will approve up to 3 tablets a day.  Dispense: 90 tablet; Refill: 0    2. Generalized anxiety disorder  Did not fit well with the therapist she saw.  I recommend finding a new one then.     Will schedule [facility] testing for mental health medications and pain medications with Aleah Tani - did not schedule last time states did not know it was her responsibility.     Follow up one month.     Refilled lorazepam.     Goal is to be within CDC prescribing guidelines of less than 100 Mg morphine equivalent a day at least and reduce benzo use for safety and improve self-management through psychotherapy.     If unable to connect with psychotherapist we may need to make another effort to connect with our pain clinic for pain psychotherapy.       - LORazepam (ATIVAN) 1 MG tablet; TAKE 1 TABLET BY MOUTH EVERY 8 HOURS AS NEEDED FOR ANXIETY  Dispense: 90 tablet; Refill: 0        MYCHART FOR ON-LINE CARE(VISITS), LABS, REFILLS, MESSAGING, ETC [email or web address] , [phone]    E-VISIT: click \"on-line care, then request e-visit\".  E-visits work well for following up on " "issues we have discussed in clinic previously which may need new prescriptions, new prescriptions or substantial discussion. These are always done by me (Dr. Wegener).     ONCARE VISIT:  [email or web address]  - we treat nearly 50 common conditions through on-care.  These are done in an hour by on-call staff.     RADIOLOGY:  [facility]:  [phone]   [facility]: [phone]    Mammogram and Colonoscopy [phone]    Smoking Cessation: www.[email or web address], 6-302-493-PLAN ([phone])      CONSUMER PRICE LINE for estimates of test costs:  [phone]               Follow-ups after your visit        Who to contact     If you have questions or need follow up information about today's clinic visit or your schedule please contact [facility] directly at [phone].  Normal or non-critical lab and imaging results will be communicated to you by MyChart, letter or phone within 4 business days after the clinic has received the results. If you do not hear from us within 7 days, please contact the clinic through MyChart or phone. If you have a critical or abnormal lab result, we will notify you by phone as soon as possible.  Submit refill requests through [employer] or call your pharmacy and they will forward the refill request to us. Please allow 3 business days for your refill to be completed.          Additional Information About Your Visit        [employer] Information     [employer] lets you send messages to your doctor, view your test results, renew your prescriptions, schedule appointments and more. To sign up, go to [email or web address] . Click on \"Log in\" on the left side of the screen, which will take you to the Welcome page. Then click on \"Sign up Now\" on the right side of the page.     You will be asked to enter the access code listed below, as well as some personal information. Please follow the directions to create your username and password.     Your access code is: " "[email or web address]  Expires: 2018 11:38 AM     Your access code will  in 90 days. If you need help or a new code, please call your [facility] clinic or [phone].        Care EveryWhere ID     This is your Care EveryWhere ID. This could be used by other organizations to access your [facility] medical records  [id number]        Your Vitals Were     Pulse Temperature Height Pulse Oximetry BMI (Body Mass Index)       99 97.9  F (36.6  C) 5' 8\" (1.727 m) 99% 24.63 kg/m2        Blood Pressure from Last 3 Encounters:   05/15/18 (!) 142/93   18 137/86   18 105/63    Weight from Last 3 Encounters:   05/15/18 162 lb (73.5 kg)   18 163 lb (73.9 kg)   18 152 lb (68.9 kg)              Today, you had the following     No orders found for display         Today's Medication Changes          These changes are accurate as of 5/15/18 11:38 AM.  If you have any questions, ask your nurse or doctor.               These medicines have changed or have updated prescriptions.        Dose/Directions    oxyCODONE-acetaminophen  MG per tablet   Commonly known as:  PERCOCET   This may have changed:    - when to take this  - reasons to take this  - additional instructions   Used for:  Chronic bilateral low back pain without sciatica        Dose:  1 tablet   Take 1 tablet by mouth 3 times daily Will approve up to 3 tablets a day.   Quantity:  90 tablet   Refills:  0         Stop taking these medicines if you haven't already. Please contact your care team if you have questions.     venlafaxine 37.5 MG 24 hr capsule   Commonly known as:  EFFEXOR-XR           venlafaxine 75 MG Tb24 24 hr tablet   Commonly known as:  EFFEXOR-ER                Where to get your medicines      Some of these will need a paper prescription and others can be bought over the counter.  Ask your nurse if you have questions.     Bring a paper prescription for each of these medications     LORazepam 1 MG tablet    oxyCODONE 40 MG 12 hr tablet "    oxyCODONE-acetaminophen  MG per tablet               Information about OPIOIDS     PRESCRIPTION OPIOIDS: WHAT YOU NEED TO KNOW   You have a prescription for an opioid (narcotic) pain medicine. Opioids can cause addiction. If you have a history of chemical dependency of any type, you are at a higher risk of becoming addicted to opioids. Only take this medicine after all other options have been tried. Take it for as short a time and as few doses as possible.     Do not:    Drive. If you drive while taking these medicines, you could be arrested for driving under the influence (DUI).    Operate heavy machinery    Do any other dangerous activities while taking these medicines.     Drink any alcohol while taking these medicines.      Take with any other medicines that contain acetaminophen. Read all labels carefully. Look for the word  acetaminophen  or  Tylenol.  Ask your pharmacist if you have questions or are unsure.    Store your pills in a secure place, locked if possible. We will not replace any lost or stolen medicine. If you don t finish your medicine, please throw away (dispose) as directed by your pharmacist. The [place] Pollution Control Agency has more information about safe disposal: https://www.pca.[facility].[place].us/living-green/managing-unwanted-medications    All opioids tend to cause constipation. Drink plenty of water and eat foods that have a lot of fiber, such as fruits, vegetables, prune juice, apple juice and high-fiber cereal. Take a laxative (Miralax, milk of magnesia, Colace, Senna) if you don t move your bowels at least every other day.          Primary Care Provider Office Phone # Fax #    Joel Daniel Irwin Wegener, [phone] [address] [place] [id number]        Equal Access to Services     [facility] : Vladimir Moreno, wacarlene lufelecia, qaybtoy juarez. [facility] [phone].    ATENCIÓN: Si  roxana lan, tiene a freire disposición servicios gratuitos de asistencia lingüística. Jono ramirez [phone].    We comply with applicable federal civil rights laws and [place] laws. We do not discriminate on the basis of race, color, national origin, age, disability, sex, sexual orientation, or gender identity.            Thank you!     Thank you for choosing [facility]  for your care. Our goal is always to provide you with excellent care. Hearing back from our patients is one way we can continue to improve our services. Please take a few minutes to complete the written survey that you may receive in the mail after your visit with us. Thank you!             Your Updated Medication List - Protect others around you: Learn how to safely use, store and throw away your medicines at www.disposemymeds.org.          This list is accurate as of 5/15/18 11:38 AM.  Always use your most recent med list.                   Brand Name Dispense Instructions for use Diagnosis    * albuterol 108 (90 Base) MCG/ACT Inhaler    PROAIR HFA/PROVENTIL HFA/VENTOLIN HFA    1 Inhaler    Inhale 2 puffs into the lungs every 4 hours as needed for shortness of breath / dyspnea or wheezing    Acute bronchospasm       * albuterol (2.5 MG/3ML) 0.083% neb solution     30 vial    Take 1 vial (2.5 mg) by nebulization every 4 hours as needed for shortness of breath / dyspnea or wheezing    Acute bronchospasm       calcium carbonate 500 MG tablet    OS-ADOLFO 500 mg [place]. Ca    100 tablet    Take 1 tablet by mouth 2 times daily.    Routine general medical examination at a health care facility       cloNIDine 0.1 MG tablet    CATAPRES    4 tablet    Take 1 tablet (0.1 mg) by mouth 2 times daily        EPINEPHrine 0.3 MG/0.3ML injection 2-pack    EPIPEN/ADRENACLICK/or ANY BX GENERIC EQUIV    2 each    Inject 0.3 mLs (0.3 mg) into the muscle once as needed for anaphylaxis    Bee sting-induced anaphylaxis, accidental or unintentional, subsequent  encounter       gabapentin 300 MG capsule    NEURONTIN    180 capsule    Take 2 capsules (600 mg) by mouth 3 times daily    Chronic bilateral low back pain without sciatica       ibuprofen 200 MG tablet    ADVIL/MOTRIN     Pt is taking 4 TABLET EVERY 4 TO 6 HOURS AS NEEDED        LORazepam 1 MG tablet    ATIVAN    90 tablet    TAKE 1 TABLET BY MOUTH EVERY 8 HOURS AS NEEDED FOR ANXIETY    Generalized anxiety disorder       magnesium 250 MG tablet     100 tablet    Take 1 tablet by mouth daily.    Routine general medical examination at a health care facility       naloxone nasal spray    NARCAN    0.2 mL    Spray 1 spray (4 mg) into one nostril alternating nostrils as needed for opioid reversal every 2-3 minutes until assistance arrives    Continuous opioid dependence (H)       nicotine 21 MG/24HR 24 hr patch    NICODERM CQ    30 patch    Place 1 patch onto the skin every 24 hours    Tobacco use disorder       oxyCODONE 40 MG 12 hr tablet    OxyCONTIN    60 tablet    Take 1 tablet (40 mg) by mouth every 12 hours    Chronic bilateral low back pain without sciatica       oxyCODONE-acetaminophen  MG per tablet    PERCOCET    90 tablet    Take 1 tablet by mouth 3 times daily Will approve up to 3 tablets a day.    Chronic bilateral low back pain without sciatica       tiZANidine 4 MG tablet    ZANAFLEX    270 tablet    TAKE ONE TABLET BY MOUTH THREE TIMES DAILY AS NEEDED FOR SHOULDER AND UPPER BACK PAIN    Chronic bilateral low back pain without sciatica       vitamin D 2000 units tablet     100 tablet    Take 2,000 Units by mouth daily    Major depressive disorder, recurrent episode, moderate (H)       * Notice:  This list has 2 medication(s) that are the same as other medications prescribed for you. Read the directions carefully, and ask your doctor or other care provider to review them with you.

## 2018-05-15 NOTE — PROGRESS NOTES
"  SUBJECTIVE:   Ria Nj is a 42 year old female who presents to clinic today for the following health issues:      Medication Followup of     Taking Medication as prescribed: yes    Side Effects:  None    Medication Helping Symptoms:  It was until they got lower does             Chronic bilateral low back pain without sciatica: struggling with pain with weaning.  Doesn't know why has to go down.  Running out of pills early.    Generalized anxiety disorder :did not connect with her therapist. Said he was \"gothic\" and tried to connect with her she felt then states was swearing.      Did not schedule visit with saurabh Allison for genesight testing.         Problem list, Medication list, Allergies, and Medical/Social/Surgical histories reviewed in [facility] and updated as appropriate.  Labs reviewed in [facility]  BP Readings from Last 3 Encounters:   05/15/18 (!) 142/93   04/18/18 137/86   04/16/18 105/63    Wt Readings from Last 3 Encounters:   05/15/18 162 lb (73.5 kg)   04/18/18 163 lb (73.9 kg)   04/16/18 152 lb (68.9 kg)                  Patient Active Problem List   Diagnosis     CARDIOVASCULAR SCREENING; LDL GOAL LESS THAN 160     Lumbar disc herniation with myelopathy     Chronic low back pain     Bee sting-induced anaphylaxis     Generalized anxiety disorder     Tobacco use disorder     Moderate major depression (H)     Continuous opioid dependence (H)     Cervicalgia     Past Surgical History:   Procedure Laterality Date     OVARY SURGERY N/A 2004    Ovarian cyst removed.      SURGICAL HISTORY OF -   2007    ovarian cyst removal        Social History   Substance Use Topics     Smoking status: Current Every Day Smoker     Packs/day: 2.00     Years: 15.00     Types: Cigarettes     Smokeless tobacco: Never Used     Alcohol use No     Family History   Problem Relation Age of Onset     CANCER Maternal Grandmother      CANCER Maternal Grandfather      CANCER Paternal Grandmother      CANCER Paternal Grandfather      "     Current Outpatient Prescriptions   Medication Sig Dispense Refill     albuterol (2.5 MG/3ML) 0.083% neb solution Take 1 vial (2.5 mg) by nebulization every 4 hours as needed for shortness of breath / dyspnea or wheezing 30 vial 11     albuterol (PROAIR HFA, PROVENTIL HFA, VENTOLIN HFA) 108 (90 BASE) MCG/ACT inhaler Inhale 2 puffs into the lungs every 4 hours as needed for shortness of breath / dyspnea or wheezing 1 Inhaler 0     calcium carbonate 500 MG tablet Take 1 tablet by mouth 2 times daily. 100 tablet 3     Cholecalciferol (VITAMIN D) 2000 UNITS tablet Take 2,000 Units by mouth daily 100 tablet 3     cloNIDine (CATAPRES) 0.1 MG tablet Take 1 tablet (0.1 mg) by mouth 2 times daily 4 tablet 0     EPINEPHrine (EPIPEN) 0.3 MG/0.3ML injection Inject 0.3 mLs (0.3 mg) into the muscle once as needed for anaphylaxis 2 each 1     gabapentin (NEURONTIN) 300 MG capsule Take 2 capsules (600 mg) by mouth 3 times daily 180 capsule 11     IBUPROFEN 200 MG OR TABS Pt is taking 4 TABLET EVERY 4 TO 6 HOURS AS NEEDED       LORazepam (ATIVAN) 1 MG tablet TAKE 1 TABLET BY MOUTH EVERY 8 HOURS AS NEEDED FOR ANXIETY 90 tablet 0     Magnesium 250 MG tablet Take 1 tablet by mouth daily. 100 tablet 3     naloxone (NARCAN) nasal spray Spray 1 spray (4 mg) into one nostril alternating nostrils as needed for opioid reversal every 2-3 minutes until assistance arrives 0.2 mL 0     nicotine (NICODERM CQ) 21 MG/24HR 24 hr patch Place 1 patch onto the skin every 24 hours 30 patch 0     oxyCODONE (OXYCONTIN) 40 MG 12 hr tablet Take 1 tablet (40 mg) by mouth every 12 hours 60 tablet 0     oxyCODONE-acetaminophen (PERCOCET)  MG per tablet Take 1 tablet by mouth 3 times daily Will approve up to 3 tablets a day. 90 tablet 0     tiZANidine (ZANAFLEX) 4 MG tablet TAKE ONE TABLET BY MOUTH THREE TIMES DAILY AS NEEDED FOR SHOULDER AND UPPER BACK PAIN 270 tablet 0     Allergies   Allergen Reactions     Bee Venom      Recent Labs   Lab Test   "10/17/17   1122   ALT  16   CR  0.80   GFRESTIMATED  79   GFRESTBLACK  >90   POTASSIUM  4.2        ROS:  Constitutional, HEENT, cardiovascular, pulmonary, GI, , musculoskeletal, neuro, skin, endocrine and psych systems are negative, except as otherwise noted.        OBJECTIVE:  BP (!) 142/93  Pulse 99  Temp 97.9  F (36.6  C)  Ht 5' 8\" (1.727 m)  Wt 162 lb (73.5 kg)  SpO2 99%  BMI 24.63 kg/m2    EXAM:  GENERAL APPEARANCE: healthy, alert and no distress  Clear speech.     MENTAL STATUS EXAM  Appearance: appropriate  Attitude: cooperative  Behavior: normal  Eyre Contact: normal to decreased   Speech: normal  Orientation: oreinted to person , place, time and situation  Mood:  admits sadness and anxiety most of time  Affect: Mood Congruient  Thought Process: clear  Suicidal Ideation: reports no thoughts, no intention  Hallucination: no      ASSESSMENT AND PLAN  Patient Instructions   ASSESSMENT AND PLAN  1. Chronic bilateral low back pain without sciatica - uncontrolled.   Continuing slow wean.  Continue oxycontin 40mg twice daily     Decrease percocet to three times daily from four times daily.     [place] suspicious activity on [place].     180 Mg morphine daily equivalent plus benzos = well above high risk overdose cdc threshold.  Has narcan now filled.       - oxyCODONE (OXYCONTIN) 40 MG 12 hr tablet; Take 1 tablet (40 mg) by mouth every 12 hours  Dispense: 60 tablet; Refill: 0  - oxyCODONE-acetaminophen (PERCOCET)  MG per tablet; Take 1 tablet by mouth 3 times daily Will approve up to 3 tablets a day.  Dispense: 90 tablet; Refill: 0    2. Generalized anxiety disorder - uncontrolled.   Did not fit well with the therapist she saw.  I recommend finding a new one then.     Will schedule Genesight testing for mental health medications and pain medications with Aleah Tani - did not schedule last time states did not know it was her responsibility.     Follow up one month. "     Refilled lorazepam.     Goal is to be within CDC prescribing guidelines of less than 100 Mg morphine equivalent a day at least and reduce benzo use for safety and improve self-management through psychotherapy.     If unable to connect with psychotherapist we may need to make another effort to connect with our pain clinic for pain psychotherapy.       - LORazepam (ATIVAN) 1 MG tablet; TAKE 1 TABLET BY MOUTH EVERY 8 HOURS AS NEEDED FOR ANXIETY  Dispense: 90 tablet; Refill: 0      Joel Wegener,MD

## 2018-05-16 ENCOUNTER — TELEPHONE (OUTPATIENT)
Dept: FAMILY MEDICINE | Facility: CLINIC | Age: 43
End: 2018-05-16

## 2018-05-16 NOTE — TELEPHONE ENCOUNTER
This is a common problem with [facility] - it is an auto generated fax - we prescribe generic so pharmacy may dispense per patient formulary - we do not send over brand name only prescription orders - called pharmacy and patient has already picked up medication - made them aware of issue    Closing encounter - no further actions needed at this time    Heather Hercules RN

## 2018-05-16 NOTE — TELEPHONE ENCOUNTER
Oxycodone/acetaminophen 10-325mg- drug not covered by patient plan. The preferred alternative is Endocett

## 2018-06-04 NOTE — PROGRESS NOTES
SUBJECTIVE:   Ria Nj is a 42 year old female who presents to clinic today for the following health issues:      Genesight Testing         Here today at the request of her PCP Dr. Wegener for genesight testing.      Hx of chronic back pain, follows with PCP Dr. Wegener, she is on chronic opioids.    History of depression and anxiety.  Currently taking ativan.  PCP has tried to arrange psychology follow up, pt has not meshed with therapist or missed appointments.  Has been on multiple meds in the past:  - venlafexine (2017): didn't work  - cymbalta  - others cant remember.    Feels anxiety is controlled with lorazepam.  Patient reports her opioid dose has been decreased in the recent past and this has caused her pain to be worse.  She feels her pain and her mood would be better managed by increased dose opioids.  she is on disability due to chronic pain, the pain significantly limits her ability to function.  She has an 9yo son and is frustrated that her pain interferes with her ability to care for him.      Patient Active Problem List   Diagnosis     CARDIOVASCULAR SCREENING; LDL GOAL LESS THAN 160     Lumbar disc herniation with myelopathy     Chronic low back pain     Bee sting-induced anaphylaxis     Generalized anxiety disorder     Tobacco use disorder     Moderate major depression (H)     Continuous opioid dependence (H)     Cervicalgia     Past Surgical History:   Procedure Laterality Date     OVARY SURGERY N/A 2004    Ovarian cyst removed.      SURGICAL HISTORY OF -   2007    ovarian cyst removal        Social History   Substance Use Topics     Smoking status: Current Every Day Smoker     Packs/day: 2.00     Years: 15.00     Types: Cigarettes     Smokeless tobacco: Never Used     Alcohol use No     Family History   Problem Relation Age of Onset     CANCER Maternal Grandmother      CANCER Maternal Grandfather      CANCER Paternal Grandmother      CANCER Paternal Grandfather          Current  Outpatient Prescriptions   Medication Sig Dispense Refill     albuterol (2.5 MG/3ML) 0.083% neb solution Take 1 vial (2.5 mg) by nebulization every 4 hours as needed for shortness of breath / dyspnea or wheezing 30 vial 11     albuterol (PROAIR HFA, PROVENTIL HFA, VENTOLIN HFA) 108 (90 BASE) MCG/ACT inhaler Inhale 2 puffs into the lungs every 4 hours as needed for shortness of breath / dyspnea or wheezing 1 Inhaler 0     calcium carbonate 500 MG tablet Take 1 tablet by mouth 2 times daily. 100 tablet 3     Cholecalciferol (VITAMIN D) 2000 UNITS tablet Take 2,000 Units by mouth daily 100 tablet 3     cloNIDine (CATAPRES) 0.1 MG tablet Take 1 tablet (0.1 mg) by mouth 2 times daily 4 tablet 0     EPINEPHrine (EPIPEN) 0.3 MG/0.3ML injection Inject 0.3 mLs (0.3 mg) into the muscle once as needed for anaphylaxis 2 each 1     gabapentin (NEURONTIN) 300 MG capsule Take 2 capsules (600 mg) by mouth 3 times daily 180 capsule 11     IBUPROFEN 200 MG OR TABS Pt is taking 4 TABLET EVERY 4 TO 6 HOURS AS NEEDED       LORazepam (ATIVAN) 1 MG tablet TAKE 1 TABLET BY MOUTH EVERY 8 HOURS AS NEEDED FOR ANXIETY 90 tablet 0     Magnesium 250 MG tablet Take 1 tablet by mouth daily. 100 tablet 3     naloxone (NARCAN) nasal spray Spray 1 spray (4 mg) into one nostril alternating nostrils as needed for opioid reversal every 2-3 minutes until assistance arrives 0.2 mL 0     nicotine (NICODERM CQ) 21 MG/24HR 24 hr patch Place 1 patch onto the skin every 24 hours 30 patch 0     Omega-3 Fatty Acids (FISH OIL CONCENTRATE PO) Take 300 mg by mouth 3 times daily       oxyCODONE (OXYCONTIN) 40 MG 12 hr tablet Take 1 tablet (40 mg) by mouth every 12 hours 60 tablet 0     oxyCODONE-acetaminophen (PERCOCET)  MG per tablet Take 1 tablet by mouth 3 times daily Will approve up to 3 tablets a day. 90 tablet 0     tiZANidine (ZANAFLEX) 4 MG tablet TAKE ONE TABLET BY MOUTH THREE TIMES DAILY AS NEEDED FOR SHOULDER AND UPPER BACK PAIN 270 tablet 0        ROS:  MSK, Psych as above, otherwise negative       OBJECTIVE:                                                    /62 (BP Location: Left arm, Patient Position: Chair, Cuff Size: Adult Regular)  Pulse 103  Temp 99.1  F (37.3  C) (Oral)  Resp 12  Wt 162 lb (73.5 kg)  SpO2 98%  BMI 24.63 kg/m2   GENERAL APPEARANCE: healthy, alert and no distress  EYES: Eyes grossly normal to inspection and conjunctivae and sclerae normal  RESP: respirations nonlabored  PSYCH: mentation appears normal          ASSESSMENT/PLAN:                                                    (F32.1) Moderate major depression (H)  (primary encounter diagnosis)  (F41.1) Generalized anxiety disorder  Comment: has failed effexor, cymbalta, and likely others in the past.  She is only on benzo currently for anxiety and would benefit from controller   Plan: We discussed genesight testing and that it will help identify alterations in patient's metabolic process that may predispose her to medication side effects or make them less effective. We also discussed it will help determine serotonin sensitivity which will allow us to determine which medication class might e more effective for her. We discussed there are other factors that play a role in medication efficacy and these results will be used to guide therapy but can not guarantee results. We discussed the role of self care and CBT in management of depression in addition to medication. We discussed the cost of the testing. She verbalized understanding and wished to proceed with testing which was performed. She will plan to follow up with PCP in 2 weeks.      (M54.5,  G89.29) Chronic bilateral low back pain without sciatica  (F11.20) Continuous opioid dependence (H)  Comment: on chronic opioids which she states are currently ineffective for her pain  Plan: we reviewed the importance of a comprehensive approach to pain management including non-opioid options such as antidepressants which may  benefit her pain.  We will add analgesic panel to patients genesight testing, she is in agreement.      See Patient Instructions    Aleha Freire, [facility]    There are no Patient Instructions on file for this visit.

## 2018-06-11 ENCOUNTER — TRANSFERRED RECORDS (OUTPATIENT)
Dept: HEALTH INFORMATION MANAGEMENT | Facility: CLINIC | Age: 43
End: 2018-06-11

## 2018-06-11 ENCOUNTER — OFFICE VISIT (OUTPATIENT)
Dept: FAMILY MEDICINE | Facility: CLINIC | Age: 43
End: 2018-06-11
Payer: MEDICARE

## 2018-06-11 VITALS
OXYGEN SATURATION: 98 % | WEIGHT: 162 LBS | RESPIRATION RATE: 12 BRPM | SYSTOLIC BLOOD PRESSURE: 124 MMHG | DIASTOLIC BLOOD PRESSURE: 62 MMHG | HEART RATE: 103 BPM | TEMPERATURE: 99.1 F | BODY MASS INDEX: 24.63 KG/M2

## 2018-06-11 DIAGNOSIS — F32.1 MODERATE MAJOR DEPRESSION (H): Primary | ICD-10-CM

## 2018-06-11 DIAGNOSIS — F11.20 CONTINUOUS OPIOID DEPENDENCE (H): ICD-10-CM

## 2018-06-11 DIAGNOSIS — F41.1 GENERALIZED ANXIETY DISORDER: ICD-10-CM

## 2018-06-11 DIAGNOSIS — M54.50 CHRONIC BILATERAL LOW BACK PAIN WITHOUT SCIATICA: ICD-10-CM

## 2018-06-11 DIAGNOSIS — G89.29 CHRONIC BILATERAL LOW BACK PAIN WITHOUT SCIATICA: ICD-10-CM

## 2018-06-11 PROCEDURE — 99213 OFFICE O/P EST LOW 20 MIN: CPT | Performed by: NURSE PRACTITIONER

## 2018-06-11 NOTE — MR AVS SNAPSHOT
"              After Visit Summary   6/11/2018    Ria Nj    MRN: [id number]           Patient Information     Date Of Birth          1975        Visit Information        Provider Department      6/11/2018 2:20 PM Aleah Freire APRN CNP [facility]        Today's Diagnoses     Moderate major depression (H)    -  1    Generalized anxiety disorder        Chronic bilateral low back pain without sciatica        Continuous opioid dependence (H)           Follow-ups after your visit        Your next 10 appointments already scheduled     Jun 12, 2018  2:20 PM CDT   SHORT with Joel Daniel Irwin Wegener, MD   [facility] ([facility])    [address] [place] [id number] [phone]              Who to contact     If you have questions or need follow up information about today's clinic visit or your schedule please contact [facility] directly at [phone].  Normal or non-critical lab and imaging results will be communicated to you by MyChart, letter or phone within 4 business days after the clinic has received the results. If you do not hear from us within 7 days, please contact the clinic through [employer] or phone. If you have a critical or abnormal lab result, we will notify you by phone as soon as possible.  Submit refill requests through [employer] or call your pharmacy and they will forward the refill request to us. Please allow 3 business days for your refill to be completed.          Additional Information About Your Visit        [employer] Information     [employer] lets you send messages to your doctor, view your test results, renew your prescriptions, schedule appointments and more. To sign up, go to [email or web address] . Click on \"Log in\" on the left side of the screen, which will take you to the Welcome page. Then click on \"Sign up Now\" on the right side of the page.     You will be asked to enter the access code " listed below, as well as some personal information. Please follow the directions to create your username and password.     Your access code is: [email or web address]  Expires: 2018 11:38 AM     Your access code will  in 90 days. If you need help or a new code, please call your [place] clinic or [phone].        Care EveryWhere ID     This is your Care EveryWhere ID. This could be used by other organizations to access your [place] medical records  [id number]        Your Vitals Were     Pulse Temperature Respirations Pulse Oximetry BMI (Body Mass Index)       103 99.1  F (37.3  C) (Oral) 12 98% 24.63 kg/m2        Blood Pressure from Last 3 Encounters:   18 124/62   05/15/18 (!) 142/93   18 137/86    Weight from Last 3 Encounters:   18 162 lb (73.5 kg)   05/15/18 162 lb (73.5 kg)   18 163 lb (73.9 kg)              Today, you had the following     No orders found for display       Primary Care Provider Office Phone # Fax #    Joel Daniel Irwin Wegener, [phone] [address] [place] [address]        Equal Access to Services     MONO WATERS : Hadii kandy ku hadasho Soomaali, waaxda luqadaha, qaybta kaalmada adeegyada, waxay rashaunin haybooker miranda. So [facility] [phone].    ATENCIÓN: Si habla español, tiene a freire disposición servicios gratuitos de asistencia lingüística. Llame al [phone].    We comply with applicable federal civil rights laws and [place] laws. We do not discriminate on the basis of race, color, national origin, age, disability, sex, sexual orientation, or gender identity.            Thank you!     Thank you for choosing [facility]  for your care. Our goal is always to provide you with excellent care. Hearing back from our patients is one way we can continue to improve our services. Please take a few minutes to complete the written survey that you may receive in the mail after your visit with us. Thank you!              Your Updated Medication List - Protect others around you: Learn how to safely use, store and throw away your medicines at www.disposemymeds.org.          This list is accurate as of 6/11/18  3:11 PM.  Always use your most recent med list.                   Brand Name Dispense Instructions for use Diagnosis    * albuterol 108 (90 Base) MCG/ACT Inhaler    PROAIR HFA/PROVENTIL HFA/VENTOLIN HFA    1 Inhaler    Inhale 2 puffs into the lungs every 4 hours as needed for shortness of breath / dyspnea or wheezing    Acute bronchospasm       * albuterol (2.5 MG/3ML) 0.083% neb solution     30 vial    Take 1 vial (2.5 mg) by nebulization every 4 hours as needed for shortness of breath / dyspnea or wheezing    Acute bronchospasm       calcium carbonate 500 MG tablet    OS-ADOLFO 500 mg [place]. [place]    100 tablet    Take 1 tablet by mouth 2 times daily.    Routine general medical examination at a health care facility       cloNIDine 0.1 MG tablet    CATAPRES    4 tablet    Take 1 tablet (0.1 mg) by mouth 2 times daily        EPINEPHrine 0.3 MG/0.3ML injection 2-pack    EPIPEN/ADRENACLICK/or ANY BX GENERIC EQUIV    2 each    Inject 0.3 mLs (0.3 mg) into the muscle once as needed for anaphylaxis    Bee sting-induced anaphylaxis, accidental or unintentional, subsequent encounter       FISH OIL CONCENTRATE PO      Take 300 mg by mouth 3 times daily        gabapentin 300 MG capsule    NEURONTIN    180 capsule    Take 2 capsules (600 mg) by mouth 3 times daily    Chronic bilateral low back pain without sciatica       ibuprofen 200 MG tablet    ADVIL/MOTRIN     Pt is taking 4 TABLET EVERY 4 TO 6 HOURS AS NEEDED        LORazepam 1 MG tablet    ATIVAN    90 tablet    TAKE 1 TABLET BY MOUTH EVERY 8 HOURS AS NEEDED FOR ANXIETY    Generalized anxiety disorder       magnesium 250 MG tablet     100 tablet    Take 1 tablet by mouth daily.    Routine general medical examination at a health care facility       naloxone nasal spray    NARCAN     0.2 mL    Spray 1 spray (4 mg) into one nostril alternating nostrils as needed for opioid reversal every 2-3 minutes until assistance arrives    Continuous opioid dependence (H)       nicotine 21 MG/24HR 24 hr patch    NICODERM CQ    30 patch    Place 1 patch onto the skin every 24 hours    Tobacco use disorder       oxyCODONE 40 MG 12 hr tablet    OxyCONTIN    60 tablet    Take 1 tablet (40 mg) by mouth every 12 hours    Chronic bilateral low back pain without sciatica       oxyCODONE-acetaminophen  MG per tablet    PERCOCET    90 tablet    Take 1 tablet by mouth 3 times daily Will approve up to 3 tablets a day.    Chronic bilateral low back pain without sciatica       tiZANidine 4 MG tablet    ZANAFLEX    270 tablet    TAKE ONE TABLET BY MOUTH THREE TIMES DAILY AS NEEDED FOR SHOULDER AND UPPER BACK PAIN    Chronic bilateral low back pain without sciatica       vitamin D 2000 units tablet     100 tablet    Take 2,000 Units by mouth daily    Major depressive disorder, recurrent episode, moderate (H)       * Notice:  This list has 2 medication(s) that are the same as other medications prescribed for you. Read the directions carefully, and ask your doctor or other care provider to review them with you.

## 2018-06-12 ENCOUNTER — OFFICE VISIT (OUTPATIENT)
Dept: FAMILY MEDICINE | Facility: CLINIC | Age: 43
End: 2018-06-12
Payer: MEDICARE

## 2018-06-12 VITALS
DIASTOLIC BLOOD PRESSURE: 87 MMHG | SYSTOLIC BLOOD PRESSURE: 127 MMHG | BODY MASS INDEX: 24.4 KG/M2 | TEMPERATURE: 98.5 F | OXYGEN SATURATION: 98 % | HEART RATE: 102 BPM | HEIGHT: 68 IN | WEIGHT: 161 LBS

## 2018-06-12 DIAGNOSIS — M54.50 CHRONIC BILATERAL LOW BACK PAIN WITHOUT SCIATICA: Primary | ICD-10-CM

## 2018-06-12 DIAGNOSIS — G89.29 CHRONIC BILATERAL LOW BACK PAIN WITHOUT SCIATICA: Primary | ICD-10-CM

## 2018-06-12 DIAGNOSIS — G89.4 CHRONIC PAIN SYNDROME: ICD-10-CM

## 2018-06-12 DIAGNOSIS — F41.1 GENERALIZED ANXIETY DISORDER: ICD-10-CM

## 2018-06-12 DIAGNOSIS — F11.20 CONTINUOUS OPIOID DEPENDENCE (H): ICD-10-CM

## 2018-06-12 PROCEDURE — 99214 OFFICE O/P EST MOD 30 MIN: CPT | Performed by: FAMILY MEDICINE

## 2018-06-12 RX ORDER — LORAZEPAM 1 MG/1
TABLET ORAL
Qty: 90 TABLET | Refills: 2 | Status: SHIPPED | OUTPATIENT
Start: 2018-06-12 | End: 2018-06-19

## 2018-06-12 RX ORDER — OXYCODONE AND ACETAMINOPHEN 10; 325 MG/1; MG/1
1 TABLET ORAL 3 TIMES DAILY
Qty: 90 TABLET | Refills: 0 | Status: SHIPPED | OUTPATIENT
Start: 2018-06-15 | End: 2018-06-12

## 2018-06-12 RX ORDER — OXYCODONE AND ACETAMINOPHEN 10; 325 MG/1; MG/1
TABLET ORAL
Qty: 100 TABLET | Refills: 0 | Status: SHIPPED | OUTPATIENT
Start: 2018-07-14 | End: 2018-06-12

## 2018-06-12 RX ORDER — OXYCODONE AND ACETAMINOPHEN 10; 325 MG/1; MG/1
TABLET ORAL
Qty: 100 TABLET | Refills: 0 | Status: SHIPPED | OUTPATIENT
Start: 2018-08-14 | End: 2018-09-15

## 2018-06-12 RX ORDER — OXYCODONE AND ACETAMINOPHEN 10; 325 MG/1; MG/1
TABLET ORAL
Qty: 10 TABLET | Refills: 0 | Status: SHIPPED | OUTPATIENT
Start: 2018-07-15 | End: 2018-06-12

## 2018-06-12 RX ORDER — OXYCODONE AND ACETAMINOPHEN 10; 325 MG/1; MG/1
TABLET ORAL
Qty: 10 TABLET | Refills: 0 | Status: SHIPPED | OUTPATIENT
Start: 2018-06-15 | End: 2018-06-12

## 2018-06-12 RX ORDER — OXYCODONE HCL 40 MG/1
40 TABLET, FILM COATED, EXTENDED RELEASE ORAL EVERY 12 HOURS
Qty: 60 TABLET | Refills: 0 | Status: SHIPPED | OUTPATIENT
Start: 2018-08-14 | End: 2018-09-15

## 2018-06-12 RX ORDER — OXYCODONE HCL 40 MG/1
40 TABLET, FILM COATED, EXTENDED RELEASE ORAL EVERY 12 HOURS
Qty: 60 TABLET | Refills: 0 | Status: SHIPPED | OUTPATIENT
Start: 2018-07-15 | End: 2018-06-12

## 2018-06-12 RX ORDER — OXYCODONE HCL 40 MG/1
40 TABLET, FILM COATED, EXTENDED RELEASE ORAL EVERY 12 HOURS
Qty: 60 TABLET | Refills: 0 | Status: SHIPPED | OUTPATIENT
Start: 2018-06-15 | End: 2018-06-12

## 2018-06-12 RX ORDER — OXYCODONE AND ACETAMINOPHEN 10; 325 MG/1; MG/1
TABLET ORAL
Qty: 100 TABLET | Refills: 0 | Status: SHIPPED | OUTPATIENT
Start: 2018-06-15 | End: 2018-06-12

## 2018-06-12 RX ORDER — OXYCODONE AND ACETAMINOPHEN 10; 325 MG/1; MG/1
1 TABLET ORAL 3 TIMES DAILY
Qty: 90 TABLET | Refills: 0 | Status: SHIPPED | OUTPATIENT
Start: 2018-07-15 | End: 2018-06-12

## 2018-06-12 RX ORDER — OXYCODONE AND ACETAMINOPHEN 10; 325 MG/1; MG/1
1 TABLET ORAL 3 TIMES DAILY
Qty: 90 TABLET | Refills: 0 | Status: SHIPPED | OUTPATIENT
Start: 2018-08-14 | End: 2018-06-12

## 2018-06-12 NOTE — MR AVS SNAPSHOT
"              After Visit Summary   6/12/2018    Ria Nj    MRN: [id number]           Patient Information     Date Of Birth          1975        Visit Information        Provider Department      6/12/2018 2:20 PM Wegener, Joel Daniel Irwin, MD [facility]        Today's Diagnoses     Chronic bilateral low back pain without sciatica    -  1    Continuous opioid dependence (H)        Chronic pain syndrome        Generalized anxiety disorder           Follow-ups after your visit        Additional Services     PAIN MANAGEMENT REFERRAL       Your provider has referred you to: [facility] [place]: [facility] -    Reason for Referral: Evaluation for comprehensive services- patients will be evaluated if appropriate for comprehensive service including medication changes, procedures, pain psychology, and pain physical therapy.  While involved with comprehensive services, pain providers will work with referring provider/PCP to stabilize appropriate medication management, with long-term plan of transition of prescribing back to referring provider/PCP upon completion of comprehensive services.      Please complete the following questions:    Do you have any specific questions for the pain specialist? Yes: additional suggestions and establish care for pain psychology    Are there any red flags that may impact the assessment or management of the patient? Previously not engaging substantially in self-management of pain or anxiety/depression.  Working with Dr. Wegener and has improved reliability.  Continues to be relatively medication focused however I feel ready to engage in pain focused psychotherapy and to get past trying to \"fix\" the pain.      Also working with Dr. Wegener for improved psychiatric medication strategies to be determined after genesight testing.     Was \"fired\" from our pain clinic I believe after several no-shows in past.       What is your diagnosis for the patient's pain? 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       For any questions, contact the [facility] at [phone].     **ANY DIAGNOSTIC TESTS THAT ARE NOT IN EPIC SHOULD BE SENT TO THE PAIN CENTER**    REGARDING OPIOID MEDICATIONS:  The discussion of opioids management, appropriateness of therapy, and dosing will be discussed in patients being seen for evaluation.  The pain management clinics are not long-term prescribing clinics, with transition of prescribing of medications ultimately going back to the referring provider/PCP.  If prescribing is taken over at the pain clinic, it is in actively involved patients whom are appropriate for opioids, urine drug screening is completed, and long-term prescribing plan has been determined.  Therefore, we will not be automatically taking over prescribing at the patient's first visit.  Is this agreeable to you? agrees.     Please be aware that coverage of these services is subject to the terms and limitations of your health insurance plan.  Call member services at your health plan with any benefit or coverage questions.      Please bring the following with you to your appointment:    (1) Any X-Rays, CTs or MRIs which have been performed.  Contact the facility where they were done to arrange for  prior to your scheduled appointment.    (2) List of current medications   (3) This referral request   (4) Any documents/labs given to you for this referral                  Who to contact     If you have questions or need follow up information about today's clinic visit or your schedule please contact [facility] directly at [phone].  Normal or non-critical lab and imaging results will be communicated to you by MyChart, letter or phone within 4 business days after the clinic has received the results. If you do not hear from us within 7 days, please contact the clinic through MyChart or phone. If you have a critical  "or abnormal lab result, we will notify you by phone as soon as possible.  Submit refill requests through [employer] or call your pharmacy and they will forward the refill request to us. Please allow 3 business days for your refill to be completed.          Additional Information About Your Visit        T[employer] Information     [employer] lets you send messages to your doctor, view your test results, renew your prescriptions, schedule appointments and more. To sign up, go to [email or web address] . Click on \"Log in\" on the left side of the screen, which will take you to the Welcome page. Then click on \"Sign up Now\" on the right side of the page.     You will be asked to enter the access code listed below, as well as some personal information. Please follow the directions to create your username and password.     Your access code is: [email or web address]  Expires: 2018 11:38 AM     Your access code will  in 90 days. If you need help or a new code, please call your [place] clinic or [phone].        Care EveryWhere ID     This is your Care EveryWhere ID. This could be used by other organizations to access your [place] medical records  [id number]        Your Vitals Were     Pulse Temperature Height Pulse Oximetry BMI (Body Mass Index)       102 98.5  F (36.9  C) (Oral) 5' 8\" (1.727 m) 98% 24.48 kg/m2        Blood Pressure from Last 3 Encounters:   18 127/87   18 124/62   05/15/18 (!) 142/93    Weight from Last 3 Encounters:   18 161 lb (73 kg)   18 162 lb (73.5 kg)   05/15/18 162 lb (73.5 kg)              We Performed the Following     PAIN MANAGEMENT REFERRAL          Today's Medication Changes          These changes are accurate as of 18  4:53 PM.  If you have any questions, ask your nurse or doctor.               Start taking these medicines.        Dose/Directions    oxyCODONE 40 MG 12 hr tablet   Commonly known as:  OxyCONTIN   Used for:  Chronic bilateral low back pain without " sciatica, Continuous opioid dependence (H), Chronic pain syndrome   Started by:  Wegener, Joel Daniel Irwin, MD        Dose:  40 mg   Start taking on:  8/14/2018   Take 1 tablet (40 mg) by mouth every 12 hours   Quantity:  60 tablet   Refills:  0       oxyCODONE-acetaminophen  MG per tablet   Commonly known as:  PERCOCET   Used for:  Chronic bilateral low back pain without sciatica, Continuous opioid dependence (H), Chronic pain syndrome   Started by:  Wegener, Joel Daniel Irwin, MD        Start taking on:  8/14/2018   One tablet 3-4 times daily #100/month   Quantity:  100 tablet   Refills:  0            Where to get your medicines      Some of these will need a paper prescription and others can be bought over the counter.  Ask your nurse if you have questions.     Bring a paper prescription for each of these medications     LORazepam 1 MG tablet    oxyCODONE 40 MG 12 hr tablet    oxyCODONE-acetaminophen  MG per tablet               Information about OPIOIDS     PRESCRIPTION OPIOIDS: WHAT YOU NEED TO KNOW   We gave you an opioid (narcotic) pain medicine. It is important to manage your pain, but opioids are not always the best choice. You should first try all the other options your care team gave you. Take this medicine for as short a time (and as few doses) as possible.     These medicines have risks:    DO NOT drive when on new or higher doses of pain medicine. These medicines can affect your alertness and reaction times, and you could be arrested for driving under the influence (DUI). If you need to use opioids long-term, talk to your care team about driving.    DO NOT operate heave machinery    DO NOT do any other dangerous activities while taking these medicines.     DO NOT drink any alcohol while taking these medicines.      If the opioid prescribed includes acetaminophen, DO NOT take with any other medicines that contain acetaminophen. Read all labels carefully. Look for the word  acetaminophen  or   Tylenol.  Ask your pharmacist if you have questions or are unsure.    You can get addicted to pain medicines, especially if you have a history of addiction (chemical, alcohol or substance dependence). Talk to your care team about ways to reduce this risk.    Store your pills in a secure place, locked if possible. We will not replace any lost or stolen medicine. If you don t finish your medicine, please throw away (dispose) as directed by your pharmacist. The [place] [employer]ution [employer] has more information about safe disposal: https://www.pca.[facility].[place].us/living-green/managing-unwanted-medications.     All opioids tend to cause constipation. Drink plenty of water and eat foods that have a lot of fiber, such as fruits, vegetables, prune juice, apple juice and high-fiber cereal. Take a laxative (Miralax, milk of magnesia, Colace, Senna) if you don t move your bowels at least every other day.          Primary Care Provider Office Phone # Fax #    Joel Daniel Irwin Wegener, [phone]       [address] [place] [address]        Equal Access to Services     [facility] Center[facility]IL[facility] : Hadii kandy carey hadmaria isabelo Sosimon, waaxda lufelecia, qaybta kaalgifty bustillos, toy miranda. So [facility] [phone].    ATENCIÓN: Si habla español, tiene a freire disposición servicios gratuitos de asistencia lingüística. Jono al [phone].    We comply with applicable federal civil rights laws and [place] laws. We do not discriminate on the basis of race, color, national origin, age, disability, sex, sexual orientation, or gender identity.            Thank you!     Thank you for choosing [facility]  for your care. Our goal is always to provide you with excellent care. Hearing back from our patients is one way we can continue to improve our services. Please take a few minutes to complete the written survey that you may receive in the mail after your visit with us. Thank you!              Your Updated Medication List - Protect others around you: Learn how to safely use, store and throw away your medicines at www.disposemymeds.org.          This list is accurate as of 6/12/18  4:53 PM.  Always use your most recent med list.                   Brand Name Dispense Instructions for use Diagnosis    * albuterol 108 (90 Base) MCG/ACT Inhaler    PROAIR HFA/PROVENTIL HFA/VENTOLIN HFA    1 Inhaler    Inhale 2 puffs into the lungs every 4 hours as needed for shortness of breath / dyspnea or wheezing    Acute bronchospasm       * albuterol (2.5 MG/3ML) 0.083% neb solution     30 vial    Take 1 vial (2.5 mg) by nebulization every 4 hours as needed for shortness of breath / dyspnea or wheezing    Acute bronchospasm       calcium carbonate 500 MG tablet    OS-ADOLFO 500 mg [place]. Ca    100 tablet    Take 1 tablet by mouth 2 times daily.    Routine general medical examination at a health care facility       cloNIDine 0.1 MG tablet    CATAPRES    4 tablet    Take 1 tablet (0.1 mg) by mouth 2 times daily        EPINEPHrine 0.3 MG/0.3ML injection 2-pack    EPIPEN/ADRENACLICK/or ANY BX GENERIC EQUIV    2 each    Inject 0.3 mLs (0.3 mg) into the muscle once as needed for anaphylaxis    Bee sting-induced anaphylaxis, accidental or unintentional, subsequent encounter       FISH OIL CONCENTRATE PO      Take 300 mg by mouth 3 times daily        gabapentin 300 MG capsule    NEURONTIN    180 capsule    Take 2 capsules (600 mg) by mouth 3 times daily    Chronic bilateral low back pain without sciatica       ibuprofen 200 MG tablet    ADVIL/MOTRIN     Pt is taking 4 TABLET EVERY 4 TO 6 HOURS AS NEEDED        LORazepam 1 MG tablet    ATIVAN    90 tablet    TAKE 1 TABLET BY MOUTH EVERY 8 HOURS AS NEEDED FOR ANXIETY    Generalized anxiety disorder       magnesium 250 MG tablet     100 tablet    Take 1 tablet by mouth daily.    Routine general medical examination at a health care facility       naloxone nasal spray    NARCAN    0.2 mL     Spray 1 spray (4 mg) into one nostril alternating nostrils as needed for opioid reversal every 2-3 minutes until assistance arrives    Continuous opioid dependence (H)       nicotine 21 MG/24HR 24 hr patch    NICODERM CQ    30 patch    Place 1 patch onto the skin every 24 hours    Tobacco use disorder       oxyCODONE 40 MG 12 hr tablet   Start taking on:  8/14/2018    OxyCONTIN    60 tablet    Take 1 tablet (40 mg) by mouth every 12 hours    Chronic bilateral low back pain without sciatica, Continuous opioid dependence (H), Chronic pain syndrome       oxyCODONE-acetaminophen  MG per tablet   Start taking on:  8/14/2018    PERCOCET    100 tablet    One tablet 3-4 times daily #100/month    Chronic bilateral low back pain without sciatica, Continuous opioid dependence (H), Chronic pain syndrome       tiZANidine 4 MG tablet    ZANAFLEX    270 tablet    TAKE ONE TABLET BY MOUTH THREE TIMES DAILY AS NEEDED FOR SHOULDER AND UPPER BACK PAIN    Chronic bilateral low back pain without sciatica       vitamin D 2000 units tablet     100 tablet    Take 2,000 Units by mouth daily    Major depressive disorder, recurrent episode, moderate (H)       * Notice:  This list has 2 medication(s) that are the same as other medications prescribed for you. Read the directions carefully, and ask your doctor or other care provider to review them with you.

## 2018-06-12 NOTE — PROGRESS NOTES
"  SUBJECTIVE:   Ria Nj is a 42 year old female who presents to clinic today for the following health issues:      Medication Followup of  Percocet     Taking Medication as prescribed: yes    Side Effects:  None    Medication Helping Symptoms:  yes            Chronic bilateral low back pain without sciatica  Continuous opioid dependence (H)  Chronic pain syndrome  Generalized anxiety disorder :here with family, , son to help get me insight that she is a person with family, needs, etc and that her pain impacts them.      Upset/frustrated that I weaned amount again last month and she thought I was not going to.  Did not realize this and thus going to be out somewhat early.     Did genesight testing with pain and psychotropic panels yesterday - not back yet.    Had one psychotherapy appointment but did not connect well - has not found a new therapist yet.     Wants to help find a solution to her pain to fix it.  Does not feel I or other providers have really understood her pain, the impact of it.  Feels it was medical mismanagement of her labor and epidural that caused it so has a fairly high level of mistrust and apprehension about medical care/providers.  Feels it is unfair that we are trying to wean her medication/not treat her pain since \"we are the ones that caused it.\"  I.e. Medical professionals.         Problem list, Medication list, Allergies, and Medical/Social/Surgical histories reviewed in [employer] and updated as appropriate.  Labs reviewed in [facility]  BP Readings from Last 3 Encounters:   06/12/18 127/87   06/11/18 124/62   05/15/18 (!) 142/93    Wt Readings from Last 3 Encounters:   06/12/18 161 lb (73 kg)   06/11/18 162 lb (73.5 kg)   05/15/18 162 lb (73.5 kg)                  Patient Active Problem List   Diagnosis     CARDIOVASCULAR SCREENING; LDL GOAL LESS THAN 160     Lumbar disc herniation with myelopathy     Chronic low back pain     Bee sting-induced anaphylaxis     Generalized anxiety " disorder     Tobacco use disorder     Moderate major depression (H)     Continuous opioid dependence (H)     Cervicalgia     Chronic pain syndrome     Past Surgical History:   Procedure Laterality Date     OVARY SURGERY N/A 2004    Ovarian cyst removed.      SURGICAL HISTORY OF -   2007    ovarian cyst removal        Social History   Substance Use Topics     Smoking status: Current Every Day Smoker     Packs/day: 2.00     Years: 15.00     Types: Cigarettes     Smokeless tobacco: Never Used     Alcohol use No     Family History   Problem Relation Age of Onset     CANCER Maternal Grandmother      CANCER Maternal Grandfather      CANCER Paternal Grandmother      CANCER Paternal Grandfather          Current Outpatient Prescriptions   Medication Sig Dispense Refill     albuterol (2.5 MG/3ML) 0.083% neb solution Take 1 vial (2.5 mg) by nebulization every 4 hours as needed for shortness of breath / dyspnea or wheezing 30 vial 11     albuterol (PROAIR HFA, PROVENTIL HFA, VENTOLIN HFA) 108 (90 BASE) MCG/ACT inhaler Inhale 2 puffs into the lungs every 4 hours as needed for shortness of breath / dyspnea or wheezing 1 Inhaler 0     calcium carbonate 500 MG tablet Take 1 tablet by mouth 2 times daily. 100 tablet 3     Cholecalciferol (VITAMIN D) 2000 UNITS tablet Take 2,000 Units by mouth daily 100 tablet 3     cloNIDine (CATAPRES) 0.1 MG tablet Take 1 tablet (0.1 mg) by mouth 2 times daily 4 tablet 0     EPINEPHrine (EPIPEN) 0.3 MG/0.3ML injection Inject 0.3 mLs (0.3 mg) into the muscle once as needed for anaphylaxis 2 each 1     gabapentin (NEURONTIN) 300 MG capsule Take 2 capsules (600 mg) by mouth 3 times daily 180 capsule 11     IBUPROFEN 200 MG OR TABS Pt is taking 4 TABLET EVERY 4 TO 6 HOURS AS NEEDED       LORazepam (ATIVAN) 1 MG tablet TAKE 1 TABLET BY MOUTH EVERY 8 HOURS AS NEEDED FOR ANXIETY 90 tablet 2     Magnesium 250 MG tablet Take 1 tablet by mouth daily. 100 tablet 3     naloxone (NARCAN) nasal spray Spray 1  "spray (4 mg) into one nostril alternating nostrils as needed for opioid reversal every 2-3 minutes until assistance arrives 0.2 mL 0     nicotine (NICODERM CQ) 21 MG/24HR 24 hr patch Place 1 patch onto the skin every 24 hours 30 patch 0     Omega-3 Fatty Acids (FISH OIL CONCENTRATE PO) Take 300 mg by mouth 3 times daily       [START ON 8/14/2018] oxyCODONE (OXYCONTIN) 40 MG 12 hr tablet Take 1 tablet (40 mg) by mouth every 12 hours 60 tablet 0     [START ON 8/14/2018] oxyCODONE-acetaminophen (PERCOCET)  MG per tablet One tablet 3-4 times daily #100/month 100 tablet 0     tiZANidine (ZANAFLEX) 4 MG tablet TAKE ONE TABLET BY MOUTH THREE TIMES DAILY AS NEEDED FOR SHOULDER AND UPPER BACK PAIN 270 tablet 0     Allergies   Allergen Reactions     Bee Venom      Recent Labs   Lab Test  10/17/17   1122   ALT  16   CR  0.80   GFRESTIMATED  79   GFRESTBLACK  >90   POTASSIUM  4.2        ROS:  Constitutional, HEENT, cardiovascular, pulmonary, GI, , musculoskeletal, neuro, skin, endocrine and psych systems are negative, except as otherwise noted.        OBJECTIVE:  /87  Pulse 102  Temp 98.5  F (36.9  C) (Oral)  Ht 5' 8\" (1.727 m)  Wt 161 lb (73 kg)  SpO2 98%  BMI 24.48 kg/m2    EXAM:  GENERAL APPEARANCE: healthy, alert and no distress  Frustrated, tearful at times.  Consoled by family, son present (10 years old).     ASSESSMENT AND PLAN  1. Chronic bilateral low back pain without sciatica  In the end with some reservation after long discussion with her did agree to compromise increase her Percocet to 100 tablets per month from 90.  Continued 40 mg of OxyContin twice a day.  Previous baseline dose prior to seeing me had been 60 mg twice a day of oxycodone and 150 of the 10 mg Percocet per month.    We had an extensive discussion and chart review based on her concerns of management and looking for ideas moving forward today.  She shared her perspective as above in HPI as did her .  Certainly attempted " appreciate the impact on her life of this chronic pain.  Regarding why we were trying to decrease her medicines I did my best to explain this reviewed that by current standards she is on extremely high-dose of opioids, the opinion is she has been fairly medication focused, and I continue believe that she has uncontrolled mental health issues if nothing else evidenced by the fact that she continues to be on benzodiazepines without any other controller medications and is not actively engaged in psychiatric or mental health care.  Currently not actively engaged in psychotherapy or physical therapy or other modalities.  Difficult for me to  how much she is engaged in these activities in the past.  I did let her know however that from a distance at least it appears to globally I know many of these things been suggested to her in the past and the current focus seems to be very related to her medicines and that it really does not appear that she is substantially maximized nonmedication treatment of her pain in the past or at this moment.  And that is substantially impacting the recommendations to wean her medication.  Along with this is the fact that she is in a very high risk category for overdose being on high-dose opioids and benzodiazepines at the same time.  Did note that she previously been prescribed Narcan and I reminded them of this they state that they have it.  We discussed the barriers to methadone and Suboxone treatment.  She states that she pursue these treatments in the past but was told that she could not.  I suspect this is because she likely told him that she wanted those items for pain and not for addiction explained to her that those programs would really be for addiction they may have a side benefit of improving pain control.  She certainly could consider to pursuing this again.  Personally I would feel that methadone may be a better option as that would likely better treat her pain if in fact  "opioids are helping her pain at all.  Again she is adamant that her opioid medications have very much helped her pain but now that she is on a lower dose that she is very much struggling.  This does not fit with the current medical Fery of whole opioid-induced hyperalgesia which would support the fact that the doses that she help she is on may actually be increasing pain at this time.  However is reasonable to point out that she does not appear to have other symptoms of opioid-induced hyperalgesia including abdominal pain, tics, tremors or absent spells.    In addition to above and negotiated a referral to our pain clinic she is previously essentially fired or prohibited from rescheduling with them after several no shows I believe.  I am hoping that after this time since she has reestablished care with me been reasonably reliable in terms of her follow-up that they will be willing to see her again in my hopes is that she could engaged in more pain focused psychotherapy.  After several comments today by her wanting to \"find something to fix this.  I did have a discussion regarding changing that mindset.  Unfortunately it is clear to me that she will likely never find something to fix this or even necessarily find the cause of her pain or go to \"be like she used to be\".  It is my opinion that the spinal arthritis is certainly not explain the level of pain and dysfunction that she has had from this.  I do feel that once she is able to come to terms with the emotional consequences that will not be fixed and grieve this that she may be better at to switch in a mindset which allows her to be more proactive and to control this pain instead of always looking for a cure.  My experience is when this mental shift is achieved that pain tends to improve to some degree at least.    Reviewed the [place] prescription monitoring database there is no sick suspicious activity today.  She states she will be out several days early of " her short acting medicine but not her long-acting medicine again because she did not realize I had changed the amount.  I apologize for this although I am not able or willing to authorize early refill as per my memory documentation and clearly explained this to her.    I let her know that I would call her back with the pain management referral after it was placed since we were quite over time today.  I also had my team help her schedule a future visit for Pap smear a second visit to follow-up to genotype testing, and a third visit for pain medication refills in 3 months.  I spent more than half of the 55 minute face-to-face visit today reviewing the rationale for my assessment and plan above .    - oxyCODONE (OXYCONTIN) 40 MG 12 hr tablet; Take 1 tablet (40 mg) by mouth every 12 hours  Dispense: 60 tablet; Refill: 0  - oxyCODONE-acetaminophen (PERCOCET)  MG per tablet; One tablet 3-4 times daily #100/month  Dispense: 100 tablet; Refill: 0  - PAIN MANAGEMENT REFERRAL    2. Continuous opioid dependence (H)    - oxyCODONE (OXYCONTIN) 40 MG 12 hr tablet; Take 1 tablet (40 mg) by mouth every 12 hours  Dispense: 60 tablet; Refill: 0  - oxyCODONE-acetaminophen (PERCOCET)  MG per tablet; One tablet 3-4 times daily #100/month  Dispense: 100 tablet; Refill: 0  - PAIN MANAGEMENT REFERRAL    3. Chronic pain syndrome    - oxyCODONE (OXYCONTIN) 40 MG 12 hr tablet; Take 1 tablet (40 mg) by mouth every 12 hours  Dispense: 60 tablet; Refill: 0  - oxyCODONE-acetaminophen (PERCOCET)  MG per tablet; One tablet 3-4 times daily #100/month  Dispense: 100 tablet; Refill: 0  - PAIN MANAGEMENT REFERRAL    4. Generalized anxiety disorder    - LORazepam (ATIVAN) 1 MG tablet; TAKE 1 TABLET BY MOUTH EVERY 8 HOURS AS NEEDED FOR ANXIETY  Dispense: 90 tablet; Refill: 2        Joel Wegener,MD

## 2018-06-15 DIAGNOSIS — F41.1 GENERALIZED ANXIETY DISORDER: ICD-10-CM

## 2018-06-18 RX ORDER — LORAZEPAM 1 MG/1
TABLET ORAL
Qty: 90 TABLET | Refills: 0 | OUTPATIENT
Start: 2018-06-18

## 2018-06-18 NOTE — TELEPHONE ENCOUNTER
Call pt.  Let her know we received a refill request from [facility] for lorazepam.  I gave her a prescription for this at her last visit in hard copy that she needs to bring to the pharmacy to have filled.     Joel Wegener,MD

## 2018-06-19 RX ORDER — LORAZEPAM 1 MG/1
TABLET ORAL
Qty: 90 TABLET | Refills: 2 | Status: SHIPPED | OUTPATIENT
Start: 2018-06-19 | End: 2018-09-05

## 2018-06-19 NOTE — TELEPHONE ENCOUNTER
I confirmed with [facility] that they did not fill the prescription so she can fill it at our pharmacy.      I have printed a new prescription for the lorazepam and will have my team bring it to our pharmacy now and let them know she is going to fill it now.     Joel Wegener,MD

## 2018-06-20 NOTE — TELEPHONE ENCOUNTER
Somebody walked over to [facility] pharmacy already and patient has already picked up yesterday 6-19-18

## 2018-07-01 ENCOUNTER — HOSPITAL ENCOUNTER (EMERGENCY)
Facility: CLINIC | Age: 43
Discharge: HOME | End: 2018-07-02
Attending: EMERGENCY MEDICINE | Admitting: EMERGENCY MEDICINE
Payer: MEDICARE

## 2018-07-01 DIAGNOSIS — G89.4 CHRONIC PAIN SYNDROME: ICD-10-CM

## 2018-07-01 DIAGNOSIS — R10.84 ABDOMINAL PAIN, GENERALIZED: ICD-10-CM

## 2018-07-01 LAB
ALBUMIN SERPL-MCNC: 4.5 G/DL (ref 3.4–5)
ALP SERPL-CCNC: 54 U/L (ref 40–150)
ALT SERPL W P-5'-P-CCNC: 17 U/L (ref 0–50)
ANION GAP SERPL CALCULATED.3IONS-SCNC: 12 MMOL/L (ref 3–14)
AST SERPL W P-5'-P-CCNC: 10 U/L (ref 0–45)
BASOPHILS # BLD AUTO: 0 10E9/L (ref 0–0.2)
BASOPHILS NFR BLD AUTO: 0.2 %
BILIRUB SERPL-MCNC: 0.4 MG/DL (ref 0.2–1.3)
BUN SERPL-MCNC: 13 MG/DL (ref 7–30)
CALCIUM SERPL-MCNC: 9.7 MG/DL (ref 8.5–10.1)
CHLORIDE SERPL-SCNC: 110 MMOL/L (ref 94–109)
CO2 SERPL-SCNC: 20 MMOL/L (ref 20–32)
CREAT SERPL-MCNC: 0.61 MG/DL (ref 0.52–1.04)
DIFFERENTIAL METHOD BLD: ABNORMAL
EOSINOPHIL # BLD AUTO: 0 10E9/L (ref 0–0.7)
EOSINOPHIL NFR BLD AUTO: 0 %
ERYTHROCYTE [DISTWIDTH] IN BLOOD BY AUTOMATED COUNT: 13.1 % (ref 10–15)
GFR SERPL CREATININE-BSD FRML MDRD: >90 ML/MIN/1.7M2
GLUCOSE SERPL-MCNC: 94 MG/DL (ref 70–99)
HCT VFR BLD AUTO: 45.6 % (ref 35–47)
HGB BLD-MCNC: 15.6 G/DL (ref 11.7–15.7)
IMM GRANULOCYTES # BLD: 0 10E9/L (ref 0–0.4)
IMM GRANULOCYTES NFR BLD: 0.2 %
LIPASE SERPL-CCNC: 209 U/L (ref 73–393)
LYMPHOCYTES # BLD AUTO: 1.6 10E9/L (ref 0.8–5.3)
LYMPHOCYTES NFR BLD AUTO: 12.2 %
MCH RBC QN AUTO: 33.3 PG (ref 26.5–33)
MCHC RBC AUTO-ENTMCNC: 34.2 G/DL (ref 31.5–36.5)
MCV RBC AUTO: 97 FL (ref 78–100)
MONOCYTES # BLD AUTO: 0.8 10E9/L (ref 0–1.3)
MONOCYTES NFR BLD AUTO: 6.3 %
NEUTROPHILS # BLD AUTO: 10.5 10E9/L (ref 1.6–8.3)
NEUTROPHILS NFR BLD AUTO: 81.1 %
NRBC # BLD AUTO: 0 10*3/UL
NRBC BLD AUTO-RTO: 0 /100
PLATELET # BLD AUTO: 321 10E9/L (ref 150–450)
POTASSIUM SERPL-SCNC: 3.8 MMOL/L (ref 3.4–5.3)
PROT SERPL-MCNC: 8.4 G/DL (ref 6.8–8.8)
RBC # BLD AUTO: 4.68 10E12/L (ref 3.8–5.2)
SODIUM SERPL-SCNC: 142 MMOL/L (ref 133–144)
WBC # BLD AUTO: 12.9 10E9/L (ref 4–11)

## 2018-07-01 PROCEDURE — 25000128 H RX IP 250 OP 636: Performed by: EMERGENCY MEDICINE

## 2018-07-01 PROCEDURE — 99285 EMERGENCY DEPT VISIT HI MDM: CPT | Mod: 25 | Performed by: EMERGENCY MEDICINE

## 2018-07-01 PROCEDURE — 96374 THER/PROPH/DIAG INJ IV PUSH: CPT | Performed by: EMERGENCY MEDICINE

## 2018-07-01 PROCEDURE — 99285 EMERGENCY DEPT VISIT HI MDM: CPT | Mod: Z6 | Performed by: EMERGENCY MEDICINE

## 2018-07-01 PROCEDURE — 96375 TX/PRO/DX INJ NEW DRUG ADDON: CPT | Performed by: EMERGENCY MEDICINE

## 2018-07-01 PROCEDURE — 80053 COMPREHEN METABOLIC PANEL: CPT | Performed by: EMERGENCY MEDICINE

## 2018-07-01 PROCEDURE — 85025 COMPLETE CBC W/AUTO DIFF WBC: CPT | Performed by: EMERGENCY MEDICINE

## 2018-07-01 PROCEDURE — 96361 HYDRATE IV INFUSION ADD-ON: CPT | Performed by: EMERGENCY MEDICINE

## 2018-07-01 PROCEDURE — 83690 ASSAY OF LIPASE: CPT | Performed by: EMERGENCY MEDICINE

## 2018-07-01 RX ORDER — ONDANSETRON 2 MG/ML
4 INJECTION INTRAMUSCULAR; INTRAVENOUS EVERY 30 MIN PRN
Status: DISCONTINUED | OUTPATIENT
Start: 2018-07-01 | End: 2018-07-02 | Stop reason: HOSPADM

## 2018-07-01 RX ORDER — SODIUM CHLORIDE 9 MG/ML
INJECTION, SOLUTION INTRAVENOUS CONTINUOUS
Status: DISCONTINUED | OUTPATIENT
Start: 2018-07-01 | End: 2018-07-02 | Stop reason: HOSPADM

## 2018-07-01 RX ORDER — LORAZEPAM 2 MG/ML
0.5 INJECTION INTRAMUSCULAR ONCE
Status: COMPLETED | OUTPATIENT
Start: 2018-07-01 | End: 2018-07-01

## 2018-07-01 RX ORDER — HYDROMORPHONE HYDROCHLORIDE 1 MG/ML
0.5 INJECTION, SOLUTION INTRAMUSCULAR; INTRAVENOUS; SUBCUTANEOUS
Status: COMPLETED | OUTPATIENT
Start: 2018-07-01 | End: 2018-07-01

## 2018-07-01 RX ORDER — KETOROLAC TROMETHAMINE 30 MG/ML
30 INJECTION, SOLUTION INTRAMUSCULAR; INTRAVENOUS ONCE
Status: COMPLETED | OUTPATIENT
Start: 2018-07-01 | End: 2018-07-01

## 2018-07-01 RX ADMIN — Medication 0.5 MG: at 23:03

## 2018-07-01 RX ADMIN — SODIUM CHLORIDE 1000 ML: 9 INJECTION, SOLUTION INTRAVENOUS at 23:02

## 2018-07-01 RX ADMIN — KETOROLAC TROMETHAMINE 30 MG: 30 INJECTION, SOLUTION INTRAMUSCULAR at 22:02

## 2018-07-01 RX ADMIN — ONDANSETRON 4 MG: 2 INJECTION INTRAMUSCULAR; INTRAVENOUS at 22:02

## 2018-07-01 RX ADMIN — LORAZEPAM 0.5 MG: 2 INJECTION INTRAMUSCULAR; INTRAVENOUS at 22:02

## 2018-07-01 RX ADMIN — SODIUM CHLORIDE 1000 ML: 9 INJECTION, SOLUTION INTRAVENOUS at 22:03

## 2018-07-01 ASSESSMENT — ENCOUNTER SYMPTOMS
ABDOMINAL PAIN: 1
VOMITING: 1
DIARRHEA: 1
BLOOD IN STOOL: 0

## 2018-07-01 NOTE — ED AVS SNAPSHOT
[facility], [place], Emergency Department    [address] [address]    Phone:  [phone]                                       Ria Nj   MRN: [id number]    Department:  [facility], Emergency Department   Date of Visit:  7/1/2018           After Visit Summary Signature Page     I have received my discharge instructions, and my questions have been answered. I have discussed any challenges I see with this plan with the nurse or doctor.    ..........................................................................................................................................  Patient/Patient Representative Signature      ..........................................................................................................................................  Patient Representative Print Name and Relationship to Patient    ..................................................               ................................................  Date                                            Time    ..........................................................................................................................................  Reviewed by Signature/Title    ...................................................              ..............................................  Date                                                            Time

## 2018-07-01 NOTE — ED AVS SNAPSHOT
[facility], Emergency Department    [address] [address]    Phone:  [phone]                                       Ria Nj   MRN: [id number]    Department:  [facility], Emergency Department   Date of Visit:  7/1/2018           Patient Information     Date Of Birth          1975        Your diagnoses for this visit were:     Chronic pain syndrome     Abdominal pain, generalized        You were seen by Sujatha Mar MD.        Discharge Instructions         Please make an appointment to follow up with Your Primary Care Provider in 2-4 days even if entirely better.  Your CT abd/pelvis is normal.         Abdominal Pain    Abdominal pain is pain in the stomach or belly area. Everyone has this pain from time to time. In many cases it goes away on its own. But abdominal pain can sometimes be due to a serious problem, such as appendicitis. So it s important to know when to seek help.  Causes of abdominal pain  There are many possible causes of abdominal pain. Common causes in adults include:    Constipation, diarrhea, or gas    Stomach acid flowing back up into the esophagus (acid reflux or heartburn)    Severe acid reflux, called GERD (gastroesophageal reflux disease)    A sore in the lining of the stomach or small intestine (peptic ulcer)    Inflammation of the gallbladder, liver, or pancreas    Gallstones or kidney stones    Appendicitis     Intestinal blockage     An internal organ pushing through a muscle or other tissue (hernia)    Urinary tract infections    In women, menstrual cramps, fibroids, or endometriosis    Inflammation or infection of the intestines  Diagnosing the cause of abdominal pain  Your healthcare provider will do a physical exam help find the cause of your pain. If needed, tests will be ordered. Belly pain has many possible causes. So it can be hard to find the reason for your pain. Giving details about your pain can help. Tell your provider where  and when you feel the pain, and what makes it better or worse. Also let your provider know if you have other symptoms such as:    Fever    Tiredness    Upset stomach (nausea)    Vomiting    Changes in bathroom habits  Treating abdominal pain  Some causes of pain need emergency medical treatment right away. These include appendicitis or a bowel blockage. Other problems can be treated with rest, fluids, or medicines. Your healthcare provider can give you specific instructions for treatment or self-care based on what is causing your pain.  If you have vomiting or diarrhea, sip water or other clear fluids. When you are ready to eat solid foods again, start with small amounts of easy-to-digest, low-fat foods. These include apple sauce, toast, or crackers.   When to seek medical care  Call 911 or go to the hospital right away if you:    Can t pass stool and are vomiting    Are vomiting blood or have bloody diarrhea or black, tarry diarrhea    Have chest, neck, or shoulder pain    Feel like you might pass out    Have pain in your shoulder blades with nausea    Have sudden, severe belly pain    Have new, severe pain unlike any you have felt before    Have a belly that is rigid, hard, and tender to touch  Call your healthcare provider if you have:    Pain for more than 5 days    Bloating for more than 2 days    Diarrhea for more than 5 days    A fever of 100.4 F (38 C) or higher, or as directed by your healthcare provider    Pain that gets worse    Weight loss for no reason    Continued lack of appetite    Blood in your stool  How to prevent abdominal pain  Here are some tips to help prevent abdominal pain:    Eat smaller amounts of food at one time.    Avoid greasy, fried, or other high-fat foods.    Avoid foods that give you gas.    Exercise regularly.    Drink plenty of fluids.  To help prevent GERD symptoms:    Quit smoking.    Reduce alcohol and certain foods that increase stomach acid.    Avoid aspirin and  over-the-counter pain and fever medicines (NSAIDS or nonsteroidal anti-inflammatory drugs), if possible    Lose extra weight.    Finish eating at least 2 hours before you go to bed or lie down.    Raise the head of your bed.  Date Last Reviewed: 7/1/2016 2000-2017 [employer]. [address] [place] [address]. All rights reserved. This information is not intended as a substitute for professional medical care. Always follow your healthcare professional's instructions.          24 Hour Appointment Hotline       To make an appointment at any [facility], call [phone] ([phone]). If you don't have a family doctor or clinic, we will help you find one. [facility] clinics are conveniently located to serve the needs of you and your family.             Review of your medicines      START taking        Dose / Directions Last dose taken    ondansetron 4 MG ODT tab   Commonly known as:  ZOFRAN ODT   Dose:  4 mg   Quantity:  10 tablet        Take 1 tablet (4 mg) by mouth every 8 hours as needed for nausea   Refills:  0          Our records show that you are taking the medicines listed below. If these are incorrect, please call your family doctor or clinic.        Dose / Directions Last dose taken    * albuterol 108 (90 Base) MCG/ACT Inhaler   Commonly known as:  PROAIR HFA/PROVENTIL HFA/VENTOLIN HFA   Dose:  2 puff   Quantity:  1 Inhaler        Inhale 2 puffs into the lungs every 4 hours as needed for shortness of breath / dyspnea or wheezing   Refills:  0        * albuterol (2.5 MG/3ML) 0.083% neb solution   Dose:  1 vial   Quantity:  30 vial        Take 1 vial (2.5 mg) by nebulization every 4 hours as needed for shortness of breath / dyspnea or wheezing   Refills:  11        calcium carbonate 500 MG tablet   Commonly known as:  OS-ADOLFO 500 mg [place]. Ca   Dose:  1 tablet   Quantity:  100 tablet        Take 1 tablet by mouth 2 times daily.   Refills:  3        cloNIDine 0.1 MG tablet    Commonly known as:  CATAPRES   Dose:  0.1 mg   Quantity:  4 tablet        Take 1 tablet (0.1 mg) by mouth 2 times daily   Refills:  0        EPINEPHrine 0.3 MG/0.3ML injection 2-pack   Commonly known as:  EPIPEN/ADRENACLICK/or ANY BX GENERIC EQUIV   Dose:  0.3 mg   Quantity:  2 each        Inject 0.3 mLs (0.3 mg) into the muscle once as needed for anaphylaxis   Refills:  1        FISH OIL CONCENTRATE PO   Dose:  300 mg        Take 300 mg by mouth 3 times daily   Refills:  0        gabapentin 300 MG capsule   Commonly known as:  NEURONTIN   Dose:  600 mg   Quantity:  180 capsule        Take 2 capsules (600 mg) by mouth 3 times daily   Refills:  11        ibuprofen 200 MG tablet   Commonly known as:  ADVIL/MOTRIN        Pt is taking 4 TABLET EVERY 4 TO 6 HOURS AS NEEDED   Refills:  0        LORazepam 1 MG tablet   Commonly known as:  ATIVAN   Quantity:  90 tablet        TAKE 1 TABLET BY MOUTH EVERY 8 HOURS AS NEEDED FOR ANXIETY   Refills:  2        magnesium 250 MG tablet   Dose:  1 tablet   Quantity:  100 tablet        Take 1 tablet by mouth daily.   Refills:  3        naloxone nasal spray   Commonly known as:  NARCAN   Dose:  4 mg   Quantity:  0.2 mL        Spray 1 spray (4 mg) into one nostril alternating nostrils as needed for opioid reversal every 2-3 minutes until assistance arrives   Refills:  0        nicotine 21 MG/24HR 24 hr patch   Commonly known as:  NICODERM CQ   Dose:  1 patch   Quantity:  30 patch        Place 1 patch onto the skin every 24 hours   Refills:  0        oxyCODONE 40 MG 12 hr tablet   Commonly known as:  OxyCONTIN   Dose:  40 mg   Quantity:  60 tablet   Start taking on:  8/14/2018        Take 1 tablet (40 mg) by mouth every 12 hours   Refills:  0        oxyCODONE-acetaminophen  MG per tablet   Commonly known as:  PERCOCET   Quantity:  100 tablet   Start taking on:  8/14/2018        One tablet 3-4 times daily #100/month   Refills:  0        tiZANidine 4 MG tablet   Commonly known as:   ZANAFLEX   Quantity:  270 tablet        TAKE ONE TABLET BY MOUTH THREE TIMES DAILY AS NEEDED FOR SHOULDER AND UPPER BACK PAIN   Refills:  0        vitamin D 2000 units tablet   Dose:  2000 Units   Quantity:  100 tablet        Take 2,000 Units by mouth daily   Refills:  3        * Notice:  This list has 2 medication(s) that are the same as other medications prescribed for you. Read the directions carefully, and ask your doctor or other care provider to review them with you.            Prescriptions were sent or printed at these locations (1 Prescription)                   Other Prescriptions                Printed at Department/Unit printer (1 of 1)         ondansetron (ZOFRAN ODT) 4 MG ODT tab                Procedures and tests performed during your visit     CBC with platelets differential    CT Abdomen Pelvis w Contrast    Comprehensive metabolic panel    Give 20 ounces of water 15 minutes before CT of abdomen    Lipase      Orders Needing Specimen Collection     Ordered          07/01/18 2154  UA with Microscopic - STAT, [facility]: STAT, Needs to be Collected     Scheduled Task Status   07/01/18 2155 Collect UA with Microscopic Open   Order Class:  PCU Collect                  Pending Results     Date and Time Order Name Status Description    7/1/2018 2356 CT Abdomen Pelvis w Contrast In process             Pending Culture Results     No orders found for last 3 day(s).            Pending Results Instructions     If you had any lab results that were not finalized at the time of your Discharge, you can call the ED Lab Result RN at [phone]. You will be contacted by this team for any positive Lab results or changes in treatment. The nurses are available 7 days a week from 10A to 6:30P.  You can leave a message 24 hours per day and they will return your call.        Thank you for choosing [facility]       Thank you for choosing [facility] for your care. Our goal is always to provide you with excellent care. Hearing back  "from our patients is one way we can continue to improve our services. Please take a few minutes to complete the written survey that you may receive in the mail after you visit with us. Thank you!        [facility] Information     [employer] lets you send messages to your doctor, view your test results, renew your prescriptions, schedule appointments and more. To sign up, go to [email or web address] . Click on \"Log in\" on the left side of the screen, which will take you to the Welcome page. Then click on \"Sign up Now\" on the right side of the page.     You will be asked to enter the access code listed below, as well as some personal information. Please follow the directions to create your username and password.     Your access code is: [email or web address]  Expires: 2018 11:38 AM     Your access code will  in 90 days. If you need help or a new code, please call your [facility] clinic or [phone].        Care EveryWhere ID     This is your Care EveryWhere ID. This could be used by other organizations to access your [facility] medical records  [id number]        Equal Access to Services     MONO WATERS : Hadisabella Moreno, lucinda reyna, brenda bustillos, toy bowling . So [facility] [phone].    ATENCIÓN: Si habla español, tiene a freire disposición servicios gratuitos de asistencia lingüística. Jono al [phone].    We comply with applicable federal civil rights laws and [place] laws. We do not discriminate on the basis of race, color, national origin, age, disability, sex, sexual orientation, or gender identity.            After Visit Summary       This is your record. Keep this with you and show to your community pharmacist(s) and doctor(s) at your next visit.                  "

## 2018-07-02 ENCOUNTER — APPOINTMENT (OUTPATIENT)
Dept: CT IMAGING | Facility: CLINIC | Age: 43
End: 2018-07-02
Attending: EMERGENCY MEDICINE
Payer: MEDICARE

## 2018-07-02 ENCOUNTER — PATIENT OUTREACH (OUTPATIENT)
Dept: CARE COORDINATION | Facility: CLINIC | Age: 43
End: 2018-07-02

## 2018-07-02 VITALS
DIASTOLIC BLOOD PRESSURE: 95 MMHG | RESPIRATION RATE: 16 BRPM | HEIGHT: 68 IN | SYSTOLIC BLOOD PRESSURE: 158 MMHG | WEIGHT: 145 LBS | HEART RATE: 71 BPM | BODY MASS INDEX: 21.98 KG/M2 | OXYGEN SATURATION: 99 % | TEMPERATURE: 100.4 F

## 2018-07-02 DIAGNOSIS — R10.84 ABDOMINAL PAIN, GENERALIZED: Primary | ICD-10-CM

## 2018-07-02 PROCEDURE — 25000132 ZZH RX MED GY IP 250 OP 250 PS 637: Mod: GY | Performed by: EMERGENCY MEDICINE

## 2018-07-02 PROCEDURE — 25000125 ZZHC RX 250: Performed by: EMERGENCY MEDICINE

## 2018-07-02 PROCEDURE — A9270 NON-COVERED ITEM OR SERVICE: HCPCS | Mod: GY | Performed by: EMERGENCY MEDICINE

## 2018-07-02 PROCEDURE — 74177 CT ABD & PELVIS W/CONTRAST: CPT

## 2018-07-02 PROCEDURE — 25000128 H RX IP 250 OP 636: Performed by: EMERGENCY MEDICINE

## 2018-07-02 RX ORDER — IOPAMIDOL 755 MG/ML
88 INJECTION, SOLUTION INTRAVASCULAR ONCE
Status: COMPLETED | OUTPATIENT
Start: 2018-07-02 | End: 2018-07-02

## 2018-07-02 RX ORDER — ONDANSETRON 4 MG/1
4 TABLET, ORALLY DISINTEGRATING ORAL EVERY 8 HOURS PRN
Qty: 10 TABLET | Refills: 0 | Status: SHIPPED | OUTPATIENT
Start: 2018-07-02 | End: 2019-02-21

## 2018-07-02 RX ORDER — OXYCODONE HYDROCHLORIDE 5 MG/1
10 TABLET ORAL ONCE
Status: COMPLETED | OUTPATIENT
Start: 2018-07-02 | End: 2018-07-02

## 2018-07-02 RX ADMIN — IOPAMIDOL 88 ML: 755 INJECTION, SOLUTION INTRAVENOUS at 01:09

## 2018-07-02 RX ADMIN — SODIUM CHLORIDE, PRESERVATIVE FREE 72 ML: 5 INJECTION INTRAVENOUS at 01:09

## 2018-07-02 RX ADMIN — OXYCODONE HYDROCHLORIDE 10 MG: 5 TABLET ORAL at 00:27

## 2018-07-02 NOTE — PROGRESS NOTES
Clinic Care Coordination Contact  [facility]/Voicemail    Referral Source: ED Follow-Up  Clinical Data: Care Coordinator Outreach  Outreach attempted x 1.  Left message on voicemail with call back information and requested return call.  Plan: Care Coordinator will mail out care coordination introduction letter with care coordinator contact information and explanation of care coordination services. Care Coordinator will try to reach patient again in 1-2 business days.  Rosie Mcdaniel R.N.  Clinic Care Coordinator  [facility]  [phone]

## 2018-07-02 NOTE — ED TRIAGE NOTES
Pt BIBA complaining of nausea, vomiting and diarrhea since this morning. EMS gave zofran 4mg IV in route.

## 2018-07-02 NOTE — LETTER
Health Care Home - Access Care Plan    About Me  Patient Name:  iRa Nj    YOB: 1975  Age:                             42 year old   [facility] MRN:            [id number] Telephone Information:     Home Phone [phone]   Mobile [phone]       [address]    [address] [facility] [address] Email address:  No e-mail address on record      Emergency Contact(s)  Name Relationship Lgl Grd Work Phone Home Phone Mobile Phone   SALVADOR RODRIGUEZ Relative   [phone]              Health Maintenance: Routine Health maintenance Reviewed: Due/Overdue     My Access Plan  Medical Emergency 911   Questions or concerns during clinic hours Primary Clinic Line, I will call the clinic directly: [facility] [phone]   24 Hour Appointment Line [phone] or  [phone]) (toll free)   24 Hour Nurse Line [phone] (toll free)   Questions or concerns outside clinic hours 24 Hour Appointment Line, I will call the after-hours on-call line:   [facility] [phone] or [phone]) (toll-free)   Preferred Urgent Care     Preferred Hospital [facility]  [phone]   Preferred Pharmacy [facility] [place] [address] AT [address]     Behavioral Health Crisis Line The National Suicide Prevention Lifeline at 1-795.139.3629 or 911     My Care Team Members  Patient Care Team       Relationship Specialty Notifications Start End    Wegener, Joel Daniel Irwin, MD PCP - General Family Practice  3/26/18     Phone: [phone] Fax: [phone]         [address] [facility] [address]    Selene Stallings, RN Lead Care Coordinator Primary Care - [facility] Admissions 7/2/18     Phone: [phone] Fax: [phone]               My Medical and Care Information  Problem List   Patient Active Problem List   Diagnosis     CARDIOVASCULAR SCREENING; LDL  GOAL LESS THAN 160     Lumbar disc herniation with myelopathy     Chronic low back pain     Bee sting-induced anaphylaxis     Generalized anxiety disorder     Tobacco use disorder     Moderate major depression (H)     Continuous opioid dependence (H)     Cervicalgia     Chronic pain syndrome      Current Medications and Allergies:  See printed Medication Report

## 2018-07-02 NOTE — LETTER
[facility] CARE COORDINATION    July 2, 2018    Ria Nj  [address]  [facility] [address]      Dear Ria,    I am a clinic care coordinator who works with Joel Daniel Wegener, MD at [facility].  I wanted to introduce myself and provide you with my contact information so that you can call me with questions or concerns about your health care. Below is a description of clinic care coordination and how I can further assist you.     The clinic care coordinator is a registered nurse and/or  who understand the health care system. The goal of clinic care coordination is to help you manage your health and improve access to the [facility] system in the most efficient manner. The registered nurse can assist you in meeting your health care goals by providing education, coordinating services, and strengthening the communication among your providers. The  can assist you with financial, behavioral, psychosocial, chemical dependency, counseling, and/or psychiatric resources.    Please feel free to contact me at [phone], with any questions or concerns. We at [facility] are focused on providing you with the highest-quality healthcare experience possible and that all starts with you.     Sincerely,     Roise Mcdaniel    Enclosed: I have enclosed a copy of a 24 Hour Access Plan. This has helpful phone numbers for you to call when needed. Please keep this in an easy to access place to use as needed.

## 2018-07-02 NOTE — DISCHARGE INSTRUCTIONS
Please make an appointment to follow up with Your Primary Care Provider in 2-4 days even if entirely better.  Your CT abd/pelvis is normal.         Abdominal Pain    Abdominal pain is pain in the stomach or belly area. Everyone has this pain from time to time. In many cases it goes away on its own. But abdominal pain can sometimes be due to a serious problem, such as appendicitis. So it s important to know when to seek help.  Causes of abdominal pain  There are many possible causes of abdominal pain. Common causes in adults include:    Constipation, diarrhea, or gas    Stomach acid flowing back up into the esophagus (acid reflux or heartburn)    Severe acid reflux, called GERD (gastroesophageal reflux disease)    A sore in the lining of the stomach or small intestine (peptic ulcer)    Inflammation of the gallbladder, liver, or pancreas    Gallstones or kidney stones    Appendicitis     Intestinal blockage     An internal organ pushing through a muscle or other tissue (hernia)    Urinary tract infections    In women, menstrual cramps, fibroids, or endometriosis    Inflammation or infection of the intestines  Diagnosing the cause of abdominal pain  Your healthcare provider will do a physical exam help find the cause of your pain. If needed, tests will be ordered. Belly pain has many possible causes. So it can be hard to find the reason for your pain. Giving details about your pain can help. Tell your provider where and when you feel the pain, and what makes it better or worse. Also let your provider know if you have other symptoms such as:    Fever    Tiredness    Upset stomach (nausea)    Vomiting    Changes in bathroom habits  Treating abdominal pain  Some causes of pain need emergency medical treatment right away. These include appendicitis or a bowel blockage. Other problems can be treated with rest, fluids, or medicines. Your healthcare provider can give you specific instructions for treatment or self-care  based on what is causing your pain.  If you have vomiting or diarrhea, sip water or other clear fluids. When you are ready to eat solid foods again, start with small amounts of easy-to-digest, low-fat foods. These include apple sauce, toast, or crackers.   When to seek medical care  Call 911 or go to the hospital right away if you:    Can t pass stool and are vomiting    Are vomiting blood or have bloody diarrhea or black, tarry diarrhea    Have chest, neck, or shoulder pain    Feel like you might pass out    Have pain in your shoulder blades with nausea    Have sudden, severe belly pain    Have new, severe pain unlike any you have felt before    Have a belly that is rigid, hard, and tender to touch  Call your healthcare provider if you have:    Pain for more than 5 days    Bloating for more than 2 days    Diarrhea for more than 5 days    A fever of 100.4 F (38 C) or higher, or as directed by your healthcare provider    Pain that gets worse    Weight loss for no reason    Continued lack of appetite    Blood in your stool  How to prevent abdominal pain  Here are some tips to help prevent abdominal pain:    Eat smaller amounts of food at one time.    Avoid greasy, fried, or other high-fat foods.    Avoid foods that give you gas.    Exercise regularly.    Drink plenty of fluids.  To help prevent GERD symptoms:    Quit smoking.    Reduce alcohol and certain foods that increase stomach acid.    Avoid aspirin and over-the-counter pain and fever medicines (NSAIDS or nonsteroidal anti-inflammatory drugs), if possible    Lose extra weight.    Finish eating at least 2 hours before you go to bed or lie down.    Raise the head of your bed.  Date Last Reviewed: 7/1/2016 2000-2017 [employer]. [address] [place] [address], [place] [address]. All rights reserved. This information is not intended as a substitute for professional medical care. Always follow your healthcare professional's instructions.

## 2018-07-02 NOTE — ED PROVIDER NOTES
History     Chief Complaint   Patient presents with     Nausea, Vomiting, & Diarrhea     HPI  Ria Nj is a 42 year old female with history of chronic low back pain (managed on Oxycontin 40 mg PRN and Percocet one tablet TID/QID), depression, and MICHAEL who presents for evaluation of severe nausea, diarrhea, and lower abdominal pain. Patient reports sudden onset of her symptoms yesterday and denies a history of similar symptoms. She states she has had >10 episodes of both diarrhea and vomiting today. Denies melena or hematemesis. Patient also complains of chills and diaphoresis. She reports she has not yet taken anything for nausea today but did try taking some oxycodone, however unable to keep it down. Patient states she also tried taking an old prescription of Tramadol. Per patient report, her chronic back pain was due to a complicated delivery. Reports a surgical history of ovarian cyst removal, per chart review, 2x in 2004 & 2007.    Past Medical History:   Diagnosis Date     Adjustment disorder with anxiety 10/7/2010     Bee sting-induced anaphylaxis      CARDIOVASCULAR SCREENING; LDL GOAL LESS THAN 160 5/9/2010     Chronic low back pain     has narcotic agreement on file     MICHAEL (generalised anxiety disorder)      Lumbar disc herniation with myelopathy 10/28/2010     Moderate major depression (H)      Tobacco use disorder     Smokes a 1/2 ppd. Started         Past Surgical History:   Procedure Laterality Date     OVARY SURGERY N/A 2004    Ovarian cyst removed.      SURGICAL HISTORY OF -   2007    ovarian cyst removal        Family History   Problem Relation Age of Onset     Cancer Maternal Grandmother      Cancer Maternal Grandfather      Cancer Paternal Grandmother      Cancer Paternal Grandfather        Social History   Substance Use Topics     Smoking status: Current Every Day Smoker     Packs/day: 2.00     Years: 15.00     Types: Cigarettes     Smokeless tobacco: Never Used     Alcohol use No  "      No current facility-administered medications for this encounter.      Current Outpatient Prescriptions   Medication     albuterol (2.5 MG/3ML) 0.083% neb solution     albuterol (PROAIR HFA, PROVENTIL HFA, VENTOLIN HFA) 108 (90 BASE) MCG/ACT inhaler     calcium carbonate 500 MG tablet     Cholecalciferol (VITAMIN D) 2000 UNITS tablet     cloNIDine (CATAPRES) 0.1 MG tablet     EPINEPHrine (EPIPEN) 0.3 MG/0.3ML injection     gabapentin (NEURONTIN) 300 MG capsule     IBUPROFEN 200 MG OR TABS     LORazepam (ATIVAN) 1 MG tablet     Magnesium 250 MG tablet     naloxone (NARCAN) nasal spray     nicotine (NICODERM CQ) 21 MG/24HR 24 hr patch     Omega-3 Fatty Acids (FISH OIL CONCENTRATE PO)     [START ON 8/14/2018] oxyCODONE (OXYCONTIN) 40 MG 12 hr tablet     [START ON 8/14/2018] oxyCODONE-acetaminophen (PERCOCET)  MG per tablet     tiZANidine (ZANAFLEX) 4 MG tablet        Allergies   Allergen Reactions     Bee Venom          I have reviewed the Medications, Allergies, Past Medical and Surgical History, and Social History in the Epic system.    Review of Systems   Gastrointestinal: Positive for abdominal pain (lower), diarrhea and vomiting. Negative for blood in stool.   All other systems reviewed and are negative.      Physical Exam   BP: 146/87  Pulse: 76  Temp: 100.5  F (38.1  C)  Resp: 16  Height: 172.7 cm (5' 8\")  Weight: 65.8 kg (145 lb)  SpO2: 98 %      Physical Exam   Constitutional: She is oriented to person, place, and time.   Crunched in bed; tearful; holding empty vomit bag; mildly disheveled    HENT:   Head: Normocephalic and atraumatic.   Neck: Normal range of motion. Neck supple.   Cardiovascular: Normal rate, regular rhythm and normal heart sounds.    Pulmonary/Chest: Effort normal and breath sounds normal. No respiratory distress.   Abdominal: Soft. She exhibits no distension. There is no tenderness. There is no rebound.   No tenderness to deep palpation    Musculoskeletal: She exhibits no edema, " tenderness or deformity.   Neurological: She is alert and oriented to person, place, and time.   Skin: Skin is warm and dry.   Psychiatric: She has a normal mood and affect. Her behavior is normal. Thought content normal.   Mildly anxious        ED Course     ED Course     Procedures       9:48 PM  The patient was seen and examined by Dr. Mar in Room 3.          Critical Care time:  none         Results for orders placed or performed during the hospital encounter of 07/01/18   CBC with platelets differential   Result Value Ref Range    WBC 12.9 (H) 4.0 - 11.0 10e9/L    RBC Count 4.68 3.8 - 5.2 10e12/L    Hemoglobin 15.6 11.7 - 15.7 g/dL    Hematocrit 45.6 35.0 - 47.0 %    MCV 97 78 - 100 fl    MCH 33.3 (H) 26.5 - 33.0 pg    MCHC 34.2 31.5 - 36.5 g/dL    RDW 13.1 10.0 - 15.0 %    Platelet Count 321 150 - 450 10e9/L    Diff Method Automated Method     % Neutrophils 81.1 %    % Lymphocytes 12.2 %    % Monocytes 6.3 %    % Eosinophils 0.0 %    % Basophils 0.2 %    % Immature Granulocytes 0.2 %    Nucleated RBCs 0 0 /100    Absolute Neutrophil 10.5 (H) 1.6 - 8.3 10e9/L    Absolute Lymphocytes 1.6 0.8 - 5.3 10e9/L    Absolute Monocytes 0.8 0.0 - 1.3 10e9/L    Absolute Eosinophils 0.0 0.0 - 0.7 10e9/L    Absolute Basophils 0.0 0.0 - 0.2 10e9/L    Abs Immature Granulocytes 0.0 0 - 0.4 10e9/L    Absolute Nucleated RBC 0.0    Comprehensive metabolic panel   Result Value Ref Range    Sodium 142 133 - 144 mmol/L    Potassium 3.8 3.4 - 5.3 mmol/L    Chloride 110 (H) 94 - 109 mmol/L    Carbon Dioxide 20 20 - 32 mmol/L    Anion Gap 12 3 - 14 mmol/L    Glucose 94 70 - 99 mg/dL    Urea Nitrogen 13 7 - 30 mg/dL    Creatinine 0.61 0.52 - 1.04 mg/dL    GFR Estimate >90 >60 mL/min/1.7m2    GFR Estimate If Black >90 >60 mL/min/1.7m2    Calcium 9.7 8.5 - 10.1 mg/dL    Bilirubin Total 0.4 0.2 - 1.3 mg/dL    Albumin 4.5 3.4 - 5.0 g/dL    Protein Total 8.4 6.8 - 8.8 g/dL    Alkaline Phosphatase 54 40 - 150 U/L    ALT 17 0 - 50 U/L    AST  10 0 - 45 U/L   Lipase   Result Value Ref Range    Lipase 209 73 - 393 U/L     Medications   0.9% sodium chloride BOLUS (0 mLs Intravenous Stopped 7/1/18 2302)     Followed by   0.9% sodium chloride BOLUS (0 mLs Intravenous Stopped 7/2/18 0028)     Followed by   sodium chloride 0.9% infusion (not administered)   ondansetron (ZOFRAN) injection 4 mg (4 mg Intravenous Given 7/1/18 2202)   LORazepam (ATIVAN) injection 0.5 mg (0.5 mg Intravenous Given 7/1/18 2202)   ketorolac (TORADOL) injection 30 mg (30 mg Intravenous Given 7/1/18 2202)   HYDROmorphone (PF) (DILAUDID) injection 0.5 mg (0.5 mg Intravenous Given 7/1/18 2303)   oxyCODONE IR (ROXICODONE) tablet 10 mg (10 mg Oral Given 7/2/18 0027)   iopamidol (ISOVUE-370) solution 88 mL (88 mLs Intravenous Given 7/2/18 0109)   sodium chloride 0.9 % bag 500mL for CT scan flush use (72 mLs Intravenous Given 7/2/18 0109)            Labs Ordered and Resulted from Time of ED Arrival Up to the Time of Departure from the ED - No data to display         Assessments & Plan (with Medical Decision Making)   Patient is a 42-year-old female with a history of chronic back pain who presents to the ER complaining about acute onset of vomiting and diarrhea and abdominal pain.  Per review of patient's medical chart patient recently was seen by her primary care doctor about 2 weeks prior and they have decreased her oral oxycodone from 150 tabs to 100 tabs for the month.  Also decreasing her long-acting OxyContin.  Patient also has symptoms of anxiety and chronic pain.  Patient symptoms here appear to be similar to possible withdrawal from narcotics.  Patient however says that she has never felt this way in the past.  Patient's abdomen remains soft with no signs of acute abdomen.  We obtain labs that show mild leukocytosis but otherwise stable electrolytes.  Patient received some fluids and 1 dose of IV Dilaudid.  She also received 2 L of fluid.  Patient had no vomiting or diarrhea during her  stay.  Patient was able to drink liquids here in the ER.  Due to patient feeling there is a new problem will obtain a CT abdomen and pelvis.  If this is negative patient will be discharged home with instruction to follow-up with her primary care doctor.  Did offer detox to the patient but she does not want to be detoxed off of her narcotics.    CT abd/pelvis shows no acute process.  Will d/c home.    I have reviewed the nursing notes.    I have reviewed the findings, diagnosis, plan and need for follow up with the patient.    New Prescriptions    No medications on file       Final diagnoses:   Chronic pain syndrome   Abdominal pain, generalized   I, Jose Cabral, am serving as a trained medical scribe to document services personally performed by Sujatha Mar MD, based on the provider's statements to me.   ISujatha MD, was physically present and have reviewed and verified the accuracy of this note documented by Jose Cabral.      7/1/2018   [facility], [place], EMERGENCY DEPARTMENT     Sujatha Mar MD  07/02/18 0159

## 2018-07-02 NOTE — ED NOTES
MD states it is okay to do the CT w/o pregnancy test confirmation if patient states there is no way she can be pregnant

## 2018-07-05 NOTE — PROGRESS NOTES
Clinic Care Coordination Contact  [facility]/Voicemail    Referral Source: ED Follow-Up  Clinical Data: Care Coordinator Outreach  Outreach attempted x 2.  Left message on voicemail with call back information and requested return call.  Plan: Care Coordinator will do no further outreaches at this time.    Rosie Mcdaniel R.N.  Clinic Care Coordinator  [facility]  [phone]

## 2018-07-16 DIAGNOSIS — M54.5 LOW BACK PAIN, UNSPECIFIED BACK PAIN LATERALITY, UNSPECIFIED CHRONICITY, WITH SCIATICA PRESENCE UNSPECIFIED: ICD-10-CM

## 2018-07-17 RX ORDER — GABAPENTIN 300 MG/1
CAPSULE ORAL
Qty: 270 CAPSULE | Refills: 3 | Status: SHIPPED | OUTPATIENT
Start: 2018-07-17 | End: 2019-05-29

## 2018-07-25 ENCOUNTER — OFFICE VISIT (OUTPATIENT)
Dept: PALLIATIVE MEDICINE | Facility: CLINIC | Age: 43
End: 2018-07-25
Payer: MEDICARE

## 2018-07-25 VITALS
BODY MASS INDEX: 25.09 KG/M2 | DIASTOLIC BLOOD PRESSURE: 78 MMHG | SYSTOLIC BLOOD PRESSURE: 122 MMHG | HEART RATE: 70 BPM | WEIGHT: 165 LBS | OXYGEN SATURATION: 100 % | RESPIRATION RATE: 16 BRPM

## 2018-07-25 DIAGNOSIS — Z72.0 TOBACCO ABUSE DISORDER: ICD-10-CM

## 2018-07-25 DIAGNOSIS — F43.21 GRIEF REACTION: ICD-10-CM

## 2018-07-25 DIAGNOSIS — G89.29 CHRONIC BILATERAL LOW BACK PAIN WITHOUT SCIATICA: ICD-10-CM

## 2018-07-25 DIAGNOSIS — M54.50 CHRONIC BILATERAL LOW BACK PAIN WITHOUT SCIATICA: ICD-10-CM

## 2018-07-25 DIAGNOSIS — F11.10 OPIOID ABUSE (H): Primary | ICD-10-CM

## 2018-07-25 DIAGNOSIS — F43.10 PTSD (POST-TRAUMATIC STRESS DISORDER): ICD-10-CM

## 2018-07-25 DIAGNOSIS — M51.06 LUMBAR DISC HERNIATION WITH MYELOPATHY: Primary | ICD-10-CM

## 2018-07-25 DIAGNOSIS — M51.06 LUMBAR DISC HERNIATION WITH MYELOPATHY: ICD-10-CM

## 2018-07-25 PROCEDURE — 99205 OFFICE O/P NEW HI 60 MIN: CPT | Performed by: NURSE PRACTITIONER

## 2018-07-25 PROCEDURE — 96150 HC HEALTH & BEHAVIOR ASSESS INITIAL, EA 15MIN: CPT | Performed by: PSYCHOLOGIST

## 2018-07-25 ASSESSMENT — PAIN SCALES - GENERAL: PAINLEVEL: MODERATE PAIN (5)

## 2018-07-25 NOTE — MR AVS SNAPSHOT
After Visit Summary   7/25/2018    Ria Nj    MRN: [id number]           Patient Information     Date Of Birth          1975        Visit Information        Provider Department      7/25/2018 1:00 PM Francis Arrington, PhD LP [facility] [place]        Today's Diagnoses     Lumbar disc herniation with myelopathy    -  1    Chronic bilateral low back pain without sciatica           Follow-ups after your visit        Your next 10 appointments already scheduled     Jul 30, 2018  1:20 PM CDT   Office Visit with Joel Daniel Irwin Wegener, MD   [facility]    [address] [place] [id number] [phone]           Bring a current list of meds and any records pertaining to this visit. For Physicals, please bring immunization records and any forms needing to be filled out. Please arrive 10 minutes early to complete paperwork.            Aug 28, 2018 10:00 AM CDT   New Visit with Scarlet Uribe MD   [facility] ([facility])    [address] [place] [address]  [address]  [facility] [address] [phone]              Who to contact     If you have questions or need follow up information about today's clinic visit or your schedule please contact [facility] directly at [phone].  Normal or non-critical lab and imaging results will be communicated to you by MyChart, letter or phone within 4 business days after the clinic has received the results. If you do not hear from us within 7 days, please contact the clinic through MyChart or phone. If you have a critical or abnormal lab result, we will notify you by phone as soon as possible.  Submit refill requests through [employer] or call your pharmacy and they will forward the refill request to us. Please allow 3 business days for your refill to be completed.          Additional Information About Your  Visit        Care EveryWhere ID     This is your Care EveryWhere ID. This could be used by other organizations to access your [facility] medical records  [id number]        Your Vitals Were     Last Period                   (LMP Unknown)            Blood Pressure from Last 3 Encounters:   07/25/18 122/78   07/02/18 (!) 158/95   06/12/18 127/87    Weight from Last 3 Encounters:   07/25/18 74.8 kg (165 lb)   07/01/18 65.8 kg (145 lb)   06/12/18 73 kg (161 lb)              We Performed the Following     HEALTH & BEHAVIOR ASSESS INITIAL, EA 15MIN        Primary Care Provider Office Phone # Fax #    Joel Daniel Irwin Wegener, [phone] [address] [place] [id number]        Equal Access to Services     MONO WATERS : Hadii aad ku hadasho Soomaali, waaxda luqadaha, qaybta kaalmada adeegyada, waxkatarina goel haymorgann edward bowling . So [facility] [phone].    ATENCIÓN: Si habla español, tiene a freire disposición servicios gratuitos de asistencia lingüística. Llame al [phone].    We comply with applicable federal civil rights laws and [place] laws. We do not discriminate on the basis of race, color, national origin, age, disability, sex, sexual orientation, or gender identity.            Thank you!     Thank you for choosing [facility]  for your care. Our goal is always to provide you with excellent care. Hearing back from our patients is one way we can continue to improve our services. Please take a few minutes to complete the written survey that you may receive in the mail after your visit with us. Thank you!             Your Updated Medication List - Protect others around you: Learn how to safely use, store and throw away your medicines at www.disposemymeds.org.          This list is accurate as of 7/25/18 11:59 PM.  Always use your most recent med list.                   Brand Name Dispense Instructions for use Diagnosis    * albuterol 108 (90 Base) MCG/ACT Inhaler     PROAIR HFA/PROVENTIL HFA/VENTOLIN HFA    1 Inhaler    Inhale 2 puffs into the lungs every 4 hours as needed for shortness of breath / dyspnea or wheezing    Acute bronchospasm       * albuterol (2.5 MG/3ML) 0.083% neb solution     30 vial    Take 1 vial (2.5 mg) by nebulization every 4 hours as needed for shortness of breath / dyspnea or wheezing    Acute bronchospasm       calcium carbonate 500 MG tablet    OS-ADOLFO 500 mg [place]. Ca    100 tablet    Take 1 tablet by mouth 2 times daily.    Routine general medical examination at a health care facility       cloNIDine 0.1 MG tablet    CATAPRES    4 tablet    Take 1 tablet (0.1 mg) by mouth 2 times daily        EPINEPHrine 0.3 MG/0.3ML injection 2-pack    EPIPEN/ADRENACLICK/or ANY BX GENERIC EQUIV    2 each    Inject 0.3 mLs (0.3 mg) into the muscle once as needed for anaphylaxis    Bee sting-induced anaphylaxis, accidental or unintentional, subsequent encounter       FISH OIL CONCENTRATE PO      Take 300 mg by mouth 3 times daily        * gabapentin 300 MG capsule    NEURONTIN    180 capsule    Take 2 capsules (600 mg) by mouth 3 times daily    Chronic bilateral low back pain without sciatica       * gabapentin 300 MG capsule    NEURONTIN    270 capsule    TAKE 1 CAPSULE BY MOUTH THREE TIMES DAILY    Low back pain, unspecified back pain laterality, unspecified chronicity, with sciatica presence unspecified       ibuprofen 200 MG tablet    ADVIL/MOTRIN     Pt is taking 4 TABLET EVERY 4 TO 6 HOURS AS NEEDED        LORazepam 1 MG tablet    ATIVAN    90 tablet    TAKE 1 TABLET BY MOUTH EVERY 8 HOURS AS NEEDED FOR ANXIETY    Generalized anxiety disorder       magnesium 250 MG tablet     100 tablet    Take 1 tablet by mouth daily.    Routine general medical examination at a health care facility       naloxone nasal spray    NARCAN    0.2 mL    Spray 1 spray (4 mg) into one nostril alternating nostrils as needed for opioid reversal every 2-3 minutes until assistance arrives     Continuous opioid dependence (H)       nicotine 21 MG/24HR 24 hr patch    NICODERM CQ    30 patch    Place 1 patch onto the skin every 24 hours    Tobacco use disorder       oxyCODONE 40 MG 12 hr tablet   Start taking on:  8/14/2018    OxyCONTIN    60 tablet    Take 1 tablet (40 mg) by mouth every 12 hours    Chronic bilateral low back pain without sciatica, Continuous opioid dependence (H), Chronic pain syndrome       oxyCODONE-acetaminophen  MG per tablet   Start taking on:  8/14/2018    PERCOCET    100 tablet    One tablet 3-4 times daily #100/month    Chronic bilateral low back pain without sciatica, Continuous opioid dependence (H), Chronic pain syndrome       tiZANidine 4 MG tablet    ZANAFLEX    270 tablet    TAKE ONE TABLET BY MOUTH THREE TIMES DAILY AS NEEDED FOR SHOULDER AND UPPER BACK PAIN    Chronic bilateral low back pain without sciatica       vitamin D 2000 units tablet     100 tablet    Take 2,000 Units by mouth daily    Major depressive disorder, recurrent episode, moderate (H)       * Notice:  This list has 4 medication(s) that are the same as other medications prescribed for you. Read the directions carefully, and ask your doctor or other care provider to review them with you.

## 2018-07-25 NOTE — PROGRESS NOTES
"Consult Date:  07/25/2018      CHIEF COMPLAINT:  The patient is presenting with low back pain on the right side particularly.  She reports constant miserable aching sensations.  She reports worsening of pain with weather changes and describes having symptoms of arthritis as well.  The trend in her pain is relatively constant.  She sees herself as needing medicine in order to be able to function.      PAIN HISTORY:  The patient reports that pain began about 8 years ago with the birth of her son.  She traces this to epidurals during labor that turned into back pain.  She reports that she also has a diagnosis of disk problems with degenerative disk disease.  Pain centers gave her injections and physical therapy, which were not helpful.  Chiropractic did not help.  She reports that medicine, particularly Percocet, works but that she was started on a high dose and has had to go through a difficult process of having medicine taken away.      IMPACT OF PAIN:  The patient is on disability.  She has worked as a  and  in the past.  She reports sleep disturbance with difficulty falling asleep and waking up with pain.  She manages self-care and says that she works around her limits.  She says she has to because of her son.  She does house tasks, but does them slowly.  She does bike ride and reports that is the one thing that makes a difference.  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      CURRENT MEDICATIONS:  Medicines include gabapentin 300 x3 as well as 40 mg of Percocet.  She reports taking tizanidine, but says that she stopped it because it did not help.  She does take fish oil and also takes over-the-counter ibuprofen, aspirin and Aleve.      The patient sees herself as being dealt an \"unlucky dish\" because of the controversy about medicine and the decrease in the pain control that she feels as part of " those changes.      LIFESTYLE PRACTICES:  The patient does not drink or use substance.  She acknowledges having done it in the days before her son was born.  She does not do a lot of physical activity and says that walking is particularly painful.  She is able to ride a bike and does it up to an hour and a half with her son.  She is a smoker and does not do organized deep breathing but understands it as a part of yoga stretches and some of those treatments.  She uses heat and ice, particularly ice frequently.  Her distractions are spending time with her son and she likes the bike riding as part of that.      PSYCHOSOCIAL HISTORY:  The patient was born in [facility].  She is the younger of 2 children.  Her father  when she was 12 and her mother was an alcoholic.  She was self-raised with the help of her grandparents who lived across the street.  She did satisfactorily in school, but says after her father  that she acted out more.  She did finish high school, but says that she went to work at age18 and describes her whole growing up time as a trauma.  The working led to being able to work as a  and she says that she did well at that.      She has not been .  She had a longterm boyfriend and he went through treatment, but then it was discovered that he had an enlarged heart and he .  She also reports that her best friend  in a car accident and that she has had 5 losses total in a relatively recent time which has made her emotionally isolated.  She describes God as her support.  She does maintain contact with Congregation and prayer.      Her stresses include the pain itself and the process of going through the adjustments and she sees herself as having unfathomable losses and says that she is not really relying on anyone particularly at this point because she is reliant on herself and feels that she has to be mobilized to be able to take care of her son.      EXAMINATION:  The patient  is presenting with depression features.  She reports that she blocks out the sadness because she cannot function with it.  She does not know how to talk about it and does not have anybody to talk to.  She does have some mood changes but denies suicidality.  She has gone through significant weight gain related to changes in eating habits.  She also reports having generalized anxiousness as well as frequent panicky feelings.  She reports that she can feel them coming and that she has learned how to ride with it some but continues to be bothered by it.  She has irritability, but it is related to pain.  She keeps it in control and is careful about the impact on her son.      She describes her process of going through being on medicine as kind of a rollercoaster and says that she would like to be able to go back to work but without pain control does not see that as being able to happen.  She is somewhat philosophical about her situation, but in a way that is more ironic.  She is not really a person who is oriented to therapy.  She has not done grief counseling and may not be able to significantly benefit from psychological care.  We talked a little bit about diversified pain management practice but she has a way of handling things on her own and sees herself as needing medicine for sufficient pain control to offset the disk situation.      DIAGNOSIS:   AXIS I:  Pain disorder with medical and psychological factors.  The patient presents with some features of underlying depression and loss as well as anxiety including panic attacks.      TREATMENT PLAN:  I would defer in recommending broad-scaled treatment plan because of the patient's attitude toward handling things on her own.  She might be a candidate for grief counseling or some work with behavioral relaxation and pain modulation depending on her situation and her willingness to participate.      FACILITY TIME:  60 minutes.         BILLIE MAC, PHD              D: 2018   T: 2018   MT: MARISELA      Name:     NIHARIKA SHERIDAN   MRN:      [id number]        Account:       [id number]   :      1975           Consult Date:  2018      Document: [id number]

## 2018-07-25 NOTE — PATIENT INSTRUCTIONS
After Visit Instructions:     Thank you for coming to [facility] for your care. It is my goal to partner with you to help you reach your optimal state of health.     NORIS Martel referring you to Dr Scarlet Uribe for consultation for Suboxone.     Continue daily self-care, identifying contributing factors, and monitoring variations in pain level. Continue to integrate self-care into your life.      ELEANOR Munson, NP-C  [facility]    Contact information: [facility]    Please call if any side effects, questions, or concerns arise.    Nurse Triage line:  [phone]   Call this number with any questions or concerns. You may leave a detailed message anytime. Calls are typically returned Monday through Friday between 8 AM and 4:30 PM. We usually get back to you within 2 business days depending on the issue/request.    Scheduling number: [phone].  Call this number to schedule or change appointments.          Medication Refills Policy:    For non-narcotic medications, please your pharmacy directly to request a refill and the pharmacy will call the [facility] for authorization. Please allow 3-4 days for these refills.    For narcotic refills, call the nurse triage line and leave a message requesting your refill along with the name of the pharmacy that you use. Narcotic prescriptions will be mailed to your pharmacy or you may pick them up at the clinic.  Please call 7-10 days before your refill is due  The above policy allows adequate time so that you do not run out of medication.    No Show - Late Cancellation - Late Arrival Policy  We believe regular attendance is key to your success in our program.    Any time you are unable to keep your appointment we ask that you call us at  least 24 hours in advance to let us know. This will allow us to offer the appointment time to another patient. The following is our  policy for missed appointments. This also applies to appointments cancelled with less than 24 hours notice.    You will receive a letter after missing your 1st and 2nd appointments without contacting the clinic before your scheduled appointment time.     After missing 3 appointments without calling first, we will cancel all of your future appointments at [facility].    At that point, you will not be able to resume services unless approved by your care team  We understand that unforseen circumstances arise, however, out of respect for all concerned and to provide this appointment to another patient, this policy will be enforced.    Please note that most follow up appointments are 30 minutes long. If you arrive late, your provider may not be able to see you for the entire 30 minutes. Please also note that if you arrive more than 15 minutes for any appointment, it may be rescheduled.
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
## 2018-07-25 NOTE — MR AVS SNAPSHOT
After Visit Summary   7/25/2018    Ria Nj    MRN: [id number]           Patient Information     Date Of Birth          1975        Visit Information        Provider Department      7/25/2018 2:00 PM Sumi Ga APRN CNP [facility] [place]        Today's Diagnoses     Tobacco abuse disorder    -  1    Opioid abuse          Care Instructions    After Visit Instructions:     Thank you for coming to [facility] for your care. It is my goal to partner with you to help you reach your optimal state of health.     NORIS Martel referring you to Dr Scarlet Uribe for consultation for Suboxone.     Continue daily self-care, identifying contributing factors, and monitoring variations in pain level. Continue to integrate self-care into your life.      ELEANOR Munson, NP-C  [facility]    Contact information: [facility]    Please call if any side effects, questions, or concerns arise.    Nurse Triage line:  [phone]   Call this number with any questions or concerns. You may leave a detailed message anytime. Calls are typically returned Monday through Friday between 8 AM and 4:30 PM. We usually get back to you within 2 business days depending on the issue/request.    Scheduling number: [phone].  Call this number to schedule or change appointments.          Medication Refills Policy:    For non-narcotic medications, please your pharmacy directly to request a refill and the pharmacy will call the [facility] for authorization. Please allow 3-4 days for these refills.    For narcotic refills, call the nurse triage line and leave a message requesting your refill along with the name of the pharmacy that you use. Narcotic prescriptions will be mailed to your pharmacy or you may pick them up at the clinic.  Please call 7-10 days before your refill is due  The above policy  allows adequate time so that you do not run out of medication.    No Show - Late Cancellation - Late Arrival Policy  We believe regular attendance is key to your success in our program.    Any time you are unable to keep your appointment we ask that you call us at  least 24 hours in advance to let us know. This will allow us to offer the appointment time to another patient. The following is our policy for missed appointments. This also applies to appointments cancelled with less than 24 hours notice.    You will receive a letter after missing your 1st and 2nd appointments without contacting the clinic before your scheduled appointment time.     After missing 3 appointments without calling first, we will cancel all of your future appointments at [facility].    At that point, you will not be able to resume services unless approved by your care team  We understand that unforseen circumstances arise, however, out of respect for all concerned and to provide this appointment to another patient, this policy will be enforced.    Please note that most follow up appointments are 30 minutes long. If you arrive late, your provider may not be able to see you for the entire 30 minutes. Please also note that if you arrive more than 15 minutes for any appointment, it may be rescheduled.                                     Follow-ups after your visit        Additional Services     ADDICTION MEDICINE REFERRAL       The Addiction Medicine Service is prepared to provide consultation for and, if necessary, ongoing care for patients with the disease of addiction, ages 16 and up.     For acute detox, please send your patient to the ER or contact [facility] at [phone].     Common problems that may warrant referral to the Addiction Medicine Service are:  Alcohol use disorder - diagnosis, treatment referral, acute and protracted withdrawal management, and ongoing medication assisted treatment  including Antabuse and Naltrexone.  Opioid Use Disorder - medication assisted treatment including Buprenorphine (Suboxone) or extended release Naltrexone (Vivitrol)  Benzodiazepine dependence - extended outpatient detoxification  Many other issues pertaining to addiction, relapse, and recovery    Please contact our scheduling staff at [phone] with any questions.    Referrals to the Addiction Medicine Service assume that the referring provider has discussed the referral with the patient.  Referral will be reviewed and if appropriate, the patient will be contacted to schedule appointment.  If not appropriate, the provider will be contacted with further information.    Please answer the following questions so we can better service your patient:    Drug of choice: opioids  Do they have a [facility] PCP:  Yes       Please bring the following to your appointment:  >>   List of current medications   >>   Any relevant history            CALL IT QUITS (QUITPLAN) REFERRAL       [place] [employer]AC[employer] QUITLINES FAX FORM  Fax form to: 1 [phone]    The clinic will facilitate the referral to the quitline.    Provider Information:  ===============================================================  ELEANOR Conryo CNP  ID#: [id number] - [facility]: [facility] [phone] Fax: [phone]   [email or web address]  Payor: MEDICARE / Plan: MEDICARE / Product Type: Medicare /   ===============================================================    The Public Health Service Guideline does not recommend providing over-the-counter nicotine replacement therapy products without physician authorization to patients with the following conditions: pregnancy, uncontrolled high blood pressure, or cardiovascular diseases.     I authorize the [place] Tobacco Quitlines to provide over-the-counter nicotine replacement products for the patient listed below if the patient's health plan benefits cover  NRT or if the patient is eligible for QUITPLAN services.    Patient Consented to:  ===============================================================  - YES - I am ready to quit tobacco and request the above information be given to the quitline so they may contact me.  I understand that one of [place]'s Tobacco Quitlines will inform my provider about my participation.  ===============================================================  Please check the BEST 3-hour call window for them to reach you: 11am - 2pm  May we leave a message?  YES  Language Preference:  English  Phone Number: Home Phone      [phone]  Mobile          [phone]     E-mail Address: No e-mail address on record    ========================================================================  FOR QUITLINE USE ONLY:  THIS INFORMATION WILL BE PROVIDED BACK TO THE PROVIDER  Contact date: __/ __/__ or ____ Did not reach after three attempts.    Outcome:  __ Enrolled in telephone counseling program  __ Declined  __ Not Reached    Stage of readiness: _______________________  Planned Quit Date: ___/ ___/ ___  Comments:      2011 [facility]   This message funded by [employer] [place], an independent licensee of the Blue Cross and Blue [employer] Association. Rev. 11/1/12                  Your next 10 appointments already scheduled     Jul 30, 2018  1:20 PM CDT   Office Visit with Joel Daniel Irwin Wegener, MD   [facility]    [address] [place] [id number] [phone]           Bring a current list of meds and any records pertaining to this visit. For Physicals, please bring immunization records and any forms needing to be filled out. Please arrive 10 minutes early to complete paperwork.              Who to contact     If you have questions or need follow up information about today's clinic visit or your schedule please contact [facility]  directly at [phone].  Normal or non-critical lab and imaging results will be communicated to you by MyChart, letter or phone within 4 business days after the clinic has received the results. If you do not hear from us within 7 days, please contact the clinic through MyChart or phone. If you have a critical or abnormal lab result, we will notify you by phone as soon as possible.  Submit refill requests through [email or web address] or call your pharmacy and they will forward the refill request to us. Please allow 3 business days for your refill to be completed.          Additional Information About Your Visit        Care EveryWhere ID     This is your Care EveryWhere ID. This could be used by other organizations to access your [facility] medical records  [id number]        Your Vitals Were     Pulse Respirations Last Period Pulse Oximetry BMI (Body Mass Index)       70 16 (LMP Unknown) 100% 25.09 kg/m2        Blood Pressure from Last 3 Encounters:   07/25/18 122/78   07/02/18 (!) 158/95   06/12/18 127/87    Weight from Last 3 Encounters:   07/25/18 74.8 kg (165 lb)   07/01/18 65.8 kg (145 lb)   06/12/18 73 kg (161 lb)              We Performed the Following     ADDICTION MEDICINE REFERRAL     CALL IT QUITS (QUITPLAN) REFERRAL        Primary Care Provider Office Phone # Fax #    Joel Daniel Irwin Wegener, [phone] [address]  [facility] [address]        Equal Access to Services     [facility] [place] : Hadii aad ku hadasho Soomaali, waaxda luqadaha, qaybta kaalmada adeegyada, toy goel haybooker bowling . So [facility] [phone].    ATENCIÓN: Si habla español, tiene a freire disposición servicios gratuitos de asistencia lingüística. Llame al [phone].    We comply with applicable federal civil rights laws and [place] laws. We do not discriminate on the basis of race, color, national origin, age, disability, sex, sexual orientation, or gender identity.            Thank you!     Thank you for  choosing [facility]  for your care. Our goal is always to provide you with excellent care. Hearing back from our patients is one way we can continue to improve our services. Please take a few minutes to complete the written survey that you may receive in the mail after your visit with us. Thank you!             Your Updated Medication List - Protect others around you: Learn how to safely use, store and throw away your medicines at www.disposemymeds.org.          This list is accurate as of 7/25/18  2:56 PM.  Always use your most recent med list.                   Brand Name Dispense Instructions for use Diagnosis    * albuterol 108 (90 Base) MCG/ACT Inhaler    PROAIR HFA/PROVENTIL HFA/VENTOLIN HFA    1 Inhaler    Inhale 2 puffs into the lungs every 4 hours as needed for shortness of breath / dyspnea or wheezing    Acute bronchospasm       * albuterol (2.5 MG/3ML) 0.083% neb solution     30 vial    Take 1 vial (2.5 mg) by nebulization every 4 hours as needed for shortness of breath / dyspnea or wheezing    Acute bronchospasm       calcium carbonate 500 MG tablet    OS-ADOLFO 500 mg [place]. Ca    100 tablet    Take 1 tablet by mouth 2 times daily.    Routine general medical examination at a health care facility       cloNIDine 0.1 MG tablet    CATAPRES    4 tablet    Take 1 tablet (0.1 mg) by mouth 2 times daily        EPINEPHrine 0.3 MG/0.3ML injection 2-pack    EPIPEN/ADRENACLICK/or ANY BX GENERIC EQUIV    2 each    Inject 0.3 mLs (0.3 mg) into the muscle once as needed for anaphylaxis    Bee sting-induced anaphylaxis, accidental or unintentional, subsequent encounter       FISH OIL CONCENTRATE PO      Take 300 mg by mouth 3 times daily        * gabapentin 300 MG capsule    NEURONTIN    180 capsule    Take 2 capsules (600 mg) by mouth 3 times daily    Chronic bilateral low back pain without sciatica       * gabapentin 300 MG capsule    NEURONTIN    270 capsule    TAKE 1 CAPSULE BY MOUTH THREE TIMES  DAILY    Low back pain, unspecified back pain laterality, unspecified chronicity, with sciatica presence unspecified       ibuprofen 200 MG tablet    ADVIL/MOTRIN     Pt is taking 4 TABLET EVERY 4 TO 6 HOURS AS NEEDED        LORazepam 1 MG tablet    ATIVAN    90 tablet    TAKE 1 TABLET BY MOUTH EVERY 8 HOURS AS NEEDED FOR ANXIETY    Generalized anxiety disorder       magnesium 250 MG tablet     100 tablet    Take 1 tablet by mouth daily.    Routine general medical examination at a health care facility       naloxone nasal spray    NARCAN    0.2 mL    Spray 1 spray (4 mg) into one nostril alternating nostrils as needed for opioid reversal every 2-3 minutes until assistance arrives    Continuous opioid dependence (H)       nicotine 21 MG/24HR 24 hr patch    NICODERM CQ    30 patch    Place 1 patch onto the skin every 24 hours    Tobacco use disorder       oxyCODONE 40 MG 12 hr tablet   Start taking on:  8/14/2018    OxyCONTIN    60 tablet    Take 1 tablet (40 mg) by mouth every 12 hours    Chronic bilateral low back pain without sciatica, Continuous opioid dependence (H), Chronic pain syndrome       oxyCODONE-acetaminophen  MG per tablet   Start taking on:  8/14/2018    PERCOCET    100 tablet    One tablet 3-4 times daily #100/month    Chronic bilateral low back pain without sciatica, Continuous opioid dependence (H), Chronic pain syndrome       tiZANidine 4 MG tablet    ZANAFLEX    270 tablet    TAKE ONE TABLET BY MOUTH THREE TIMES DAILY AS NEEDED FOR SHOULDER AND UPPER BACK PAIN    Chronic bilateral low back pain without sciatica       vitamin D 2000 units tablet     100 tablet    Take 2,000 Units by mouth daily    Major depressive disorder, recurrent episode, moderate (H)       * Notice:  This list has 4 medication(s) that are the same as other medications prescribed for you. Read the directions carefully, and ask your doctor or other care provider to review them with you.

## 2018-07-26 ENCOUNTER — TELEPHONE (OUTPATIENT)
Dept: ADDICTION MEDICINE | Facility: CLINIC | Age: 43
End: 2018-07-26

## 2018-07-26 NOTE — TELEPHONE ENCOUNTER
----- Message from Riki Gallagher sent at 7/26/2018  9:11 AM CDT -----  Regarding: Addiction Med Referral   Please review.   Route back to reception pool # [id number]

## 2018-07-30 ENCOUNTER — OFFICE VISIT (OUTPATIENT)
Dept: FAMILY MEDICINE | Facility: CLINIC | Age: 43
End: 2018-07-30
Payer: MEDICARE

## 2018-07-30 VITALS
RESPIRATION RATE: 20 BRPM | SYSTOLIC BLOOD PRESSURE: 128 MMHG | HEIGHT: 68 IN | HEART RATE: 110 BPM | BODY MASS INDEX: 24.71 KG/M2 | OXYGEN SATURATION: 97 % | TEMPERATURE: 98.4 F | WEIGHT: 163 LBS | DIASTOLIC BLOOD PRESSURE: 89 MMHG

## 2018-07-30 DIAGNOSIS — F41.1 GENERALIZED ANXIETY DISORDER: Primary | ICD-10-CM

## 2018-07-30 DIAGNOSIS — F32.1 MODERATE MAJOR DEPRESSION (H): ICD-10-CM

## 2018-07-30 DIAGNOSIS — G89.4 CHRONIC PAIN SYNDROME: ICD-10-CM

## 2018-07-30 PROCEDURE — 99215 OFFICE O/P EST HI 40 MIN: CPT | Performed by: FAMILY MEDICINE

## 2018-07-30 RX ORDER — DESVENLAFAXINE 25 MG/1
25 TABLET, EXTENDED RELEASE ORAL DAILY
Qty: 14 TABLET | Refills: 0 | Status: SHIPPED | OUTPATIENT
Start: 2018-07-30 | End: 2018-09-05

## 2018-07-30 NOTE — PROGRESS NOTES
"  SUBJECTIVE:   Ria Nj is a 43 year old female who presents to clinic today for the following health issues:      Patient is here for gentic testing follow up.          Generalized anxiety disorder  Moderate major depression (H): wanting to review [employer] testing.  Surprised cost $5000 .  But also did pain med panel.  Here to review mental health panel with goal of better treating anxiety and depression.  Only answer to questions regarding past use of anxiety/depression medications is that she has \"tried them all\" and either \"did not work\" or \"horrible side effects.\"  Specifically does remember having bad side effects (dizziness, nausea, anxiety) with both fluoxetine and venlafaxine which are both in category of potential significant gene/drug interactions.      We reviewed the remainder of the list and she recognizes amitriptyline, wellbutrin, paxil and remeron and feels had side effects from all but cannot say exactly what.       Chronic pain syndrome :saw seem by Sumi Ga CNP in pain clinic and also by a psychologist Francis Arrington.  It appears per her own volition suggested suboxone and has appointment scheduled with Leonor Uribe for this. She fears that if she tries the suboxone and it fails that she will be seen as being addicted to opiods and she would be left without pain management/pain pills.      Very convinced that the higher doses of opiods were working well for her and doesn't see why we don't just increase them back again.     Interested in medical marijuna and asks if I can certify her.     Wonders if should just try regular marijuana.         Problem list, Medication list, Allergies, and Medical/Social/Surgical histories reviewed in [facility] and updated as appropriate.  Labs reviewed in EPIC  BP Readings from Last 3 Encounters:   07/30/18 128/89   07/25/18 122/78   07/02/18 (!) 158/95    Wt Readings from Last 3 Encounters:   07/30/18 163 lb (73.9 kg)   07/25/18 165 lb (74.8 kg) "   07/01/18 145 lb (65.8 kg)                  Patient Active Problem List   Diagnosis     CARDIOVASCULAR SCREENING; LDL GOAL LESS THAN 160     Lumbar disc herniation with myelopathy     Chronic low back pain     Bee sting-induced anaphylaxis     Generalized anxiety disorder     Tobacco use disorder     Moderate major depression (H)     Continuous opioid dependence (H)     Cervicalgia     Chronic pain syndrome     Past Surgical History:   Procedure Laterality Date     OVARY SURGERY N/A 2004    Ovarian cyst removed.      SURGICAL HISTORY OF -   2007    ovarian cyst removal        Social History   Substance Use Topics     Smoking status: Current Every Day Smoker     Packs/day: 2.00     Years: 15.00     Types: Cigarettes     Smokeless tobacco: Never Used     Alcohol use No     Family History   Problem Relation Age of Onset     Cancer Maternal Grandmother      Cancer Maternal Grandfather      Cancer Paternal Grandmother      Cancer Paternal Grandfather          Current Outpatient Prescriptions   Medication Sig Dispense Refill     albuterol (2.5 MG/3ML) 0.083% neb solution Take 1 vial (2.5 mg) by nebulization every 4 hours as needed for shortness of breath / dyspnea or wheezing 30 vial 11     albuterol (PROAIR HFA, PROVENTIL HFA, VENTOLIN HFA) 108 (90 BASE) MCG/ACT inhaler Inhale 2 puffs into the lungs every 4 hours as needed for shortness of breath / dyspnea or wheezing 1 Inhaler 0     calcium carbonate 500 MG tablet Take 1 tablet by mouth 2 times daily. 100 tablet 3     Cholecalciferol (VITAMIN D) 2000 UNITS tablet Take 2,000 Units by mouth daily 100 tablet 3     cloNIDine (CATAPRES) 0.1 MG tablet Take 1 tablet (0.1 mg) by mouth 2 times daily 4 tablet 0     desvenlafaxine succinate (PRISTIQ) 25 MG 24 hr tablet Take 1 tablet (25 mg) by mouth daily for 14 days 14 tablet 0     EPINEPHrine (EPIPEN) 0.3 MG/0.3ML injection Inject 0.3 mLs (0.3 mg) into the muscle once as needed for anaphylaxis 2 each 1     gabapentin  "(NEURONTIN) 300 MG capsule TAKE 1 CAPSULE BY MOUTH THREE TIMES DAILY 270 capsule 3     gabapentin (NEURONTIN) 300 MG capsule Take 2 capsules (600 mg) by mouth 3 times daily 180 capsule 11     IBUPROFEN 200 MG OR TABS Pt is taking 4 TABLET EVERY 4 TO 6 HOURS AS NEEDED       LORazepam (ATIVAN) 1 MG tablet TAKE 1 TABLET BY MOUTH EVERY 8 HOURS AS NEEDED FOR ANXIETY 90 tablet 2     Magnesium 250 MG tablet Take 1 tablet by mouth daily. 100 tablet 3     naloxone (NARCAN) nasal spray Spray 1 spray (4 mg) into one nostril alternating nostrils as needed for opioid reversal every 2-3 minutes until assistance arrives 0.2 mL 0     nicotine (NICODERM CQ) 21 MG/24HR 24 hr patch Place 1 patch onto the skin every 24 hours 30 patch 0     Omega-3 Fatty Acids (FISH OIL CONCENTRATE PO) Take 300 mg by mouth 3 times daily       [START ON 8/14/2018] oxyCODONE (OXYCONTIN) 40 MG 12 hr tablet Take 1 tablet (40 mg) by mouth every 12 hours 60 tablet 0     [START ON 8/14/2018] oxyCODONE-acetaminophen (PERCOCET)  MG per tablet One tablet 3-4 times daily #100/month 100 tablet 0     tiZANidine (ZANAFLEX) 4 MG tablet TAKE ONE TABLET BY MOUTH THREE TIMES DAILY AS NEEDED FOR SHOULDER AND UPPER BACK PAIN 270 tablet 0     Allergies   Allergen Reactions     Bee Venom      Recent Labs   Lab Test  07/01/18   2132  10/17/17   1122   ALT  17  16   CR  0.61  0.80   GFRESTIMATED  >90  79   GFRESTBLACK  >90  >90   POTASSIUM  3.8  4.2        ROS:  Constitutional, HEENT, cardiovascular, pulmonary, GI, , musculoskeletal, neuro, skin, endocrine and psych systems are negative, except as otherwise noted.        OBJECTIVE:  /89 (BP Location: Right arm, Patient Position: Sitting, Cuff Size: Adult Regular)  Pulse 110  Temp 98.4  F (36.9  C) (Oral)  Resp 20  Ht 5' 8\" (1.727 m)  Wt 163 lb (73.9 kg)  LMP  (LMP Unknown)  SpO2 97%  BMI 24.78 kg/m2    EXAM:  GENERAL APPEARANCE: healthy, alert and no distress  Fairly intense eye contact today.   Not " crying today however.  Does appear slightly anxious.       ASSESSMENT AND PLAN  1. Generalized anxiety disorder  And   - desvenlafaxine succinate (PRISTIQ) 25 MG 24 hr tablet; Take 1 tablet (25 mg) by mouth daily for 14 days  Dispense: 14 tablet; Refill: 0    2. Moderate major depression (H)  Reviewed [employer] testing and best possibilities.  I do feel a controlled anxiety/depression medication is appropriate.     Will start desvenlafaxine at low dose. I believe there may also be some pain relief effect from this medication.      Discussed CDC guidelines for opiod dosing and concurrent use of lorazepam and goal to decrease dose and eliminate lorazepam.  She would prefer to stay on pain medication over lorazepam and agreed to stop lorazepam.     I recommended decreasing lorazepam to twice daily for now to avoid withdrawal.     My understanding during our visit was she was continuing pain psychology but I see now after review those notes further that it isn't clear if she will be continuing with psychotherapy or comprehensive pain management. We can discuss more at the next visit.       - desvenlafaxine succinate (PRISTIQ) 25 MG 24 hr tablet; Take 1 tablet (25 mg) by mouth daily for 14 days  Dispense: 14 tablet; Refill: 0    3. Chronic pain syndrome - in high risk overdose cdc category with concurrent benzo use.     Follow up with me scheduled August 13th for her next pain medication/opiod refills and lorazepam. My impression is she is still very focused on medications only to solve her pain, is looking for a cure/fix, has emotional trauma which has not been addressed and has not engaged substantially enough in non-opiod self care such as psychotherapy or physical therapy.  Discussed concept of pain starting in the body which has been injured and then modulated/amplified by brain.  She voiced some understanding of this.      I will discuss with Sumi Ga and Leonor Uribe appropriateness of medical marijuana.     I  did clarify with her that use of illicit marijuana is not allowed and would preclude prescribing of controlled substances.     I spent greater than 1/2 of a 55 minute face to face encounter counseling regarding the rational for the assessment and plan noted above.     Joel Wegener,MD

## 2018-07-30 NOTE — MR AVS SNAPSHOT
After Visit Summary   7/30/2018    Ria Nj    MRN: [id number]           Patient Information     Date Of Birth          1975        Visit Information        Provider Department      7/30/2018 1:20 PM Wegener, Joel Daniel Irwin, MD [facility]        Care Instructions    Start pristiq.     Decrease lorazepam to twice daily from three times daily.     Follow up with me 3:20 august 13th and will continue opiod wean.     I will discuss medical marijuana with pain clinic and we can discuss at the next visit.       Seeking suboxone , has appointment with Dr. Uribe addiction med coming up mid august.     Continue to engage in pain psychotherapy.               Follow-ups after your visit        Your next 10 appointments already scheduled     Aug 28, 2018 10:00 AM CDT   New Visit with Scarlet Uribe MD   [facility] ([facility])    [address] [place] [address]  [address]  [facility] [address] [phone]              Who to contact     If you have questions or need follow up information about today's clinic visit or your schedule please contact [facility] directly at [phone].  Normal or non-critical lab and imaging results will be communicated to you by MyChart, letter or phone within 4 business days after the clinic has received the results. If you do not hear from us within 7 days, please contact the clinic through MyChart or phone. If you have a critical or abnormal lab result, we will notify you by phone as soon as possible.  Submit refill requests through [employer] or call your pharmacy and they will forward the refill request to us. Please allow 3 business days for your refill to be completed.          Additional Information About Your Visit        Care EveryWhere ID     This is your Care EveryWhere ID. This could be used by other organizations to access your [place] medical records  [id number]       "  Your Vitals Were     Pulse Temperature Respirations Height Last Period Pulse Oximetry    110 98.4  F (36.9  C) (Oral) 20 5' 8\" (1.727 m) (LMP Unknown) 97%    BMI (Body Mass Index)                   24.78 kg/m2            Blood Pressure from Last 3 Encounters:   07/30/18 128/89   07/25/18 122/78   07/02/18 (!) 158/95    Weight from Last 3 Encounters:   07/30/18 163 lb (73.9 kg)   07/25/18 165 lb (74.8 kg)   07/01/18 145 lb (65.8 kg)              Today, you had the following     No orders found for display       Primary Care Provider Office Phone # Fax #    Joel Daniel Irwin Wegener, [phone] [address]  [facility] [address]        Equal Access to Services     [facility] Hospital[facility]IL[facility] : Hadii kandy carey hadasho Sosimon, waaxda luqadaha, qaybta kaalmada adekatyayada, toy bowling . So [facility] [phone].    ATENCIÓN: Si habla español, tiene a freire disposición servicios gratuitos de asistencia lingüística. Llame al [phone].    We comply with applicable federal civil rights laws and [place] laws. We do not discriminate on the basis of race, color, national origin, age, disability, sex, sexual orientation, or gender identity.            Thank you!     Thank you for choosing [facility]  for your care. Our goal is always to provide you with excellent care. Hearing back from our patients is one way we can continue to improve our services. Please take a few minutes to complete the written survey that you may receive in the mail after your visit with us. Thank you!             Your Updated Medication List - Protect others around you: Learn how to safely use, store and throw away your medicines at www.disposemymeds.org.          This list is accurate as of 7/30/18  2:20 PM.  Always use your most recent med list.                   Brand Name Dispense Instructions for use Diagnosis    * albuterol 108 (90 Base) MCG/ACT Inhaler    PROAIR HFA/PROVENTIL HFA/VENTOLIN HFA    1 " Inhaler    Inhale 2 puffs into the lungs every 4 hours as needed for shortness of breath / dyspnea or wheezing    Acute bronchospasm       * albuterol (2.5 MG/3ML) 0.083% neb solution     30 vial    Take 1 vial (2.5 mg) by nebulization every 4 hours as needed for shortness of breath / dyspnea or wheezing    Acute bronchospasm       calcium carbonate 500 MG tablet    OS-ADOLFO 500 mg [place]. [place]    100 tablet    Take 1 tablet by mouth 2 times daily.    Routine general medical examination at a health care facility       cloNIDine 0.1 MG tablet    CATAPRES    4 tablet    Take 1 tablet (0.1 mg) by mouth 2 times daily        EPINEPHrine 0.3 MG/0.3ML injection 2-pack    EPIPEN/ADRENACLICK/or ANY BX GENERIC EQUIV    2 each    Inject 0.3 mLs (0.3 mg) into the muscle once as needed for anaphylaxis    Bee sting-induced anaphylaxis, accidental or unintentional, subsequent encounter       FISH OIL CONCENTRATE PO      Take 300 mg by mouth 3 times daily        * gabapentin 300 MG capsule    NEURONTIN    180 capsule    Take 2 capsules (600 mg) by mouth 3 times daily    Chronic bilateral low back pain without sciatica       * gabapentin 300 MG capsule    NEURONTIN    270 capsule    TAKE 1 CAPSULE BY MOUTH THREE TIMES DAILY    Low back pain, unspecified back pain laterality, unspecified chronicity, with sciatica presence unspecified       ibuprofen 200 MG tablet    ADVIL/MOTRIN     Pt is taking 4 TABLET EVERY 4 TO 6 HOURS AS NEEDED        LORazepam 1 MG tablet    ATIVAN    90 tablet    TAKE 1 TABLET BY MOUTH EVERY 8 HOURS AS NEEDED FOR ANXIETY    Generalized anxiety disorder       magnesium 250 MG tablet     100 tablet    Take 1 tablet by mouth daily.    Routine general medical examination at a health care facility       naloxone nasal spray    NARCAN    0.2 mL    Spray 1 spray (4 mg) into one nostril alternating nostrils as needed for opioid reversal every 2-3 minutes until assistance arrives    Continuous opioid dependence (H)        nicotine 21 MG/24HR 24 hr patch    NICODERM CQ    30 patch    Place 1 patch onto the skin every 24 hours    Tobacco use disorder       oxyCODONE 40 MG 12 hr tablet   Start taking on:  8/14/2018    OxyCONTIN    60 tablet    Take 1 tablet (40 mg) by mouth every 12 hours    Chronic bilateral low back pain without sciatica, Continuous opioid dependence (H), Chronic pain syndrome       oxyCODONE-acetaminophen  MG per tablet   Start taking on:  8/14/2018    PERCOCET    100 tablet    One tablet 3-4 times daily #100/month    Chronic bilateral low back pain without sciatica, Continuous opioid dependence (H), Chronic pain syndrome       tiZANidine 4 MG tablet    ZANAFLEX    270 tablet    TAKE ONE TABLET BY MOUTH THREE TIMES DAILY AS NEEDED FOR SHOULDER AND UPPER BACK PAIN    Chronic bilateral low back pain without sciatica       vitamin D 2000 units tablet     100 tablet    Take 2,000 Units by mouth daily    Major depressive disorder, recurrent episode, moderate (H)       * Notice:  This list has 4 medication(s) that are the same as other medications prescribed for you. Read the directions carefully, and ask your doctor or other care provider to review them with you.

## 2018-08-01 ENCOUNTER — TELEPHONE (OUTPATIENT)
Dept: FAMILY MEDICINE | Facility: CLINIC | Age: 43
End: 2018-08-01

## 2018-08-01 NOTE — TELEPHONE ENCOUNTER
Call pt, please inform her:     1.  I discussed upcoming suboxone appointment with Dr. Uribe whom she will be seeing later in august.     Dr. Uribe was very positive about suboxone and pain and felt it would be a very good option.      2.  Follow up with me as planned august 13th.     3. At this point we are not sure medical marijuana is a good fit yet but we can continue to discuss.     Joel Wegener,MD

## 2018-08-02 NOTE — TELEPHONE ENCOUNTER
Thank you, please note I changed item #3.  We have not made any decisions on medical marijuana yet.     Joel Wegener,MD

## 2018-08-02 NOTE — TELEPHONE ENCOUNTER
Caller is venting frustration that she has not been able to have her opioid refilled today; Has been refused by   Clinic provider and ED  Provider due to non compliance with plan established by PCP; PCP is not in office today and missed appointment with him last week. Caller states she understands that nothing can be done over the phone and continues to angrily express unhappiness with the care she has received  Caller terminated the   Conversation  Della Holt RN  FNA    Reason for Disposition    Caller has URGENT medication question about med that PCP prescribed and triager unable to answer question    Protocols used: MEDICATION QUESTION CALL-ADULT-    
Patient

## 2018-08-04 ENCOUNTER — HEALTH MAINTENANCE LETTER (OUTPATIENT)
Age: 43
End: 2018-08-04

## 2018-08-07 ENCOUNTER — NURSE TRIAGE (OUTPATIENT)
Dept: NURSING | Facility: CLINIC | Age: 43
End: 2018-08-07

## 2018-08-08 NOTE — TELEPHONE ENCOUNTER
"Clinic Action Needed: FYI:     Reason for Call: Patient calling clinic message regarding a message left for her. RN reviewed Dr. Joel Wegener's message from today 8/7/2018.     \"Call pt, please inform her:      1.  I discussed upcoming suboxone appointment with Dr. Uribe whom she will be seeing later in august.      Dr. Uribe was very positive about suboxone and pain and felt it would be a very good option.       2.  Follow up with me as planned august 13th.      3. We have not made any decisions on medical marijuana yet.\"     Caller verbalized understanding. Denies further questions.      Routed to: [facility].     Royer Alfonso RN  [facility]     "

## 2018-08-08 NOTE — TELEPHONE ENCOUNTER
"Clinic Action Needed: FYI:     Reason for Call: Patient calling clinic message regarding a message left for her. RN reviewed Dr. Joel Wegener's message from today 8/7/2018.     \"Call pt, please inform her:      1.  I discussed upcoming suboxone appointment with Dr. Uribe whom she will be seeing later in august.      Dr. Uribe was very positive about suboxone and pain and felt it would be a very good option.       2.  Follow up with me as planned august 13th.      3. We have not made any decisions on medical marijuana yet.\"     Caller verbalized understanding. Denies further questions.      Routed to: [facility] Nurse Pool.     Royer Alfonso RN  [facility]     "

## 2018-08-13 ENCOUNTER — TELEPHONE (OUTPATIENT)
Dept: FAMILY MEDICINE | Facility: CLINIC | Age: 43
End: 2018-08-13

## 2018-08-13 NOTE — TELEPHONE ENCOUNTER
Patient is asking when she is supposed to follow-up with Dr Wegener.  Anticipates that she will be switched from opiods to suboxone and wanders how soon after making the switch she is supposed to see Dr Wegener.  Has not yet been seen or started on suboxone.  Had an appointment scheduled for today with Dr Wegener but cnacelled because she is not yet on Suboxone.  Emily Squires RN

## 2018-08-13 NOTE — TELEPHONE ENCOUNTER
Call pt:    1. It looks like we should plan to meet September 14th or a few days before then in case oxycodone needs to be refilled and/or lorazepam.  ( this is when they will be due.).     2.  Desvenlafaxine/pristiq:  How is this going?  Any side effects?  We started very low dose on 07/30 for anxiety.     Does she want to increase the dose of the desvenlafaxine?     Joel Wegener,MD

## 2018-08-13 NOTE — LETTER
[facility]  [address] [place] [address] [phone]          August 21, 2018    Ria Nj                                                                                                                     [address] [place] [id number]            Dear Ria,    We tried contacting you but were unable to reach you.    Dr. Wegener would like to see you in clinic September 14th or a few days before then in case your Oxycodone and/or Lorazepam need to be refilled.    Dr. Wegener is curious how Desvenlafaxine (Pristiq) is going for you?  Any side effects?  Would you like to increase the dose?    Please call the clinic to schedule your September appointment and to inform Dr. Wegener how the Desvenlafaxine is working for you.        Sincerely,       Your [facility] Care Team

## 2018-08-13 NOTE — TELEPHONE ENCOUNTER
Left message on machine to call back.  Ask to speak to an RN, let them know it's a return call.    Leave a number and time that you can be reached.   Emily Squires RN

## 2018-08-17 NOTE — TELEPHONE ENCOUNTER
2nd attempt. Left voice mail asking patient to call back and ask to speak with triage nurse.  Helga Kohli, RN, BSN

## 2018-08-28 ENCOUNTER — OFFICE VISIT (OUTPATIENT)
Dept: ADDICTION MEDICINE | Facility: CLINIC | Age: 43
End: 2018-08-28
Payer: MEDICARE

## 2018-08-28 DIAGNOSIS — Z53.9 NO SHOW: Primary | ICD-10-CM

## 2018-08-28 NOTE — MR AVS SNAPSHOT
After Visit Summary   8/28/2018    Ria Nj    MRN: [id number]           Patient Information     Date Of Birth          1975        Visit Information        Provider Department      8/28/2018 10:00 AM Scarlet Uribe MD [facility]        Today's Diagnoses     NO SHOW    -  1       Follow-ups after your visit        Your next 10 appointments already scheduled     Sep 10, 2018  2:30 PM CDT   New Visit with Scarlet Uribe MD   [facility] ([facility])    [address] [place] [address]  [address]  [facility] [id number] [phone]              Who to contact     If you have questions or need follow up information about today's clinic visit or your schedule please contact [facility] directly at [phone].  Normal or non-critical lab and imaging results will be communicated to you by MyChart, letter or phone within 4 business days after the clinic has received the results. If you do not hear from us within 7 days, please contact the clinic through MyChart or phone. If you have a critical or abnormal lab result, we will notify you by phone as soon as possible.  Submit refill requests through [employer] or call your pharmacy and they will forward the refill request to us. Please allow 3 business days for your refill to be completed.          Additional Information About Your Visit        Care EveryWhere ID     This is your Care EveryWhere ID. This could be used by other organizations to access your [place] medical records  [id number]         Blood Pressure from Last 3 Encounters:   07/30/18 128/89   07/25/18 122/78   07/02/18 (!) 158/95    Weight from Last 3 Encounters:   07/30/18 163 lb (73.9 kg)   07/25/18 165 lb (74.8 kg)   07/01/18 145 lb (65.8 kg)              Today, you had the following     No orders found for display       Primary Care Provider Office Phone # Fax #    Jose A  Daniel Irwin Wegener, [phone]       [address]  [facility] [address]        Equal Access to Services     MONO WATERS : Vladimir gaitan cherry chrystal Moreno, waavisda axel, brenda kadevaughn bustillos, toy miranda. So [facility] [phone].    ATENCIÓN: Si habla español, tiene a freire disposición servicios gratuitos de asistencia lingüística. Llame al [phone].    We comply with applicable federal civil rights laws and [place] laws. We do not discriminate on the basis of race, color, national origin, age, disability, sex, sexual orientation, or gender identity.            Thank you!     Thank you for choosing [facility]  for your care. Our goal is always to provide you with excellent care. Hearing back from our patients is one way we can continue to improve our services. Please take a few minutes to complete the written survey that you may receive in the mail after your visit with us. Thank you!             Your Updated Medication List - Protect others around you: Learn how to safely use, store and throw away your medicines at www.disposemymeds.org.          This list is accurate as of 8/28/18 10:58 AM.  Always use your most recent med list.                   Brand Name Dispense Instructions for use Diagnosis    * albuterol 108 (90 Base) MCG/ACT inhaler    PROAIR HFA/PROVENTIL HFA/VENTOLIN HFA    1 Inhaler    Inhale 2 puffs into the lungs every 4 hours as needed for shortness of breath / dyspnea or wheezing    Acute bronchospasm       * albuterol (2.5 MG/3ML) 0.083% neb solution     30 vial    Take 1 vial (2.5 mg) by nebulization every 4 hours as needed for shortness of breath / dyspnea or wheezing    Acute bronchospasm       calcium carbonate 500 mg {elemental} 500 MG tablet    OS-ADOLFO    100 tablet    Take 1 tablet by mouth 2 times daily.    Routine general medical examination at a health care facility       cloNIDine 0.1 MG tablet     CATAPRES    4 tablet    Take 1 tablet (0.1 mg) by mouth 2 times daily        desvenlafaxine succinate 25 MG 24 hr tablet    PRISTIQ    14 tablet    Take 1 tablet (25 mg) by mouth daily for 14 days    Generalized anxiety disorder, Moderate major depression (H)       EPINEPHrine 0.3 MG/0.3ML injection 2-pack    EPIPEN/ADRENACLICK/or ANY BX GENERIC EQUIV    2 each    Inject 0.3 mLs (0.3 mg) into the muscle once as needed for anaphylaxis    Bee sting-induced anaphylaxis, accidental or unintentional, subsequent encounter       FISH OIL CONCENTRATE PO      Take 300 mg by mouth 3 times daily        * gabapentin 300 MG capsule    NEURONTIN    180 capsule    Take 2 capsules (600 mg) by mouth 3 times daily    Chronic bilateral low back pain without sciatica       * gabapentin 300 MG capsule    NEURONTIN    270 capsule    TAKE 1 CAPSULE BY MOUTH THREE TIMES DAILY    Low back pain, unspecified back pain laterality, unspecified chronicity, with sciatica presence unspecified       ibuprofen 200 MG tablet    ADVIL/MOTRIN     Pt is taking 4 TABLET EVERY 4 TO 6 HOURS AS NEEDED        LORazepam 1 MG tablet    ATIVAN    90 tablet    TAKE 1 TABLET BY MOUTH EVERY 8 HOURS AS NEEDED FOR ANXIETY    Generalized anxiety disorder       magnesium 250 MG tablet     100 tablet    Take 1 tablet by mouth daily.    Routine general medical examination at a health care facility       naloxone nasal spray    NARCAN    0.2 mL    Spray 1 spray (4 mg) into one nostril alternating nostrils as needed for opioid reversal every 2-3 minutes until assistance arrives    Continuous opioid dependence (H)       nicotine 21 MG/24HR 24 hr patch    NICODERM CQ    30 patch    Place 1 patch onto the skin every 24 hours    Tobacco use disorder       oxyCODONE 40 MG 12 hr tablet    OxyCONTIN    60 tablet    Take 1 tablet (40 mg) by mouth every 12 hours    Chronic bilateral low back pain without sciatica, Continuous opioid dependence (H), Chronic pain syndrome        oxyCODONE-acetaminophen  MG per tablet    PERCOCET    100 tablet    One tablet 3-4 times daily #100/month    Chronic bilateral low back pain without sciatica, Continuous opioid dependence (H), Chronic pain syndrome       tiZANidine 4 MG tablet    ZANAFLEX    270 tablet    TAKE ONE TABLET BY MOUTH THREE TIMES DAILY AS NEEDED FOR SHOULDER AND UPPER BACK PAIN    Chronic bilateral low back pain without sciatica       vitamin D 2000 units tablet     100 tablet    Take 2,000 Units by mouth daily    Major depressive disorder, recurrent episode, moderate (H)       * Notice:  This list has 4 medication(s) that are the same as other medications prescribed for you. Read the directions carefully, and ask your doctor or other care provider to review them with you.

## 2018-09-05 ENCOUNTER — OFFICE VISIT (OUTPATIENT)
Dept: FAMILY MEDICINE | Facility: CLINIC | Age: 43
End: 2018-09-05
Payer: MEDICARE

## 2018-09-05 VITALS
DIASTOLIC BLOOD PRESSURE: 77 MMHG | BODY MASS INDEX: 23.99 KG/M2 | HEIGHT: 69 IN | OXYGEN SATURATION: 99 % | HEART RATE: 90 BPM | SYSTOLIC BLOOD PRESSURE: 133 MMHG | TEMPERATURE: 99.4 F | WEIGHT: 162 LBS | RESPIRATION RATE: 20 BRPM

## 2018-09-05 DIAGNOSIS — F41.1 GENERALIZED ANXIETY DISORDER: ICD-10-CM

## 2018-09-05 DIAGNOSIS — F32.1 MODERATE MAJOR DEPRESSION (H): ICD-10-CM

## 2018-09-05 DIAGNOSIS — F43.10 PTSD (POST-TRAUMATIC STRESS DISORDER): Primary | ICD-10-CM

## 2018-09-05 PROCEDURE — 99214 OFFICE O/P EST MOD 30 MIN: CPT | Performed by: FAMILY MEDICINE

## 2018-09-05 RX ORDER — LORAZEPAM 1 MG/1
TABLET ORAL
Qty: 90 TABLET | Refills: 2 | Status: SHIPPED | OUTPATIENT
Start: 2018-09-05 | End: 2018-09-05

## 2018-09-05 RX ORDER — DESVENLAFAXINE 25 MG/1
25 TABLET, EXTENDED RELEASE ORAL DAILY
Qty: 30 TABLET | Refills: 0 | Status: SHIPPED | OUTPATIENT
Start: 2018-09-05 | End: 2018-11-19

## 2018-09-05 RX ORDER — LORAZEPAM 1 MG/1
TABLET ORAL
Qty: 90 TABLET | Refills: 2 | Status: SHIPPED | OUTPATIENT
Start: 2018-09-05 | End: 2018-10-03

## 2018-09-05 NOTE — PROGRESS NOTES
"  SUBJECTIVE:   Ria Nj is a 43 year old female who presents to clinic today for the following health issues:      Medication refill for percocet and oxycodone           Generalized anxiety disorder  PTSD (post-traumatic stress disorder)  Moderate major depression (H) :never got pristiq prescription, mother went to fill it and told not covered but for some reason prior auth not started.   Major recent event is father of child (partner for 15 years) leaving her.  Feels he just 'got tired of her pain and complaining probably. \"  Feels this may be a blessing in disguise though.  Feels handling well and not down.  More time for herself, self care.  He \"did not even call for son's birthday\" who is 9 years old.   Chronic pain:  Appointment with dr. Uribe addiction medicine re-scheduled for 09/11 after arriving she states two minutes late for appointment.  Very disappointing to her.          Problem list, Medication list, Allergies, and Medical/Social/Surgical histories reviewed in [facility] and updated as appropriate.  Labs reviewed in EPIC  BP Readings from Last 3 Encounters:   09/05/18 133/77   07/30/18 128/89   07/25/18 122/78    Wt Readings from Last 3 Encounters:   09/05/18 162 lb (73.5 kg)   07/30/18 163 lb (73.9 kg)   07/25/18 165 lb (74.8 kg)                  Patient Active Problem List   Diagnosis     CARDIOVASCULAR SCREENING; LDL GOAL LESS THAN 160     Lumbar disc herniation with myelopathy     Chronic low back pain     Bee sting-induced anaphylaxis     Generalized anxiety disorder     Tobacco use disorder     Moderate major depression (H)     Continuous opioid dependence (H)     Cervicalgia     Chronic pain syndrome     Past Surgical History:   Procedure Laterality Date     OVARY SURGERY N/A 2004    Ovarian cyst removed.      SURGICAL HISTORY OF -   2007    ovarian cyst removal        Social History   Substance Use Topics     Smoking status: Current Every Day Smoker     Packs/day: 2.00     Years: 15.00     " Types: Cigarettes     Smokeless tobacco: Never Used     Alcohol use No     Family History   Problem Relation Age of Onset     Cancer Maternal Grandmother      Cancer Maternal Grandfather      Cancer Paternal Grandmother      Cancer Paternal Grandfather          Current Outpatient Prescriptions   Medication Sig Dispense Refill     albuterol (2.5 MG/3ML) 0.083% neb solution Take 1 vial (2.5 mg) by nebulization every 4 hours as needed for shortness of breath / dyspnea or wheezing 30 vial 11     albuterol (PROAIR HFA, PROVENTIL HFA, VENTOLIN HFA) 108 (90 BASE) MCG/ACT inhaler Inhale 2 puffs into the lungs every 4 hours as needed for shortness of breath / dyspnea or wheezing 1 Inhaler 0     calcium carbonate 500 MG tablet Take 1 tablet by mouth 2 times daily. 100 tablet 3     Cholecalciferol (VITAMIN D) 2000 UNITS tablet Take 2,000 Units by mouth daily 100 tablet 3     cloNIDine (CATAPRES) 0.1 MG tablet Take 1 tablet (0.1 mg) by mouth 2 times daily 4 tablet 0     desvenlafaxine succinate (PRISTIQ) 25 MG 24 hr tablet Take 1 tablet (25 mg) by mouth daily Start prior auth if needed 30 tablet 0     EPINEPHrine (EPIPEN) 0.3 MG/0.3ML injection Inject 0.3 mLs (0.3 mg) into the muscle once as needed for anaphylaxis 2 each 1     gabapentin (NEURONTIN) 300 MG capsule TAKE 1 CAPSULE BY MOUTH THREE TIMES DAILY 270 capsule 3     gabapentin (NEURONTIN) 300 MG capsule Take 2 capsules (600 mg) by mouth 3 times daily 180 capsule 11     IBUPROFEN 200 MG OR TABS Pt is taking 4 TABLET EVERY 4 TO 6 HOURS AS NEEDED       LORazepam (ATIVAN) 1 MG tablet TAKE 1 TABLET BY MOUTH EVERY 8 HOURS AS NEEDED FOR ANXIETY, 90 tablet 2     Magnesium 250 MG tablet Take 1 tablet by mouth daily. 100 tablet 3     naloxone (NARCAN) nasal spray Spray 1 spray (4 mg) into one nostril alternating nostrils as needed for opioid reversal every 2-3 minutes until assistance arrives 0.2 mL 0     nicotine (NICODERM CQ) 21 MG/24HR 24 hr patch Place 1 patch onto the skin  "every 24 hours 30 patch 0     Omega-3 Fatty Acids (FISH OIL CONCENTRATE PO) Take 300 mg by mouth 3 times daily       oxyCODONE (OXYCONTIN) 40 MG 12 hr tablet Take 1 tablet (40 mg) by mouth every 12 hours 60 tablet 0     oxyCODONE-acetaminophen (PERCOCET)  MG per tablet One tablet 3-4 times daily #100/month 100 tablet 0     tiZANidine (ZANAFLEX) 4 MG tablet TAKE ONE TABLET BY MOUTH THREE TIMES DAILY AS NEEDED FOR SHOULDER AND UPPER BACK PAIN 270 tablet 0     Allergies   Allergen Reactions     Bee Venom      Recent Labs   Lab Test  07/01/18   2132  10/17/17   1122   ALT  17  16   CR  0.61  0.80   GFRESTIMATED  >90  79   GFRESTBLACK  >90  >90   POTASSIUM  3.8  4.2        ROS:  Constitutional, HEENT, cardiovascular, pulmonary, GI, , musculoskeletal, neuro, skin, endocrine and psych systems are negative, except as otherwise noted.        OBJECTIVE:  /77  Pulse 90  Temp 99.4  F (37.4  C) (Oral)  Resp 20  Ht 5' 9\" (1.753 m)  Wt 162 lb (73.5 kg)  SpO2 99%  BMI 23.92 kg/m2    EXAM:  GENERAL APPEARANCE: healthy, alert and no distress      ASSESSMENT AND PLAN  Patient Instructions   MME:Milligrams morphine equivalent:  150, lorazepam, narcan done, letter done, utox up to date    Please schedul for 9:40 on September 11th for pain medication follow up      ASSESSMENT AND PLAN  1. Generalized anxiety disorder  I re-sent pristiq prescription and will do prior auth if needed.      Discussed following cdc guidelines in long-term and not prescribing concurrent opiods and benzos.  I did refill for now at usual amount.  Mental health/coping seems somewhat improved today and she is moving forward with planif s to consider suboxone.     In next 1-3 months she will consider/decide if lorazepam truly necessary on-going and if so I would refer to a psychaitrist to take over that care which she understood and agreed with.     Right now however we will be looking for improvement after starting pristiq.     No suspicious " activity on [place] prescription monitoring databse.     - LORazepam (ATIVAN) 1 MG tablet; TAKE 1 TABLET BY MOUTH EVERY 8 HOURS AS NEEDED FOR ANXIETY,  Dispense: 90 tablet; Refill: 2  - desvenlafaxine succinate (PRISTIQ) 25 MG 24 hr tablet; Take 1 tablet (25 mg) by mouth daily Start prior alans to consider suboxone. uth if needed  Dispense: 30 tablet; Refill: 0    2. PTSD (post-traumatic stress disorder)  As above.     3. Moderate major depression (H)  As above.   - desvenlafaxine(PRISTIQ) 25 MG 24 hr tablet; Take 1 tablet (25 mg) by mouth daily Start prior auth if needed  Dispense: 30 tablet; Refill: 0    4.  Chronic pain: concerned does not have a choice regarding suboxone and I reiterated this is not true. Voiced concerns about being stopped abruptly from opiods, having withadrawal or having prolonged period with increased pain during transition.  I am25 confident Dr. Uribe can manage this well and I do feel that at least attempting suboxone to decrease need for opiod and to help pain is warranted.     If she were to choose to continue opiod therapy I would require full participation on comprehensive pain management (i.e. Pain psychology, etc).     Has enough pain medication to last until apointment with dr. Uribe who I presume will take over opiod management during transition however I scheduled ap=pointment with myself as well soon after in case it is needed (see above. )     I spent greater than 1/2 of a 25 minute face to face encounter counseling regarding the rational for the assessment and plan noted above.     Joel Wegener,MD

## 2018-09-05 NOTE — MR AVS SNAPSHOT
After Visit Summary   9/5/2018    Ria Nj    MRN: [id number]           Patient Information     Date Of Birth          1975        Visit Information        Provider Department      9/5/2018 12:00 PM Wegener, Joel Daniel Irwin, MD [facility]        Today's Diagnoses     PTSD (post-traumatic stress disorder)    -  1    Generalized anxiety disorder          Care Instructions    MME:Milligrams morphine equivalent:  150, lorazepam, narcan done, letter done, utox up to date    Please schedul for 9:40 on September 11th for pain medication follow up            Follow-ups after your visit        Your next 10 appointments already scheduled     Sep 10, 2018  2:30 PM CDT   New Visit with Scarlet Uribe MD   [facility] ([facility])    [address] [place] [address]  [address]  [facility] [address] [phone]              Who to contact     If you have questions or need follow up information about today's clinic visit or your schedule please contact [facility] directly at [phone].  Normal or non-critical lab and imaging results will be communicated to you by MyChart, letter or phone within 4 business days after the clinic has received the results. If you do not hear from us within 7 days, please contact the clinic through MyChart or phone. If you have a critical or abnormal lab result, we will notify you by phone as soon as possible.  Submit refill requests through [employer] or call your pharmacy and they will forward the refill request to us. Please allow 3 business days for your refill to be completed.          Additional Information About Your Visit        Care EveryWhere ID     This is your Care EveryWhere ID. This could be used by other organizations to access your [facility] medical records  [id number]        Your Vitals Were     Pulse Temperature Respirations Height Pulse Oximetry BMI (Body Mass Index)    90  "99.4  F (37.4  C) (Oral) 20 5' 9\" (1.753 m) 99% 23.92 kg/m2       Blood Pressure from Last 3 Encounters:   09/05/18 133/77   07/30/18 128/89   07/25/18 122/78    Weight from Last 3 Encounters:   09/05/18 162 lb (73.5 kg)   07/30/18 163 lb (73.9 kg)   07/25/18 165 lb (74.8 kg)              Today, you had the following     No orders found for display         Today's Medication Changes          These changes are accurate as of 9/5/18 12:26 PM.  If you have any questions, ask your nurse or doctor.               These medicines have changed or have updated prescriptions.        Dose/Directions    LORazepam 1 MG tablet   Commonly known as:  ATIVAN   This may have changed:  additional instructions   Used for:  Generalized anxiety disorder   Changed by:  Wegener, Joel Daniel Irwin, MD        TAKE 1 TABLET BY MOUTH EVERY 8 HOURS AS NEEDED FOR ANXIETY, establish care with psychiatrist for next refills.   Quantity:  90 tablet   Refills:  2            Where to get your medicines      Some of these will need a paper prescription and others can be bought over the counter.  Ask your nurse if you have questions.     Bring a paper prescription for each of these medications     LORazepam 1 MG tablet                Primary Care Provider Office Phone # Fax #    Joel Daniel Irwin Wegener, [phone]  [place] [id number]        Equal Access to Services     [facility] AH: Hadii aad ku hadasho Soomaali, waaxda luqadaha, qaybta kaalmada adeegyada, waxay manasa haymorgann edward edmondsonaranamita zuniga'booker . So [facility] [phone].    ATENCIÓN: Si habla español, tiene a freire disposición servicios gratuitos de asistencia lingüística. Llame al [phone].    We comply with applicable federal civil rights laws and [place] laws. We do not discriminate on the basis of race, color, national origin, age, disability, sex, sexual orientation, or gender identity.            Thank you!     Thank you for choosing [facility] " [facility]  for your care. Our goal is always to provide you with excellent care. Hearing back from our patients is one way we can continue to improve our services. Please take a few minutes to complete the written survey that you may receive in the mail after your visit with us. Thank you!             Your Updated Medication List - Protect others around you: Learn how to safely use, store and throw away your medicines at www.disposemymeds.org.          This list is accurate as of 9/5/18 12:26 PM.  Always use your most recent med list.                   Brand Name Dispense Instructions for use Diagnosis    * albuterol 108 (90 Base) MCG/ACT inhaler    PROAIR HFA/PROVENTIL HFA/VENTOLIN HFA    1 Inhaler    Inhale 2 puffs into the lungs every 4 hours as needed for shortness of breath / dyspnea or wheezing    Acute bronchospasm       * albuterol (2.5 MG/3ML) 0.083% neb solution     30 vial    Take 1 vial (2.5 mg) by nebulization every 4 hours as needed for shortness of breath / dyspnea or wheezing    Acute bronchospasm       calcium carbonate 500 mg {elemental} 500 MG tablet    OS-ADOLFO    100 tablet    Take 1 tablet by mouth 2 times daily.    Routine general medical examination at a health care facility       cloNIDine 0.1 MG tablet    CATAPRES    4 tablet    Take 1 tablet (0.1 mg) by mouth 2 times daily        desvenlafaxine succinate 25 MG 24 hr tablet    PRISTIQ    14 tablet    Take 1 tablet (25 mg) by mouth daily for 14 days    Generalized anxiety disorder, Moderate major depression (H)       EPINEPHrine 0.3 MG/0.3ML injection 2-pack    EPIPEN/ADRENACLICK/or ANY BX GENERIC EQUIV    2 each    Inject 0.3 mLs (0.3 mg) into the muscle once as needed for anaphylaxis    Bee sting-induced anaphylaxis, accidental or unintentional, subsequent encounter       FISH OIL CONCENTRATE PO      Take 300 mg by mouth 3 times daily        * gabapentin 300 MG capsule    NEURONTIN    180 capsule    Take 2 capsules (600 mg) by mouth 3 times  daily    Chronic bilateral low back pain without sciatica       * gabapentin 300 MG capsule    NEURONTIN    270 capsule    TAKE 1 CAPSULE BY MOUTH THREE TIMES DAILY    Low back pain, unspecified back pain laterality, unspecified chronicity, with sciatica presence unspecified       ibuprofen 200 MG tablet    ADVIL/MOTRIN     Pt is taking 4 TABLET EVERY 4 TO 6 HOURS AS NEEDED        LORazepam 1 MG tablet    ATIVAN    90 tablet    TAKE 1 TABLET BY MOUTH EVERY 8 HOURS AS NEEDED FOR ANXIETY, establish care with psychiatrist for next refills.    Generalized anxiety disorder       magnesium 250 MG tablet     100 tablet    Take 1 tablet by mouth daily.    Routine general medical examination at a health care facility       naloxone nasal spray    NARCAN    0.2 mL    Spray 1 spray (4 mg) into one nostril alternating nostrils as needed for opioid reversal every 2-3 minutes until assistance arrives    Continuous opioid dependence (H)       nicotine 21 MG/24HR 24 hr patch    NICODERM CQ    30 patch    Place 1 patch onto the skin every 24 hours    Tobacco use disorder       oxyCODONE 40 MG 12 hr tablet    OxyCONTIN    60 tablet    Take 1 tablet (40 mg) by mouth every 12 hours    Chronic bilateral low back pain without sciatica, Continuous opioid dependence (H), Chronic pain syndrome       oxyCODONE-acetaminophen  MG per tablet    PERCOCET    100 tablet    One tablet 3-4 times daily #100/month    Chronic bilateral low back pain without sciatica, Continuous opioid dependence (H), Chronic pain syndrome       tiZANidine 4 MG tablet    ZANAFLEX    270 tablet    TAKE ONE TABLET BY MOUTH THREE TIMES DAILY AS NEEDED FOR SHOULDER AND UPPER BACK PAIN    Chronic bilateral low back pain without sciatica       vitamin D 2000 units tablet     100 tablet    Take 2,000 Units by mouth daily    Major depressive disorder, recurrent episode, moderate (H)       * Notice:  This list has 4 medication(s) that are the same as other medications  prescribed for you. Read the directions carefully, and ask your doctor or other care provider to review them with you.

## 2018-09-05 NOTE — PATIENT INSTRUCTIONS
MME:Milligrams morphine equivalent:  150, lorazepam, narcan done, letter done, utox up to date    Please schedul for 9:40 on September 11th for pain medication follow up

## 2018-09-10 ENCOUNTER — OFFICE VISIT (OUTPATIENT)
Dept: PALLIATIVE MEDICINE | Facility: CLINIC | Age: 43
End: 2018-09-10
Payer: MEDICARE

## 2018-09-10 VITALS
WEIGHT: 158 LBS | DIASTOLIC BLOOD PRESSURE: 100 MMHG | BODY MASS INDEX: 23.33 KG/M2 | OXYGEN SATURATION: 100 % | SYSTOLIC BLOOD PRESSURE: 154 MMHG | HEART RATE: 99 BPM

## 2018-09-10 DIAGNOSIS — Z79.899 ENCOUNTER FOR LONG-TERM (CURRENT) USE OF MEDICATIONS: ICD-10-CM

## 2018-09-10 DIAGNOSIS — F11.10 OPIOID USE DISORDER, MILD, ON MAINTENANCE THERAPY (H): ICD-10-CM

## 2018-09-10 DIAGNOSIS — F11.10 OPIOID USE DISORDER, MILD, ABUSE (H): Primary | ICD-10-CM

## 2018-09-10 DIAGNOSIS — G89.4 CHRONIC PAIN SYNDROME: Primary | ICD-10-CM

## 2018-09-10 LAB
AMPHETAMINES UR QL: NOT DETECTED NG/ML
BARBITURATES UR QL SCN: NOT DETECTED NG/ML
BENZODIAZ UR QL SCN: ABNORMAL NG/ML
BUPRENORPHINE UR QL: NOT DETECTED NG/ML
CANNABINOIDS UR QL: NOT DETECTED NG/ML
COCAINE UR QL SCN: NOT DETECTED NG/ML
D-METHAMPHET UR QL: NOT DETECTED NG/ML
METHADONE UR QL SCN: NOT DETECTED NG/ML
OPIATES UR QL SCN: NOT DETECTED NG/ML
OXYCODONE UR QL SCN: ABNORMAL NG/ML
PCP UR QL SCN: NOT DETECTED NG/ML
PROPOXYPH UR QL: NOT DETECTED NG/ML
TRICYCLICS UR QL SCN: NOT DETECTED NG/ML

## 2018-09-10 PROCEDURE — 99215 OFFICE O/P EST HI 40 MIN: CPT | Performed by: ANESTHESIOLOGY

## 2018-09-10 PROCEDURE — 80306 DRUG TEST PRSMV INSTRMNT: CPT | Performed by: ANESTHESIOLOGY

## 2018-09-10 RX ORDER — BUPRENORPHINE AND NALOXONE 8; 2 MG/1; MG/1
1 FILM, SOLUBLE BUCCAL; SUBLINGUAL DAILY
Qty: 7 FILM | Refills: 0 | Status: SHIPPED | OUTPATIENT
Start: 2018-09-10 | End: 2018-09-17

## 2018-09-10 ASSESSMENT — PAIN SCALES - GENERAL: PAINLEVEL: WORST PAIN (10)

## 2018-09-10 NOTE — PATIENT INSTRUCTIONS
Patient to follow up with Primary Care provider regarding elevated blood pressure.    Buprenorphine/Naloxone - Brand name Suboxone   Treats Opioid dependence and pain    Instructions for Home Induction:     Do not start Buprenorphine until withdrawal symptoms - body aches, runny nose, diarrhea, abdominal cramps, chills, goose bumps etc are well established.  This is usually >24hrs after the last dose of opioids are taken.  Taking buprenorphine before this can result in precipitated withdrawal.      You have been given 8mg films with a maximum dose of one film daily. Start with 1/2 film/tablet (4mg) Place the medication under your tongue and allow it to dissolve.  Do not swallow the film or tablet as this medication does not absorb when taken orally.  Wait 2 hours and if tolerated you can take the other half (4mg).  The next day take 1/4 film every 6 hours - 4 times/day. Continue with this dosing schedule until your follow up appointment.     Please call my clinic with a progress report tomorrow.      Follow up in ONE week - September 17th at 1:30pm     The addiction medicine clinic number is [phone].    If you cannot make your appointment please call the office and reschedule immediately. If you are out of medication a bridge can be sent to your pharmacy to last until the date of your rescheduled appointment. Our clinic is open from Monday-Friday 0800-4:30pm and there is not an ON CALL after hours service.  If medical care is needed after hours or on the weekend you will need to contact your primary care physician or go to an Urgent Care or ER.     MyChart messages and telephone calls from patients are taken care of by the nursing team within 24 business hours if received between Monday 8am - Friday 4:30pm.  Therefore, if a refill/bridge of medication is needed it is important to call in advance so you do not run out.     It is strongly recommended that you abstain from alcohol, benzodiazepines (xanax, valium,  klonopin and others), marijuana, opioids (percocet, tramadol, dilauded, fentanyl and others) and other drugs of abuse (methamphetamines, alcohol and others)  Using any of these substances increases your risk of overdose/death. (especially with alcohol and benzodiazepines) You are at risk for overdose and death with return to the use of opioids after a period of abstinence because your tolerance has decreased.     You are encouraged to have some type of recovery program in addition to medication management. This may include having a sober network of friends, avoiding isolation, avoiding triggers including people, places and things you associate with using.  Such supports may include Alcoholics Anonymous, Narcotics Anonymous, SMART recovery, Rastafari groups or other self help activities like counseling.  We can help provide resources to these services.   Medication alone is usually not enough to lead to long term recovery.      Narcan kit prescriptions are available if you need one.   Suboxone risk/benefit/side effect profile discussed.      [facility] does not accept [facility] or [employer] assistance insurance.       Scarlet Uribe MD

## 2018-09-10 NOTE — Clinical Note
She came in today in severe withdrawal and admitted to repeated overuse of her opioids and running out early.  She has been off opioids for over 30 hours so I am going to do a home induction of suboxone.  We did not cover a lot of other things today and will plan to gradually add other pain services as her mental status improves.  She seems motivated to make this change.

## 2018-09-11 ENCOUNTER — OFFICE VISIT (OUTPATIENT)
Dept: FAMILY MEDICINE | Facility: CLINIC | Age: 43
End: 2018-09-11
Payer: MEDICARE

## 2018-09-11 VITALS
DIASTOLIC BLOOD PRESSURE: 82 MMHG | TEMPERATURE: 98.9 F | HEART RATE: 84 BPM | WEIGHT: 158 LBS | RESPIRATION RATE: 16 BRPM | OXYGEN SATURATION: 98 % | BODY MASS INDEX: 23.4 KG/M2 | HEIGHT: 69 IN | SYSTOLIC BLOOD PRESSURE: 116 MMHG

## 2018-09-11 DIAGNOSIS — F11.20 CONTINUOUS OPIOID DEPENDENCE (H): ICD-10-CM

## 2018-09-11 DIAGNOSIS — G89.4 CHRONIC PAIN SYNDROME: ICD-10-CM

## 2018-09-11 DIAGNOSIS — M54.50 CHRONIC BILATERAL LOW BACK PAIN WITHOUT SCIATICA: ICD-10-CM

## 2018-09-11 DIAGNOSIS — G89.29 CHRONIC BILATERAL LOW BACK PAIN WITHOUT SCIATICA: ICD-10-CM

## 2018-09-11 PROCEDURE — 99213 OFFICE O/P EST LOW 20 MIN: CPT | Performed by: FAMILY MEDICINE

## 2018-09-11 NOTE — PATIENT INSTRUCTIONS
Follow up 2-3 months when needed for lorazepam refill/decide if need to continue that.     Agree with starting pristiq after stable on suboxone.     Great job starting the suboxone!    Schedule pap smear with me or another provider if desired when able.

## 2018-09-11 NOTE — PROGRESS NOTES
SUBJECTIVE:   Ria Nj is a 43 year old female who presents to clinic today for the following health issues:      Back Pain Follow Up      Description:   Location of pain:  bilateral  Character of pain: sharp  Pain radiation: Does not radiate and legs  Since last visit, pain is:  improved  New numbness or weakness in legs, not attributed to pain:  YES    Intensity: Currently 4/10, mild    History:   Pain interferes with job: YES,        Chronic bilateral low back pain without sciatica  Continuous opioid dependence (H)  Chronic pain syndrome :did end up deciding to start suboxone yesterday after meeting with Dr. Uribe.  Excited but nervous about it.  So far, so good!         Problem list, Medication list, Allergies, and Medical/Social/Surgical histories reviewed in [facility] and updated as appropriate.  Labs reviewed in [facility]  BP Readings from Last 3 Encounters:   09/17/18 122/74   09/11/18 116/82   09/10/18 (!) 154/100    Wt Readings from Last 3 Encounters:   09/17/18 156 lb (70.8 kg)   09/11/18 158 lb (71.7 kg)   09/10/18 158 lb (71.7 kg)                  Patient Active Problem List   Diagnosis     CARDIOVASCULAR SCREENING; LDL GOAL LESS THAN 160     Lumbar disc herniation with myelopathy     Chronic low back pain     Bee sting-induced anaphylaxis     Generalized anxiety disorder     Tobacco use disorder     Moderate major depression (H)     Continuous opioid dependence (H)     Cervicalgia     Chronic pain syndrome     Past Surgical History:   Procedure Laterality Date     OVARY SURGERY N/A 2004    Ovarian cyst removed.      SURGICAL HISTORY OF -   2007    ovarian cyst removal        Social History   Substance Use Topics     Smoking status: Current Every Day Smoker     Packs/day: 2.00     Years: 15.00     Types: Cigarettes     Smokeless tobacco: Never Used     Alcohol use No     Family History   Problem Relation Age of Onset     Cancer Maternal Grandmother      Cancer Maternal Grandfather      Cancer Paternal  Grandmother      Cancer Paternal Grandfather          Current Outpatient Prescriptions   Medication Sig Dispense Refill     albuterol (2.5 MG/3ML) 0.083% neb solution Take 1 vial (2.5 mg) by nebulization every 4 hours as needed for shortness of breath / dyspnea or wheezing 30 vial 11     albuterol (PROAIR HFA, PROVENTIL HFA, VENTOLIN HFA) 108 (90 BASE) MCG/ACT inhaler Inhale 2 puffs into the lungs every 4 hours as needed for shortness of breath / dyspnea or wheezing 1 Inhaler 0     buprenorphine HCl-naloxone HCl (SUBOXONE) 8-2 MG per film Place 1 Film under the tongue daily 30 Film 0     calcium carbonate 500 MG tablet Take 1 tablet by mouth 2 times daily. 100 tablet 3     Cholecalciferol (VITAMIN D) 2000 UNITS tablet Take 2,000 Units by mouth daily 100 tablet 3     cloNIDine (CATAPRES) 0.1 MG tablet Take 1 tablet (0.1 mg) by mouth 2 times daily 4 tablet 0     desvenlafaxine succinate (PRISTIQ) 25 MG 24 hr tablet Take 1 tablet (25 mg) by mouth daily Start prior auth if needed 30 tablet 0     EPINEPHrine (EPIPEN) 0.3 MG/0.3ML injection Inject 0.3 mLs (0.3 mg) into the muscle once as needed for anaphylaxis 2 each 1     gabapentin (NEURONTIN) 300 MG capsule TAKE 1 CAPSULE BY MOUTH THREE TIMES DAILY 270 capsule 3     gabapentin (NEURONTIN) 300 MG capsule Take 2 capsules (600 mg) by mouth 3 times daily 180 capsule 11     IBUPROFEN 200 MG OR TABS Pt is taking 4 TABLET EVERY 4 TO 6 HOURS AS NEEDED       LORazepam (ATIVAN) 1 MG tablet TAKE 1 TABLET BY MOUTH EVERY 8 HOURS AS NEEDED FOR ANXIETY, 90 tablet 2     Magnesium 250 MG tablet Take 1 tablet by mouth daily. 100 tablet 3     naloxone (NARCAN) nasal spray Spray 1 spray (4 mg) into one nostril alternating nostrils as needed for opioid reversal every 2-3 minutes until assistance arrives 0.2 mL 0     nicotine (NICODERM CQ) 21 MG/24HR 24 hr patch Place 1 patch onto the skin every 24 hours 30 patch 0     Omega-3 Fatty Acids (FISH OIL CONCENTRATE PO) Take 300 mg by mouth 3  "times daily       Allergies   Allergen Reactions     Bee Venom      Recent Labs   Lab Test  07/01/18   2132  10/17/17   1122   ALT  17  16   CR  0.61  0.80   GFRESTIMATED  >90  79   GFRESTBLACK  >90  >90   POTASSIUM  3.8  4.2        ROS:  Constitutional, HEENT, cardiovascular, pulmonary, GI, , musculoskeletal, neuro, skin, endocrine and psych systems are negative, except as otherwise noted.        OBJECTIVE:  /82  Pulse 84  Temp 98.9  F (37.2  C) (Oral)  Resp 16  Ht 5' 9\" (1.753 m)  Wt 158 lb (71.7 kg)  SpO2 98%  BMI 23.33 kg/m2    EXAM:  GENERAL APPEARANCE: healthy, alert and no distress      ASSESSMENT AND PLAN  Patient Instructions   Follow up 2-3 months when needed for lorazepam refill/decide if need to continue that bu preferably it things go well (woith the desvenlafaxine) slowly wean down.     Planning to start the desvenlafaxine after stable on the suboxone which is reasonable.    Agree with starting pristiq after stable on suboxone.     Great job starting the suboxone!    Schedule pap smear with me or another provider if desired when able.         ASSESSMENT AND PLAN  1. Chronic bilateral low back pain without sciatica      2. Continuous opioid dependence (H)      3. Chronic pain syndrome      Joel Wegener,MD    "

## 2018-09-11 NOTE — PROGRESS NOTES
"  SUBJECTIVE:   Ria Nj is a 43 year old female who presents to clinic today for the following health issues:      Medication Followup of ***    Taking Medication as prescribed: {.:225523::\"yes\"}    Side Effects:  {NONEORCHOOSE:774669::\"None\"}    Medication Helping Symptoms:  {.:753483::\"yes\"}       {additional problems for provider to add:197054}    Problem list and histories reviewed & adjusted, as indicated.  Additional history: {NONE - AS DOCUMENTED:967156::\"as documented\"}    {HIST REVIEW/ LINKS 2:322925}    Reviewed and updated as needed this visit by clinical staff       Reviewed and updated as needed this visit by Provider         {PROVIDER CHARTING PREFERENCE:165291}  "

## 2018-09-11 NOTE — MR AVS SNAPSHOT
After Visit Summary   9/11/2018    Ria Nj    MRN: [id number]           Patient Information     Date Of Birth          1975        Visit Information        Provider Department      9/11/2018 9:40 AM Wegener, Joel Daniel Irwin, MD [facility]        Care Instructions    Follow up 2-3 months when needed for lorazepam refill/decide if need to continue that.     Agree with starting pristiq after stable on suboxone.     Great job starting the suboxone!    Schedule pap smear with me or another provider if desired when able.               Follow-ups after your visit        Your next 10 appointments already scheduled     Sep 17, 2018  1:30 PM CDT   Return Visit with Scarlet Uribe MD   [facility] ([facility])    [address] [place] [address]  [address]  [facility] [address] [phone]              Future tests that were ordered for you today     Open Standing Orders        Priority Remaining Interval Expires Ordered    Urine Drugs of Abuse Screen Panel 13 Routine 99/100  9/10/2019 9/10/2018            Who to contact     If you have questions or need follow up information about today's clinic visit or your schedule please contact [facility] directly at [phone].  Normal or non-critical lab and imaging results will be communicated to you by MyChart, letter or phone within 4 business days after the clinic has received the results. If you do not hear from us within 7 days, please contact the clinic through MyChart or phone. If you have a critical or abnormal lab result, we will notify you by phone as soon as possible.  Submit refill requests through [facility] or call your pharmacy and they will forward the refill request to us. Please allow 3 business days for your refill to be completed.          Additional Information About Your Visit        Care EveryWhere ID     This is your Care EveryWhere ID. This could be  "used by other organizations to access your [place] medical records  [id number]        Your Vitals Were     Pulse Temperature Respirations Height Pulse Oximetry BMI (Body Mass Index)    84 98.9  F (37.2  C) (Oral) 16 5' 9\" (1.753 m) 98% 23.33 kg/m2       Blood Pressure from Last 3 Encounters:   09/11/18 116/82   09/10/18 (!) 154/100   09/05/18 133/77    Weight from Last 3 Encounters:   09/11/18 158 lb (71.7 kg)   09/10/18 158 lb (71.7 kg)   09/05/18 162 lb (73.5 kg)              Today, you had the following     No orders found for display       Primary Care Provider Office Phone # Fax #    Joel Daniel Irwin Wegener, [phone] [address]  [facility] [address]        Equal Access to Services     [facility]: Hadii aad ku hadasho Soomaali, waaxda luqadaha, qaybta kaalmada adeegyada, waxay idiin hayaan edward edmondsonaranamita zaraten . So [facility] [phone].    ATENCIÓN: Si habla español, tiene a freire disposición servicios gratuitos de asistencia lingüística. Patiame al [phone].    We comply with applicable federal civil rights laws and [place] laws. We do not discriminate on the basis of race, color, national origin, age, disability, sex, sexual orientation, or gender identity.            Thank you!     Thank you for choosing [facility][note 3]  for your care. Our goal is always to provide you with excellent care. Hearing back from our patients is one way we can continue to improve our services. Please take a few minutes to complete the written survey that you may receive in the mail after your visit with us. Thank you!             Your Updated Medication List - Protect others around you: Learn how to safely use, store and throw away your medicines at www.disposemymeds.org.          This list is accurate as of 9/11/18 10:34 AM.  Always use your most recent med list.                   Brand Name Dispense Instructions for use Diagnosis    * albuterol 108 (90 Base) MCG/ACT inhaler    PROAIR " HFA/PROVENTIL HFA/VENTOLIN HFA    1 Inhaler    Inhale 2 puffs into the lungs every 4 hours as needed for shortness of breath / dyspnea or wheezing    Acute bronchospasm       * albuterol (2.5 MG/3ML) 0.083% neb solution     30 vial    Take 1 vial (2.5 mg) by nebulization every 4 hours as needed for shortness of breath / dyspnea or wheezing    Acute bronchospasm       * buprenorphine HCl-naloxone HCl 8-2 MG per film    SUBOXONE    7 Film    Place 1 Film under the tongue daily    Opioid use disorder, mild, on maintenance therapy, Encounter for long-term (current) use of medications       * Buprenorphine HCl-Naloxone HCl 2.9-0.71 MG Subl     21 tablet    Place 1 tablet under the tongue every 8 hours    Opioid use disorder, mild, abuse, Encounter for long-term (current) use of medications       calcium carbonate 500 mg {elemental} 500 MG tablet    OS-ADOLFO    100 tablet    Take 1 tablet by mouth 2 times daily.    Routine general medical examination at a health care facility       cloNIDine 0.1 MG tablet    CATAPRES    4 tablet    Take 1 tablet (0.1 mg) by mouth 2 times daily        desvenlafaxine succinate 25 MG 24 hr tablet    PRISTIQ    30 tablet    Take 1 tablet (25 mg) by mouth daily Start prior auth if needed    Generalized anxiety disorder, Moderate major depression (H)       EPINEPHrine 0.3 MG/0.3ML injection 2-pack    EPIPEN/ADRENACLICK/or ANY BX GENERIC EQUIV    2 each    Inject 0.3 mLs (0.3 mg) into the muscle once as needed for anaphylaxis    Bee sting-induced anaphylaxis, accidental or unintentional, subsequent encounter       FISH OIL CONCENTRATE PO      Take 300 mg by mouth 3 times daily        * gabapentin 300 MG capsule    NEURONTIN    180 capsule    Take 2 capsules (600 mg) by mouth 3 times daily    Chronic bilateral low back pain without sciatica       * gabapentin 300 MG capsule    NEURONTIN    270 capsule    TAKE 1 CAPSULE BY MOUTH THREE TIMES DAILY    Low back pain, unspecified back pain laterality,  unspecified chronicity, with sciatica presence unspecified       ibuprofen 200 MG tablet    ADVIL/MOTRIN     Pt is taking 4 TABLET EVERY 4 TO 6 HOURS AS NEEDED        LORazepam 1 MG tablet    ATIVAN    90 tablet    TAKE 1 TABLET BY MOUTH EVERY 8 HOURS AS NEEDED FOR ANXIETY,    Generalized anxiety disorder       magnesium 250 MG tablet     100 tablet    Take 1 tablet by mouth daily.    Routine general medical examination at a health care facility       naloxone nasal spray    NARCAN    0.2 mL    Spray 1 spray (4 mg) into one nostril alternating nostrils as needed for opioid reversal every 2-3 minutes until assistance arrives    Continuous opioid dependence (H)       nicotine 21 MG/24HR 24 hr patch    NICODERM CQ    30 patch    Place 1 patch onto the skin every 24 hours    Tobacco use disorder       oxyCODONE 40 MG 12 hr tablet    OxyCONTIN    60 tablet    Take 1 tablet (40 mg) by mouth every 12 hours    Chronic bilateral low back pain without sciatica, Continuous opioid dependence (H), Chronic pain syndrome       oxyCODONE-acetaminophen  MG per tablet    PERCOCET    100 tablet    One tablet 3-4 times daily #100/month    Chronic bilateral low back pain without sciatica, Continuous opioid dependence (H), Chronic pain syndrome       vitamin D 2000 units tablet     100 tablet    Take 2,000 Units by mouth daily    Major depressive disorder, recurrent episode, moderate (H)       * Notice:  This list has 6 medication(s) that are the same as other medications prescribed for you. Read the directions carefully, and ask your doctor or other care provider to review them with you.

## 2018-09-14 ENCOUNTER — TELEPHONE (OUTPATIENT)
Dept: PALLIATIVE MEDICINE | Facility: CLINIC | Age: 43
End: 2018-09-14

## 2018-09-14 NOTE — TELEPHONE ENCOUNTER
Prior Authorization Approval    Authorization Effective Date: 9/14/2018  Authorization Expiration Date: 12/31/2018  Medication: buprenorphine HCl-naloxone HCl (SUBOXONE) 8-2 MG per film  Approved Dose/Quantity:   Reference #:     Insurance Company: [employer] [phone] Fax [phone]  Expected CoPay:       CoPay Card Available:      Foundation Assistance Needed:    Which Pharmacy is filling the prescription (Not needed for infusion/clinic administered): [facility] [place], [place] - [address]  Pharmacy Notified: Yes  Patient Notified: Yes

## 2018-09-14 NOTE — TELEPHONE ENCOUNTER
Prior Authorization Retail Medication Request    Medication/Dose: buprenorphine HCl-naloxone HCl (SUBOXONE) 8-2 MG per film  ICD code (if different than what is on RX):  Encounter for long-term (current) use of medications [Z79.899]   Previously Tried and Failed:    Rationale:      Insurance Name:  Medicare ([employer])  Insurance ID:  [id number]      Pharmacy Information (if different than what is on RX)  Name:  [facility]  Phone:  [phone]

## 2018-09-14 NOTE — TELEPHONE ENCOUNTER
PA Initiation    Medication: buprenorphine HCl-naloxone HCl (SUBOXONE) 8-2 MG per film  Insurance Company: [employer] - Phone [phone] Fax [phone]  Pharmacy Filling the Rx: [place], [place] - [address]  Filling Pharmacy Phone: [phone]  Filling Pharmacy Fax:    Start Date: 9/14/2018    THIS HAS BEEN SUBMITTED BY THE PRIOR-AUTHORIZATION TEAM. ANY QUESTIONS PLEASE CALL [phone]. THANK YOU

## 2018-09-17 ENCOUNTER — OFFICE VISIT (OUTPATIENT)
Dept: ADDICTION MEDICINE | Facility: CLINIC | Age: 43
End: 2018-09-17
Payer: MEDICARE

## 2018-09-17 VITALS
RESPIRATION RATE: 16 BRPM | WEIGHT: 156 LBS | BODY MASS INDEX: 23.04 KG/M2 | SYSTOLIC BLOOD PRESSURE: 122 MMHG | HEART RATE: 88 BPM | OXYGEN SATURATION: 99 % | DIASTOLIC BLOOD PRESSURE: 74 MMHG

## 2018-09-17 DIAGNOSIS — Z79.899 ENCOUNTER FOR LONG-TERM (CURRENT) USE OF MEDICATIONS: ICD-10-CM

## 2018-09-17 DIAGNOSIS — F11.10 OPIOID USE DISORDER, MILD, ON MAINTENANCE THERAPY (H): Primary | ICD-10-CM

## 2018-09-17 LAB
AMPHETAMINES UR QL: NOT DETECTED NG/ML
BARBITURATES UR QL SCN: NOT DETECTED NG/ML
BENZODIAZ UR QL SCN: ABNORMAL NG/ML
BUPRENORPHINE UR QL: ABNORMAL NG/ML
CANNABINOIDS UR QL: NOT DETECTED NG/ML
COCAINE UR QL SCN: NOT DETECTED NG/ML
D-METHAMPHET UR QL: NOT DETECTED NG/ML
METHADONE UR QL SCN: NOT DETECTED NG/ML
OPIATES UR QL SCN: NOT DETECTED NG/ML
OXYCODONE UR QL SCN: NOT DETECTED NG/ML
PCP UR QL SCN: NOT DETECTED NG/ML
PROPOXYPH UR QL: NOT DETECTED NG/ML
TRICYCLICS UR QL SCN: NOT DETECTED NG/ML

## 2018-09-17 PROCEDURE — 80306 DRUG TEST PRSMV INSTRMNT: CPT | Performed by: ANESTHESIOLOGY

## 2018-09-17 PROCEDURE — 99214 OFFICE O/P EST MOD 30 MIN: CPT | Performed by: ANESTHESIOLOGY

## 2018-09-17 RX ORDER — BUPRENORPHINE AND NALOXONE 8; 2 MG/1; MG/1
1 FILM, SOLUBLE BUCCAL; SUBLINGUAL DAILY
Qty: 30 FILM | Refills: 0 | Status: SHIPPED | OUTPATIENT
Start: 2018-09-17 | End: 2018-10-22

## 2018-09-17 NOTE — PROGRESS NOTES
[facility]    Date of visit: 9/17/2018    Chief complaint:   Chief Complaint   Patient presents with     Pain       Interval history:  Ria Nj is a 43 year old female here for Chronic Pain and Addiction - Suboxone Maintenance follow up.   Last visit: initial consult 9/10/2018    CURRENT DOSE: Suboxone 8mg film daily with divided dosing   BRIEF HPI: 43 year old female with chronic low back pain which began after the birth of her son who is now 9 years old.  She is on disability for pain and has not worked since then.  She has been on chronic opioids for over 8 years with escalating doses (175 morphine equivalents), overuse and poor pain relief qualifies for a diagnosis of OUD - mild. Started on suboxone after initial consult on 9/10/2018 and doing well.  PMHx is significant for childhood trauma, anxiety and depression       Recommendations/plan at the last visit included:  1. Physical Therapy: YES, in the future  2. Clinical Health Pain Psychologist: YES, in the future   1. Therapy can help reduce physical and psychosocial triggers or reinforcers of pain by adapting thoughts, feelings and behaviors to reduce symptoms and increase quality of life.  Evidence indicates that the practice of relaxation, meditation, and mindfulness techniques can significantly affect pain levels and overall well being.  3. Self Care Recommendations: Teofilo progressive exercise that does not increase pain - gradually increase daily walking program.  Take mini breaks - 5 minutes of mindfullness a couple times a day.   4. Diagnostic Studies: reviewed lumbar MRI  5. Medication Management:   1. Home induction of Suboxone - she has already been off all opioids for over 30 hours.  Instructions were given in AVS as to how to do this.  2. Recommend gradual decrease off benzodiazepines  3. Urine toxicology screen today - YES     6. Follow up: Call me tomorrow and follow up in clinic in ONE  WEEK - she was double booked in my clinic for next Monday.     Since her last visit, Ria Nj reports:  - Doing REALLY well  - very grateful to have found suboxone  - pain is well controlled  - side effects include sweating and some dizziness      Current pain medications:  Suboxone films 8mg daily  Gabapentin 600mg BID  Ativan 1mg TID                              Status since last visit:    Since last visit patient has been: doing well.     Intensity:     There has been: no craving.      Suboxone Dose: adequate.   Progression of Symptoms:     Cues to use and relapse triggers: none    Recovery program has been: ignored.   Accompanying Signs & Symptoms:    Side Effects: sweating and dizzyness.    Sobriety:     Status: no use since last visit.      Drug Screen: obtained.   Precipitating factors:    Triggers have been: non-existent.   Alleviating factors:    Contact with sponsor has been: no sponsor.     Family and support system has been: helpful.   Other Therapies Tried :     Patient has been going to recovery meetings:not at all.      Patient has been participating in professional counseling/therapy: YES    [place] Board of Pharmacy Data Base Reviewed:    YES; appropriate      Pain scores:  Pain intensity on average is 3 on a scale of 0-10.     Side Effects: See above    Medications:  Current Outpatient Prescriptions   Medication Sig Dispense Refill     buprenorphine HCl-naloxone HCl (SUBOXONE) 8-2 MG per film Place 1 Film under the tongue daily 30 Film 0     albuterol (2.5 MG/3ML) 0.083% neb solution Take 1 vial (2.5 mg) by nebulization every 4 hours as needed for shortness of breath / dyspnea or wheezing 30 vial 11     albuterol (PROAIR HFA, PROVENTIL HFA, VENTOLIN HFA) 108 (90 BASE) MCG/ACT inhaler Inhale 2 puffs into the lungs every 4 hours as needed for shortness of breath / dyspnea or wheezing 1 Inhaler 0     calcium carbonate 500 MG tablet Take 1 tablet by mouth 2 times daily. 100 tablet 3      Cholecalciferol (VITAMIN D) 2000 UNITS tablet Take 2,000 Units by mouth daily 100 tablet 3     cloNIDine (CATAPRES) 0.1 MG tablet Take 1 tablet (0.1 mg) by mouth 2 times daily 4 tablet 0     desvenlafaxine succinate (PRISTIQ) 25 MG 24 hr tablet Take 1 tablet (25 mg) by mouth daily Start prior auth if needed 30 tablet 0     EPINEPHrine (EPIPEN) 0.3 MG/0.3ML injection Inject 0.3 mLs (0.3 mg) into the muscle once as needed for anaphylaxis 2 each 1     gabapentin (NEURONTIN) 300 MG capsule TAKE 1 CAPSULE BY MOUTH THREE TIMES DAILY 270 capsule 3     gabapentin (NEURONTIN) 300 MG capsule Take 2 capsules (600 mg) by mouth 3 times daily 180 capsule 11     IBUPROFEN 200 MG OR TABS Pt is taking 4 TABLET EVERY 4 TO 6 HOURS AS NEEDED       LORazepam (ATIVAN) 1 MG tablet TAKE 1 TABLET BY MOUTH EVERY 8 HOURS AS NEEDED FOR ANXIETY, 90 tablet 2     Magnesium 250 MG tablet Take 1 tablet by mouth daily. 100 tablet 3     naloxone (NARCAN) nasal spray Spray 1 spray (4 mg) into one nostril alternating nostrils as needed for opioid reversal every 2-3 minutes until assistance arrives 0.2 mL 0     nicotine (NICODERM CQ) 21 MG/24HR 24 hr patch Place 1 patch onto the skin every 24 hours 30 patch 0     Omega-3 Fatty Acids (FISH OIL CONCENTRATE PO) Take 300 mg by mouth 3 times daily         Medical History: any changes in medical history since they were last seen? No      OBJECTIVE:                                                    /74  Pulse 88  Resp 16  Wt 156 lb (70.8 kg)  SpO2 99%  BMI 23.04 kg/m2  Body mass index is 23.04 kg/(m^2).     ROS:  Constitutional, neuro, ENT, endocrine, pulmonary, cardiac, gastrointestinal, genitourinary, musculoskeletal, integument and psychiatric systems are negative, except as otherwise noted.    EXAM:  GENERAL APPEARANCE: healthy, alert and no distress  EYES: Eyes grossly normal to inspection and conjunctivae and sclerae normal  SKIN: no suspicious lesions or rashes  PSYCH: mentation appears  normal and affect normal/bright  MENTAL STATUS EXAM:  Appearance/Behavior: No apparent distress and Neatly groomed  Speech: Normal  Mood/Affect: normal affect  Insight: Adequate    Diagnostic Test Results:  Results for orders placed or performed in visit on 09/17/18 (from the past 24 hour(s))   Urine Drugs of Abuse Screen Panel 13   Result Value Ref Range    Cannabinoids (75-[id number]-0-carboxy-9-THC) Not Detected NDET^Not Detected ng/mL    Phencyclidine (Phencyclidine) Not Detected NDET^Not Detected ng/mL    Cocaine (Benzoylecgonine) Not Detected NDET^Not Detected ng/mL    Methamphetamine (d-Methamphetamine) Not Detected NDET^Not Detected ng/mL    Opiates (Morphine) Not Detected NDET^Not Detected ng/mL    Amphetamine (d-Amphetamine) Not Detected NDET^Not Detected ng/mL    Benzodiazepines (Nordiazepam) Detected, Abnormal Result (A) NDET^Not Detected ng/mL    Tricyclic Antidepressants (Desipramine) Not Detected NDET^Not Detected ng/mL    Methadone (Methadone) Not Detected NDET^Not Detected ng/mL    Barbiturates (Butalbital) Not Detected NDET^Not Detected ng/mL    Oxycodone (Oxycodone) Not Detected NDET^Not Detected ng/mL    Propoxyphene (Norpropoxyphene) Not Detected NDET^Not Detected ng/mL    Buprenorphine (Buprenorphine) Detected, Abnormal Result (A) NDET^Not Detected ng/mL            ASSESSMENT:                                                      OPIOID USE DISORDER - MILD    ENCOUNTER FOR LONG TERM USE OF HIGH RISK MEDICATION   High Risk Drug Monitoring?  YES   Drug being monitored: Suboxone    Reason for drug: Opioid Use Disorder   What is being monitored?: Dosage, Cravings, Trigger, side effects, and continued abstinence.    CHRONIC USE OF BENZOS  CHRONIC LOW BACK PAIN  ANXIETY AND DEPRESSION      ICD-10-CM    1. Opioid use disorder, mild, on maintenance therapy F11.10 Urine Drugs of Abuse Screen Panel 13     buprenorphine HCl-naloxone HCl (SUBOXONE) 8-2 MG per film   2. Encounter for long-term (current) use of  medications Z79.899 Urine Drugs of Abuse Screen Panel 13     buprenorphine HCl-naloxone HCl (SUBOXONE) 8-2 MG per film           PLAN:                                                      1. Physical Therapy:  YES, in the future when more stable   2. Clinical Health Psychologist to address issues of relaxation, behavioral change, coping style, and other factors important to improvement.  YES, recommend  3. Diagnostic Studies:    1. MRI lumbar spine reviewed  4. Medication Management:    1. Change from zubsolv 2.9mg TID to suboxone 8mg daily with divided dosing for pain  5. Further procedures recommended: none  6. Counseling: Counseled the patient on the importance of having a recovery program in addition to Suboxone maintenance.  Also discussed having a sober support network, not isolating, being open, honest, and willing to change, avoiding triggers and managing cravings. she should continue to abstain from alcohol, benzodiazepines, THC, opioids and other drugs of abuse.         MEDICATIONS:   Orders Placed This Encounter   Medications     buprenorphine HCl-naloxone HCl (SUBOXONE) 8-2 MG per film     Sig: Place 1 Film under the tongue daily     Dispense:  30 Film     Refill:  0          - Continue other medications without change    FUTURE APPOINTMENTS:       - Follow-up visit in 4 weeks    Total time spent was 30 minutes, and more than 50% of face to face time was spent in counseling and/or coordination of care.      Scarlet UribeMD Pain Management

## 2018-09-17 NOTE — MR AVS SNAPSHOT
After Visit Summary   9/17/2018    Ria Nj    MRN: [id number]           Patient Information     Date Of Birth          1975        Visit Information        Provider Department      9/17/2018 1:30 PM Scarlet Uribe MD [facility]        Today's Diagnoses     Opioid use disorder, mild, on maintenance therapy    -  1    Encounter for long-term (current) use of medications          Care Instructions    Continue your Buprenorphine 8mg films/tabs ONE times a day    Follow up with me in 4 weeks    A prescription has been sent to your pharmacy of choice. If a prior authorization is required it may take several days to get you medication.  Please make sure your pharmacy has your contact information so they can contact you when it is ready to .     The addiction medicine clinic number is [phone].    If you cannot make your appointment please call the office and reschedule immediately. If you are out of medication a bridge can be sent to your pharmacy to last until the date of your rescheduled appointment. Our clinic is open from Monday-Friday 0800-4:30pm and there is not an ON CALL after hours service.  If medical care is needed after hours or on the weekend you will need to contact your primary care physician or go to an Urgent Care or ER.     MyChart messages and telephone calls from patients are taken care of by the nursing team within 24 business hours if received between Monday 8am - Friday 4:30pm.  Therefore, if a refill/bridge of medication is needed it is important to call in advance so you do not run out.     It is strongly recommended that you abstain from alcohol, benzodiazepines (xanax, valium, klonopin and others), marijuana, opioids (percocet, tramadol, dilauded, fentanyl and others) and other drugs of abuse (methamphetamines, alcohol and others)  Using any of these substances increases your risk of overdose/death. (especially with alcohol  and benzodiazepines) You are at risk for overdose and death with return to the use of opioids after a period of abstinence because your tolerance has decreased.     You are encouraged to have some type of recovery program in addition to medication management. This may include having a sober network of friends, avoiding isolation, avoiding triggers including people, places and things you associate with using.  Such supports may include Alcoholics Anonymous, Narcotics Anonymous, SMART recovery, Mormonism groups or other self help activities like counseling.  We can help provide resources to these services.   Medication alone is usually not enough to lead to long term recovery.      Narcan kit prescriptions are available if you need one.     [facility] does not accept [employer] or [employer] Medical assistance insurance.       Scarlet Uribe MD                 Follow-ups after your visit        Your next 10 appointments already scheduled     Oct 15, 2018  2:00 PM CDT   Return Visit with Scarlet Uribe MD   [facility] ([facility])    [address] [place] [address]  [address] [place] [id number] [phone]              Who to contact     If you have questions or need follow up information about today's clinic visit or your schedule please contact [facility] directly at [phone].  Normal or non-critical lab and imaging results will be communicated to you by MyChart, letter or phone within 4 business days after the clinic has received the results. If you do not hear from us within 7 days, please contact the clinic through MyChart or phone. If you have a critical or abnormal lab result, we will notify you by phone as soon as possible.  Submit refill requests through [employer] or call your pharmacy and they will forward the refill request to us. Please allow 3 business days for your refill to be completed.           Additional Information About Your Visit        Care EveryWhere ID     This is your Care EveryWhere ID. This could be used by other organizations to access your [facility] medical records  [id number]        Your Vitals Were     Pulse Respirations Pulse Oximetry BMI (Body Mass Index)          88 16 99% 23.04 kg/m2         Blood Pressure from Last 3 Encounters:   09/17/18 122/74   09/11/18 116/82   09/10/18 (!) 154/100    Weight from Last 3 Encounters:   09/17/18 156 lb (70.8 kg)   09/11/18 158 lb (71.7 kg)   09/10/18 158 lb (71.7 kg)              We Performed the Following     Urine Drugs of Abuse Screen Panel 13          Today's Medication Changes          These changes are accurate as of 9/17/18  3:47 PM.  If you have any questions, ask your nurse or doctor.               These medicines have changed or have updated prescriptions.        Dose/Directions    buprenorphine HCl-naloxone HCl 8-2 MG per film   Commonly known as:  SUBOXONE   This may have changed:  Another medication with the same name was removed. Continue taking this medication, and follow the directions you see here.   Used for:  Opioid use disorder, mild, on maintenance therapy, Encounter for long-term (current) use of medications   Changed by:  Scarlet Uribe MD        Dose:  1 Film   Place 1 Film under the tongue daily   Quantity:  30 Film   Refills:  0            Where to get your medicines      Some of these will need a paper prescription and others can be bought over the counter.  Ask your nurse if you have questions.     Bring a paper prescription for each of these medications     buprenorphine HCl-naloxone HCl 8-2 MG per film                Primary Care Provider Office Phone # Fax #    Joel Daniel Irwin Wegener, [phone] [address] [facility] [address]        Equal Access to Services     MONO WATERS AH: Vladimir Moreno, lucinda reyna, qatoy andrews  lalizzy miranda. [facility] [phone].    ATENCIÓN: Si ajitla riky, tiene a freire disposición servicios gratuitos de asistencia lingüística. Jono ramirez [phone].    We comply with applicable federal civil rights laws and [place] laws. We do not discriminate on the basis of race, color, national origin, age, disability, sex, sexual orientation, or gender identity.            Thank you!     Thank you for choosing [facility]  for your care. Our goal is always to provide you with excellent care. Hearing back from our patients is one way we can continue to improve our services. Please take a few minutes to complete the written survey that you may receive in the mail after your visit with us. Thank you!             Your Updated Medication List - Protect others around you: Learn how to safely use, store and throw away your medicines at www.disposemymeds.org.          This list is accurate as of 9/17/18  3:47 PM.  Always use your most recent med list.                   Brand Name Dispense Instructions for use Diagnosis    * albuterol 108 (90 Base) MCG/ACT inhaler    PROAIR HFA/PROVENTIL HFA/VENTOLIN HFA    1 Inhaler    Inhale 2 puffs into the lungs every 4 hours as needed for shortness of breath / dyspnea or wheezing    Acute bronchospasm       * albuterol (2.5 MG/3ML) 0.083% neb solution     30 vial    Take 1 vial (2.5 mg) by nebulization every 4 hours as needed for shortness of breath / dyspnea or wheezing    Acute bronchospasm       buprenorphine HCl-naloxone HCl 8-2 MG per film    SUBOXONE    30 Film    Place 1 Film under the tongue daily    Opioid use disorder, mild, on maintenance therapy, Encounter for long-term (current) use of medications       calcium carbonate 500 mg {elemental} 500 MG tablet    OS-ADOLFO    100 tablet    Take 1 tablet by mouth 2 times daily.    Routine general medical examination at a health care facility       cloNIDine 0.1 MG tablet    CATAPRES    4 tablet    Take 1  tablet (0.1 mg) by mouth 2 times daily        desvenlafaxine succinate 25 MG 24 hr tablet    PRISTIQ    30 tablet    Take 1 tablet (25 mg) by mouth daily Start prior auth if needed    Generalized anxiety disorder, Moderate major depression (H)       EPINEPHrine 0.3 MG/0.3ML injection 2-pack    EPIPEN/ADRENACLICK/or ANY BX GENERIC EQUIV    2 each    Inject 0.3 mLs (0.3 mg) into the muscle once as needed for anaphylaxis    Bee sting-induced anaphylaxis, accidental or unintentional, subsequent encounter       FISH OIL CONCENTRATE PO      Take 300 mg by mouth 3 times daily        * gabapentin 300 MG capsule    NEURONTIN    180 capsule    Take 2 capsules (600 mg) by mouth 3 times daily    Chronic bilateral low back pain without sciatica       * gabapentin 300 MG capsule    NEURONTIN    270 capsule    TAKE 1 CAPSULE BY MOUTH THREE TIMES DAILY    Low back pain, unspecified back pain laterality, unspecified chronicity, with sciatica presence unspecified       ibuprofen 200 MG tablet    ADVIL/MOTRIN     Pt is taking 4 TABLET EVERY 4 TO 6 HOURS AS NEEDED        LORazepam 1 MG tablet    ATIVAN    90 tablet    TAKE 1 TABLET BY MOUTH EVERY 8 HOURS AS NEEDED FOR ANXIETY,    Generalized anxiety disorder       magnesium 250 MG tablet     100 tablet    Take 1 tablet by mouth daily.    Routine general medical examination at a health care facility       naloxone nasal spray    NARCAN    0.2 mL    Spray 1 spray (4 mg) into one nostril alternating nostrils as needed for opioid reversal every 2-3 minutes until assistance arrives    Continuous opioid dependence (H)       nicotine 21 MG/24HR 24 hr patch    NICODERM CQ    30 patch    Place 1 patch onto the skin every 24 hours    Tobacco use disorder       vitamin D 2000 units tablet     100 tablet    Take 2,000 Units by mouth daily    Major depressive disorder, recurrent episode, moderate (H)       * Notice:  This list has 4 medication(s) that are the same as other medications prescribed  for you. Read the directions carefully, and ask your doctor or other care provider to review them with you.

## 2018-09-17 NOTE — PATIENT INSTRUCTIONS
Continue your Buprenorphine 8mg films/tabs ONE times a day    Follow up with me in 4 weeks    A prescription has been sent to your pharmacy of choice. If a prior authorization is required it may take several days to get you medication.  Please make sure your pharmacy has your contact information so they can contact you when it is ready to .     The addiction medicine clinic number is [phone].    If you cannot make your appointment please call the office and reschedule immediately. If you are out of medication a bridge can be sent to your pharmacy to last until the date of your rescheduled appointment. Our clinic is open from Monday-Friday 0800-4:30pm and there is not an ON CALL after hours service.  If medical care is needed after hours or on the weekend you will need to contact your primary care physician or go to an Urgent Care or ER.     [employer] messages and telephone calls from patients are taken care of by the nursing team within 24 business hours if received between Monday 8am - Friday 4:30pm.  Therefore, if a refill/bridge of medication is needed it is important to call in advance so you do not run out.     It is strongly recommended that you abstain from alcohol, benzodiazepines (xanax, valium, klonopin and others), marijuana, opioids (percocet, tramadol, dilauded, fentanyl and others) and other drugs of abuse (methamphetamines, alcohol and others)  Using any of these substances increases your risk of overdose/death. (especially with alcohol and benzodiazepines) You are at risk for overdose and death with return to the use of opioids after a period of abstinence because your tolerance has decreased.     You are encouraged to have some type of recovery program in addition to medication management. This may include having a sober network of friends, avoiding isolation, avoiding triggers including people, places and things you associate with using.  Such supports may include Alcoholics Anonymous,  Narcotics Anonymous, SMART recovery, Hindu groups or other self help activities like counseling.  We can help provide resources to these services.   Medication alone is usually not enough to lead to long term recovery.      Narcan kit prescriptions are available if you need one.     [facility] does not accept [facility] or [facility] [employer] Medical assistance insurance.       Scarlet Uribe MD

## 2018-09-18 ENCOUNTER — TELEPHONE (OUTPATIENT)
Dept: ADDICTION MEDICINE | Facility: CLINIC | Age: 43
End: 2018-09-18

## 2018-09-18 NOTE — TELEPHONE ENCOUNTER
Patient reports that she was taking it incorrectly.  Discussed proper way to take the medication.  Also patient wanted to report that she does take Lorazepam, but forgot to mention it to Dr. Uribe.  Discussed the risks associated with taking it with Suboxone. Discussed not stopping Lorazepam cold turkey.      She would like to discuss a taper schedule.    Will forward to provider for review and advise.

## 2018-09-18 NOTE — TELEPHONE ENCOUNTER
Pt called about her Subx films, pt stated that yesterday she just started taking the films, she wan previously taking the tablets but since taking the films she's been feeling strange. Pt's back pain back has increased. Pt want to discuss this further with a nurse.     Pt contact info: [phone]      Riki Gallagher

## 2018-09-19 NOTE — TELEPHONE ENCOUNTER
I would recommend she continue her ativan as prescribed.  When I see her for her next visit we can discuss a taper schedule.  This is not a medication that can be discontinued quickly.     I am assuming the other problem with her suboxone is resolved now that she is taking it correctly?     Scarlet Uribe MD

## 2018-09-19 NOTE — TELEPHONE ENCOUNTER
I asked the patient to call back in a couple of days to let us know if the films are more effective.  She was encouraged to keep taking lorazepam until her next appointment.

## 2018-10-01 ENCOUNTER — OFFICE VISIT (OUTPATIENT)
Dept: ADDICTION MEDICINE | Facility: CLINIC | Age: 43
End: 2018-10-01
Payer: MEDICARE

## 2018-10-01 VITALS
WEIGHT: 170 LBS | SYSTOLIC BLOOD PRESSURE: 102 MMHG | OXYGEN SATURATION: 96 % | TEMPERATURE: 98.3 F | DIASTOLIC BLOOD PRESSURE: 58 MMHG | HEART RATE: 89 BPM | RESPIRATION RATE: 14 BRPM | BODY MASS INDEX: 25.1 KG/M2

## 2018-10-01 DIAGNOSIS — G89.4 CHRONIC PAIN SYNDROME: ICD-10-CM

## 2018-10-01 DIAGNOSIS — F11.10 OPIOID USE DISORDER, MILD, ON MAINTENANCE THERAPY (H): Primary | ICD-10-CM

## 2018-10-01 DIAGNOSIS — Z79.899 ENCOUNTER FOR LONG-TERM (CURRENT) USE OF MEDICATIONS: ICD-10-CM

## 2018-10-01 DIAGNOSIS — F41.1 GENERALIZED ANXIETY DISORDER: ICD-10-CM

## 2018-10-01 PROCEDURE — 80306 DRUG TEST PRSMV INSTRMNT: CPT | Performed by: ANESTHESIOLOGY

## 2018-10-01 PROCEDURE — 99214 OFFICE O/P EST MOD 30 MIN: CPT | Performed by: ANESTHESIOLOGY

## 2018-10-01 RX ORDER — BUPRENORPHINE AND NALOXONE 8; 2 MG/1; MG/1
1 FILM, SOLUBLE BUCCAL; SUBLINGUAL 2 TIMES DAILY
Qty: 60 FILM | Refills: 0 | Status: SHIPPED | OUTPATIENT
Start: 2018-10-01 | End: 2018-10-16

## 2018-10-01 NOTE — PATIENT INSTRUCTIONS
Continue your Buprenorphine 8mg films/tabs TWO times a day.  Do not overuse or make dose changes on your own.  You need to take your medication as prescribed.     Continue your Ativan as prescribed by your PCP.  We can discuss tapering off this over time once you are stable and feeling better on your suboxone.  I anticipate it will take one year to taper completely off.      Follow up with me in 4 weeks - cancel your appointment on the 15th.     A prescription has been sent to your pharmacy of choice. If a prior authorization is required it may take several days to get you medication.  Please make sure your pharmacy has your contact information so they can contact you when it is ready to .     The addiction medicine clinic number is [phone].    If you cannot make your appointment please call the office and reschedule immediately. If you are out of medication a bridge can be sent to your pharmacy to last until the date of your rescheduled appointment. Our clinic is open from Monday-Friday 0800-4:30pm and there is not an ON CALL after hours service.  If medical care is needed after hours or on the weekend you will need to contact your primary care physician or go to an Urgent Care or ER.     MyChart messages and telephone calls from patients are taken care of by the nursing team within 24 business hours if received between Monday 8am - Friday 4:30pm.  Therefore, if a refill/bridge of medication is needed it is important to call in advance so you do not run out.     It is strongly recommended that you abstain from alcohol, benzodiazepines (xanax, valium, klonopin and others), marijuana, opioids (percocet, tramadol, dilauded, fentanyl and others) and other drugs of abuse (methamphetamines, alcohol and others)  Using any of these substances increases your risk of overdose/death. (especially with alcohol and benzodiazepines) You are at risk for overdose and death with return to the use of opioids after a  period of abstinence because your tolerance has decreased.     You are encouraged to have some type of recovery program in addition to medication management. This may include having a sober network of friends, avoiding isolation, avoiding triggers including people, places and things you associate with using.  Such supports may include Alcoholics Anonymous, Narcotics Anonymous, SMART recovery, Gnosticist groups or other self help activities like counseling.  We can help provide resources to these services.   Medication alone is usually not enough to lead to long term recovery.      Narcan kit prescriptions are available if you need one.     [facility] does not accept [facility] or [employer] Medical assistance insurance.       Scarlet Uribe MD

## 2018-10-01 NOTE — PROGRESS NOTES
[facility]    Date of visit: 10/01/2018    Chief complaint:   Chief Complaint   Patient presents with     Drug Problem       Interval history:  Ria Nj is a 43 year old female here for Chronic Pain and Addiction - Suboxone Maintenance follow up.   Last visit: follow up 9/17/2018    CURRENT DOSE: Suboxone 8mg film BID   BRIEF HPI: 43 year old female with chronic low back pain which began after the birth of her son who is now 9 years old.  She is on disability for pain and has not worked since then.  She has been on chronic opioids for over 8 years with escalating doses (175 morphine equivalents), overuse and poor pain relief qualifies for a diagnosis of OUD - mild. Started on suboxone after initial consult on 9/10/2018 and doing well.  PMHx is significant for childhood trauma, anxiety and depression       Since her last visit, Ria Nj reports:  - Doing REALLY well  - Tried just discontinuing her ativan cold turkey and felt terrible.  She is now back on this but worried as the pharmacy only gave her 68 tablets and not the 90 that were prescribed by her PCP.   - Overused her suboxone and is now out of medication.  She was taking TWO 8mg films a day vs one as prescribed.   - very grateful to have found suboxone  - pain is well controlled  - side effects include sweating and some dizziness      Current pain medications:  Suboxone films 8mg daily - increased today to BID  Gabapentin 600mg BID  Ativan 1mg TID                            Status since last visit:    Since last visit patient has been: doing well.     Intensity:     There has been: no craving.      Suboxone Dose: too low   Progression of Symptoms:     Cues to use and relapse triggers: none    Recovery program has been: ignored.   Accompanying Signs & Symptoms:    Side Effects: sweating and dizzyness.    Sobriety:     Status: no use since last visit.      Drug Screen: obtained.   Precipitating  factors:    Triggers have been: non-existent.   Alleviating factors:    Contact with sponsor has been: no sponsor.     Family and support system has been: helpful.   Other Therapies Tried :     Patient has been going to recovery meetings:not at all.      Patient has been participating in professional counseling/therapy: YES    [place] Board of Pharmacy Data Base Reviewed:    YES; appropriate      Pain scores:  Pain intensity on average is 3 on a scale of 0-10.     Side Effects: See above    Medications:  Current Outpatient Prescriptions   Medication Sig Dispense Refill     albuterol (2.5 MG/3ML) 0.083% neb solution Take 1 vial (2.5 mg) by nebulization every 4 hours as needed for shortness of breath / dyspnea or wheezing 30 vial 11     albuterol (PROAIR HFA, PROVENTIL HFA, VENTOLIN HFA) 108 (90 BASE) MCG/ACT inhaler Inhale 2 puffs into the lungs every 4 hours as needed for shortness of breath / dyspnea or wheezing 1 Inhaler 0     buprenorphine HCl-naloxone HCl (SUBOXONE) 8-2 MG per film Place 1 Film under the tongue daily 30 Film 0     calcium carbonate 500 MG tablet Take 1 tablet by mouth 2 times daily. 100 tablet 3     Cholecalciferol (VITAMIN D) 2000 UNITS tablet Take 2,000 Units by mouth daily 100 tablet 3     cloNIDine (CATAPRES) 0.1 MG tablet Take 1 tablet (0.1 mg) by mouth 2 times daily 4 tablet 0     desvenlafaxine succinate (PRISTIQ) 25 MG 24 hr tablet Take 1 tablet (25 mg) by mouth daily Start prior auth if needed 30 tablet 0     EPINEPHrine (EPIPEN) 0.3 MG/0.3ML injection Inject 0.3 mLs (0.3 mg) into the muscle once as needed for anaphylaxis 2 each 1     gabapentin (NEURONTIN) 300 MG capsule TAKE 1 CAPSULE BY MOUTH THREE TIMES DAILY 270 capsule 3     gabapentin (NEURONTIN) 300 MG capsule Take 2 capsules (600 mg) by mouth 3 times daily 180 capsule 11     IBUPROFEN 200 MG OR TABS Pt is taking 4 TABLET EVERY 4 TO 6 HOURS AS NEEDED       LORazepam (ATIVAN) 1 MG tablet TAKE 1 TABLET BY MOUTH EVERY 8 HOURS AS  NEEDED FOR ANXIETY, 90 tablet 2     Magnesium 250 MG tablet Take 1 tablet by mouth daily. 100 tablet 3     naloxone (NARCAN) nasal spray Spray 1 spray (4 mg) into one nostril alternating nostrils as needed for opioid reversal every 2-3 minutes until assistance arrives 0.2 mL 0     nicotine (NICODERM CQ) 21 MG/24HR 24 hr patch Place 1 patch onto the skin every 24 hours 30 patch 0     Omega-3 Fatty Acids (FISH OIL CONCENTRATE PO) Take 300 mg by mouth 3 times daily         Medical History: any changes in medical history since they were last seen? No      OBJECTIVE:                                                    /58  Pulse 89  Temp 98.3  F (36.8  C) (Oral)  Resp 14  Wt 170 lb (77.1 kg)  SpO2 96%  BMI 25.1 kg/m2  Body mass index is 25.1 kg/(m^2).     ROS:  Constitutional, neuro, ENT, endocrine, pulmonary, cardiac, gastrointestinal, genitourinary, musculoskeletal, integument and psychiatric systems are negative, except as otherwise noted.    EXAM:  GENERAL APPEARANCE: healthy, alert and no distress  EYES: Eyes grossly normal to inspection and conjunctivae and sclerae normal  SKIN: no suspicious lesions or rashes  PSYCH: mentation appears normal and affect normal/bright  MENTAL STATUS EXAM:  Appearance/Behavior: No apparent distress and Neatly groomed  Speech: Normal  Mood/Affect: normal affect  Insight: Adequate    Diagnostic Test Results:  Results for orders placed or performed in visit on 10/01/18 (from the past 24 hour(s))   Urine Drugs of Abuse Screen Panel 13   Result Value Ref Range    Cannabinoids (02-[id number]-9-carboxy-9-THC) Not Detected NDET^Not Detected ng/mL    Phencyclidine (Phencyclidine) Not Detected NDET^Not Detected ng/mL    Cocaine (Benzoylecgonine) Not Detected NDET^Not Detected ng/mL    Methamphetamine (d-Methamphetamine) Not Detected NDET^Not Detected ng/mL    Opiates (Morphine) Not Detected NDET^Not Detected ng/mL    Amphetamine (d-Amphetamine) Not Detected NDET^Not Detected ng/mL     Benzodiazepines (Nordiazepam) Detected, Abnormal Result (A) NDET^Not Detected ng/mL    Tricyclic Antidepressants (Desipramine) Not Detected NDET^Not Detected ng/mL    Methadone (Methadone) Not Detected NDET^Not Detected ng/mL    Barbiturates (Butalbital) Not Detected NDET^Not Detected ng/mL    Oxycodone (Oxycodone) Not Detected NDET^Not Detected ng/mL    Propoxyphene (Norpropoxyphene) Not Detected NDET^Not Detected ng/mL    Buprenorphine (Buprenorphine) Detected, Abnormal Result (A) NDET^Not Detected ng/mL            ASSESSMENT:                                                      OPIOID USE DISORDER - MILD  TOBACCO USE DISORDER    ENCOUNTER FOR LONG TERM USE OF HIGH RISK MEDICATION   High Risk Drug Monitoring?  YES   Drug being monitored: Suboxone    Reason for drug: Opioid Use Disorder   What is being monitored?: Dosage, Cravings, Trigger, side effects, and continued abstinence.    CHRONIC USE OF BENZOS  CHRONIC LOW BACK PAIN  ANXIETY AND DEPRESSION      ICD-10-CM    1. Opioid use disorder, mild, on maintenance therapy F11.10 Urine Drugs of Abuse Screen Panel 13   2. Encounter for long-term (current) use of medications Z79.899 Urine Drugs of Abuse Screen Panel 13           PLAN:                                                      1. Physical Therapy:  YES, in the future when more stable   2. Clinical Health Psychologist to address issues of relaxation, behavioral change, coping style, and other factors important to improvement.  YES, recommend in the future  3. Diagnostic Studies:    1. MRI lumbar spine reviewed  4. Medication Management:    1. Increase Suboxone to 8mg BID.  Discussed that she should NOT be making dose changes on her own and that overuse will not be tolerated in the future. Recommended that she take 8mg in the am and 4mg midday and 4mg before bed for better pain control.  Pain relieving properties of Suboxone last 8 hours on average.   2. I am recommending she continue her Ativan 1mg TID for the  time being. Eventually when she is stable on suboxone and feeling better we can talk about a slow taper off.  For now, I would prefer her PCP continue to prescribe this until she is serious about tapering off.  At that point we can consider conversion to a long acting (diazepam) and jorge method to taper off.  I can take over a prescribing role at that point.   5. Further procedures recommended: none  6. Counseling: Counseled the patient on the importance of having a recovery program in addition to Suboxone maintenance.  Also discussed having a sober support network, not isolating, being open, honest, and willing to change, avoiding triggers and managing cravings. she should continue to abstain from alcohol, benzodiazepines, THC, opioids and other drugs of abuse.       MEDICATIONS:   Orders Placed This Encounter   Medications     buprenorphine HCl-naloxone HCl (SUBOXONE) 8-2 MG per film     Sig: Place 1 Film under the tongue 2 times daily     Dispense:  60 Film     Refill:  0          - Continue other medications without change    FUTURE APPOINTMENTS:       - Follow-up visit in 4 weeks    Total time spent was 30 minutes, and more than 50% of face to face time was spent in counseling and/or coordination of care.      Scarlet UribeMD Pain Management

## 2018-10-01 NOTE — MR AVS SNAPSHOT
After Visit Summary   10/1/2018    Ria Nj    MRN: [id number]           Patient Information     Date Of Birth          1975        Visit Information        Provider Department      10/1/2018 3:30 PM Scarlet Uribe MD [facility]        Today's Diagnoses     Opioid use disorder, mild, on maintenance therapy    -  1    Encounter for long-term (current) use of medications        Generalized anxiety disorder        Chronic pain syndrome          Care Instructions    Continue your Buprenorphine 8mg films/tabs TWO times a day    Continue your Ativan as prescribed by your PCP.  We can discuss tapering off this over time once you are stable and feeling better on your suboxone.  I anticipate it will take one year to taper completely off.      Follow up with me in 4 weeks - cancel your appointment on the 15th.     A prescription has been sent to your pharmacy of choice. If a prior authorization is required it may take several days to get you medication.  Please make sure your pharmacy has your contact information so they can contact you when it is ready to .     The addiction medicine clinic number is [phone].    If you cannot make your appointment please call the office and reschedule immediately. If you are out of medication a bridge can be sent to your pharmacy to last until the date of your rescheduled appointment. Our clinic is open from Monday-Friday 0800-4:30pm and there is not an ON CALL after hours service.  If medical care is needed after hours or on the weekend you will need to contact your primary care physician or go to an Urgent Care or ER.     MyChart messages and telephone calls from patients are taken care of by the nursing team within 24 business hours if received between Monday 8am - Friday 4:30pm.  Therefore, if a refill/bridge of medication is needed it is important to call in advance so you do not run out.     It is strongly  recommended that you abstain from alcohol, benzodiazepines (xanax, valium, klonopin and others), marijuana, opioids (percocet, tramadol, dilauded, fentanyl and others) and other drugs of abuse (methamphetamines, alcohol and others)  Using any of these substances increases your risk of overdose/death. (especially with alcohol and benzodiazepines) You are at risk for overdose and death with return to the use of opioids after a period of abstinence because your tolerance has decreased.     You are encouraged to have some type of recovery program in addition to medication management. This may include having a sober network of friends, avoiding isolation, avoiding triggers including people, places and things you associate with using.  Such supports may include Alcoholics Anonymous, Narcotics Anonymous, SMART recovery, Jehovah's witness groups or other self help activities like counseling.  We can help provide resources to these services.   Medication alone is usually not enough to lead to long term recovery.      Narcan kit prescriptions are available if you need one.     [facility] does not accept [employer] or [employer] assistance insurance.       Scarlet Uribe MD                   Follow-ups after your visit        Who to contact     If you have questions or need follow up information about today's clinic visit or your schedule please contact [facility] directly at [phone].  Normal or non-critical lab and imaging results will be communicated to you by MyChart, letter or phone within 4 business days after the clinic has received the results. If you do not hear from us within 7 days, please contact the clinic through MyChart or phone. If you have a critical or abnormal lab result, we will notify you by phone as soon as possible.  Submit refill requests through [employer] or call your pharmacy and they will forward the refill request to us. Please allow 3 business days for  your refill to be completed.          Additional Information About Your Visit        Care EveryWhere ID     This is your Care EveryWhere ID. This could be used by other organizations to access your [facility] medical records  [id number]        Your Vitals Were     Pulse Temperature Respirations Pulse Oximetry BMI (Body Mass Index)       89 98.3  F (36.8  C) (Oral) 14 96% 25.1 kg/m2        Blood Pressure from Last 3 Encounters:   10/01/18 102/58   09/17/18 122/74   09/11/18 116/82    Weight from Last 3 Encounters:   10/01/18 170 lb (77.1 kg)   09/17/18 156 lb (70.8 kg)   09/11/18 158 lb (71.7 kg)              We Performed the Following     Urine Drugs of Abuse Screen Panel 13          Today's Medication Changes          These changes are accurate as of 10/1/18  4:22 PM.  If you have any questions, ask your nurse or doctor.               These medicines have changed or have updated prescriptions.        Dose/Directions    * buprenorphine HCl-naloxone HCl 8-2 MG per film   Commonly known as:  SUBOXONE   This may have changed:  Another medication with the same name was added. Make sure you understand how and when to take each.   Used for:  Opioid use disorder, mild, on maintenance therapy, Encounter for long-term (current) use of medications   Changed by:  Scarlet Uribe MD        Dose:  1 Film   Place 1 Film under the tongue daily   Quantity:  30 Film   Refills:  0       * buprenorphine HCl-naloxone HCl 8-2 MG per film   Commonly known as:  SUBOXONE   This may have changed:  You were already taking a medication with the same name, and this prescription was added. Make sure you understand how and when to take each.   Used for:  Opioid use disorder, mild, on maintenance therapy, Encounter for long-term (current) use of medications   Changed by:  Scarlet Uribe MD        Dose:  1 Film   Place 1 Film under the tongue 2 times daily   Quantity:  60 Film   Refills:  0       * Notice:  This list has 2 medication(s) that  are the same as other medications prescribed for you. Read the directions carefully, and ask your doctor or other care provider to review them with you.         Where to get your medicines      Some of these will need a paper prescription and others can be bought over the counter.  Ask your nurse if you have questions.     Bring a paper prescription for each of these medications     buprenorphine HCl-naloxone HCl 8-2 MG per film                Primary Care Provider Office Phone # Fax #    Joel Daniel Irwin Wegener, [phone] [address] [address]        Equal Access to Services     [facility]: Hadii aad ku hadasho Soomaali, waaxda luqadaha, qaybta kaalmada adeegyadeepa, waxkatarina bowling . So [facility] [phone].    ATENCIÓN: Si habla español, tiene a freire disposición servicios gratuitos de asistencia lingüística. [facility] [phone].    We comply with applicable federal civil rights laws and [place] laws. We do not discriminate on the basis of race, color, national origin, age, disability, sex, sexual orientation, or gender identity.            Thank you!     Thank you for choosing [facility]  for your care. Our goal is always to provide you with excellent care. Hearing back from our patients is one way we can continue to improve our services. Please take a few minutes to complete the written survey that you may receive in the mail after your visit with us. Thank you!             Your Updated Medication List - Protect others around you: Learn how to safely use, store and throw away your medicines at www.disposemymeds.org.          This list is accurate as of 10/1/18  4:22 PM.  Always use your most recent med list.                   Brand Name Dispense Instructions for use Diagnosis    * albuterol 108 (90 Base) MCG/ACT inhaler    PROAIR HFA/PROVENTIL HFA/VENTOLIN HFA    1 Inhaler    Inhale 2 puffs into the lungs every 4 hours  as needed for shortness of breath / dyspnea or wheezing    Acute bronchospasm       * albuterol (2.5 MG/3ML) 0.083% neb solution     30 vial    Take 1 vial (2.5 mg) by nebulization every 4 hours as needed for shortness of breath / dyspnea or wheezing    Acute bronchospasm       * buprenorphine HCl-naloxone HCl 8-2 MG per film    SUBOXONE    30 Film    Place 1 Film under the tongue daily    Opioid use disorder, mild, on maintenance therapy, Encounter for long-term (current) use of medications       * buprenorphine HCl-naloxone HCl 8-2 MG per film    SUBOXONE    60 Film    Place 1 Film under the tongue 2 times daily    Opioid use disorder, mild, on maintenance therapy, Encounter for long-term (current) use of medications       calcium carbonate 500 mg {elemental} 500 MG tablet    OS-ADOLFO    100 tablet    Take 1 tablet by mouth 2 times daily.    Routine general medical examination at a health care facility       cloNIDine 0.1 MG tablet    CATAPRES    4 tablet    Take 1 tablet (0.1 mg) by mouth 2 times daily        desvenlafaxine succinate 25 MG 24 hr tablet    PRISTIQ    30 tablet    Take 1 tablet (25 mg) by mouth daily Start prior auth if needed    Generalized anxiety disorder, Moderate major depression (H)       EPINEPHrine 0.3 MG/0.3ML injection 2-pack    EPIPEN/ADRENACLICK/or ANY BX GENERIC EQUIV    2 each    Inject 0.3 mLs (0.3 mg) into the muscle once as needed for anaphylaxis    Bee sting-induced anaphylaxis, accidental or unintentional, subsequent encounter       FISH OIL CONCENTRATE PO      Take 300 mg by mouth 3 times daily        * gabapentin 300 MG capsule    NEURONTIN    180 capsule    Take 2 capsules (600 mg) by mouth 3 times daily    Chronic bilateral low back pain without sciatica       * gabapentin 300 MG capsule    NEURONTIN    270 capsule    TAKE 1 CAPSULE BY MOUTH THREE TIMES DAILY    Low back pain, unspecified back pain laterality, unspecified chronicity, with sciatica presence unspecified        ibuprofen 200 MG tablet    ADVIL/MOTRIN     Pt is taking 4 TABLET EVERY 4 TO 6 HOURS AS NEEDED        LORazepam 1 MG tablet    ATIVAN    90 tablet    TAKE 1 TABLET BY MOUTH EVERY 8 HOURS AS NEEDED FOR ANXIETY,    Generalized anxiety disorder       magnesium 250 MG tablet     100 tablet    Take 1 tablet by mouth daily.    Routine general medical examination at a health care facility       naloxone nasal spray    NARCAN    0.2 mL    Spray 1 spray (4 mg) into one nostril alternating nostrils as needed for opioid reversal every 2-3 minutes until assistance arrives    Continuous opioid dependence (H)       nicotine 21 MG/24HR 24 hr patch    NICODERM CQ    30 patch    Place 1 patch onto the skin every 24 hours    Tobacco use disorder       vitamin D 2000 units tablet     100 tablet    Take 2,000 Units by mouth daily    Major depressive disorder, recurrent episode, moderate (H)       * Notice:  This list has 6 medication(s) that are the same as other medications prescribed for you. Read the directions carefully, and ask your doctor or other care provider to review them with you.

## 2018-10-03 DIAGNOSIS — F41.1 GENERALIZED ANXIETY DISORDER: ICD-10-CM

## 2018-10-03 NOTE — TELEPHONE ENCOUNTER
LORAZEPAM 1MG TABLETS        Last Written Prescription Date:  9/5/2018  Last Fill Quantity: 90 tablet,   # refills: 2  Last Office Visit: 9/11/2018  Future Office visit:       Routing refill request to provider for review/approval because:  Drug not on the [facility], P or [facility] refill protocol or controlled substance

## 2018-10-04 NOTE — TELEPHONE ENCOUNTER
I can do this tomorrow when back in clinic.     Last fill was 09/4 but looks like was 2/2 refill from a previous prescription so will need a new prescription if she does not have the one written on 09/5. Joel Wegener,MD

## 2018-10-05 RX ORDER — LORAZEPAM 1 MG/1
TABLET ORAL
Qty: 90 TABLET | Refills: 1 | Status: SHIPPED | OUTPATIENT
Start: 2018-10-05 | End: 2018-10-08

## 2018-10-08 RX ORDER — LORAZEPAM 1 MG/1
TABLET ORAL
Qty: 90 TABLET | Refills: 1 | Status: SHIPPED | OUTPATIENT
Start: 2018-10-08 | End: 2018-12-04

## 2018-10-15 ENCOUNTER — TELEPHONE (OUTPATIENT)
Dept: PALLIATIVE MEDICINE | Facility: CLINIC | Age: 43
End: 2018-10-15

## 2018-10-15 DIAGNOSIS — Z79.899 ENCOUNTER FOR LONG-TERM (CURRENT) USE OF MEDICATIONS: ICD-10-CM

## 2018-10-15 DIAGNOSIS — F11.10 OPIOID USE DISORDER, MILD, ON MAINTENANCE THERAPY (H): ICD-10-CM

## 2018-10-15 NOTE — TELEPHONE ENCOUNTER
Pt called would like to speak with Scarlet Uribe regarding her pain control. Please call pt to advise.      Dwaine ORTEGA    [facility] [place]

## 2018-10-16 RX ORDER — BUPRENORPHINE AND NALOXONE 8; 2 MG/1; MG/1
1 FILM, SOLUBLE BUCCAL; SUBLINGUAL 2 TIMES DAILY
Qty: 12 FILM | Refills: 0 | Status: SHIPPED | OUTPATIENT
Start: 2018-10-16 | End: 2018-10-22

## 2018-10-16 NOTE — TELEPHONE ENCOUNTER
Pt called with concerns around breakthrough pain especially at night, she has been using a full strip 8 mg , 3-4 times per day vs as prescribed at last visit with Dr Uribe. RX was filled 10/1/18 & she is now out.  Routing to Dr Hill.  Casie Knapp RN

## 2018-10-16 NOTE — TELEPHONE ENCOUNTER
Her increasing her dose of Suboxone herself is not acceptable  Will bridge for 6 days until next scheduled appointment   Please call in and let patient know

## 2018-10-16 NOTE — TELEPHONE ENCOUNTER
Patient LM at 0732 stating that she called last week and yesterday and still has not had any response from Dr. Uribe. Patient would like a call back. Phone #[phone]      Miracle Owens    [facility]

## 2018-10-22 ENCOUNTER — OFFICE VISIT (OUTPATIENT)
Dept: ADDICTION MEDICINE | Facility: CLINIC | Age: 43
End: 2018-10-22
Payer: MEDICARE

## 2018-10-22 VITALS
WEIGHT: 173 LBS | SYSTOLIC BLOOD PRESSURE: 108 MMHG | OXYGEN SATURATION: 97 % | HEART RATE: 90 BPM | DIASTOLIC BLOOD PRESSURE: 56 MMHG | BODY MASS INDEX: 25.55 KG/M2 | RESPIRATION RATE: 16 BRPM

## 2018-10-22 DIAGNOSIS — F11.20 CONTINUOUS OPIOID DEPENDENCE (H): ICD-10-CM

## 2018-10-22 DIAGNOSIS — Z79.899 ENCOUNTER FOR LONG-TERM (CURRENT) USE OF MEDICATIONS: ICD-10-CM

## 2018-10-22 DIAGNOSIS — F11.10 OPIOID USE DISORDER, MILD, ON MAINTENANCE THERAPY (H): Primary | ICD-10-CM

## 2018-10-22 DIAGNOSIS — F17.200 TOBACCO USE DISORDER: ICD-10-CM

## 2018-10-22 DIAGNOSIS — F41.1 GENERALIZED ANXIETY DISORDER: ICD-10-CM

## 2018-10-22 DIAGNOSIS — G89.4 CHRONIC PAIN SYNDROME: ICD-10-CM

## 2018-10-22 DIAGNOSIS — F45.0 SOMATIZATION DISORDER: ICD-10-CM

## 2018-10-22 PROCEDURE — 80306 DRUG TEST PRSMV INSTRMNT: CPT | Performed by: ANESTHESIOLOGY

## 2018-10-22 PROCEDURE — 99214 OFFICE O/P EST MOD 30 MIN: CPT | Performed by: ANESTHESIOLOGY

## 2018-10-22 RX ORDER — BUPRENORPHINE AND NALOXONE 8; 2 MG/1; MG/1
1 FILM, SOLUBLE BUCCAL; SUBLINGUAL 3 TIMES DAILY
Qty: 90 FILM | Refills: 0 | Status: SHIPPED | OUTPATIENT
Start: 2018-10-22 | End: 2018-11-19

## 2018-10-22 NOTE — PROGRESS NOTES
"                          [facility]    Date of visit: 10/22/2018    Chief complaint:   Chief Complaint   Patient presents with     Addiction Problem       Interval history:  Ria Nj is a 43 year old female here for Chronic Pain and Addiction - Suboxone Maintenance follow up.   Last visit: follow up 10/01/2018    CURRENT DOSE: Suboxone 8mg film BID   BRIEF HPI: 43 year old female with chronic low back pain which began after the birth of her son who is now 9 years old.  She is on disability for pain and has not worked since.  She has been on chronic opioids for over 8 years with escalating doses (175 morphine equivalents), overuse and poor pain relief qualifies for a diagnosis of OUD - mild. Started on suboxone after initial consult on 9/10/2018 and doing well.  PMHx is significant for PTSD, childhood trauma, anxiety and depression       Since her last visit, Ria Nj reports:  - Doing okay  - Overused her suboxone AGAIN and was out 6 days early.  She states she called multiple times and was unable to get in touch with anyone as her pain was out of control.  She states it is worse at night and she cannot even get out of bed to get her Suboxone.  There are no telephone encounters and I did not receive any inbox messages from her.   - She thinks that if someone would just prescribe high dose chronic opioids her pain would be well controlled.  She denies any addiction.  She is not doing any type of a recovery program  - Not seeing anyone (therapist) for her mental health.    - Had a bad experience with the pain psychologist and does not want to engage in alternative treatments for her chronic pain.  \"my pain is real\" \"that is not going to help\"  - Asking if she can take Pristiq that was prescribed by her PCP.   She is admitting she is depressed now.   - Continues to take Ativan prescribed by her PCP with whom she has a long term relationship   - side effects include sweating and " some dizziness      Current pain medications:  Suboxone films 8mg BID - increased today to TID  Gabapentin 600mg BID  Ativan 1mg TID                            Status since last visit:    Since last visit patient has been: struggling.     Intensity:     There has been: no craving.      Suboxone Dose: too low   Progression of Symptoms:     Cues to use and relapse triggers: none    Recovery program has been: ignored.   Accompanying Signs & Symptoms:    Side Effects: sweating and dizzyness.    Sobriety:     Status: no use since last visit.      Drug Screen: obtained.   Precipitating factors:    Triggers have been: non-existent.   Alleviating factors:    Contact with sponsor has been: no sponsor.     Family and support system has been: helpful.   Other Therapies Tried :     Patient has been going to recovery meetings:not at all.      Patient has been participating in professional counseling/therapy: YES    [place] Board of Pharmacy Data Base Reviewed:    YES; appropriate      Pain scores:  Pain intensity on average is 3 on a scale of 0-10.     Side Effects: See above    Medications:  Current Outpatient Prescriptions   Medication Sig Dispense Refill     albuterol (2.5 MG/3ML) 0.083% neb solution Take 1 vial (2.5 mg) by nebulization every 4 hours as needed for shortness of breath / dyspnea or wheezing 30 vial 11     albuterol (PROAIR HFA, PROVENTIL HFA, VENTOLIN HFA) 108 (90 BASE) MCG/ACT inhaler Inhale 2 puffs into the lungs every 4 hours as needed for shortness of breath / dyspnea or wheezing 1 Inhaler 0     buprenorphine HCl-naloxone HCl (SUBOXONE) 8-2 MG per film Place 1 Film under the tongue 2 times daily 12 Film 0     calcium carbonate 500 MG tablet Take 1 tablet by mouth 2 times daily. 100 tablet 3     Cholecalciferol (VITAMIN D) 2000 UNITS tablet Take 2,000 Units by mouth daily 100 tablet 3     cloNIDine (CATAPRES) 0.1 MG tablet Take 1 tablet (0.1 mg) by mouth 2 times daily 4 tablet 0     desvenlafaxine  succinate (PRISTIQ) 25 MG 24 hr tablet Take 1 tablet (25 mg) by mouth daily Start prior auth if needed 30 tablet 0     EPINEPHrine (EPIPEN) 0.3 MG/0.3ML injection Inject 0.3 mLs (0.3 mg) into the muscle once as needed for anaphylaxis 2 each 1     gabapentin (NEURONTIN) 300 MG capsule TAKE 1 CAPSULE BY MOUTH THREE TIMES DAILY 270 capsule 3     IBUPROFEN 200 MG OR TABS Pt is taking 4 TABLET EVERY 4 TO 6 HOURS AS NEEDED       LORazepam (ATIVAN) 1 MG tablet TAKE 1 TABLET BY MOUTH EVERY 8 HOURS AS NEEDED FOR ANXIETY 90 tablet 1     Magnesium 250 MG tablet Take 1 tablet by mouth daily. 100 tablet 3     naloxone (NARCAN) nasal spray Spray 1 spray (4 mg) into one nostril alternating nostrils as needed for opioid reversal every 2-3 minutes until assistance arrives 0.2 mL 0     nicotine (NICODERM CQ) 21 MG/24HR 24 hr patch Place 1 patch onto the skin every 24 hours 30 patch 0     Omega-3 Fatty Acids (FISH OIL CONCENTRATE PO) Take 300 mg by mouth 3 times daily       [DISCONTINUED] gabapentin (NEURONTIN) 300 MG capsule Take 2 capsules (600 mg) by mouth 3 times daily 180 capsule 11       Medical History: any changes in medical history since they were last seen? No      OBJECTIVE:                                                    /56  Pulse 90  Resp 16  Wt 173 lb (78.5 kg)  SpO2 97%  BMI 25.55 kg/m2  Body mass index is 25.55 kg/(m^2).     ROS:  Constitutional, neuro, ENT, endocrine, pulmonary, cardiac, gastrointestinal, genitourinary, musculoskeletal, integument and psychiatric systems are negative, except as otherwise noted.    EXAM:  GENERAL APPEARANCE: healthy, alert and no distress  EYES: Eyes grossly normal to inspection and conjunctivae and sclerae normal  SKIN: no suspicious lesions or rashes  PSYCH: mentation appears normal and affect normal/bright  MENTAL STATUS EXAM:  Appearance/Behavior: No apparent distress and Neatly groomed  Speech: Normal  Mood/Affect: normal affect  Insight: Adequate    Diagnostic Test  Results:  Results for orders placed or performed in visit on 10/22/18 (from the past 24 hour(s))   Urine Drugs of Abuse Screen Panel 13   Result Value Ref Range    Cannabinoids (29-heh-6-carboxy-9-THC) Not Detected NDET^Not Detected ng/mL    Phencyclidine (Phencyclidine) Not Detected NDET^Not Detected ng/mL    Cocaine (Benzoylecgonine) Not Detected NDET^Not Detected ng/mL    Methamphetamine (d-Methamphetamine) Not Detected NDET^Not Detected ng/mL    Opiates (Morphine) Not Detected NDET^Not Detected ng/mL    Amphetamine (d-Amphetamine) Not Detected NDET^Not Detected ng/mL    Benzodiazepines (Nordiazepam) Detected, Abnormal Result (A) NDET^Not Detected ng/mL    Tricyclic Antidepressants (Desipramine) Not Detected NDET^Not Detected ng/mL    Methadone (Methadone) Not Detected NDET^Not Detected ng/mL    Barbiturates (Butalbital) Not Detected NDET^Not Detected ng/mL    Oxycodone (Oxycodone) Not Detected NDET^Not Detected ng/mL    Propoxyphene (Norpropoxyphene) Not Detected NDET^Not Detected ng/mL    Buprenorphine (Buprenorphine) Detected, Abnormal Result (A) NDET^Not Detected ng/mL            ASSESSMENT:                                                      OPIOID USE DISORDER - MILD  TOBACCO USE DISORDER    ENCOUNTER FOR LONG TERM USE OF HIGH RISK MEDICATION   High Risk Drug Monitoring?  YES   Drug being monitored: Suboxone    Reason for drug: Opioid Use Disorder   What is being monitored?: Dosage, Cravings, Trigger, side effects, and continued abstinence.    CHRONIC USE OF BENZOS  CATASTROPHISING/SOMATIZATION  CHRONIC LOW BACK PAIN  ANXIETY AND DEPRESSION      ICD-10-CM    1. Opioid use disorder, mild, on maintenance therapy (H) F11.10 Urine Drugs of Abuse Screen Panel 13     buprenorphine HCl-naloxone HCl (SUBOXONE) 8-2 MG per film   2. Encounter for long-term (current) use of medications Z79.899 Urine Drugs of Abuse Screen Panel 13     buprenorphine HCl-naloxone HCl (SUBOXONE) 8-2 MG per film   3. Tobacco use disorder  "F17.200    4. Chronic pain syndrome G89.4    5. Generalized anxiety disorder F41.1    6. Continuous opioid dependence (H) F11.20    7. Somatization disorder F45.0            PLAN:                                                      1. Physical Therapy:  YES, in the future when more stable   2. Clinical Health Psychologist to address issues of relaxation, behavioral change, coping style, and other factors important to improvement.  YES, recommended she follow up with Sherita Arrington, however, she refuses and states he made her feel \"uncomfortable\".    3. Diagnostic Studies:    1. MRI lumbar spine reviewed - she has mild DDD and is not a surgical candidate.  This was discussed.   4. Medication Management:    1. Increase Suboxone to 8mg TID.  Discussed that she should NOT be making dose changes on her own and that overuse will not be tolerated in the future. Pain relieving properties of Suboxone last 8 hours on average.   2. I am recommending she continue her Ativan 1mg TID for the time being. Eventually when she is stable on suboxone and feeling better we can talk about a slow taper off.  For now, I would prefer her PCP continue to prescribe this until she is serious about tapering off.  At that point we can consider conversion to a long acting (diazepam) and jorge method to taper off.  I can take over a prescribing role at that point.   5. Further procedures recommended: none  6. Counseling: Counseled the patient on the importance of having a recovery program in addition to Suboxone maintenance.  She denies any addiction and is not open to doing any type of a recovery program.  I will continue to address this at follow up visits.   Also discussed having a sober support network, not isolating, being open, honest, and willing to change, avoiding triggers and managing cravings. she should continue to abstain from alcohol, benzodiazepines, THC, opioids and other drugs of abuse.       MEDICATIONS:   Orders Placed This " Encounter   Medications     buprenorphine HCl-naloxone HCl (SUBOXONE) 8-2 MG per film     Sig: Place 1 Film under the tongue 3 times daily     Dispense:  90 Film     Refill:  0          - Continue other medications without change    FUTURE APPOINTMENTS:       - Follow-up visit in 4 weeks    Total time spent was 30 minutes, and more than 50% of face to face time was spent in counseling and/or coordination of care.      Scarlet UribeMD Pain Management

## 2018-10-22 NOTE — MR AVS SNAPSHOT
After Visit Summary   10/22/2018    Ria Nj    MRN: [id number]           Patient Information     Date Of Birth          1975        Visit Information        Provider Department      10/22/2018 2:00 PM Scarlet Uribe MD [facility]        Today's Diagnoses     Opioid use disorder, mild, on maintenance therapy (H)    -  1    Encounter for long-term (current) use of medications          Care Instructions    Continue your Buprenorphine 8mg films THREE times a day    Follow up with me in 4 weeks    A prescription has been sent to your pharmacy of choice. If a prior authorization is required it may take several days to get you medication.  Please make sure your pharmacy has your contact information so they can contact you when it is ready to .     The addiction medicine clinic number is [phone].    If you cannot make your appointment please call the office and reschedule immediately. If you are out of medication a bridge can be sent to your pharmacy to last until the date of your rescheduled appointment. Our clinic is open from Monday-Friday 0800-4:30pm and there is not an ON CALL after hours service.  If medical care is needed after hours or on the weekend you will need to contact your primary care physician or go to an Urgent Care or ER.     MyChart messages and telephone calls from patients are taken care of by the nursing team within 24 business hours if received between Monday 8am - Friday 4:30pm.  Therefore, if a refill/bridge of medication is needed it is important to call in advance so you do not run out.     It is strongly recommended that you abstain from alcohol, benzodiazepines (xanax, valium, klonopin and others), marijuana, opioids (percocet, tramadol, dilauded, fentanyl and others) and other drugs of abuse (methamphetamines, alcohol and others)  Using any of these substances increases your risk of overdose/death. (especially with alcohol  and benzodiazepines) You are at risk for overdose and death with return to the use of opioids after a period of abstinence because your tolerance has decreased.     You are encouraged to have some type of recovery program in addition to medication management. This may include having a sober network of friends, avoiding isolation, avoiding triggers including people, places and things you associate with using.  Such supports may include Alcoholics Anonymous, Narcotics Anonymous, SMART recovery, Alevism groups or other self help activities like counseling.  We can help provide resources to these services.   Medication alone is usually not enough to lead to long term recovery.      Narcan kit prescriptions are available if you need one.     [facility] does not accept [employer] assistance insurance.       Scarlet Uribe MD                 Follow-ups after your visit        Who to contact     If you have questions or need follow up information about today's clinic visit or your schedule please contact [facility] directly at [phone].  Normal or non-critical lab and imaging results will be communicated to you by MyChart, letter or phone within 4 business days after the clinic has received the results. If you do not hear from us within 7 days, please contact the clinic through MyChart or phone. If you have a critical or abnormal lab result, we will notify you by phone as soon as possible.  Submit refill requests through [employer] or call your pharmacy and they will forward the refill request to us. Please allow 3 business days for your refill to be completed.          Additional Information About Your Visit        Care EveryWhere ID     This is your Care EveryWhere ID. This could be used by other organizations to access your [facility] medical records  [id number]        Your Vitals Were     Pulse Respirations Pulse Oximetry BMI (Body Mass Index)           90 16 97% 25.55 kg/m2         Blood Pressure from Last 3 Encounters:   10/22/18 108/56   10/01/18 102/58   09/17/18 122/74    Weight from Last 3 Encounters:   10/22/18 173 lb (78.5 kg)   10/01/18 170 lb (77.1 kg)   09/17/18 156 lb (70.8 kg)              We Performed the Following     Urine Drugs of Abuse Screen Panel 13          Today's Medication Changes          These changes are accurate as of 10/22/18  2:38 PM.  If you have any questions, ask your nurse or doctor.               These medicines have changed or have updated prescriptions.        Dose/Directions    buprenorphine HCl-naloxone HCl 8-2 MG per film   Commonly known as:  SUBOXONE   This may have changed:    - when to take this  - Another medication with the same name was removed. Continue taking this medication, and follow the directions you see here.   Used for:  Opioid use disorder, mild, on maintenance therapy (H), Encounter for long-term (current) use of medications        Dose:  1 Film   Place 1 Film under the tongue 3 times daily   Quantity:  90 Film   Refills:  0       gabapentin 300 MG capsule   Commonly known as:  NEURONTIN   This may have changed:  Another medication with the same name was removed. Continue taking this medication, and follow the directions you see here.   Used for:  Low back pain, unspecified back pain laterality, unspecified chronicity, with sciatica presence unspecified        TAKE 1 CAPSULE BY MOUTH THREE TIMES DAILY   Quantity:  270 capsule   Refills:  3            Where to get your medicines      Some of these will need a paper prescription and others can be bought over the counter.  Ask your nurse if you have questions.     Bring a paper prescription for each of these medications     buprenorphine HCl-naloxone HCl 8-2 MG per film                Primary Care Provider Office Phone # Fax #    Joel Daniel Irwin Wegener, [phone] [address] [place] [address]        Equal Access to Services      MONO [facility] HospitalSOHEILA : Hadii aad ku chrystal Moreno, waaxda luqadaha, qaybta kaalmada tressa, toy manasa haybooker edmondsonfranknamita bowling . So [facility] [phone].    ATENCIÓN: Si habla español, tiene a freire disposición servicios gratuitos de asistencia lingüística. Llame al [phone].    We comply with applicable federal civil rights laws and [place] laws. We do not discriminate on the basis of race, color, national origin, age, disability, sex, sexual orientation, or gender identity.            Thank you!     Thank you for choosing [facility]  for your care. Our goal is always to provide you with excellent care. Hearing back from our patients is one way we can continue to improve our services. Please take a few minutes to complete the written survey that you may receive in the mail after your visit with us. Thank you!             Your Updated Medication List - Protect others around you: Learn how to safely use, store and throw away your medicines at www.disposemymeds.org.          This list is accurate as of 10/22/18  2:38 PM.  Always use your most recent med list.                   Brand Name Dispense Instructions for use Diagnosis    * albuterol 108 (90 Base) MCG/ACT inhaler    PROAIR HFA/PROVENTIL HFA/VENTOLIN HFA    1 Inhaler    Inhale 2 puffs into the lungs every 4 hours as needed for shortness of breath / dyspnea or wheezing    Acute bronchospasm       * albuterol (2.5 MG/3ML) 0.083% neb solution     30 vial    Take 1 vial (2.5 mg) by nebulization every 4 hours as needed for shortness of breath / dyspnea or wheezing    Acute bronchospasm       buprenorphine HCl-naloxone HCl 8-2 MG per film    SUBOXONE    90 Film    Place 1 Film under the tongue 3 times daily    Opioid use disorder, mild, on maintenance therapy (H), Encounter for long-term (current) use of medications       calcium carbonate 500 mg (elemental) 500 MG tablet    OS-ADOLFO    100 tablet    Take 1 tablet by mouth 2 times  daily.    Routine general medical examination at a health care facility       cloNIDine 0.1 MG tablet    CATAPRES    4 tablet    Take 1 tablet (0.1 mg) by mouth 2 times daily        desvenlafaxine succinate 25 MG 24 hr tablet    PRISTIQ    30 tablet    Take 1 tablet (25 mg) by mouth daily Start prior auth if needed    Generalized anxiety disorder, Moderate major depression (H)       EPINEPHrine 0.3 MG/0.3ML injection 2-pack    EPIPEN/ADRENACLICK/or ANY BX GENERIC EQUIV    2 each    Inject 0.3 mLs (0.3 mg) into the muscle once as needed for anaphylaxis    Bee sting-induced anaphylaxis, accidental or unintentional, subsequent encounter       FISH OIL CONCENTRATE PO      Take 300 mg by mouth 3 times daily        gabapentin 300 MG capsule    NEURONTIN    270 capsule    TAKE 1 CAPSULE BY MOUTH THREE TIMES DAILY    Low back pain, unspecified back pain laterality, unspecified chronicity, with sciatica presence unspecified       ibuprofen 200 MG tablet    ADVIL/MOTRIN     Pt is taking 4 TABLET EVERY 4 TO 6 HOURS AS NEEDED        LORazepam 1 MG tablet    ATIVAN    90 tablet    TAKE 1 TABLET BY MOUTH EVERY 8 HOURS AS NEEDED FOR ANXIETY    Generalized anxiety disorder       magnesium 250 MG tablet     100 tablet    Take 1 tablet by mouth daily.    Routine general medical examination at a health care facility       naloxone nasal spray    NARCAN    0.2 mL    Spray 1 spray (4 mg) into one nostril alternating nostrils as needed for opioid reversal every 2-3 minutes until assistance arrives    Continuous opioid dependence (H)       nicotine 21 MG/24HR 24 hr patch    NICODERM CQ    30 patch    Place 1 patch onto the skin every 24 hours    Tobacco use disorder       vitamin D 2000 units tablet     100 tablet    Take 2,000 Units by mouth daily    Major depressive disorder, recurrent episode, moderate (H)       * Notice:  This list has 2 medication(s) that are the same as other medications prescribed for you. Read the directions  carefully, and ask your doctor or other care provider to review them with you.

## 2018-10-22 NOTE — PATIENT INSTRUCTIONS
Continue your Buprenorphine 8mg films THREE times a day    Follow up with me in 4 weeks    A prescription has been sent to your pharmacy of choice. If a prior authorization is required it may take several days to get you medication.  Please make sure your pharmacy has your contact information so they can contact you when it is ready to .     The addiction medicine clinic number is [phone].    If you cannot make your appointment please call the office and reschedule immediately. If you are out of medication a bridge can be sent to your pharmacy to last until the date of your rescheduled appointment. Our clinic is open from Monday-Friday 0800-4:30pm and there is not an ON CALL after hours service.  If medical care is needed after hours or on the weekend you will need to contact your primary care physician or go to an Urgent Care or ER.     [employer] messages and telephone calls from patients are taken care of by the nursing team within 24 business hours if received between Monday 8am - Friday 4:30pm.  Therefore, if a refill/bridge of medication is needed it is important to call in advance so you do not run out.     It is strongly recommended that you abstain from alcohol, benzodiazepines (xanax, valium, klonopin and others), marijuana, opioids (percocet, tramadol, dilauded, fentanyl and others) and other drugs of abuse (methamphetamines, alcohol and others)  Using any of these substances increases your risk of overdose/death. (especially with alcohol and benzodiazepines) You are at risk for overdose and death with return to the use of opioids after a period of abstinence because your tolerance has decreased.     You are encouraged to have some type of recovery program in addition to medication management. This may include having a sober network of friends, avoiding isolation, avoiding triggers including people, places and things you associate with using.  Such supports may include Alcoholics Anonymous,  Narcotics Anonymous, SMART recovery, Taoist groups or other self help activities like counseling.  We can help provide resources to these services.   Medication alone is usually not enough to lead to long term recovery.      Narcan kit prescriptions are available if you need one.     [facility] does not accept [facility] or [facility] [employer] Medical assistance insurance.       Scarlet Uribe MD

## 2018-11-07 ENCOUNTER — OFFICE VISIT (OUTPATIENT)
Dept: FAMILY MEDICINE | Facility: CLINIC | Age: 43
End: 2018-11-07
Payer: MEDICARE

## 2018-11-07 VITALS
SYSTOLIC BLOOD PRESSURE: 133 MMHG | BODY MASS INDEX: 26.21 KG/M2 | HEART RATE: 93 BPM | DIASTOLIC BLOOD PRESSURE: 79 MMHG | TEMPERATURE: 98.8 F | WEIGHT: 177.5 LBS | OXYGEN SATURATION: 100 %

## 2018-11-07 DIAGNOSIS — J02.9 SORE THROAT: Primary | ICD-10-CM

## 2018-11-07 DIAGNOSIS — K12.2 UVULITIS: ICD-10-CM

## 2018-11-07 LAB
DEPRECATED S PYO AG THROAT QL EIA: NORMAL
HETEROPH AB SER QL: NEGATIVE
SPECIMEN SOURCE: NORMAL

## 2018-11-07 PROCEDURE — 99214 OFFICE O/P EST MOD 30 MIN: CPT | Performed by: FAMILY MEDICINE

## 2018-11-07 PROCEDURE — 86308 HETEROPHILE ANTIBODY SCREEN: CPT | Performed by: FAMILY MEDICINE

## 2018-11-07 PROCEDURE — 36415 COLL VENOUS BLD VENIPUNCTURE: CPT | Performed by: FAMILY MEDICINE

## 2018-11-07 PROCEDURE — 87880 STREP A ASSAY W/OPTIC: CPT | Performed by: FAMILY MEDICINE

## 2018-11-07 PROCEDURE — 87081 CULTURE SCREEN ONLY: CPT | Performed by: FAMILY MEDICINE

## 2018-11-07 RX ORDER — PREDNISONE 10 MG/1
10 TABLET ORAL DAILY
Qty: 5 TABLET | Refills: 0 | Status: SHIPPED | OUTPATIENT
Start: 2018-11-07 | End: 2019-02-21

## 2018-11-07 RX ORDER — PENICILLIN V POTASSIUM 500 MG/1
500 TABLET, FILM COATED ORAL 2 TIMES DAILY
Qty: 20 TABLET | Refills: 0 | Status: SHIPPED | OUTPATIENT
Start: 2018-11-07 | End: 2019-02-21

## 2018-11-07 NOTE — PROGRESS NOTES
Please send the letter to the patient with the following.         Here are your results for your recent lab which was normal. Please call or message me if you have questions or concerns.

## 2018-11-07 NOTE — MR AVS SNAPSHOT
After Visit Summary   11/7/2018    Ria Nj    MRN: [id number]           Patient Information     Date Of Birth          1975        Visit Information        Provider Department      11/7/2018 1:20 PM Lyudmila Rojo MD [facility]        Today's Diagnoses     Sore throat    -  1    Uvulitis          Care Instructions    Penicillin one tablet twice daily for 10 days  Start prednisone tomorrow, once daily for 5 days.   Lots of fluid.   Follow if symptoms worsen or fail to improve.          Follow-ups after your visit        Follow-up notes from your care team     Return in about 7 days (around 11/14/2018) for sympotms are not improving.      Your next 10 appointments already scheduled     Nov 19, 2018  2:00 PM CST   Return Visit with Scarlet Uribe MD   [facility]    [address]  [address]  [facility] [id number] [phone]            Nov 19, 2018  2:30 PM CST   New Visit with Ray Leach [facility] ([facility])    [address]  [address]  [facility] [id number] [phone]              Who to contact     If you have questions or need follow up information about today's clinic visit or your schedule please contact [facility] directly at [phone].  Normal or non-critical lab and imaging results will be communicated to you by MyChart, letter or phone within 4 business days after the clinic has received the results. If you do not hear from us within 7 days, please contact the clinic through MyChart or phone. If you have a critical or abnormal lab result, we will notify you by phone as soon as possible.  Submit refill requests through [employer] or call your pharmacy and they will forward the refill request to us. Please allow 3 business days for your refill to be completed.           "Additional Information About Your Visit        MyChart Information     [employer] lets you send messages to your doctor, view your test results, renew your prescriptions, schedule appointments and more. To sign up, go to [email or web address] . Click on \"Log in\" on the left side of the screen, which will take you to the Welcome page. Then click on \"Sign up Now\" on the right side of the page.     You will be asked to enter the access code listed below, as well as some personal information. Please follow the directions to create your username and password.     Your access code is: [email or web address]  Expires: 2019 11:57 AM     Your access code will  in 90 days. If you need help or a new code, please call your [facility] clinic or [phone].        Care EveryWhere ID     This is your Care EveryWhere ID. This could be used by other organizations to access your [facility] medical records  [id number]        Your Vitals Were     Pulse Temperature Pulse Oximetry BMI (Body Mass Index)          93 98.8  F (37.1  C) (Oral) 100% 26.21 kg/m2         Blood Pressure from Last 3 Encounters:   18 133/79   10/22/18 108/56   10/01/18 102/58    Weight from Last 3 Encounters:   18 177 lb 8 oz (80.5 kg)   10/22/18 173 lb (78.5 kg)   10/01/18 170 lb (77.1 kg)              We Performed the Following     Beta strep group A culture     Mononucleosis screen     Strep, Rapid Screen          Today's Medication Changes          These changes are accurate as of 18  2:15 PM.  If you have any questions, ask your nurse or doctor.               Start taking these medicines.        Dose/Directions    penicillin V potassium 500 MG tablet   Commonly known as:  VEETID   Used for:  Uvulitis   Started by:  Lyudmila Rojo MD        Dose:  500 mg   Take 1 tablet (500 mg) by mouth 2 times daily for 10 days   Quantity:  20 tablet   Refills:  0       predniSONE 10 MG tablet   Commonly known as:  DELTASONE   Used for:  Uvulitis "   Started by:  Lyudmila Rojo MD        Dose:  10 mg   Take 1 tablet (10 mg) by mouth daily for 5 days   Quantity:  5 tablet   Refills:  0            Where to get your medicines      These medications were sent to [facility] [place], [place] - [address]  [address] [facility] [address]     Phone:  [phone]     penicillin V potassium 500 MG tablet    predniSONE 10 MG tablet                Primary Care Provider Office Phone # Fax #    Jose A Daniel Irwin Wegener, [phone]       [address]  [facility] [address]        Equal Access to Services     [facility]: Hadii aad ku hadasho Solionelali, waaxda luqadaha, qaybta kaalmada adeegyada, toy bowling . So [facility] [phone].    ATENCIÓN: Si habla español, tiene a freire disposición servicios gratuitos de asistencia lingüística. [facility] [phone].    We comply with applicable federal civil rights laws and [place] laws. We do not discriminate on the basis of race, color, national origin, age, disability, sex, sexual orientation, or gender identity.            Thank you!     Thank you for choosing [facility]  for your care. Our goal is always to provide you with excellent care. Hearing back from our patients is one way we can continue to improve our services. Please take a few minutes to complete the written survey that you may receive in the mail after your visit with us. Thank you!             Your Updated Medication List - Protect others around you: Learn how to safely use, store and throw away your medicines at www.disposemymeds.org.          This list is accurate as of 11/7/18  2:15 PM.  Always use your most recent med list.                   Brand Name Dispense Instructions for use Diagnosis    * albuterol 108 (90 Base) MCG/ACT inhaler    PROAIR HFA/PROVENTIL HFA/VENTOLIN HFA    1 Inhaler    Inhale 2 puffs into the lungs every 4 hours as needed for shortness of  breath / dyspnea or wheezing    Acute bronchospasm       * albuterol (2.5 MG/3ML) 0.083% neb solution     30 vial    Take 1 vial (2.5 mg) by nebulization every 4 hours as needed for shortness of breath / dyspnea or wheezing    Acute bronchospasm       buprenorphine HCl-naloxone HCl 8-2 MG per film    SUBOXONE    90 Film    Place 1 Film under the tongue 3 times daily    Opioid use disorder, mild, on maintenance therapy (H), Encounter for long-term (current) use of medications       calcium carbonate 500 mg (elemental) 500 MG tablet    OS-ADOLFO    100 tablet    Take 1 tablet by mouth 2 times daily.    Routine general medical examination at a health care facility       cloNIDine 0.1 MG tablet    CATAPRES    4 tablet    Take 1 tablet (0.1 mg) by mouth 2 times daily        desvenlafaxine succinate 25 MG 24 hr tablet    PRISTIQ    30 tablet    Take 1 tablet (25 mg) by mouth daily Start prior auth if needed    Generalized anxiety disorder, Moderate major depression (H)       EPINEPHrine 0.3 MG/0.3ML injection 2-pack    EPIPEN/ADRENACLICK/or ANY BX GENERIC EQUIV    2 each    Inject 0.3 mLs (0.3 mg) into the muscle once as needed for anaphylaxis    Bee sting-induced anaphylaxis, accidental or unintentional, subsequent encounter       FISH OIL CONCENTRATE PO      Take 300 mg by mouth 3 times daily        gabapentin 300 MG capsule    NEURONTIN    270 capsule    TAKE 1 CAPSULE BY MOUTH THREE TIMES DAILY    Low back pain, unspecified back pain laterality, unspecified chronicity, with sciatica presence unspecified       ibuprofen 200 MG tablet    ADVIL/MOTRIN     Pt is taking 4 TABLET EVERY 4 TO 6 HOURS AS NEEDED        LORazepam 1 MG tablet    ATIVAN    90 tablet    TAKE 1 TABLET BY MOUTH EVERY 8 HOURS AS NEEDED FOR ANXIETY    Generalized anxiety disorder       magnesium 250 MG tablet     100 tablet    Take 1 tablet by mouth daily.    Routine general medical examination at a health care facility       naloxone nasal spray     NARCAN    0.2 mL    Spray 1 spray (4 mg) into one nostril alternating nostrils as needed for opioid reversal every 2-3 minutes until assistance arrives    Continuous opioid dependence (H)       nicotine 21 MG/24HR 24 hr patch    NICODERM CQ    30 patch    Place 1 patch onto the skin every 24 hours    Tobacco use disorder       penicillin V potassium 500 MG tablet    VEETID    20 tablet    Take 1 tablet (500 mg) by mouth 2 times daily for 10 days    Uvulitis       predniSONE 10 MG tablet    DELTASONE    5 tablet    Take 1 tablet (10 mg) by mouth daily for 5 days    Uvulitis       vitamin D 2000 units tablet     100 tablet    Take 2,000 Units by mouth daily    Major depressive disorder, recurrent episode, moderate (H)       * Notice:  This list has 2 medication(s) that are the same as other medications prescribed for you. Read the directions carefully, and ask your doctor or other care provider to review them with you.

## 2018-11-07 NOTE — PROGRESS NOTES
SUBJECTIVE:   Ria Nj is a 43 year old female who presents to clinic today for the following health issues:      Acute Illness   Acute illness concerns: throat  Onset: unknown    Fever: no     Chills/Sweats: no    Headache (location?): no    Sinus Pressure:no    Conjunctivitis:  no    Ear Pain: no    Rhinorrhea: no    Congestion: no    Sore Throat: YES     Cough: no    Wheeze: yes    Decreased Appetite: no    Nausea: no    Vomiting: no    Diarrhea:  no    Dysuria/Freq.: no    Fatigue/Achiness: no    Sick/Strep Exposure: no     Therapies Tried and outcome: tylenol           Problem list and histories reviewed & adjusted, as indicated.  Additional history: as documented    Labs reviewed in EPIC    Reviewed and updated as needed this visit by clinical staff       Reviewed and updated as needed this visit by Provider         ROS:  Constitutional, HEENT, cardiovascular, pulmonary, gi and gu systems are negative, except as otherwise noted.    OBJECTIVE:     /79  Pulse 93  Temp 98.8  F (37.1  C) (Oral)  Wt 177 lb 8 oz (80.5 kg)  SpO2 100%  BMI 26.21 kg/m2  Body mass index is 26.21 kg/(m^2).  GENERAL: healthy, alert and no distress  EYES: Eyes grossly normal to inspection  HENT: ear canals and TM's normal, uvula inflamed  NECK: + adenopathy  RESP: lungs clear to auscultation - no rales, rhonchi or wheezes  CV: regular rate and rhythm, normal S1 S2    Diagnostic Test Results:  Results for orders placed or performed in visit on 11/07/18   Mononucleosis screen   Result Value Ref Range    Mononucleosis Screen Negative NEG^Negative   Strep, Rapid Screen   Result Value Ref Range    Specimen Description Throat     Rapid Strep A Screen       NEGATIVE: No Group A streptococcal antigen detected by immunoassay, await culture report.   Beta strep group A culture   Result Value Ref Range    Specimen Description Throat     Culture Micro No beta hemolytic Streptococcus Group A isolated        ASSESSMENT/PLAN:        1. Sore throat  - Strep, Rapid Screen negative   - Beta strep group A culture pending   - Mononucleosis screen negative     2. Uvulitis  - discussed s/e of medication   - penicillin V potassium (VEETID) 500 MG tablet; Take 1 tablet (500 mg) by mouth 2 times daily for 10 days  Dispense: 20 tablet; Refill: 0  - predniSONE (DELTASONE) 10 MG tablet; Take 1 tablet (10 mg) by mouth daily for 5 days  Dispense: 5 tablet; Refill: 0  - Follow if symptoms worsen or fail to improve.      Lyudmila Rojo MD  [facility]

## 2018-11-07 NOTE — PATIENT INSTRUCTIONS
Penicillin one tablet twice daily for 10 days  Start prednisone tomorrow, once daily for 5 days.   Lots of fluid.   Follow if symptoms worsen or fail to improve.

## 2018-11-07 NOTE — LETTER
November 12, 2018      Ria Nj  [address]  [facility] [address]        Dear ,    We are writing to inform you of your test results.    Here are your results for your recent lab which was normal. Please call or message me if you have questions or concerns.     Resulted Orders   Strep, Rapid Screen   Result Value Ref Range    Specimen Description Throat     Rapid Strep A Screen       NEGATIVE: No Group A streptococcal antigen detected by immunoassay, await culture report.   Beta strep group A culture   Result Value Ref Range    Specimen Description Throat     Culture Micro No beta hemolytic Streptococcus Group A isolated    Mononucleosis screen   Result Value Ref Range    Mononucleosis Screen Negative NEG^Negative       If you have any questions or concerns, please call the clinic at the number listed above.       Sincerely,      Lyudmila Rojo MD/nr

## 2018-11-08 LAB
BACTERIA SPEC CULT: NORMAL
SPECIMEN SOURCE: NORMAL

## 2018-11-19 ENCOUNTER — OFFICE VISIT (OUTPATIENT)
Dept: BEHAVIORAL HEALTH | Facility: CLINIC | Age: 43
End: 2018-11-19
Payer: MEDICARE

## 2018-11-19 ENCOUNTER — OFFICE VISIT (OUTPATIENT)
Dept: ADDICTION MEDICINE | Facility: CLINIC | Age: 43
End: 2018-11-19
Payer: MEDICARE

## 2018-11-19 VITALS
SYSTOLIC BLOOD PRESSURE: 126 MMHG | WEIGHT: 175 LBS | DIASTOLIC BLOOD PRESSURE: 68 MMHG | BODY MASS INDEX: 25.84 KG/M2 | HEART RATE: 88 BPM | RESPIRATION RATE: 16 BRPM

## 2018-11-19 DIAGNOSIS — F11.10 OPIOID USE DISORDER, MILD, ON MAINTENANCE THERAPY (H): Primary | ICD-10-CM

## 2018-11-19 DIAGNOSIS — F41.1 GENERALIZED ANXIETY DISORDER: ICD-10-CM

## 2018-11-19 DIAGNOSIS — F11.20 CONTINUOUS OPIOID DEPENDENCE (H): ICD-10-CM

## 2018-11-19 DIAGNOSIS — F32.1 MODERATE MAJOR DEPRESSION (H): Primary | ICD-10-CM

## 2018-11-19 DIAGNOSIS — Z79.899 ENCOUNTER FOR LONG-TERM (CURRENT) USE OF MEDICATIONS: ICD-10-CM

## 2018-11-19 DIAGNOSIS — G89.29 CHRONIC BILATERAL LOW BACK PAIN WITHOUT SCIATICA: ICD-10-CM

## 2018-11-19 DIAGNOSIS — M54.50 CHRONIC BILATERAL LOW BACK PAIN WITHOUT SCIATICA: ICD-10-CM

## 2018-11-19 PROCEDURE — 90832 PSYTX W PT 30 MINUTES: CPT | Performed by: SOCIAL WORKER

## 2018-11-19 PROCEDURE — 80306 DRUG TEST PRSMV INSTRMNT: CPT | Performed by: ANESTHESIOLOGY

## 2018-11-19 PROCEDURE — 99214 OFFICE O/P EST MOD 30 MIN: CPT | Performed by: ANESTHESIOLOGY

## 2018-11-19 RX ORDER — BUPRENORPHINE AND NALOXONE 8; 2 MG/1; MG/1
1 FILM, SOLUBLE BUCCAL; SUBLINGUAL 3 TIMES DAILY
Qty: 90 FILM | Refills: 0 | Status: SHIPPED | OUTPATIENT
Start: 2018-11-19 | End: 2018-12-03

## 2018-11-19 NOTE — PATIENT INSTRUCTIONS
Continue your Buprenorphine 8mg films/tabs THREE times a day    Follow up with me in 4 weeks    A prescription has been sent to your pharmacy of choice. If a prior authorization is required it may take several days to get you medication.  Please make sure your pharmacy has your contact information so they can contact you when it is ready to .     The addiction medicine clinic number is [phone].    If you cannot make your appointment please call the office and reschedule immediately. If you are out of medication a bridge can be sent to your pharmacy to last until the date of your rescheduled appointment. Our clinic is open from Monday-Friday 0800-4:30pm and there is not an ON CALL after hours service.  If medical care is needed after hours or on the weekend you will need to contact your primary care physician or go to an Urgent Care or ER.     [employer] messages and telephone calls from patients are taken care of by the nursing team within 24 business hours if received between Monday 8am - Friday 4:30pm.  Therefore, if a refill/bridge of medication is needed it is important to call in advance so you do not run out.     It is strongly recommended that you abstain from alcohol, benzodiazepines (xanax, valium, klonopin and others), marijuana, opioids (percocet, tramadol, dilauded, fentanyl and others) and other drugs of abuse (methamphetamines, alcohol and others)  Using any of these substances increases your risk of overdose/death. (especially with alcohol and benzodiazepines) You are at risk for overdose and death with return to the use of opioids after a period of abstinence because your tolerance has decreased.     You are encouraged to have some type of recovery program in addition to medication management. This may include having a sober network of friends, avoiding isolation, avoiding triggers including people, places and things you associate with using.  Such supports may include Alcoholics Anonymous,  Narcotics Anonymous, SMART recovery, Denominational groups or other self help activities like counseling.  We can help provide resources to these services.   Medication alone is usually not enough to lead to long term recovery.      Narcan kit prescriptions are available if you need one.     [facility] does not accept [facility] or [facility] [employer] assistance insurance.       Scarlet Uribe MD

## 2018-11-19 NOTE — MR AVS SNAPSHOT
After Visit Summary   11/19/2018    Ria Nj    MRN: [id number]           Patient Information     Date Of Birth          1975        Visit Information        Provider Department      11/19/2018 2:00 PM Scarlet Uribe MD [facility]        Today's Diagnoses     Opioid use disorder, mild, on maintenance therapy (H)    -  1    Encounter for long-term (current) use of medications        Chronic bilateral low back pain without sciatica          Care Instructions    Continue your Buprenorphine 8mg films/tabs THREE times a day    Follow up with me in 4 weeks    A prescription has been sent to your pharmacy of choice. If a prior authorization is required it may take several days to get you medication.  Please make sure your pharmacy has your contact information so they can contact you when it is ready to .     The addiction medicine clinic number is [phone].    If you cannot make your appointment please call the office and reschedule immediately. If you are out of medication a bridge can be sent to your pharmacy to last until the date of your rescheduled appointment. Our clinic is open from Monday-Friday 0800-4:30pm and there is not an ON CALL after hours service.  If medical care is needed after hours or on the weekend you will need to contact your primary care physician or go to an Urgent Care or ER.     MyChart messages and telephone calls from patients are taken care of by the nursing team within 24 business hours if received between Monday 8am - Friday 4:30pm.  Therefore, if a refill/bridge of medication is needed it is important to call in advance so you do not run out.     It is strongly recommended that you abstain from alcohol, benzodiazepines (xanax, valium, klonopin and others), marijuana, opioids (percocet, tramadol, dilauded, fentanyl and others) and other drugs of abuse (methamphetamines, alcohol and others)  Using any of these substances  increases your risk of overdose/death. (especially with alcohol and benzodiazepines) You are at risk for overdose and death with return to the use of opioids after a period of abstinence because your tolerance has decreased.     You are encouraged to have some type of recovery program in addition to medication management. This may include having a sober network of friends, avoiding isolation, avoiding triggers including people, places and things you associate with using.  Such supports may include Alcoholics Anonymous, Narcotics Anonymous, SMART recovery, Yarsanism groups or other self help activities like counseling.  We can help provide resources to these services.   Medication alone is usually not enough to lead to long term recovery.      Narcan kit prescriptions are available if you need one.     [facility] does not accept [employer] assistance insurance.       Scarlet Uribe MD                   Follow-ups after your visit        Who to contact     If you have questions or need follow up information about today's clinic visit or your schedule please contact [facility] directly at [phone].  Normal or non-critical lab and imaging results will be communicated to you by Backyardhart, letter or phone within 4 business days after the clinic has received the results. If you do not hear from us within 7 days, please contact the clinic through Bext or phone. If you have a critical or abnormal lab result, we will notify you by phone as soon as possible.  Submit refill requests through Brandle or call your pharmacy and they will forward the refill request to us. Please allow 3 business days for your refill to be completed.          Additional Information About Your Visit        Brandle Information     Brandle lets you send messages to your doctor, view your test results, renew your prescriptions, schedule appointments and more. To sign up, go to  "[email or web address] [email or web address]/MyChart . Click on \"Log in\" on the left side of the screen, which will take you to the Welcome page. Then click on \"Sign up Now\" on the right side of the page.     You will be asked to enter the access code listed below, as well as some personal information. Please follow the directions to create your username and password.     Your access code is: [email or web address]  Expires: 2019 11:57 AM     Your access code will  in 90 days. If you need help or a new code, please call your [facility] clinic or [phone].        Care EveryWhere ID     This is your Care EveryWhere ID. This could be used by other organizations to access your [facility] medical records  [id number]        Your Vitals Were     Pulse Respirations BMI (Body Mass Index)             88 16 25.84 kg/m2          Blood Pressure from Last 3 Encounters:   18 126/68   18 133/79   10/22/18 108/56    Weight from Last 3 Encounters:   18 175 lb (79.4 kg)   18 177 lb 8 oz (80.5 kg)   10/22/18 173 lb (78.5 kg)              We Performed the Following     Urine Drugs of Abuse Screen Panel 13          Today's Medication Changes          These changes are accurate as of 18  2:54 PM.  If you have any questions, ask your nurse or doctor.               Stop taking these medicines if you haven't already. Please contact your care team if you have questions.     cloNIDine 0.1 MG tablet   Commonly known as:  CATAPRES   Stopped by:  Scarlet Uribe MD           desvenlafaxine succinate 25 MG 24 hr tablet   Commonly known as:  PRISTIQ   Stopped by:  Scarlet Uribe MD           nicotine 21 MG/24HR 24 hr patch   Commonly known as:  NICODERM CQ   Stopped by:  Scarlet Uribe MD                Where to get your medicines      Some of these will need a paper prescription and others can be bought over the counter.  Ask your nurse if you have questions.     Bring a paper prescription for each of these medications     buprenorphine " HCl-naloxone HCl 8-2 MG per film                Primary Care Provider Office Phone # Fax #    Jose A Chu Irwin Wegener, [phone] [address] [place] [id number]        Equal Access to Services     GENEVIEVECAYLA JT : Hadii aad ku hadmaria isabelo Soomaali, waaxda luqadaha, qaybta kaalmada adeegyada, toy goel haymorgann edward zaratejade . So [facility] [phone].    ATENCIÓN: Si habla español, tiene a freire disposición servicios gratuitos de asistencia lingüística. Llame al [phone].    We comply with applicable federal civil rights laws and [place] laws. We do not discriminate on the basis of race, color, national origin, age, disability, sex, sexual orientation, or gender identity.            Thank you!     Thank you for choosing [facility]  for your care. Our goal is always to provide you with excellent care. Hearing back from our patients is one way we can continue to improve our services. Please take a few minutes to complete the written survey that you may receive in the mail after your visit with us. Thank you!             Your Updated Medication List - Protect others around you: Learn how to safely use, store and throw away your medicines at www.disposemymeds.org.          This list is accurate as of 11/19/18  2:54 PM.  Always use your most recent med list.                   Brand Name Dispense Instructions for use Diagnosis    * albuterol 108 (90 Base) MCG/ACT inhaler    PROAIR HFA/PROVENTIL HFA/VENTOLIN HFA    1 Inhaler    Inhale 2 puffs into the lungs every 4 hours as needed for shortness of breath / dyspnea or wheezing    Acute bronchospasm       * albuterol (2.5 MG/3ML) 0.083% neb solution     30 vial    Take 1 vial (2.5 mg) by nebulization every 4 hours as needed for shortness of breath / dyspnea or wheezing    Acute bronchospasm       buprenorphine HCl-naloxone HCl 8-2 MG per film    SUBOXONE    90 Film    Place 1 Film under the tongue 3 times daily     Opioid use disorder, mild, on maintenance therapy (H), Encounter for long-term (current) use of medications       calcium carbonate 500 mg (elemental) 500 MG tablet    OS-ADOLFO    100 tablet    Take 1 tablet by mouth 2 times daily.    Routine general medical examination at a health care facility       EPINEPHrine 0.3 MG/0.3ML injection 2-pack    EPIPEN/ADRENACLICK/or ANY BX GENERIC EQUIV    2 each    Inject 0.3 mLs (0.3 mg) into the muscle once as needed for anaphylaxis    Bee sting-induced anaphylaxis, accidental or unintentional, subsequent encounter       FISH OIL CONCENTRATE PO      Take 300 mg by mouth 3 times daily        gabapentin 300 MG capsule    NEURONTIN    270 capsule    TAKE 1 CAPSULE BY MOUTH THREE TIMES DAILY    Low back pain, unspecified back pain laterality, unspecified chronicity, with sciatica presence unspecified       ibuprofen 200 MG tablet    ADVIL/MOTRIN     Pt is taking 4 TABLET EVERY 4 TO 6 HOURS AS NEEDED        LORazepam 1 MG tablet    ATIVAN    90 tablet    TAKE 1 TABLET BY MOUTH EVERY 8 HOURS AS NEEDED FOR ANXIETY    Generalized anxiety disorder       magnesium 250 MG tablet     100 tablet    Take 1 tablet by mouth daily.    Routine general medical examination at a health care facility       naloxone 4 MG/0.1ML nasal spray    NARCAN    0.2 mL    Spray 1 spray (4 mg) into one nostril alternating nostrils as needed for opioid reversal every 2-3 minutes until assistance arrives    Continuous opioid dependence (H)       vitamin D3 2000 units tablet    CHOLECALCIFEROL    100 tablet    Take 2,000 Units by mouth daily    Major depressive disorder, recurrent episode, moderate (H)       * Notice:  This list has 2 medication(s) that are the same as other medications prescribed for you. Read the directions carefully, and ask your doctor or other care provider to review them with you.

## 2018-11-19 NOTE — PROGRESS NOTES
"                          [facility]    Date of visit: 11/19/2018    Chief complaint:   Chief Complaint   Patient presents with     Addiction Problem       Interval history:  Ria Nj is a 43 year old female here for Chronic Pain and Addiction - Suboxone Maintenance follow up.   Last visit: follow up 10/22/2018    CURRENT DOSE: Suboxone 8mg film BID   BRIEF HPI: 43 year old female with chronic low back pain which began after the birth of her son who is now 9 years old ([place]oper).  She is on disability for pain and has not worked since.  She has a history of chronic opioid use for over 8 years with escalating doses (175 morphine equivalents), overuse and poor pain relief. She qualifies for a diagnosis of OUD - mild. Started on suboxone after initial consult on 9/10/2018 and doing well.  PMHx is significant for PTSD, childhood trauma, anxiety and depression       Since her last visit, Ria Nj reports:  - Doing really well  - Has a new boyfriend who drove her to this appointment.  She is excited about him.  He is in recovery.   - Denies any overuse of her Suboxone.  Last month she was out 6 days early.   - Increasing the dose to 8mg TID was helpful for her pain.      - Seeing Don today (therapist) for her mental health  - Had a bad experience with the pain psychologist and does not want to engage in alternative treatments for her chronic pain.  \"my pain is real\" \"that is not going to help\"  - Tried Pristiq that was prescribed by her PCP for two days.  It was not helpful and she does not want to need medication to be happy.   She is admitting she is depressed now.   - Continues to take Ativan prescribed by her PCP with whom she has a long term relationship   - side effects include sweating and some dizziness      Current pain medications:  Suboxone films 8mg TID  Gabapentin 600mg BID  Ativan 1mg TID                            Status since last visit:    Since last visit patient has " been: struggling.     Intensity:     There has been: no craving.      Suboxone Dose: too low   Progression of Symptoms:     Cues to use and relapse triggers: none    Recovery program has been: ignored.   Accompanying Signs & Symptoms:    Side Effects: sweating and dizzyness.    Sobriety:     Status: no use since last visit.      Drug Screen: obtained.   Precipitating factors:    Triggers have been: non-existent.   Alleviating factors:    Contact with sponsor has been: no sponsor.     Family and support system has been: helpful.   Other Therapies Tried :     Patient has been going to recovery meetings:not at all.      Patient has been participating in professional counseling/therapy: YES    [place] Board of Pharmacy Data Base Reviewed:    YES; appropriate      Pain scores:  Pain intensity on average is 3 on a scale of 0-10.     Side Effects: See above    Medications:  Current Outpatient Prescriptions   Medication Sig Dispense Refill     albuterol (2.5 MG/3ML) 0.083% neb solution Take 1 vial (2.5 mg) by nebulization every 4 hours as needed for shortness of breath / dyspnea or wheezing (Patient not taking: Reported on 11/7/2018) 30 vial 11     albuterol (PROAIR HFA, PROVENTIL HFA, VENTOLIN HFA) 108 (90 BASE) MCG/ACT inhaler Inhale 2 puffs into the lungs every 4 hours as needed for shortness of breath / dyspnea or wheezing (Patient not taking: Reported on 11/7/2018) 1 Inhaler 0     buprenorphine HCl-naloxone HCl (SUBOXONE) 8-2 MG per film Place 1 Film under the tongue 3 times daily 90 Film 0     calcium carbonate 500 MG tablet Take 1 tablet by mouth 2 times daily. 100 tablet 3     Cholecalciferol (VITAMIN D) 2000 UNITS tablet Take 2,000 Units by mouth daily 100 tablet 3     cloNIDine (CATAPRES) 0.1 MG tablet Take 1 tablet (0.1 mg) by mouth 2 times daily (Patient not taking: Reported on 11/7/2018) 4 tablet 0     desvenlafaxine succinate (PRISTIQ) 25 MG 24 hr tablet Take 1 tablet (25 mg) by mouth daily Start prior auth  if needed (Patient not taking: Reported on 11/7/2018) 30 tablet 0     EPINEPHrine (EPIPEN) 0.3 MG/0.3ML injection Inject 0.3 mLs (0.3 mg) into the muscle once as needed for anaphylaxis 2 each 1     gabapentin (NEURONTIN) 300 MG capsule TAKE 1 CAPSULE BY MOUTH THREE TIMES DAILY 270 capsule 3     IBUPROFEN 200 MG OR TABS Pt is taking 4 TABLET EVERY 4 TO 6 HOURS AS NEEDED       LORazepam (ATIVAN) 1 MG tablet TAKE 1 TABLET BY MOUTH EVERY 8 HOURS AS NEEDED FOR ANXIETY 90 tablet 1     Magnesium 250 MG tablet Take 1 tablet by mouth daily. 100 tablet 3     naloxone (NARCAN) nasal spray Spray 1 spray (4 mg) into one nostril alternating nostrils as needed for opioid reversal every 2-3 minutes until assistance arrives (Patient not taking: Reported on 11/7/2018) 0.2 mL 0     nicotine (NICODERM CQ) 21 MG/24HR 24 hr patch Place 1 patch onto the skin every 24 hours (Patient not taking: Reported on 11/7/2018) 30 patch 0     Omega-3 Fatty Acids (FISH OIL CONCENTRATE PO) Take 300 mg by mouth 3 times daily         Medical History: any changes in medical history since they were last seen? No      OBJECTIVE:                                                    /68  Pulse 88  Resp 16  Wt 175 lb (79.4 kg)  BMI 25.84 kg/m2  Body mass index is 25.84 kg/(m^2).     ROS:  Constitutional, neuro, ENT, endocrine, pulmonary, cardiac, gastrointestinal, genitourinary, musculoskeletal, integument and psychiatric systems are negative, except as otherwise noted.    EXAM:  GENERAL APPEARANCE: healthy, alert and no distress  EYES: Eyes grossly normal to inspection and conjunctivae and sclerae normal  SKIN: no suspicious lesions or rashes  PSYCH: mentation appears normal and affect normal/bright  MENTAL STATUS EXAM:  Appearance/Behavior: No apparent distress and Neatly groomed  Speech: Normal  Mood/Affect: normal affect  Insight: Adequate    Diagnostic Test Results:  Results for orders placed or performed in visit on 11/19/18 (from the past 24  hour(s))   Urine Drugs of Abuse Screen Panel 13   Result Value Ref Range    Cannabinoids (19-eld-5-carboxy-9-THC) Not Detected NDET^Not Detected ng/mL    Phencyclidine (Phencyclidine) Not Detected NDET^Not Detected ng/mL    Cocaine (Benzoylecgonine) Not Detected NDET^Not Detected ng/mL    Methamphetamine (d-Methamphetamine) Not Detected NDET^Not Detected ng/mL    Opiates (Morphine) Not Detected NDET^Not Detected ng/mL    Amphetamine (d-Amphetamine) Not Detected NDET^Not Detected ng/mL    Benzodiazepines (Nordiazepam) Detected, Abnormal Result (A) NDET^Not Detected ng/mL    Tricyclic Antidepressants (Desipramine) Not Detected NDET^Not Detected ng/mL    Methadone (Methadone) Not Detected NDET^Not Detected ng/mL    Barbiturates (Butalbital) Not Detected NDET^Not Detected ng/mL    Oxycodone (Oxycodone) Not Detected NDET^Not Detected ng/mL    Propoxyphene (Norpropoxyphene) Not Detected NDET^Not Detected ng/mL    Buprenorphine (Buprenorphine) Detected, Abnormal Result (A) NDET^Not Detected ng/mL            ASSESSMENT:                                                      OPIOID USE DISORDER - MILD  TOBACCO USE DISORDER    ENCOUNTER FOR LONG TERM USE OF HIGH RISK MEDICATION   High Risk Drug Monitoring?  YES   Drug being monitored: Suboxone    Reason for drug: Opioid Use Disorder   What is being monitored?: Dosage, Cravings, Trigger, side effects, and continued abstinence.    CHRONIC USE OF BENZOS  CATASTROPHISING/SOMATIZATION  CHRONIC LOW BACK PAIN  ANXIETY AND DEPRESSION      ICD-10-CM    1. Chronic bilateral low back pain without sciatica M54.5     G89.29    2. Opioid use disorder, mild, on maintenance therapy (H) F11.10 Urine Drugs of Abuse Screen Panel 13   3. Encounter for long-term (current) use of medications Z79.899 Urine Drugs of Abuse Screen Panel 13           PLAN:                                                      1. Physical Therapy:  YES, in the future when more stable   2. Clinical Health Psychologist to  "address issues of relaxation, behavioral change, coping style, and other factors important to improvement.  YES, recommended she follow up with Sherita Arrington, however, she refuses and states he made her feel \"uncomfortable\".  She is meeting with [facility]  3. Diagnostic Studies:    1. MRI lumbar spine reviewed - she has mild DDD and is not a surgical candidate.  This was discussed.   4. Medication Management:    1. Continue Suboxone to 8mg TID.  Discussed that she should NOT be making dose changes on her own.  This is the maximum dose I will prescribe and I anticipate dropping back down in the future. Pain relieving properties of Suboxone last 8 hours on average.   2. I am recommending she continue her Ativan 1mg TID for the time being. Eventually when she is stable on suboxone and feeling better we can talk about a slow taper off.  For now, I would prefer her PCP continue to prescribe this until she is serious about tapering off.  At that point we can consider conversion to a long acting (diazepam) and jorge method to taper off.  I can take over a prescribing role at that point.   5. Further procedures recommended: none  6. Counseling: Counseled the patient on the importance of having a recovery program in addition to Suboxone maintenance.  She denies any addiction and is not open to doing any type of a recovery program.  I will continue to address this at follow up visits.   Also discussed having a sober support network, not isolating, being open, honest, and willing to change, avoiding triggers and managing cravings. she should continue to abstain from alcohol, benzodiazepines, THC, opioids and other drugs of abuse.       MEDICATIONS:   Orders Placed This Encounter   Medications     buprenorphine HCl-naloxone HCl (SUBOXONE) 8-2 MG per film     Sig: Place 1 Film under the tongue 3 times daily     Dispense:  90 Film     Refill:  0          - Continue other medications without change    FUTURE " APPOINTMENTS:       - Follow-up visit in 4 weeks    Total time spent was 30 minutes, and more than 50% of face to face time was spent in counseling and/or coordination of care.      [facility]

## 2018-11-19 NOTE — MR AVS SNAPSHOT
After Visit Summary   11/19/2018    Ria Nj    MRN: [id number]           Patient Information     Date Of Birth          1975        Visit Information        Provider Department      11/19/2018 2:30 PM Ray Leach, [facility]        Today's Diagnoses     Moderate major depression (H)    -  1    Generalized anxiety disorder        Continuous opioid dependence (H)           Follow-ups after your visit        Your next 10 appointments already scheduled     Dec 17, 2018  2:00 PM CST   Return Visit with Scarlet Uribe MD   [facility]    [address]  [address]  [facility] [address] [phone]            Dec 17, 2018  2:30 PM CST   New Visit with GUILLE Adams   [facility]    [address]  [address]  [facility] [id number] [phone]              Who to contact     If you have questions or need follow up information about today's clinic visit or your schedule please contact [facility] directly at [phone].  Normal or non-critical lab and imaging results will be communicated to you by MyChart, letter or phone within 4 business days after the clinic has received the results. If you do not hear from us within 7 days, please contact the clinic through "[employer]hart or phone. If you have a critical or abnormal lab result, we will notify you by phone as soon as possible.  Submit refill requests through [facility] or call your pharmacy and they will forward the refill request to us. Please allow 3 business days for your refill to be completed.          Additional Information About Your Visit        "[employer] Information     [facility] lets you send messages to your doctor, view your test results, renew your prescriptions, schedule appointments and more. To  "sign up, go to [email or web address] . Click on \"Log in\" on the left side of the screen, which will take you to the Welcome page. Then click on \"Sign up Now\" on the right side of the page.     You will be asked to enter the access code listed below, as well as some personal information. Please follow the directions to create your username and password.     Your access code is: [email or web address]  Expires: 2019 11:57 AM     Your access code will  in 90 days. If you need help or a new code, please call your [place] clinic or [phone].        Care EveryWhere ID     This is your Care EveryWhere ID. This could be used by other organizations to access your [place] medical records  [id number]         Blood Pressure from Last 3 Encounters:   18 126/68   18 133/79   10/22/18 108/56    Weight from Last 3 Encounters:   18 79.4 kg (175 lb)   18 80.5 kg (177 lb 8 oz)   10/22/18 78.5 kg (173 lb)              Today, you had the following     No orders found for display         Today's Medication Changes          These changes are accurate as of 18 11:59 PM.  If you have any questions, ask your nurse or doctor.               Stop taking these medicines if you haven't already. Please contact your care team if you have questions.     cloNIDine 0.1 MG tablet   Commonly known as:  CATAPRES   Stopped by:  Scarlet Uribe MD           desvenlafaxine succinate 25 MG 24 hr tablet   Commonly known as:  PRISTIQ   Stopped by:  Scarlet Uribe MD           nicotine 21 MG/24HR 24 hr patch   Commonly known as:  NICODERM CQ   Stopped by:  Scarlet Uribe MD                Where to get your medicines      Some of these will need a paper prescription and others can be bought over the counter.  Ask your nurse if you have questions.     Bring a paper prescription for each of these medications     buprenorphine HCl-naloxone HCl 8-2 MG per film                Primary Care Provider Office Phone # Fax #    " Joel Daniel Irwin Wegener, [phone]       [address]  [facility] [address]        Equal Access to Services     GENEVIEVECAYLA JT : Hadisabella kandy carey chrystal Moreno, waavisda luqadaha, qaarmandota kaalmada tressa, toy zaratejade ruben. So [facility] [phone].    ATENCIÓN: Si habla español, tiene a freire disposición servicios gratuitos de asistencia lingüística. Llame al [phone].    We comply with applicable federal civil rights laws and [place] laws. We do not discriminate on the basis of race, color, national origin, age, disability, sex, sexual orientation, or gender identity.            Thank you!     Thank you for choosing [facility]  for your care. Our goal is always to provide you with excellent care. Hearing back from our patients is one way we can continue to improve our services. Please take a few minutes to complete the written survey that you may receive in the mail after your visit with us. Thank you!             Your Updated Medication List - Protect others around you: Learn how to safely use, store and throw away your medicines at www.disposemymeds.org.          This list is accurate as of 11/19/18 11:59 PM.  Always use your most recent med list.                   Brand Name Dispense Instructions for use Diagnosis    * albuterol 108 (90 Base) MCG/ACT inhaler    PROAIR HFA/PROVENTIL HFA/VENTOLIN HFA    1 Inhaler    Inhale 2 puffs into the lungs every 4 hours as needed for shortness of breath / dyspnea or wheezing    Acute bronchospasm       * albuterol (2.5 MG/3ML) 0.083% neb solution    PROVENTIL    30 vial    Take 1 vial (2.5 mg) by nebulization every 4 hours as needed for shortness of breath / dyspnea or wheezing    Acute bronchospasm       buprenorphine HCl-naloxone HCl 8-2 MG per film    SUBOXONE    90 Film    Place 1 Film under the tongue 3 times daily    Opioid use disorder, mild, on maintenance therapy (H), Encounter for long-term  (current) use of medications       calcium carbonate 500 mg (elemental) 500 MG tablet    OS-ADOLFO    100 tablet    Take 1 tablet by mouth 2 times daily.    Routine general medical examination at a health care facility       EPINEPHrine 0.3 MG/0.3ML injection 2-pack    EPIPEN/ADRENACLICK/or ANY BX GENERIC EQUIV    2 each    Inject 0.3 mLs (0.3 mg) into the muscle once as needed for anaphylaxis    Bee sting-induced anaphylaxis, accidental or unintentional, subsequent encounter       FISH OIL CONCENTRATE PO      Take 300 mg by mouth 3 times daily        gabapentin 300 MG capsule    NEURONTIN    270 capsule    TAKE 1 CAPSULE BY MOUTH THREE TIMES DAILY    Low back pain, unspecified back pain laterality, unspecified chronicity, with sciatica presence unspecified       ibuprofen 200 MG tablet    ADVIL/MOTRIN     Pt is taking 4 TABLET EVERY 4 TO 6 HOURS AS NEEDED        LORazepam 1 MG tablet    ATIVAN    90 tablet    TAKE 1 TABLET BY MOUTH EVERY 8 HOURS AS NEEDED FOR ANXIETY    Generalized anxiety disorder       magnesium 250 MG tablet     100 tablet    Take 1 tablet by mouth daily.    Routine general medical examination at a health care facility       naloxone 4 MG/0.1ML nasal spray    NARCAN    0.2 mL    Spray 1 spray (4 mg) into one nostril alternating nostrils as needed for opioid reversal every 2-3 minutes until assistance arrives    Continuous opioid dependence (H)       vitamin D3 2000 units tablet    CHOLECALCIFEROL    100 tablet    Take 2,000 Units by mouth daily    Major depressive disorder, recurrent episode, moderate (H)       * Notice:  This list has 2 medication(s) that are the same as other medications prescribed for you. Read the directions carefully, and ask your doctor or other care provider to review them with you.

## 2018-11-19 NOTE — PROGRESS NOTES
[facility] Integrated Primary Care   November 19, 2018      Behavioral Health Clinician Progress Note    Patient Name: Ria Nj           Service Type:  Individual      Service Location:   Face to Face in Clinic     Session Start Time: 2:35pm  Session End Time: 2:52pm      Session Length: 16 - 37      Attendees: Patient    Visit Activities (Refresh list every visit): [facility] Only    Diagnostic Assessment Date: not completed yet  Treatment Plan Review Date: not completed yet    See Flowsheets for today's PHQ-9 and MICHAEL-7 results  Previous PHQ-9:   PHQ-9 SCORE 8/17/2017 10/17/2017 4/18/2018   Total Score - - -   Total Score 11 7 9     Previous MICHAEL-7:   MICHAEL-7 SCORE 8/17/2017 10/17/2017 4/18/2018   Total Score - - -   Total Score 20 18 7       CHRISTIANE LEVEL:  CHRISTIANE Score (Last Two) 12/6/2010   CHRISTIANE Raw Score 38   Activation Score 52.9   CHRISTIANE Level 2       DATA  Extended Session (60+ minutes): No  Interactive Complexity: No  Crisis: No  [facility] Patient: No    Treatment Objective(s) Addressed in This Session:  Target Behavior(s): disease management/lifestyle changes addiction and mental health    Depressed Mood: Increase interest, engagement, and pleasure in doing things  Decrease frequency and intensity of feeling down, depressed, hopeless  Identify negative self-talk and behaviors: challenge core beliefs, myths, and actions  Anxiety: will experience a reduction in anxiety, will develop more effective coping skills to manage anxiety symptoms, will develop healthy cognitive patterns and beliefs and will increase ability to function adaptively  Alcohol / Substance Use: will discuss/consider potential need for formal substance use evaluation, treatment and/or support  continue to make healthy choices regarding substance use and engage in activities / supportive services that promote sobriety  PTSD Symptom Management    Current Stressors / Issues:  The patient reported that she has been feeling good-sleep has been good lately  new relationship and she feels comfortable-she has been sober and into [employer]-no groups but she attends [employer]-she has persistent pain and (this is not joke)-the suboxone is helping the patient manage her pain she had 9 years of suffering-has been taking suboxone for the past month-mood is good-regarding anxiety she reported that she has a lot of anxiety-she is living with her parents and she stated that this is not a good place to live-has 9 year old kids-ambivalent about starting therapy-I want to move forward-she had a therapist in the past-she agreed to meet with this therapist on days she is seen by her addiction specialist.       Progress on Treatment Objective(s) / Homework:  No improvement - ACTION (Actively working towards change); Intervened by reinforcing change plan / affirming steps taken    Motivational Interviewing    MI Intervention: Expressed Empathy/Understanding, Supported Autonomy, Collaboration, Evocation, Permission to raise concern or advise, Open-ended questions, Reflections: simple and complex and Change talk (evoked)     Change Talk Expressed by the Patient: Desire to change Reasons to change Need to change Activation Taking steps    Provider Response to Change Talk: E - Evoked more info from patient about behavior change, A - Affirmed patient's thoughts, decisions, or attempts at behavior change, R - Reflected patient's change talk and S - Summarized patient's change talk statements      Care Plan review completed: No    Medication Review:  No changes to current psychiatric medication(s)    Medication Compliance:  NA    Changes in Health Issues:   None reported    Chemical Use Review:   Substance Use: Chemical use reviewed, no active concerns identified      Tobacco Use: No change in amount of tobacco use since last session.  Patient declined discussion at this time    Assessment: Current Emotional / Mental Status (status of significant symptoms):  Risk status (Self / Other harm or  suicidal ideation)  Patient denies a history of suicidal ideation, suicide attempts, self-injurious behavior, homicidal ideation, homicidal behavior and and other safety concerns  Patient denies current fears or concerns for personal safety.  Patient denies current or recent suicidal ideation or behaviors.  Patient denies current or recent homicidal ideation or behaviors.  Patient denies current or recent self injurious behavior or ideation.  Patient denies other safety concerns.  A safety and risk management plan has not been developed at this time, however patient was encouraged to call [place] [phone] should there be a change in any of these risk factors.    Appearance:   Appropriate   Eye Contact:   Good   Psychomotor Behavior: Normal   Attitude:   Cooperative  Guarded   Orientation:   All  Speech   Rate / Production: Normal    Volume:  Normal   Mood:    Anxious  Normal  Affect:    Appropriate  Worrisome   Thought Content:  Clear   Thought Form:  Coherent  Logical  Circumstantial  Insight:    Fair     Diagnoses:  1. Moderate major depression (H)    2. Generalized anxiety disorder    3. Continuous opioid dependence (H)        Collateral Reports Completed:  Not Applicable    Plan: (Homework, other):  Patient was given information about behavioral services and encouraged to schedule a follow up appointment with the clinic [facility] in 1 month.  She was also given information about mental health symptoms and treatment options .  CD Recommendations: Maintain Sobriety.  GUILLE Priest, [facility]      ______________________________________________________________________

## 2018-12-02 DIAGNOSIS — F41.1 GENERALIZED ANXIETY DISORDER: ICD-10-CM

## 2018-12-03 DIAGNOSIS — F11.10 OPIOID USE DISORDER, MILD, ON MAINTENANCE THERAPY (H): ICD-10-CM

## 2018-12-03 DIAGNOSIS — Z79.899 ENCOUNTER FOR LONG-TERM (CURRENT) USE OF MEDICATIONS: ICD-10-CM

## 2018-12-03 RX ORDER — BUPRENORPHINE AND NALOXONE 8; 2 MG/1; MG/1
1 FILM, SOLUBLE BUCCAL; SUBLINGUAL 3 TIMES DAILY
Qty: 43 FILM | Refills: 0 | Status: SHIPPED | OUTPATIENT
Start: 2018-12-03 | End: 2018-12-17

## 2018-12-03 RX ORDER — LORAZEPAM 1 MG/1
TABLET ORAL
Qty: 90 TABLET | Refills: 0 | OUTPATIENT
Start: 2018-12-03

## 2018-12-03 NOTE — TELEPHONE ENCOUNTER
Lorazepam      Last Written Prescription Date:  10/8/18  Last Fill Quantity: 90,   # refills: 1  Last Office Visit: 11/7/18 with Dr Rojo for sore throat  9/11/18 with Dr Wegener  Future Office visit:    Next 5 appointments (look out 90 days)     Dec 17, 2018  2:00 PM CST   Return Visit with Scarlet Uribe MD   [facility]    [address]  [address]  [facility] [address] [phone]                   Routing refill request to provider for review/approval because:  Drug not on the [facility], P or [facility] refill protocol or controlled substance

## 2018-12-03 NOTE — TELEPHONE ENCOUNTER
Please call the patient and advise her that she will need to file a police report about her missing medication. I will need to see a copy of this report at her follow up visit.  Housekeeping staff does not typically throw away peoples medications.  It was likely stolen.      I will bridge her medication, however, she will probably need to pay cash for this as her insurance will not approve another prescription until hers is due.  Please ask which pharmacy she would like this sent to.     Scarlet Uribe MD

## 2018-12-03 NOTE — TELEPHONE ENCOUNTER
Bridge script prepped until 12/17/18 when she has next office visit with Dr. Uribe. Please advise on message below.     Patricia MIMSN-RN Care Coordinator  [facility]

## 2018-12-03 NOTE — TELEPHONE ENCOUNTER
Visit needed for controlled substance refills.    Per last note:    Follow up 2-3 months when needed for lorazepam refill/decide if need to continue that bu preferably it things go well (woith the desvenlafaxine) slowly wean down.      Planning to start the desvenlafaxine after stable on the suboxone which is reasonable.     Agree with starting pristiq after stable on suboxone.      Great job starting the suboxone!     Schedule pap smear with me or another provider if desired when able.     Joel Wegener,MD

## 2018-12-03 NOTE — TELEPHONE ENCOUNTER
Called patient to relay message below. LVM requesting a call back.     Patricia MIMSN-RN Care Coordinator  [place] [facility]

## 2018-12-03 NOTE — TELEPHONE ENCOUNTER
Gave pt this message.  She made refill appt for 12/4/18 with pcp.  Pt thought she had another refill left. She didn't.  She is out of meds. Under a lot of stress.  DUC Beaulieu

## 2018-12-03 NOTE — TELEPHONE ENCOUNTER
Received call from patient is regards to her Suboxone. She states she had been staying at a hotel the past few days and when the housekeepers cleaned her room on the last day they threw out all of her medications. She states that when she realized this she asked if she could look through their garbage to find her medications and they would not let her. Patient doesn't know what to do as she is out of Suboxone now. Please call. Phone #  [phone]      Miracle Owens    [place] [facility]

## 2018-12-03 NOTE — TELEPHONE ENCOUNTER
Patient called back and would like her bridge script for Suboxone sent to the [facility]. She will get a police report and bring to her next office visit.     Patricia MIMSN-RN Care Coordinator  [facility]

## 2018-12-04 ENCOUNTER — OFFICE VISIT (OUTPATIENT)
Dept: FAMILY MEDICINE | Facility: CLINIC | Age: 43
End: 2018-12-04
Payer: MEDICARE

## 2018-12-04 VITALS
BODY MASS INDEX: 25.33 KG/M2 | RESPIRATION RATE: 18 BRPM | DIASTOLIC BLOOD PRESSURE: 68 MMHG | WEIGHT: 171 LBS | TEMPERATURE: 99 F | SYSTOLIC BLOOD PRESSURE: 110 MMHG | HEART RATE: 97 BPM | HEIGHT: 69 IN | OXYGEN SATURATION: 97 %

## 2018-12-04 DIAGNOSIS — F41.1 GENERALIZED ANXIETY DISORDER: ICD-10-CM

## 2018-12-04 PROCEDURE — 99214 OFFICE O/P EST MOD 30 MIN: CPT | Performed by: FAMILY MEDICINE

## 2018-12-04 RX ORDER — LORAZEPAM 1 MG/1
TABLET ORAL
Qty: 90 TABLET | Refills: 5 | Status: SHIPPED | OUTPATIENT
Start: 2018-12-04 | End: 2019-03-05

## 2018-12-04 NOTE — PROGRESS NOTES
"  SUBJECTIVE:   Ria Nj is a 43 year old female who presents to clinic today for the following health issues:      Depression and Anxiety Follow-Up    Status since last visit: No change    Other associated symptoms:None    Complicating factors:     Significant life event: No     Current substance abuse: None    PHQ 8/17/2017 10/17/2017 4/18/2018   PHQ-9 Total Score 11 7 9   Q9: Suicide Ideation Not at all Not at all Several days     MICHAEL-7 SCORE 8/17/2017 10/17/2017 4/18/2018   Total Score - - -   Total Score 20 18 7           Generalized anxiety disorder :overall states doing quite well. On suboxone therapy and working with a therapist through addiction clinic.  Trying to avoid \"drama\" in her life which helps a lot.  Did try a few days of desvenlafaxine but was having headaches and stopped.  Wasn't sure if it was from that or suboxone though.  Definitely has greatly improved sense of wellbeing.      For first time when I ask her how she is doing she states \"pretty good\"  Today.  Almost all other visits with me have started with drama, crying, desperation for improvement of pain and anxiety.       Problem list, Medication list, Allergies, and Medical/Social/Surgical histories reviewed in EPIC and updated as appropriate.  Labs reviewed in EPIC  BP Readings from Last 3 Encounters:   12/04/18 110/68   11/19/18 126/68   11/07/18 133/79    Wt Readings from Last 3 Encounters:   12/04/18 171 lb (77.6 kg)   11/19/18 175 lb (79.4 kg)   11/07/18 177 lb 8 oz (80.5 kg)                  Patient Active Problem List   Diagnosis     CARDIOVASCULAR SCREENING; LDL GOAL LESS THAN 160     Lumbar disc herniation with myelopathy     Chronic low back pain     Bee sting-induced anaphylaxis     Generalized anxiety disorder     Tobacco use disorder     Moderate major depression (H)     Continuous opioid dependence (H)     Cervicalgia     Chronic pain syndrome     Encounter for long-term (current) use of medications     Opioid use " disorder, mild, on maintenance therapy (H)     Past Surgical History:   Procedure Laterality Date     OVARY SURGERY N/A 2004    Ovarian cyst removed.      SURGICAL HISTORY OF -   2007    ovarian cyst removal        Social History   Substance Use Topics     Smoking status: Current Every Day Smoker     Packs/day: 2.00     Years: 15.00     Types: Cigarettes     Smokeless tobacco: Never Used     Alcohol use No     Family History   Problem Relation Age of Onset     Cancer Maternal Grandmother      Cancer Maternal Grandfather      Cancer Paternal Grandmother      Cancer Paternal Grandfather          Current Outpatient Prescriptions   Medication Sig Dispense Refill     buprenorphine HCl-naloxone HCl (SUBOXONE) 8-2 MG per film Place 1 Film under the tongue 3 times daily for 14 days 43 Film 0     calcium carbonate 500 MG tablet Take 1 tablet by mouth 2 times daily. 100 tablet 3     Cholecalciferol (VITAMIN D) 2000 UNITS tablet Take 2,000 Units by mouth daily 100 tablet 3     EPINEPHrine (EPIPEN) 0.3 MG/0.3ML injection Inject 0.3 mLs (0.3 mg) into the muscle once as needed for anaphylaxis 2 each 1     gabapentin (NEURONTIN) 300 MG capsule TAKE 1 CAPSULE BY MOUTH THREE TIMES DAILY 270 capsule 3     IBUPROFEN 200 MG OR TABS Pt is taking 4 TABLET EVERY 4 TO 6 HOURS AS NEEDED       LORazepam (ATIVAN) 1 MG tablet TAKE 1 TABLET BY MOUTH EVERY 8 HOURS AS NEEDED FOR ANXIETY 90 tablet 5     Magnesium 250 MG tablet Take 1 tablet by mouth daily. 100 tablet 3     naloxone (NARCAN) nasal spray Spray 1 spray (4 mg) into one nostril alternating nostrils as needed for opioid reversal every 2-3 minutes until assistance arrives 0.2 mL 0     albuterol (2.5 MG/3ML) 0.083% neb solution Take 1 vial (2.5 mg) by nebulization every 4 hours as needed for shortness of breath / dyspnea or wheezing (Patient not taking: Reported on 11/7/2018) 30 vial 11     albuterol (PROAIR HFA, PROVENTIL HFA, VENTOLIN HFA) 108 (90 BASE) MCG/ACT inhaler Inhale 2 puffs  "into the lungs every 4 hours as needed for shortness of breath / dyspnea or wheezing (Patient not taking: Reported on 11/7/2018) 1 Inhaler 0     Omega-3 Fatty Acids (FISH OIL CONCENTRATE PO) Take 300 mg by mouth 3 times daily       Allergies   Allergen Reactions     Bee Venom      Recent Labs   Lab Test  07/01/18   2132  10/17/17   1122   ALT  17  16   CR  0.61  0.80   GFRESTIMATED  >90  79   GFRESTBLACK  >90  >90   POTASSIUM  3.8  4.2        ROS:  Constitutional, HEENT, cardiovascular, pulmonary, GI, , musculoskeletal, neuro, skin, endocrine and psych systems are negative, except as otherwise noted.        OBJECTIVE:  /68 (BP Location: Left arm, Patient Position: Sitting, Cuff Size: Adult Regular)  Pulse 97  Temp 99  F (37.2  C) (Oral)  Resp 18  Ht 5' 9\" (1.753 m)  Wt 171 lb (77.6 kg)  SpO2 97%  BMI 25.25 kg/m2    EXAM:  GENERAL APPEARANCE: healthy, alert and no distress  Clear speech today.  Smiles through all of visit.  Interacts appropriately.     .  ASSESSMENT AND PLAN  1. Generalized anxiety disorder  Up to date on urine drug screen and controlled substance contract.     For first time when I ask her how she is doing she states \"pretty good\"  Today.  Almost all other visits with me have started with drama, crying, desperation for improvement of pain and anxiety.     Encouraged her to re-try desvenlafaxine again now that is stabilizing in effort to reduce lorazepam need although do not feel we have to decrease dose at this time.     Gave refill for six months.     Continue psychotherapy.     [place].      Schedule pap smear appointment today before she left.      Also follow up with me in person six months.       - LORazepam (ATIVAN) 1 MG tablet; TAKE 1 TABLET BY MOUTH EVERY 8 HOURS AS NEEDED FOR ANXIETY  Dispense: 90 tablet; Refill: 5    I spent greater than 1/2 of a 26 minute face to face encounter counseling regarding the rational for the " assessment and plan noted above.     Joel Wegener,MD

## 2018-12-04 NOTE — PROGRESS NOTES
"  SUBJECTIVE:   Ria Nj is a 43 year old female who presents to clinic today for the following health issues:      Medication Followup of Lorazepam     Taking Medication as prescribed: {.:292867::\"yes\"}    Side Effects:  {NONEORCHOOSE:898895::\"None\"}    Medication Helping Symptoms:  {.:706111::\"yes\"}       {additional problems for provider to add:105884}    Problem list and histories reviewed & adjusted, as indicated.  Additional history: {NONE - AS DOCUMENTED:352147::\"as documented\"}    {HIST REVIEW/ LINKS 2:490949}    Reviewed and updated as needed this visit by clinical staff       Reviewed and updated as needed this visit by Provider         {PROVIDER CHARTING PREFERENCE:137953}  "

## 2018-12-04 NOTE — MR AVS SNAPSHOT
After Visit Summary   12/4/2018    Ria Nj    MRN: [id number]           Patient Information     Date Of Birth          1975        Visit Information        Provider Department      12/4/2018 12:40 PM Wegener, Joel Daniel Irwin, MD [facility]        Today's Diagnoses     Generalized anxiety disorder           Follow-ups after your visit        Your next 10 appointments already scheduled     Dec 17, 2018  2:00 PM CST   Return Visit with Scarlet Uribe MD   [facility] ([facility])    [address]  [address] [place] [id number]   [phone]            Dec 17, 2018  2:30 PM CST   New Visit with Ray Leach [facility] ([facility])    [address]  [address] [place] [address]   [phone]            Dec 21, 2018  3:00 PM CST   PHYSICAL with Joel Daniel Irwin Wegener, MD   [facility] ([facility])    [address] [place] [address] [phone]              Who to contact     If you have questions or need follow up information about today's clinic visit or your schedule please contact [facility] directly at [phone].  Normal or non-critical lab and imaging results will be communicated to you by MyChart, letter or phone within 4 business days after the clinic has received the results. If you do not hear from us within 7 days, please contact the clinic through MyChart or phone. If you have a critical or abnormal lab result, we will notify you by phone as soon as possible.  Submit refill requests through [employer] or call your pharmacy and they will forward the refill request to us. Please allow 3 business days for your refill to be completed.          Additional Information About Your Visit        Sh[employer] Information     [employer] lets you send messages to  "your doctor, view your test results, renew your prescriptions, schedule appointments and more. To sign up, go to [email or web address] . Click on \"Log in\" on the left side of the screen, which will take you to the Welcome page. Then click on \"Sign up Now\" on the right side of the page.     You will be asked to enter the access code listed below, as well as some personal information. Please follow the directions to create your username and password.     Your access code is: [email or web address]  Expires: 2019 11:57 AM     Your access code will  in 90 days. If you need help or a new code, please call your [facility] clinic or [phone].        Care EveryWhere ID     This is your Care EveryWhere ID. This could be used by other organizations to access your [facility] medical records  [id number]        Your Vitals Were     Pulse Temperature Respirations Height Pulse Oximetry BMI (Body Mass Index)    97 99  F (37.2  C) (Oral) 18 5' 9\" (1.753 m) 97% 25.25 kg/m2       Blood Pressure from Last 3 Encounters:   18 110/68   18 126/68   18 133/79    Weight from Last 3 Encounters:   18 171 lb (77.6 kg)   18 175 lb (79.4 kg)   18 177 lb 8 oz (80.5 kg)              Today, you had the following     No orders found for display         Where to get your medicines      Some of these will need a paper prescription and others can be bought over the counter.  Ask your nurse if you have questions.     Bring a paper prescription for each of these medications     LORazepam 1 MG tablet          Primary Care Provider Office Phone # Fax #    Joel Daniel Irwin Wegener, [phone] [address] [place] [id number]        Equal Access to Services     MONO WATERS : Vladimir Moreno, lucinda reyna, toy hernández. [facility] [phone].    ATENCIÓN: Si habla riky, tiene a freire disposición servicios gratuitos de " renettarosioa lingüística. Jono al [phone].    We comply with applicable federal civil rights laws and [place] laws. We do not discriminate on the basis of race, color, national origin, age, disability, sex, sexual orientation, or gender identity.            Thank you!     Thank you for choosing [facility]  for your care. Our goal is always to provide you with excellent care. Hearing back from our patients is one way we can continue to improve our services. Please take a few minutes to complete the written survey that you may receive in the mail after your visit with us. Thank you!             Your Updated Medication List - Protect others around you: Learn how to safely use, store and throw away your medicines at www.disposemymeds.org.          This list is accurate as of 12/4/18  1:25 PM.  Always use your most recent med list.                   Brand Name Dispense Instructions for use Diagnosis    * albuterol 108 (90 Base) MCG/ACT inhaler    PROAIR HFA/PROVENTIL HFA/VENTOLIN HFA    1 Inhaler    Inhale 2 puffs into the lungs every 4 hours as needed for shortness of breath / dyspnea or wheezing    Acute bronchospasm       * albuterol (2.5 MG/3ML) 0.083% neb solution    PROVENTIL    30 vial    Take 1 vial (2.5 mg) by nebulization every 4 hours as needed for shortness of breath / dyspnea or wheezing    Acute bronchospasm       buprenorphine HCl-naloxone HCl 8-2 MG per film    SUBOXONE    43 Film    Place 1 Film under the tongue 3 times daily for 14 days    Opioid use disorder, mild, on maintenance therapy (H), Encounter for long-term (current) use of medications       calcium carbonate 500 MG tablet    OS-ADOLFO    100 tablet    Take 1 tablet by mouth 2 times daily.    Routine general medical examination at a health care facility       EPINEPHrine 0.3 MG/0.3ML injection 2-pack    EPIPEN/ADRENACLICK/or ANY BX GENERIC EQUIV    2 each    Inject 0.3 mLs (0.3 mg) into the muscle once as needed for  anaphylaxis    Bee sting-induced anaphylaxis, accidental or unintentional, subsequent encounter       FISH OIL CONCENTRATE PO      Take 300 mg by mouth 3 times daily        gabapentin 300 MG capsule    NEURONTIN    270 capsule    TAKE 1 CAPSULE BY MOUTH THREE TIMES DAILY    Low back pain, unspecified back pain laterality, unspecified chronicity, with sciatica presence unspecified       ibuprofen 200 MG tablet    ADVIL/MOTRIN     Pt is taking 4 TABLET EVERY 4 TO 6 HOURS AS NEEDED        LORazepam 1 MG tablet    ATIVAN    90 tablet    TAKE 1 TABLET BY MOUTH EVERY 8 HOURS AS NEEDED FOR ANXIETY    Generalized anxiety disorder       magnesium 250 MG tablet     100 tablet    Take 1 tablet by mouth daily.    Routine general medical examination at a health care facility       naloxone 4 MG/0.1ML nasal spray    NARCAN    0.2 mL    Spray 1 spray (4 mg) into one nostril alternating nostrils as needed for opioid reversal every 2-3 minutes until assistance arrives    Continuous opioid dependence (H)       vitamin D3 2000 units tablet    CHOLECALCIFEROL    100 tablet    Take 2,000 Units by mouth daily    Major depressive disorder, recurrent episode, moderate (H)       * Notice:  This list has 2 medication(s) that are the same as other medications prescribed for you. Read the directions carefully, and ask your doctor or other care provider to review them with you.

## 2018-12-10 ENCOUNTER — TELEPHONE (OUTPATIENT)
Dept: PALLIATIVE MEDICINE | Facility: CLINIC | Age: 43
End: 2018-12-10

## 2018-12-10 NOTE — TELEPHONE ENCOUNTER
Patient left  12/9 at 8:06 pm    Rx- Suboxone, she is out and did get a bridge for a week but is not sure what to do as her appointment is not until 12/17              Please call      Minna IVERSON    [facility]

## 2018-12-10 NOTE — TELEPHONE ENCOUNTER
Spoke with patient and reviewed the below information.     FELICITA JiN, RN  Care Coordinator  [facility]

## 2018-12-10 NOTE — TELEPHONE ENCOUNTER
Buprenorphine HCl-naloxone HCl (SUBOXONE) 8-2 MG per film was filled on 12/3/18 for a total of 23 films. Prescription was for #43.    Called pharmacy and they report that patients insurance was inactive and she only picked up 23 films because she had to pay cash and that is what she could afford.     Asked the pharmacist to run the remaining 21 films and the insurance is now active. The patients co-pay is $0. Asked the pharmacist to fill the remaining quantity. Called patient and left a VM requesting call back. Provided call back number.    Patient is scheduled for a follow up visit with Dr. Uribe on 12/17/18  CYDNEY Ji, RN  Care Coordinator  [facility]

## 2018-12-17 ENCOUNTER — OFFICE VISIT (OUTPATIENT)
Dept: ADDICTION MEDICINE | Facility: CLINIC | Age: 43
End: 2018-12-17
Payer: MEDICARE

## 2018-12-17 VITALS
OXYGEN SATURATION: 100 % | WEIGHT: 174.5 LBS | BODY MASS INDEX: 25.77 KG/M2 | HEART RATE: 101 BPM | RESPIRATION RATE: 16 BRPM | DIASTOLIC BLOOD PRESSURE: 72 MMHG | SYSTOLIC BLOOD PRESSURE: 128 MMHG

## 2018-12-17 DIAGNOSIS — Z79.899 ENCOUNTER FOR LONG-TERM (CURRENT) USE OF MEDICATIONS: ICD-10-CM

## 2018-12-17 DIAGNOSIS — G89.29 CHRONIC BILATERAL LOW BACK PAIN WITHOUT SCIATICA: ICD-10-CM

## 2018-12-17 DIAGNOSIS — F32.1 MODERATE MAJOR DEPRESSION (H): ICD-10-CM

## 2018-12-17 DIAGNOSIS — F17.200 TOBACCO USE DISORDER: ICD-10-CM

## 2018-12-17 DIAGNOSIS — F41.1 GENERALIZED ANXIETY DISORDER: ICD-10-CM

## 2018-12-17 DIAGNOSIS — F11.10 OPIOID USE DISORDER, MILD, ON MAINTENANCE THERAPY (H): Primary | ICD-10-CM

## 2018-12-17 DIAGNOSIS — M54.50 CHRONIC BILATERAL LOW BACK PAIN WITHOUT SCIATICA: ICD-10-CM

## 2018-12-17 DIAGNOSIS — G89.4 CHRONIC PAIN SYNDROME: ICD-10-CM

## 2018-12-17 PROCEDURE — 80306 DRUG TEST PRSMV INSTRMNT: CPT | Performed by: ANESTHESIOLOGY

## 2018-12-17 PROCEDURE — 99215 OFFICE O/P EST HI 40 MIN: CPT | Performed by: ANESTHESIOLOGY

## 2018-12-17 RX ORDER — BUPRENORPHINE AND NALOXONE 8; 2 MG/1; MG/1
1 FILM, SOLUBLE BUCCAL; SUBLINGUAL 3 TIMES DAILY
Qty: 90 FILM | Refills: 0 | Status: SHIPPED | OUTPATIENT
Start: 2018-12-17 | End: 2019-01-14

## 2018-12-17 NOTE — TELEPHONE ENCOUNTER
Requested Prescriptions   Pending Prescriptions Disp Refills     tiZANidine (ZANAFLEX) 4 MG tablet 270 tablet 0     Sig: TAKE ONE TABLET BY MOUTH THREE TIMES DAILY AS NEEDED FOR SHOULDER AND UPPER BACK PAIN    There is no refill protocol information for this order          tiZANidine (ZANAFLEX) 4 MG tablet (Discontinued)      Last Written Prescription Date:  8/7/18  Last Fill Quantity: 270,   # refills: 0  Last Office Visit: 12/4/18  Future Office visit:    Next 5 appointments (look out 90 days)    Dec 17, 2018  2:00 PM CST  Return Visit with Scarlet Uribe MD  [facility] ([facility]) [address] [place] [address] [phone]   Dec 21, 2018  3:00 PM CST  PHYSICAL with Joel Daniel Irwin Wegener, MD  [facility] ([facility]) [address] [place] [address] [phone]           Routing refill request to provider for review/approval because:  Drug not active on patient's medication list

## 2018-12-17 NOTE — PROGRESS NOTES
"                                                    [facility]    Date of visit: 12/17/2018    Chief complaint:   Chief Complaint   Patient presents with     Addiction Problem       Interval history:  Ria Nj is a 43 year old female here for Chronic Pain and Addiction - Suboxone Maintenance follow up.   Last visit: follow up 11/19/2018    CURRENT DOSE: Suboxone 8mg film BID   BRIEF HPI: 43 year old female with chronic low back pain which began after the birth of her son who is now 9 years old (Cooper).  She is on disability for pain.  She has a history of chronic opioid use for over 8 years with escalating doses (175 morphine equivalents), overuse and poor pain relief. She qualifies for a diagnosis of OUD - mild. Started on suboxone after initial consult on 9/10/2018 and doing well.  PMHx is significant for PTSD, childhood trauma, anxiety and depression       Since her last visit, Ria Nj reports:  - Doing really well  - Lost her Suboxone - it was \"thrown away\" when the  went into her hotel room.  She called the police and they stated there was nothing they could do.  She did not know she was supposed to come today with a police report.  She had to pay cash for her suboxone and does not have any money for the holidays now.   - Had a new boyfriend but he relapsed and she broke up with him.    - She is not doing any type of a recovery program but has started going to [employer].   - Denies any overuse of her Suboxone this month  - Increasing the dose to 8mg TID was helpful for her pain.      - Seeing Ray (therapist) for her mental health  - Had a bad experience with the pain psychologist and does not want to engage in alternative treatments for her chronic pain.  \"my pain is real\" \"that is not going to help\"  - Tried Pristiq that was prescribed by her PCP for two days.  It was not helpful and she does not want to need medication to be happy.   She is admitting she is " "depressed now.   - She is asking \"what else\" she can take for her pain.   - Continues to take Ativan prescribed by her PCP with whom she has a long term relationship   - side effects include sweating and some dizziness      Current pain medications:  Suboxone films 8mg TID  Gabapentin 600mg BID  Ativan 1mg TID                            Status since last visit:    Since last visit patient has been: struggling.     Intensity:     There has been: no craving.      Suboxone Dose: too low   Progression of Symptoms:     Cues to use and relapse triggers: none    Recovery program has been: ignored.   Accompanying Signs & Symptoms:    Side Effects: sweating and dizzyness.    Sobriety:     Status: no use since last visit.      Drug Screen: obtained.   Precipitating factors:    Triggers have been: non-existent.   Alleviating factors:    Contact with sponsor has been: no sponsor.     Family and support system has been: helpful.   Other Therapies Tried :     Patient has been going to recovery meetings:not at all.      Patient has been participating in professional counseling/therapy: YES    [place] Board of Pharmacy Data Base Reviewed:    YES; appropriate      Pain scores:  Pain intensity on average is 3 on a scale of 0-10.     Side Effects: See above    Medications:  Current Outpatient Medications   Medication Sig Dispense Refill     albuterol (2.5 MG/3ML) 0.083% neb solution Take 1 vial (2.5 mg) by nebulization every 4 hours as needed for shortness of breath / dyspnea or wheezing (Patient not taking: Reported on 11/7/2018) 30 vial 11     albuterol (PROAIR HFA, PROVENTIL HFA, VENTOLIN HFA) 108 (90 BASE) MCG/ACT inhaler Inhale 2 puffs into the lungs every 4 hours as needed for shortness of breath / dyspnea or wheezing (Patient not taking: Reported on 11/7/2018) 1 Inhaler 0     buprenorphine HCl-naloxone HCl (SUBOXONE) 8-2 MG per film Place 1 Film under the tongue 3 times daily for 14 days 43 Film 0     calcium carbonate 500 MG " tablet Take 1 tablet by mouth 2 times daily. 100 tablet 3     Cholecalciferol (VITAMIN D) 2000 UNITS tablet Take 2,000 Units by mouth daily 100 tablet 3     EPINEPHrine (EPIPEN) 0.3 MG/0.3ML injection Inject 0.3 mLs (0.3 mg) into the muscle once as needed for anaphylaxis 2 each 1     gabapentin (NEURONTIN) 300 MG capsule TAKE 1 CAPSULE BY MOUTH THREE TIMES DAILY 270 capsule 3     IBUPROFEN 200 MG OR TABS Pt is taking 4 TABLET EVERY 4 TO 6 HOURS AS NEEDED       LORazepam (ATIVAN) 1 MG tablet TAKE 1 TABLET BY MOUTH EVERY 8 HOURS AS NEEDED FOR ANXIETY 90 tablet 5     Magnesium 250 MG tablet Take 1 tablet by mouth daily. 100 tablet 3     naloxone (NARCAN) nasal spray Spray 1 spray (4 mg) into one nostril alternating nostrils as needed for opioid reversal every 2-3 minutes until assistance arrives 0.2 mL 0     Omega-3 Fatty Acids (FISH OIL CONCENTRATE PO) Take 300 mg by mouth 3 times daily         Medical History: any changes in medical history since they were last seen? No      OBJECTIVE:                                                    /72   Pulse 101   Resp 16   Wt 79.2 kg (174 lb 8 oz)   SpO2 100%   BMI 25.77 kg/m    Body mass index is 25.77 kg/m .     ROS:  Constitutional, neuro, ENT, endocrine, pulmonary, cardiac, gastrointestinal, genitourinary, musculoskeletal, integument and psychiatric systems are negative, except as otherwise noted.    EXAM:  GENERAL APPEARANCE: healthy, alert and no distress  EYES: Eyes grossly normal to inspection and conjunctivae and sclerae normal  SKIN: no suspicious lesions or rashes  PSYCH: mentation appears normal and affect normal/bright  MENTAL STATUS EXAM:  Appearance/Behavior: No apparent distress and Neatly groomed  Speech: Normal  Mood/Affect: normal affect  Insight: Adequate    Diagnostic Test Results:  Results for orders placed or performed in visit on 12/17/18 (from the past 24 hour(s))   Urine Drugs of Abuse Screen Panel 13   Result Value Ref Range     Cannabinoids (77-jol-7-carboxy-9-THC) Not Detected NDET^Not Detected ng/mL    Phencyclidine (Phencyclidine) Not Detected NDET^Not Detected ng/mL    Cocaine (Benzoylecgonine) Not Detected NDET^Not Detected ng/mL    Methamphetamine (d-Methamphetamine) Not Detected NDET^Not Detected ng/mL    Opiates (Morphine) Not Detected NDET^Not Detected ng/mL    Amphetamine (d-Amphetamine) Not Detected NDET^Not Detected ng/mL    Benzodiazepines (Nordiazepam) Detected, Abnormal Result (A) NDET^Not Detected ng/mL    Tricyclic Antidepressants (Desipramine) Not Detected NDET^Not Detected ng/mL    Methadone (Methadone) Not Detected NDET^Not Detected ng/mL    Barbiturates (Butalbital) Not Detected NDET^Not Detected ng/mL    Oxycodone (Oxycodone) Not Detected NDET^Not Detected ng/mL    Propoxyphene (Norpropoxyphene) Not Detected NDET^Not Detected ng/mL    Buprenorphine (Buprenorphine) Detected, Abnormal Result (A) NDET^Not Detected ng/mL            ASSESSMENT:                                                      OPIOID USE DISORDER - MILD  TOBACCO USE DISORDER    ENCOUNTER FOR LONG TERM USE OF HIGH RISK MEDICATION   High Risk Drug Monitoring?  YES   Drug being monitored: Suboxone    Reason for drug: Opioid Use Disorder   What is being monitored?: Dosage, Cravings, Trigger, side effects, and continued abstinence.    CHRONIC USE OF BENZOS  CATASTROPHISING/SOMATIZATION  CHRONIC LOW BACK PAIN  ANXIETY AND DEPRESSION      ICD-10-CM    1. Opioid use disorder, mild, on maintenance therapy (H) F11.10 Urine Drugs of Abuse Screen Panel 13     buprenorphine HCl-naloxone HCl (SUBOXONE) 8-2 MG per film   2. Encounter for long-term (current) use of medications Z79.899 Urine Drugs of Abuse Screen Panel 13     buprenorphine HCl-naloxone HCl (SUBOXONE) 8-2 MG per film   3. Chronic pain syndrome G89.4    4. Generalized anxiety disorder F41.1            PLAN:                                                      1. Physical Therapy:  YES, in the future when  "more stable   2. Clinical Health Psychologist to address issues of relaxation, behavioral change, coping style, and other factors important to improvement.  YES, recommended she follow up with Sherita Arrington, however, she refuses and states he made her feel \"uncomfortable\".  She is meeting [facility]  3. Diagnostic Studies:    1. MRI lumbar spine reviewed - she has mild DDD and is not a surgical candidate.  This was discussed.   4. Medication Management:    1. Continue Suboxone to 8mg TID.  Discussed that she should NOT be making dose changes on her own.  This is the maximum dose I will prescribe and I anticipate dropping back down in the future. Pain relieving properties of Suboxone last 8 hours on average.   2. I am recommending she discontinue her Ativan 1mg TID over the next year.   For now, I would prefer her PCP continue to prescribe this.    3. Recommend she start an antidepressant for her chronic pain and depression.  Cymbalta, effexor good choices but I will leave this up to her and her PCP  5. Further procedures recommended: none  6. Counseling: Counseled the patient on the importance of having a recovery program in addition to Suboxone maintenance.  She denies any addiction and is not open to doing any type of a recovery program.  I will continue to address this at follow up visits.   Also discussed having a sober support network, not isolating, being open, honest, and willing to change, avoiding triggers and managing cravings. she should continue to abstain from alcohol, benzodiazepines, THC, opioids and other drugs of abuse.       MEDICATIONS:   Orders Placed This Encounter   Medications     buprenorphine HCl-naloxone HCl (SUBOXONE) 8-2 MG per film     Sig: Place 1 Film under the tongue 3 times daily     Dispense:  90 Film     Refill:  0          - Continue other medications without change    FUTURE APPOINTMENTS:       - Follow-up visit in 4 weeks    Total time spent was 30 minutes, and more " than 50% of face to face time was spent in counseling and/or coordination of care.      [facility]

## 2018-12-17 NOTE — TELEPHONE ENCOUNTER
Next 5 appointments (look out 90 days)    Dec 21, 2018  2:00 PM CST  PHYSICAL with ELEANOR Ruiz CNP  [facility] ([facility]) [address] [place] [id number] [phone]           Routing refill request to provider for review/approval because:  Drug not on the [facility], U[facility] or  Health refill protocol or controlled substance  Katlyn Latham RN

## 2019-01-14 ENCOUNTER — OFFICE VISIT (OUTPATIENT)
Dept: ADDICTION MEDICINE | Facility: CLINIC | Age: 44
End: 2019-01-14
Payer: MEDICARE

## 2019-01-14 VITALS
HEART RATE: 93 BPM | DIASTOLIC BLOOD PRESSURE: 68 MMHG | BODY MASS INDEX: 25.1 KG/M2 | WEIGHT: 170 LBS | SYSTOLIC BLOOD PRESSURE: 122 MMHG | RESPIRATION RATE: 16 BRPM | OXYGEN SATURATION: 100 % | TEMPERATURE: 98.3 F

## 2019-01-14 DIAGNOSIS — F11.10 OPIOID USE DISORDER, MILD, ON MAINTENANCE THERAPY (H): Primary | ICD-10-CM

## 2019-01-14 DIAGNOSIS — Z79.899 ENCOUNTER FOR LONG-TERM (CURRENT) USE OF MEDICATIONS: ICD-10-CM

## 2019-01-14 DIAGNOSIS — F41.1 GENERALIZED ANXIETY DISORDER: ICD-10-CM

## 2019-01-14 DIAGNOSIS — M70.61 TROCHANTERIC BURSITIS OF BOTH HIPS: ICD-10-CM

## 2019-01-14 DIAGNOSIS — G89.4 CHRONIC PAIN SYNDROME: ICD-10-CM

## 2019-01-14 DIAGNOSIS — F17.200 TOBACCO USE DISORDER: ICD-10-CM

## 2019-01-14 DIAGNOSIS — M70.62 TROCHANTERIC BURSITIS OF BOTH HIPS: ICD-10-CM

## 2019-01-14 LAB
AMPHETAMINES UR QL: ABNORMAL NG/ML
BARBITURATES UR QL SCN: NOT DETECTED NG/ML
BENZODIAZ UR QL SCN: ABNORMAL NG/ML
BUPRENORPHINE UR QL: ABNORMAL NG/ML
CANNABINOIDS UR QL: NOT DETECTED NG/ML
COCAINE UR QL SCN: NOT DETECTED NG/ML
D-METHAMPHET UR QL: ABNORMAL NG/ML
METHADONE UR QL SCN: NOT DETECTED NG/ML
OPIATES UR QL SCN: NOT DETECTED NG/ML
OXYCODONE UR QL SCN: NOT DETECTED NG/ML
PCP UR QL SCN: NOT DETECTED NG/ML
PROPOXYPH UR QL: NOT DETECTED NG/ML
TRICYCLICS UR QL SCN: NOT DETECTED NG/ML

## 2019-01-14 PROCEDURE — 99214 OFFICE O/P EST MOD 30 MIN: CPT | Mod: 25 | Performed by: ANESTHESIOLOGY

## 2019-01-14 PROCEDURE — 80306 DRUG TEST PRSMV INSTRMNT: CPT | Performed by: ANESTHESIOLOGY

## 2019-01-14 PROCEDURE — 96372 THER/PROPH/DIAG INJ SC/IM: CPT | Performed by: ANESTHESIOLOGY

## 2019-01-14 RX ORDER — BUPRENORPHINE AND NALOXONE 8; 2 MG/1; MG/1
1 FILM, SOLUBLE BUCCAL; SUBLINGUAL 3 TIMES DAILY
Qty: 90 FILM | Refills: 0 | Status: SHIPPED | OUTPATIENT
Start: 2019-01-14 | End: 2019-02-11

## 2019-01-14 NOTE — PROGRESS NOTES
"                                                    [facility]    Date of visit: 1/14/2019    Chief complaint:   Chief Complaint   Patient presents with     Addiction Problem       Interval history:  Ria Nj is a 43 year old female here for Chronic Pain and Addiction - Suboxone Maintenance follow up.   Last visit: follow up 12/17/2018    CURRENT DOSE: Suboxone 8mg film BID   BRIEF HPI: 43 year old female with chronic low back pain which began after the birth of her son who is now 9 years old ([place]).  She is on disability for pain.  She has a history of chronic opioid use for over 8 years with escalating doses (175 morphine equivalents), overuse and poor pain relief. She qualifies for a diagnosis of OUD - mild. Started on suboxone after initial consult on 9/10/2018 and doing well.  PMHx is significant for PTSD, childhood trauma, anxiety and depression       Since her last visit, Ria Nj reports:  - Doing really well  - Her hips have been causing her a lot of pain lately.  She cannot lay down on them and is having trouble sleeping.    - Holidays were hard for her and she is glad they are over.  She spent time with her 9 year old son.   - UDS is positive for amphetamines, meth and benzos.  She is prescribed ativan and denies any use of stimulants.   - Last month she lost her Suboxone - it was \"thrown away\" when the  went into her hotel room.  She called the police and they stated there was nothing they could do.  She did not know she was supposed to come today with a police report.  She had to pay cash for her suboxone and does not have any money for the holidays now.   - Had a new boyfriend but he relapsed and she broke up with him.    - She is not doing any type of a recovery program but has started going to [employer].   - Denies any overuse of her Suboxone this month  - Increasing the dose to 8mg TID was helpful for her pain.      - Had a bad experience with the pain " "psychologist and does not want to engage in alternative treatments for her chronic pain.  \"my pain is real\" \"that is not going to help\"  - Continues to take Ativan prescribed by her PCP with whom she has a long term relationship   - side effects include sweating and some dizziness      Current pain medications:  Suboxone films 8mg TID  Gabapentin 600mg BID  Ativan 1mg TID                            Status since last visit:    Since last visit patient has been: stable.     Intensity:     There has been: no craving.      Suboxone Dose: adequate  Progression of Symptoms:     Cues to use and relapse triggers: none    Recovery program has been: ignored.   Accompanying Signs & Symptoms:    Side Effects: sweating and dizzyness.    Sobriety:     Status: no use since last visit.      Drug Screen: obtained.   Precipitating factors:    Triggers have been: non-existent.   Alleviating factors:    Contact with sponsor has been: no sponsor.     Family and support system has been: helpful.   Other Therapies Tried :     Patient has been going to recovery meetings:not at all.      Patient has been participating in professional counseling/therapy: YES    [place] Board of Pharmacy Data Base Reviewed:    YES; appropriate      Pain scores:  Pain intensity on average is 3 on a scale of 0-10.     Side Effects: See above    Medications:  Current Outpatient Medications   Medication Sig Dispense Refill     albuterol (2.5 MG/3ML) 0.083% neb solution Take 1 vial (2.5 mg) by nebulization every 4 hours as needed for shortness of breath / dyspnea or wheezing 30 vial 11     albuterol (PROAIR HFA, PROVENTIL HFA, VENTOLIN HFA) 108 (90 BASE) MCG/ACT inhaler Inhale 2 puffs into the lungs every 4 hours as needed for shortness of breath / dyspnea or wheezing 1 Inhaler 0     buprenorphine HCl-naloxone HCl (SUBOXONE) 8-2 MG per film Place 1 Film under the tongue 3 times daily 90 Film 0     calcium carbonate 500 MG tablet Take 1 tablet by mouth 2 times " daily. 100 tablet 3     Cholecalciferol (VITAMIN D) 2000 UNITS tablet Take 2,000 Units by mouth daily 100 tablet 3     EPINEPHrine (EPIPEN) 0.3 MG/0.3ML injection Inject 0.3 mLs (0.3 mg) into the muscle once as needed for anaphylaxis 2 each 1     gabapentin (NEURONTIN) 300 MG capsule TAKE 1 CAPSULE BY MOUTH THREE TIMES DAILY 270 capsule 3     IBUPROFEN 200 MG OR TABS Pt is taking 4 TABLET EVERY 4 TO 6 HOURS AS NEEDED       LORazepam (ATIVAN) 1 MG tablet TAKE 1 TABLET BY MOUTH EVERY 8 HOURS AS NEEDED FOR ANXIETY 90 tablet 5     Magnesium 250 MG tablet Take 1 tablet by mouth daily. 100 tablet 3     naloxone (NARCAN) nasal spray Spray 1 spray (4 mg) into one nostril alternating nostrils as needed for opioid reversal every 2-3 minutes until assistance arrives 0.2 mL 0     Omega-3 Fatty Acids (FISH OIL CONCENTRATE PO) Take 300 mg by mouth 3 times daily       tiZANidine (ZANAFLEX) 4 MG tablet TAKE ONE TABLET BY MOUTH THREE TIMES DAILY AS NEEDED FOR SHOULDER AND UPPER BACK PAIN 270 tablet 3       Medical History: any changes in medical history since they were last seen? No      OBJECTIVE:                                                    /68 (BP Location: Right arm)   Pulse 93   Temp 98.3  F (36.8  C) (Oral)   Resp 16   Wt 77.1 kg (170 lb)   SpO2 100%   BMI 25.10 kg/m    Body mass index is 25.1 kg/m .     ROS:  Constitutional, neuro, ENT, endocrine, pulmonary, cardiac, gastrointestinal, genitourinary, musculoskeletal, integument and psychiatric systems are negative, except as otherwise noted.    EXAM:  GENERAL APPEARANCE: healthy, alert and no distress  EYES: Eyes grossly normal to inspection and conjunctivae and sclerae normal  SKIN: no suspicious lesions or rashes  PSYCH: mentation appears normal and affect normal/bright  MENTAL STATUS EXAM:  Appearance/Behavior: No apparent distress and Neatly groomed  Speech: Normal  Mood/Affect: normal affect  Insight: Adequate    Diagnostic Test Results:  Results for  orders placed or performed in visit on 01/14/19 (from the past 24 hour(s))   Urine Drugs of Abuse Screen Panel 13   Result Value Ref Range    Cannabinoids (67-mru-3-carboxy-9-THC) Not Detected NDET^Not Detected ng/mL    Phencyclidine (Phencyclidine) Not Detected NDET^Not Detected ng/mL    Cocaine (Benzoylecgonine) Not Detected NDET^Not Detected ng/mL    Methamphetamine (d-Methamphetamine) Detected, Abnormal Result (A) NDET^Not Detected ng/mL    Opiates (Morphine) Not Detected NDET^Not Detected ng/mL    Amphetamine (d-Amphetamine) Detected, Abnormal Result (A) NDET^Not Detected ng/mL    Benzodiazepines (Nordiazepam) Detected, Abnormal Result (A) NDET^Not Detected ng/mL    Tricyclic Antidepressants (Desipramine) Not Detected NDET^Not Detected ng/mL    Methadone (Methadone) Not Detected NDET^Not Detected ng/mL    Barbiturates (Butalbital) Not Detected NDET^Not Detected ng/mL    Oxycodone (Oxycodone) Not Detected NDET^Not Detected ng/mL    Propoxyphene (Norpropoxyphene) Not Detected NDET^Not Detected ng/mL    Buprenorphine (Buprenorphine) Detected, Abnormal Result (A) NDET^Not Detected ng/mL            ASSESSMENT:                                                      OPIOID USE DISORDER - MILD  TOBACCO USE DISORDER    ENCOUNTER FOR LONG TERM USE OF HIGH RISK MEDICATION   High Risk Drug Monitoring?  YES   Drug being monitored: Suboxone    Reason for drug: Opioid Use Disorder   What is being monitored?: Dosage, Cravings, Trigger, side effects, and continued abstinence.    CHRONIC USE OF BENZOS  CATASTROPHISING/SOMATIZATION  CHRONIC LOW BACK PAIN  ANXIETY AND DEPRESSION      ICD-10-CM    1. Opioid use disorder, mild, on maintenance therapy (H) F11.10 Urine Drugs of Abuse Screen Panel 13     buprenorphine HCl-naloxone HCl (SUBOXONE) 8-2 MG per film   2. Encounter for long-term (current) use of medications Z79.899 Urine Drugs of Abuse Screen Panel 13     buprenorphine HCl-naloxone HCl (SUBOXONE) 8-2 MG per film      "Amphetamine confirm urine   3. Trochanteric bursitis of both hips M70.61     M70.62    4. Chronic pain syndrome G89.4            PLAN:                                                      1. Physical Therapy:  YES, in the future when more stable   2. Clinical Health Psychologist to address issues of relaxation, behavioral change, coping style, and other factors important to improvement.  YES, recommended she follow up with Sherita Arrington, however, she refuses and states he made her feel \"uncomfortable\".  She also tried meeting with [facility] in [facility], however, did not follow up with these visits either.   3. Diagnostic Studies:    1. MRI lumbar spine reviewed - she has mild DDD and is not a surgical candidate.  This was discussed.   4. Medication Management:    1. Continue Suboxone to 8mg TID.  Discussed that she should NOT be making dose changes on her own.  This is the maximum dose I will prescribe and I anticipate dropping back down in the future. Pain relieving properties of Suboxone last 8 hours on average.   2. I am recommending she discontinue her Ativan 1mg TID over the next year.   For now, I would prefer her PCP continue to prescribe this.    3. Recommend she start an antidepressant for her chronic pain and depression.  Cymbalta, effexor good choices but I will leave this up to her and her PCP  5. Further procedures recommended:   1. Bilateral trochanteric bursa injections done today.  Can repeat PRN   6. Counseling: Counseled the patient on the importance of having a recovery program in addition to Suboxone maintenance.  She denies any addiction and is not open to doing any type of a recovery program.  I will continue to address this at follow up visits.   Also discussed having a sober support network, not isolating, being open, honest, and willing to change, avoiding triggers and managing cravings. she should continue to abstain from alcohol, benzodiazepines, THC, opioids and other drugs of abuse.   7. OTHER: " UDS was positive for amphetamines and methamphetamine today.  Patient denies use so it was sent for confirmatory testing.       MEDICATIONS:   Orders Placed This Encounter   Medications     buprenorphine HCl-naloxone HCl (SUBOXONE) 8-2 MG per film     Sig: Place 1 Film under the tongue 3 times daily     Dispense:  90 Film     Refill:  0          - Continue other medications without change    FUTURE APPOINTMENTS:       - Follow-up visit in 4 weeks    Total time spent was 30 minutes, and more than 50% of face to face time was spent in counseling and/or coordination of care.      [facility]     [facility] - Procedure Note    Date of Service: 1/14/2019  Procedure performed: Bilateral Greater Trochanteric Bursa Injection  Diagnosis: Bilateral Trochanteric Bursitis  : Scarlet Uribe MD   Anesthesia: none      Indications:   Ria Nj is a 43 year old female was sent by myself for bilateral trochanteric bursa.  They have a history of bilateral hip pain worse when lying down.  Exam shows TTP over the bilateral trochanteric bursa and they have tried conservative treatment including medications and exercise.    Options/alternatives, benefits and risks were discussed with the patient including bleeding and infection. Questions were answered to her satisfaction and she agrees to proceed. Voluntary informed consent was obtained and signed.     Vitals were reviewed: Yes  Allergies were reviewed:  Yes   Medications were reviewed:  Yes   Pre-procedure pain score: 9/10    Procedure:  After getting informed consent, patient was brought into the procedure suite and was placed in a prone position on the procedure table.   A Pause for the Cause was performed.  Patient was prepped and draped in sterile fashion.     The area over the left trochanter was palpated, and the tender area was identified, corresponding to the area of the trochanteric bursa.  The area was cleaned with  Chloroprep.  A 25-gauge, 1.5-inch needle was inserted, aiming towards the trochanter.  When bone was encountered, the needle was slightly drawn.  A solution containing local anesthesic and steroid was injected.  The needle was removed.  Hemostasis was achieved. Bandaids were placed as appropriate.    The procedure was repeated on the opposite side.    In total, 8 ml of 0.5% bupivacaine and 2 ml (80 mg) of kenalog was injected divided equally between the two sides.    Hemostasis was achieved, the area was cleaned, and bandaids were placed when appropriate.  The patient tolerated the procedure well, and was taken to the recovery room.    Images were saved to PACS.    Post-procedure pain score: 8/10  Follow-up with the referring provider      Scarlet Uribe MD  [facility]

## 2019-01-14 NOTE — PATIENT INSTRUCTIONS
[facility] Injection instructions  You had bursa injections  Meds used:  Lidocaine & kenolog  Nurse line:  [phone]  Appointment line:  [phone]      Go to the emergency room if you develop any shortness of breath    Monitor the injection sites for signs and symptoms of infection-fever, chills, redness, swelling, warmth, or drainage to areas.    Okay to use ice to the areas.    Do not apply heat to sites for at least 12 hours.    You may have soreness at injection sites for up to 24 hours.    If you are able to use anti-inflammatory medications or Tylenol for pain control if necessary, you can take these as directed.  After hours doctor line:  [phone]

## 2019-01-27 ENCOUNTER — TELEPHONE (OUTPATIENT)
Dept: FAMILY MEDICINE | Facility: CLINIC | Age: 44
End: 2019-01-27

## 2019-01-27 NOTE — TELEPHONE ENCOUNTER
Call pt.   On suboxone now.   Last utox positive for amphetamine and methamphetamine on general drug screen.  Not done at medtox.  Reviewed care package resources, pseudaphed could cause false positive.     Confirmed with Dr. Urbie and also confirmed unable to send specimen to med tox.     Last lorazepam fill 1mg three times daily, #90 on 01/02/19.      Joel Wegener,MD

## 2019-01-28 ENCOUNTER — TELEPHONE (OUTPATIENT)
Dept: PALLIATIVE MEDICINE | Facility: CLINIC | Age: 44
End: 2019-01-28

## 2019-01-28 NOTE — TELEPHONE ENCOUNTER
Called patient to advise that we recvd her message and that she may need to pay cash if PA not processed prior to her running out.       Bethany MIMSN, RN Care Coordinator  [facility]

## 2019-01-28 NOTE — TELEPHONE ENCOUNTER
Prior Authorization Approval    Authorization Effective Date: 1/1/2019  Authorization Expiration Date: 12/31/2019  Medication: Suboxone 8-2MG SL FILM - APPROVED 01/28/2019  Approved Dose/Quantity:   Reference #: PA CASE: [id number]   Insurance Company: [employer] [phone] Fax [phone]  Expected CoPay:       CoPay Card Available:      Foundation Assistance Needed:    Which Pharmacy is filling the prescription (Not needed for infusion/clinic administered): [facility] [place], [place] - [address]  Pharmacy Notified: Yes  Patient Notified: Yes

## 2019-01-28 NOTE — TELEPHONE ENCOUNTER
CENTRAL PRIOR AUTHORIZATION  [phone]    PA Initiation    Medication:   Insurance Company: [employer] - Phone [phone] Fax [phone]  Pharmacy Filling the Rx: [place], [place] - [address]  Filling Pharmacy Phone: [phone]  Filling Pharmacy Fax:    Start Date: 1/28/2019

## 2019-01-28 NOTE — TELEPHONE ENCOUNTER
Pt called and stated she needs a PA for her buprenorphine HCl-naloxone HCl (SUBOXONE) 8-2 MG per film.     Sin Rizvi  [facility]

## 2019-01-28 NOTE — TELEPHONE ENCOUNTER
Patient Lm to notify PA approved.       Bethany MIMSN, RN Care Coordinator  [facility]

## 2019-02-11 ENCOUNTER — OFFICE VISIT (OUTPATIENT)
Dept: ADDICTION MEDICINE | Facility: CLINIC | Age: 44
End: 2019-02-11
Payer: MEDICARE

## 2019-02-11 VITALS — BODY MASS INDEX: 25.55 KG/M2 | WEIGHT: 173 LBS

## 2019-02-11 DIAGNOSIS — G89.4 CHRONIC PAIN SYNDROME: Primary | ICD-10-CM

## 2019-02-11 DIAGNOSIS — F11.10 OPIOID USE DISORDER, MILD, ON MAINTENANCE THERAPY (H): ICD-10-CM

## 2019-02-11 DIAGNOSIS — M70.62 TROCHANTERIC BURSITIS OF BOTH HIPS: ICD-10-CM

## 2019-02-11 DIAGNOSIS — Z79.899 ENCOUNTER FOR LONG-TERM (CURRENT) USE OF MEDICATIONS: ICD-10-CM

## 2019-02-11 DIAGNOSIS — M70.61 TROCHANTERIC BURSITIS OF BOTH HIPS: ICD-10-CM

## 2019-02-11 DIAGNOSIS — F11.20 CONTINUOUS OPIOID DEPENDENCE (H): ICD-10-CM

## 2019-02-11 DIAGNOSIS — F41.1 GENERALIZED ANXIETY DISORDER: ICD-10-CM

## 2019-02-11 PROCEDURE — 80306 DRUG TEST PRSMV INSTRMNT: CPT | Performed by: ANESTHESIOLOGY

## 2019-02-11 PROCEDURE — 99215 OFFICE O/P EST HI 40 MIN: CPT | Performed by: ANESTHESIOLOGY

## 2019-02-11 RX ORDER — BUPRENORPHINE AND NALOXONE 8; 2 MG/1; MG/1
1 FILM, SOLUBLE BUCCAL; SUBLINGUAL 3 TIMES DAILY
Qty: 90 FILM | Refills: 0 | Status: SHIPPED | OUTPATIENT
Start: 2019-02-11 | End: 2019-03-11
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
## 2019-02-21 ENCOUNTER — OFFICE VISIT (OUTPATIENT)
Dept: FAMILY MEDICINE | Facility: CLINIC | Age: 44
End: 2019-02-21
Payer: MEDICARE

## 2019-02-21 VITALS
SYSTOLIC BLOOD PRESSURE: 103 MMHG | BODY MASS INDEX: 25.11 KG/M2 | HEART RATE: 87 BPM | TEMPERATURE: 98.4 F | DIASTOLIC BLOOD PRESSURE: 69 MMHG | RESPIRATION RATE: 22 BRPM | OXYGEN SATURATION: 97 % | WEIGHT: 160 LBS | HEIGHT: 67 IN

## 2019-02-21 DIAGNOSIS — Z01.411 ENCOUNTER FOR GYNECOLOGICAL EXAMINATION WITH ABNORMAL FINDING: Primary | ICD-10-CM

## 2019-02-21 DIAGNOSIS — Z11.4 SCREENING FOR HIV WITHOUT PRESENCE OF RISK FACTORS: ICD-10-CM

## 2019-02-21 DIAGNOSIS — Z13.29 SCREENING FOR THYROID DISORDER: ICD-10-CM

## 2019-02-21 DIAGNOSIS — N76.0 BV (BACTERIAL VAGINOSIS): Primary | ICD-10-CM

## 2019-02-21 DIAGNOSIS — N93.9 VAGINAL BLEEDING: ICD-10-CM

## 2019-02-21 DIAGNOSIS — B96.89 BV (BACTERIAL VAGINOSIS): Primary | ICD-10-CM

## 2019-02-21 LAB
BASOPHILS # BLD AUTO: 0 10E9/L (ref 0–0.2)
BASOPHILS NFR BLD AUTO: 0.1 %
DIFFERENTIAL METHOD BLD: NORMAL
EOSINOPHIL # BLD AUTO: 0.1 10E9/L (ref 0–0.7)
EOSINOPHIL NFR BLD AUTO: 0.8 %
ERYTHROCYTE [DISTWIDTH] IN BLOOD BY AUTOMATED COUNT: 13.4 % (ref 10–15)
HCT VFR BLD AUTO: 42.5 % (ref 35–47)
HGB BLD-MCNC: 13.9 G/DL (ref 11.7–15.7)
LYMPHOCYTES # BLD AUTO: 1.9 10E9/L (ref 0.8–5.3)
LYMPHOCYTES NFR BLD AUTO: 21.5 %
MCH RBC QN AUTO: 32.7 PG (ref 26.5–33)
MCHC RBC AUTO-ENTMCNC: 32.7 G/DL (ref 31.5–36.5)
MCV RBC AUTO: 100 FL (ref 78–100)
MONOCYTES # BLD AUTO: 0.7 10E9/L (ref 0–1.3)
MONOCYTES NFR BLD AUTO: 7.9 %
NEUTROPHILS # BLD AUTO: 6.3 10E9/L (ref 1.6–8.3)
NEUTROPHILS NFR BLD AUTO: 69.7 %
PLATELET # BLD AUTO: 244 10E9/L (ref 150–450)
RBC # BLD AUTO: 4.25 10E12/L (ref 3.8–5.2)
SPECIMEN SOURCE: ABNORMAL
WBC # BLD AUTO: 9 10E9/L (ref 4–11)
WET PREP SPEC: ABNORMAL

## 2019-02-21 PROCEDURE — 84443 ASSAY THYROID STIM HORMONE: CPT | Performed by: PHYSICIAN ASSISTANT

## 2019-02-21 PROCEDURE — 85025 COMPLETE CBC W/AUTO DIFF WBC: CPT | Performed by: PHYSICIAN ASSISTANT

## 2019-02-21 PROCEDURE — 87624 HPV HI-RISK TYP POOLED RSLT: CPT | Performed by: PHYSICIAN ASSISTANT

## 2019-02-21 PROCEDURE — 87210 SMEAR WET MOUNT SALINE/INK: CPT | Performed by: PHYSICIAN ASSISTANT

## 2019-02-21 PROCEDURE — 87389 HIV-1 AG W/HIV-1&-2 AB AG IA: CPT | Performed by: PHYSICIAN ASSISTANT

## 2019-02-21 PROCEDURE — 87491 CHLMYD TRACH DNA AMP PROBE: CPT | Performed by: PHYSICIAN ASSISTANT

## 2019-02-21 PROCEDURE — 99214 OFFICE O/P EST MOD 30 MIN: CPT | Performed by: PHYSICIAN ASSISTANT

## 2019-02-21 PROCEDURE — 87591 N.GONORRHOEAE DNA AMP PROB: CPT | Performed by: PHYSICIAN ASSISTANT

## 2019-02-21 PROCEDURE — 36415 COLL VENOUS BLD VENIPUNCTURE: CPT | Performed by: PHYSICIAN ASSISTANT

## 2019-02-21 PROCEDURE — G0476 HPV COMBO ASSAY CA SCREEN: HCPCS | Performed by: PHYSICIAN ASSISTANT

## 2019-02-21 PROCEDURE — G0145 SCR C/V CYTO,THINLAYER,RESCR: HCPCS | Performed by: PHYSICIAN ASSISTANT

## 2019-02-21 RX ORDER — METRONIDAZOLE 500 MG/1
500 TABLET ORAL 2 TIMES DAILY
Qty: 14 TABLET | Refills: 0 | Status: SHIPPED | OUTPATIENT
Start: 2019-02-21 | End: 2019-02-28

## 2019-02-21 ASSESSMENT — MIFFLIN-ST. JEOR: SCORE: 1413.39

## 2019-02-21 NOTE — PROGRESS NOTES
SUBJECTIVE:   Ria Nj is a 43 year old female who presents to clinic today for the following health issues:    Vaginal Bleeding (Dysmenorrhea)      Onset: Off and on for a 5-6 months    Description:  Duration of bleeding episodes: off/on every few days with heavy periods to full pad  Describe bleeding/flow:   Clots: YES  Number of pads: 4 per day on heavy days to minimal on other days  Cramping: mild     Intensity:  moderate    Accompanying signs and symptoms: none    History (similar episodes/previous evaluation): None    Precipitating or alleviating factors: None    Therapies tried and outcome: None    History of IUD falling out 9 years ago.    Patient also due for routine pap smear, willing to do today given symptoms as well.    Problem list and histories reviewed & adjusted, as indicated.  Additional history: as documented    Patient Active Problem List   Diagnosis     CARDIOVASCULAR SCREENING; LDL GOAL LESS THAN 160     Lumbar disc herniation with myelopathy     Chronic low back pain     Bee sting-induced anaphylaxis     Generalized anxiety disorder     Tobacco use disorder     Moderate major depression (H)     Continuous opioid dependence (H)     Cervicalgia     Chronic pain syndrome     Encounter for long-term (current) use of medications     Opioid use disorder, mild, on maintenance therapy (H)     Trochanteric bursitis of both hips     Past Surgical History:   Procedure Laterality Date     OVARY SURGERY N/A 2004    Ovarian cyst removed.      SURGICAL HISTORY OF -   2007    ovarian cyst removal        Social History     Tobacco Use     Smoking status: Current Every Day Smoker     Packs/day: 2.00     Years: 15.00     Pack years: 30.00     Types: Cigarettes     Smokeless tobacco: Never Used   Substance Use Topics     Alcohol use: No     Alcohol/week: 0.0 oz     Family History   Problem Relation Age of Onset     Cancer Maternal Grandmother      Cancer Maternal Grandfather      Cancer Paternal  Grandmother      Cancer Paternal Grandfather          Current Outpatient Medications   Medication Sig Dispense Refill     albuterol (2.5 MG/3ML) 0.083% neb solution Take 1 vial (2.5 mg) by nebulization every 4 hours as needed for shortness of breath / dyspnea or wheezing 30 vial 11     albuterol (PROAIR HFA, PROVENTIL HFA, VENTOLIN HFA) 108 (90 BASE) MCG/ACT inhaler Inhale 2 puffs into the lungs every 4 hours as needed for shortness of breath / dyspnea or wheezing 1 Inhaler 0     buprenorphine HCl-naloxone HCl (SUBOXONE) 8-2 MG per film Place 1 Film under the tongue 3 times daily 90 Film 0     calcium carbonate 500 MG tablet Take 1 tablet by mouth 2 times daily. 100 tablet 3     Cholecalciferol (VITAMIN D) 2000 UNITS tablet Take 2,000 Units by mouth daily 100 tablet 3     EPINEPHrine (EPIPEN) 0.3 MG/0.3ML injection Inject 0.3 mLs (0.3 mg) into the muscle once as needed for anaphylaxis 2 each 1     gabapentin (NEURONTIN) 300 MG capsule TAKE 1 CAPSULE BY MOUTH THREE TIMES DAILY 270 capsule 3     IBUPROFEN 200 MG OR TABS Pt is taking 4 TABLET EVERY 4 TO 6 HOURS AS NEEDED       LORazepam (ATIVAN) 1 MG tablet TAKE 1 TABLET BY MOUTH EVERY 8 HOURS AS NEEDED FOR ANXIETY 90 tablet 5     Magnesium 250 MG tablet Take 1 tablet by mouth daily. 100 tablet 3     naloxone (NARCAN) nasal spray Spray 1 spray (4 mg) into one nostril alternating nostrils as needed for opioid reversal every 2-3 minutes until assistance arrives 0.2 mL 0     Omega-3 Fatty Acids (FISH OIL CONCENTRATE PO) Take 300 mg by mouth 3 times daily       tiZANidine (ZANAFLEX) 4 MG tablet TAKE ONE TABLET BY MOUTH THREE TIMES DAILY AS NEEDED FOR SHOULDER AND UPPER BACK PAIN 270 tablet 3     Allergies   Allergen Reactions     Bee Venom      Labs reviewed in EPIC    Reviewed and updated as needed this visit by clinical staff  Tobacco  Allergies  Meds  Problems  Med Hx  Surg Hx  Fam Hx  Soc Hx        Reviewed and updated as needed this visit by Provider  Tobacco   "Allergies  Meds  Problems  Med Hx  Surg Hx  Fam Hx         ROS:  Constitutional, HEENT, cardiovascular, pulmonary, GI, , musculoskeletal, neuro, skin, endocrine and psych systems are negative, except as otherwise noted.    OBJECTIVE:     /69 (BP Location: Left arm, Patient Position: Sitting, Cuff Size: Adult Regular)   Pulse 87   Temp 98.4  F (36.9  C) (Oral)   Resp 22   Ht 1.702 m (5' 7\")   Wt 72.6 kg (160 lb)   LMP 09/10/2018 (Approximate)   SpO2 97%   BMI 25.06 kg/m    Body mass index is 25.06 kg/m .  GENERAL: healthy, alert and no distress  RESP: lungs clear to auscultation - no rales, rhonchi or wheezes  CV: regular rate and rhythm, normal S1 S2, no S3 or S4, no murmur, click or rub, no peripheral edema and peripheral pulses strong  ABDOMEN: soft, nontender, no hepatosplenomegaly, no masses and bowel sounds normal   (female): normal female external genitalia, normal urethral meatus, vaginal mucosa pink, moist, well rugated, and normal cervix/adnexa/uterus without masses or discharge; pap smear and cultures done today  PSYCH: mentation appears normal, affect normal/bright    Diagnostic Test Results:  Pending at time of note.    ASSESSMENT/PLAN:     (Z01.411) Encounter for gynecological examination with abnormal finding  (primary encounter diagnosis)  Comment: due for routine pap smear  Plan: Pap imaged thin layer screen with HPV -         recommended age 30 - 65 years (select HPV order        below), HPV High Risk Types DNA Cervical        Pap smear due and important given acute symptoms.    (N93.9) Vaginal bleeding  Comment: new onset but going on for a few months  Plan: Neisseria gonorrhoeae PCR, Wet prep, Chlamydia         trachomatis PCR, US Pelvic Complete w         Transvaginal, CBC with platelets differential        Labs/cultures updated today; pelvic sonogram to be done.        ICD-10-CM    1. Encounter for gynecological examination with abnormal finding Z01.411 Pap imaged thin " layer screen with HPV - recommended age 30 - 65 years (select HPV order below)     HPV High Risk Types DNA Cervical   2. Vaginal bleeding N93.9 Neisseria gonorrhoeae PCR     Wet prep     Chlamydia trachomatis PCR     US Pelvic Complete w Transvaginal     CBC with platelets differential   3. Screening for HIV without presence of risk factors Z11.4 HIV Antigen Antibody Combo   4. Screening for thyroid disorder Z13.29 TSH with free T4 reflex       Patient Instructions   Labs updated today.  Patient to call radiology to make appointment: [phone]  Return to clinic with any worsening or changes in symptoms or go to ER Urgent care in off hours.      Patient Education     Dysfunctional Uterine Bleeding    Dysfunctional uterine bleeding, also called abnormal uterine bleeding, is a condition in which bleeding is abnormal and occurs at unexpected times of the month. This happens because of changes in the hormones that help control a woman s menstrual cycle each month.  The bleeding may be heavier or lighter than normal. If you have heavy bleeding often, this can lead to a problem called anemia. With anemia, your red blood cell count is too low. Red blood cells help carry oxygen throughout your body. Severe anemia may cause you to look pale and feel very weak or tired. You might also become short of breath easily.  To treat dysfunctional uterine bleeding, medicines are often tried first. If these don t help, or if you have additional symptoms or have reached menopause, further testing and treatments may be needed. Discuss all of your options with your provider.  Home care  Medicines  If you re prescribed medicines, be sure to take them as directed. Some of the more common medicines you may be prescribed include:    Hormone therapy (Options include most methods of hormonal birth control such as pills, shots, or a hormone-releasing IUD)    Nonsteroidal anti-inflammatory drugs (NSAIDs), such as ibuprofen    Iron supplements,  if you have anemia     General care    Get plenty of rest if you tire easily. Avoid heavy exertion.    To help relieve pain or cramping that may occur with bleeding, try using a heating pad on the lower belly or back. A warm bath may also help.  Follow-up care  Follow up with your healthcare provider, or as directed.  When to seek medical advice  Call your healthcare provider right away if:    Bleeding becomes heavy (soaking 1 pad or tampon every hour for 3 hours)    Increased abdominal pain    Irregular bleeding worsens or does not get better even with treatment    Fever of 100.4 F (38 C) or higher, or as directed by your provider    Signs of anemia, such as pale skin, extreme fatigue or weakness, or shortness of breath    Dizziness or fainting   Date Last Reviewed: 11/1/2017 2000-2018 [employer]. [address] [place] [address], [place] [address]. All rights reserved. This information is not intended as a substitute for professional medical care. Always follow your healthcare professional's instructions.               Kamilah Meyer PA-C  [facility]

## 2019-02-21 NOTE — LETTER
March 1, 2019    Ria Nj  [address]  [facility] [address]    Dear ,  This letter is regarding your recent Pap smear (cervical cancer screening) and Human Papillomavirus (HPV) test.  We are happy to inform you that your Pap smear result is normal. Cervical cancer is closely linked with certain types of HPV. Your results showed no evidence of high-risk HPV.  Therefore we recommend you return in 5 years for your next pap smear and HPV test.  You will still need to return to the clinic every year for an annual exam and other preventive tests.  If you have additional questions regarding this result, please call our registered nurse, Evelyn at [phone].  Sincerely,    Kamilah Meyer PA-C/tanja

## 2019-02-21 NOTE — PATIENT INSTRUCTIONS
Labs updated today.  Patient to call radiology to make appointment: [phone]  Return to clinic with any worsening or changes in symptoms or go to ER Urgent care in off hours.      Patient Education     Dysfunctional Uterine Bleeding    Dysfunctional uterine bleeding, also called abnormal uterine bleeding, is a condition in which bleeding is abnormal and occurs at unexpected times of the month. This happens because of changes in the hormones that help control a woman s menstrual cycle each month.  The bleeding may be heavier or lighter than normal. If you have heavy bleeding often, this can lead to a problem called anemia. With anemia, your red blood cell count is too low. Red blood cells help carry oxygen throughout your body. Severe anemia may cause you to look pale and feel very weak or tired. You might also become short of breath easily.  To treat dysfunctional uterine bleeding, medicines are often tried first. If these don t help, or if you have additional symptoms or have reached menopause, further testing and treatments may be needed. Discuss all of your options with your provider.  Home care  Medicines  If you re prescribed medicines, be sure to take them as directed. Some of the more common medicines you may be prescribed include:    Hormone therapy (Options include most methods of hormonal birth control such as pills, shots, or a hormone-releasing IUD)    Nonsteroidal anti-inflammatory drugs (NSAIDs), such as ibuprofen    Iron supplements, if you have anemia     General care    Get plenty of rest if you tire easily. Avoid heavy exertion.    To help relieve pain or cramping that may occur with bleeding, try using a heating pad on the lower belly or back. A warm bath may also help.  Follow-up care  Follow up with your healthcare provider, or as directed.  When to seek medical advice  Call your healthcare provider right away if:    Bleeding becomes heavy (soaking 1 pad or tampon every hour for 3  hours)    Increased abdominal pain    Irregular bleeding worsens or does not get better even with treatment    Fever of 100.4 F (38 C) or higher, or as directed by your provider    Signs of anemia, such as pale skin, extreme fatigue or weakness, or shortness of breath    Dizziness or fainting   Date Last Reviewed: 11/1/2017 2000-2018 [employer]. [address] [place] [address]. All rights reserved. This information is not intended as a substitute for professional medical care. Always follow your healthcare professional's instructions.

## 2019-02-22 LAB
HIV 1+2 AB+HIV1 P24 AG SERPL QL IA: NONREACTIVE
TSH SERPL DL<=0.005 MIU/L-ACNC: 1.1 MU/L (ref 0.4–4)

## 2019-02-23 ENCOUNTER — TELEPHONE (OUTPATIENT)
Dept: NURSING | Facility: CLINIC | Age: 44
End: 2019-02-23

## 2019-02-24 LAB
C TRACH DNA SPEC QL NAA+PROBE: NEGATIVE
N GONORRHOEA DNA SPEC QL NAA+PROBE: NEGATIVE
SPECIMEN SOURCE: NORMAL
SPECIMEN SOURCE: NORMAL

## 2019-02-24 NOTE — TELEPHONE ENCOUNTER
Reason for Call/Nurse Assessment:  44 y/o female was in clinic on Friday, looking for lab results, placed patient on hold to look through multiple labs. Several do not have final results yest, but the ones below are in... Went to retrieve call and patient had hung up.     1.Urine Drugs screen showed positive for benzodiazepines  2.Pap Smear results in progress  3.Wet Prep from pap- Clue Cells Seen(could be bacterial vaginosis?)  4.HIV was Non-reactive     RN does not see other STD results yet    Jimena Lee RN - [place] Nurse Advisor  02/23/2019        11:49PM

## 2019-02-25 ENCOUNTER — TELEPHONE (OUTPATIENT)
Dept: FAMILY MEDICINE | Facility: CLINIC | Age: 44
End: 2019-02-25

## 2019-02-25 NOTE — RESULT ENCOUNTER NOTE
"Please call patient with negative/normal lab results.  Already discussed BACTERIAL VAGINOSIS with patient in office.  Kamilah \"Hu\" PADMAJA Meyer"

## 2019-02-25 NOTE — TELEPHONE ENCOUNTER
"\"Notes recorded by Kamilah Meyer PA-C on 2/25/2019 at 7:57 AM CST  Please call patient with negative/normal lab results.  Already discussed BACTERIAL VAGINOSIS with patient in office.  Kamilah \"Hu\" PADMAJA Meyer\"      Left message to call back and ask to speak with an available triage nurse.  FELICITA BlackN, RN    "

## 2019-02-26 LAB
COPATH REPORT: NORMAL
PAP: NORMAL

## 2019-02-26 NOTE — TELEPHONE ENCOUNTER
Patient informed as below.  Did encourage her to complete the pelvic US as scheduled.  Emily Squires RN

## 2019-02-27 LAB
FINAL DIAGNOSIS: NORMAL
HPV HR 12 DNA CVX QL NAA+PROBE: NEGATIVE
HPV16 DNA SPEC QL NAA+PROBE: NEGATIVE
HPV18 DNA SPEC QL NAA+PROBE: NEGATIVE
SPECIMEN DESCRIPTION: NORMAL
SPECIMEN SOURCE CVX/VAG CYTO: NORMAL

## 2019-03-04 ENCOUNTER — TELEPHONE (OUTPATIENT)
Dept: FAMILY MEDICINE | Facility: CLINIC | Age: 44
End: 2019-03-04

## 2019-03-04 ENCOUNTER — ANCILLARY PROCEDURE (OUTPATIENT)
Dept: ULTRASOUND IMAGING | Facility: CLINIC | Age: 44
End: 2019-03-04
Attending: PHYSICIAN ASSISTANT
Payer: MEDICARE

## 2019-03-04 DIAGNOSIS — N93.9 VAGINAL BLEEDING: ICD-10-CM

## 2019-03-04 DIAGNOSIS — F41.1 GENERALIZED ANXIETY DISORDER: ICD-10-CM

## 2019-03-04 NOTE — TELEPHONE ENCOUNTER
1. [facility] states last [facility] [address] [facility] of [address].     It also shows 13 pills filled on 03/02/19.  Please call pharmacy to see how much and on what date it was filled. Her last written prescription was for 90 pills/time so not sure why 13 filled or if this is correct.     2.  [place] requires visit for all controlled substance refills even for transfer of pharmacy which is beyond our control.  Part of controlled substance contract is to stay with one pharmacy.     Please have her schedule in person visit, e-visit (preferred but she doesn't use [facility]) or telephone visit.     Joel Wegener,MD

## 2019-03-04 NOTE — TELEPHONE ENCOUNTER
Dr. Wegener-Please review.  Writer wanted to make you aware prior to following up with patient.    Writer called [facility] on [address] in [place], [place], and was informed Lorazepam script is at a [facility] in [facility] location: [phone].    Writer next called [facility], and was informed:  1. Lorazepam 1 mg is currently on back order  2. Patient picked up #13 of the 1 mg strength over the weekend as this is all pharmacy had available  3. Patient aware she cannot transfer Lorazepam script again  4. Pharmacy is working on getting Lorazepam 1 mg back in stock    Thank you!  PARUL Hay, BSN, RN

## 2019-03-04 NOTE — TELEPHONE ENCOUNTER
Reason for Call:  Medication or medication refill:    Do you use a [facility]?  Name of the pharmacy and phone number for the current request:  [facility] [place] - [phone]    Name of the medication requested: LORazepam (ATIVAN) 1 MG tablet       Other request: Pt states that this medication was transferred to a [facility] from another location and by law she can only tx the script once. Pt would like a new script sent to [facility] pharmacy because it is closer.    Can we leave a detailed message on this number? YES    Phone number patient can be reached at: Home number on file [phone] (home)    Best Time: anytime    Call taken on 3/4/2019 at 2:52 PM by Suzan Chanlder

## 2019-03-04 NOTE — TELEPHONE ENCOUNTER
Dr Wegener are you willing to write another prescription as patient script was originally sent to [facility] ands was then transferred to a [facility].  I attempted to call patient to see which [facility] but reached voice mail  I was going to check fill dates on the prescription.  Emily Squires RN

## 2019-03-05 ENCOUNTER — TELEPHONE (OUTPATIENT)
Dept: NURSING | Facility: CLINIC | Age: 44
End: 2019-03-05

## 2019-03-05 ENCOUNTER — TELEPHONE (OUTPATIENT)
Dept: FAMILY MEDICINE | Facility: CLINIC | Age: 44
End: 2019-03-05

## 2019-03-05 RX ORDER — LORAZEPAM 1 MG/1
TABLET ORAL
Qty: 90 TABLET | Refills: 2 | Status: SHIPPED | OUTPATIENT
Start: 2019-03-06 | End: 2019-07-08

## 2019-03-05 RX ORDER — LORAZEPAM 1 MG/1
TABLET ORAL
Qty: 90 TABLET | Refills: 2 | Status: SHIPPED | OUTPATIENT
Start: 2019-03-05 | End: 2019-03-05

## 2019-03-05 NOTE — TELEPHONE ENCOUNTER
Thank you for the information.     I this case I am comfortable just writing the prescription. Please tell her I will bring a new prescription to our pharmacy to fill tomorrow when it is due.     Follow up with me in three months please.  I did a month supply with two refills.      Needs to stay with our pharmacy.     Joel Wegener,MD

## 2019-03-05 NOTE — TELEPHONE ENCOUNTER
"Notes recorded by Brittnee Landin RN on 3/5/2019 at 8:10 AM CST  Tried to call pt.  VM is full.  See TE dated 3/5/19.  DUC Beaulieu    ------    Notes recorded by Kamilah Meyer PA-C on 3/5/2019 at 7:55 AM CST  Please call patient with the following message:  Normal sonogram with a few normal ovarian cysts - which are nothing to worry about and part of her normal cycle.  Monitor symptoms and follow up with PCP with any changes or worsening.  Thanks  Kamilah \"Hu\" PADMAJA Meyer   "

## 2019-03-05 NOTE — RESULT ENCOUNTER NOTE
"Please call patient with the following message:  Normal sonogram with a few normal ovarian cysts - which are nothing to worry about and part of her normal cycle.  Monitor symptoms and follow up with PCP with any changes or worsening.  Thanks  Kamilah \"Hu\" PADMAJA Meyer   "

## 2019-03-05 NOTE — TELEPHONE ENCOUNTER
Left this detailed message on VM.  PT gave permission for a detailed message in another TE.  DUC Beaulieu

## 2019-03-05 NOTE — TELEPHONE ENCOUNTER
Tried to call pt.  Her VM is full.  Recall at a later time.  Please see other TE dated 3/5/19 for another message for pt.  DUC Beaulieu

## 2019-03-06 ENCOUNTER — OFFICE VISIT (OUTPATIENT)
Dept: FAMILY MEDICINE | Facility: CLINIC | Age: 44
End: 2019-03-06
Payer: MEDICARE

## 2019-03-06 VITALS — SYSTOLIC BLOOD PRESSURE: 105 MMHG | HEART RATE: 77 BPM | TEMPERATURE: 98.7 F | DIASTOLIC BLOOD PRESSURE: 54 MMHG

## 2019-03-06 DIAGNOSIS — J02.9 SORE THROAT: Primary | ICD-10-CM

## 2019-03-06 LAB
DEPRECATED S PYO AG THROAT QL EIA: NORMAL
SPECIMEN SOURCE: NORMAL

## 2019-03-06 PROCEDURE — 87081 CULTURE SCREEN ONLY: CPT | Performed by: FAMILY MEDICINE

## 2019-03-06 PROCEDURE — 87880 STREP A ASSAY W/OPTIC: CPT | Performed by: FAMILY MEDICINE

## 2019-03-06 PROCEDURE — 99213 OFFICE O/P EST LOW 20 MIN: CPT | Performed by: FAMILY MEDICINE

## 2019-03-06 NOTE — PROGRESS NOTES
SUBJECTIVE:   Ria Nj is a 43 year old female who presents to clinic today for the following health issues:      Acute Illness   Acute illness concerns: cold  Onset: 1 day     Fever: no    Chills/Sweats: no    Headache (location?): YES    Sinus Pressure:YES    Conjunctivitis:  YES: bilateral    Ear Pain: no    Rhinorrhea: no     Congestion: YES    Sore Throat: YES     Cough: YES-non-productive    Wheeze: YES    Decreased Appetite: YES    Nausea: no     Vomiting: no    Diarrhea:  no    Dysuria/Freq.: no     Fatigue/Achiness: YES    Sick/Strep Exposure: no          Problem list and histories reviewed & adjusted, as indicated.  Additional history: as documented    Labs reviewed in EPIC    Reviewed and updated as needed this visit by clinical staff       Reviewed and updated as needed this visit by Provider         ROS:  Constitutional, HEENT, cardiovascular, pulmonary, gi and gu systems are negative, except as otherwise noted.    OBJECTIVE:     /54   Pulse 77   Temp 98.7  F (37.1  C) (Oral)   LMP  (LMP Unknown)   There is no height or weight on file to calculate BMI.  GENERAL: healthy, alert and no distress  EYES: Eyes grossly normal to inspection  HENT: ear canals and TM's normal, nose and mouth without ulcers or lesions  NECK: no adenopathy  RESP: lungs clear to auscultation - no rales, rhonchi or wheezes  CV: regular rate and rhythm, normal S1 S2    Diagnostic Test Results:  Strep screen - Negative    ASSESSMENT/PLAN:     1. Sore throat  - Strep, Rapid Screen negative   - Beta strep group A culture pending; tx if culture is positive  - recommended symptomatic tx  - Follow if symptoms worsen or fail to improve    Lyudmila Rojo MD  [facility] [place][note 2]

## 2019-03-06 NOTE — LETTER
March 8, 2019      Ria Nj  [address]  [facility] [address]        Dear ,    We are writing to inform you of your test results.    Here are your results for your recent lab which was normal. Please call or message me if you have questions or concerns.     Resulted Orders   Strep, Rapid Screen   Result Value Ref Range    Specimen Description Throat     Rapid Strep A Screen       NEGATIVE: No Group A streptococcal antigen detected by immunoassay, await culture report.   Beta strep group A culture   Result Value Ref Range    Specimen Description Throat     Culture Micro No beta hemolytic Streptococcus Group A isolated        If you have any questions or concerns, please call the clinic at the number listed above.       Sincerely,        Lyudmila Rojo MD/nr

## 2019-03-06 NOTE — TELEPHONE ENCOUNTER
Reason for Call/Nurse Assessment:  44 y/o female calling for results of her sonogram, states she missed a call from clinic and is calling for results. RN reviewed notes and message from previous nurse, read her message that it appears normal sonogram with a few normal ovarian cysts - which are nothing to worry about and part of her normal cycle.  Also they left a detailed message, encouraged her to listen to that.  Monitor symptoms and follow up with PCP with any changes or worsening.    Jimena Lee RN - [place] [facility] Nurse Advisor  03/05/2019   6:55PM

## 2019-03-07 ENCOUNTER — TELEPHONE (OUTPATIENT)
Dept: FAMILY MEDICINE | Facility: CLINIC | Age: 44
End: 2019-03-07

## 2019-03-07 LAB
BACTERIA SPEC CULT: NORMAL
SPECIMEN SOURCE: NORMAL

## 2019-03-07 NOTE — TELEPHONE ENCOUNTER
" LOV: 2/21/2019    Patient call c/o continued vaginal bleeding. Patient states this has been occurring for the last 4 months. Patient describes flow as normal to heavy with some clots. Patient states she did follow up with OB at [facility]. They told her she had cysts. Patient states \"this is all getting ridiculous. Something is wrong if I have been bleeding this long.\"  Patient also c/o sharp abdominal pains LLQ and RLQ--patient states that OB did not say if this had to do with cysts    Pelvic Transvaginal US completed on 3/4:    Please call patient with the following message:    \"Normal sonogram with a few normal ovarian cysts - which are nothing to worry about and part of her normal cycle.  Monitor symptoms and follow up with PCP with any changes or worsening.\"  Thanks      Please advise on next steps.     Thanks! Pallavi Woods RN        "

## 2019-03-11 ENCOUNTER — OFFICE VISIT (OUTPATIENT)
Dept: ADDICTION MEDICINE | Facility: CLINIC | Age: 44
End: 2019-03-11
Payer: MEDICARE

## 2019-03-11 VITALS
WEIGHT: 162 LBS | BODY MASS INDEX: 25.37 KG/M2 | SYSTOLIC BLOOD PRESSURE: 138 MMHG | OXYGEN SATURATION: 96 % | DIASTOLIC BLOOD PRESSURE: 84 MMHG | HEART RATE: 95 BPM | RESPIRATION RATE: 14 BRPM

## 2019-03-11 DIAGNOSIS — F17.200 TOBACCO USE DISORDER: ICD-10-CM

## 2019-03-11 DIAGNOSIS — M70.62 TROCHANTERIC BURSITIS OF BOTH HIPS: ICD-10-CM

## 2019-03-11 DIAGNOSIS — F11.10 OPIOID USE DISORDER, MILD, ON MAINTENANCE THERAPY (H): Primary | ICD-10-CM

## 2019-03-11 DIAGNOSIS — F32.1 MODERATE MAJOR DEPRESSION (H): ICD-10-CM

## 2019-03-11 DIAGNOSIS — Z79.899 ENCOUNTER FOR LONG-TERM (CURRENT) USE OF MEDICATIONS: ICD-10-CM

## 2019-03-11 DIAGNOSIS — M70.61 TROCHANTERIC BURSITIS OF BOTH HIPS: ICD-10-CM

## 2019-03-11 DIAGNOSIS — F41.1 GENERALIZED ANXIETY DISORDER: ICD-10-CM

## 2019-03-11 DIAGNOSIS — G89.4 CHRONIC PAIN SYNDROME: ICD-10-CM

## 2019-03-11 LAB
AMPHETAMINES UR QL: NOT DETECTED NG/ML
BARBITURATES UR QL SCN: NOT DETECTED NG/ML
BENZODIAZ UR QL SCN: ABNORMAL NG/ML
BUPRENORPHINE UR QL: ABNORMAL NG/ML
CANNABINOIDS UR QL: NOT DETECTED NG/ML
COCAINE UR QL SCN: NOT DETECTED NG/ML
D-METHAMPHET UR QL: NOT DETECTED NG/ML
METHADONE UR QL SCN: NOT DETECTED NG/ML
OPIATES UR QL SCN: NOT DETECTED NG/ML
OXYCODONE UR QL SCN: ABNORMAL NG/ML
PCP UR QL SCN: NOT DETECTED NG/ML
PROPOXYPH UR QL: NOT DETECTED NG/ML
TRICYCLICS UR QL SCN: NOT DETECTED NG/ML

## 2019-03-11 PROCEDURE — 99000 SPECIMEN HANDLING OFFICE-LAB: CPT | Performed by: ANESTHESIOLOGY

## 2019-03-11 PROCEDURE — 99214 OFFICE O/P EST MOD 30 MIN: CPT | Performed by: ANESTHESIOLOGY

## 2019-03-11 PROCEDURE — 80361 OPIATES 1 OR MORE: CPT | Performed by: ANESTHESIOLOGY

## 2019-03-11 PROCEDURE — 80306 DRUG TEST PRSMV INSTRMNT: CPT | Performed by: ANESTHESIOLOGY

## 2019-03-11 RX ORDER — BUPRENORPHINE AND NALOXONE 8; 2 MG/1; MG/1
1 FILM, SOLUBLE BUCCAL; SUBLINGUAL 3 TIMES DAILY
Qty: 90 FILM | Refills: 0 | Status: SHIPPED | OUTPATIENT
Start: 2019-03-11 | End: 2019-04-08

## 2019-03-11 NOTE — PROGRESS NOTES
"                                                    [facility]    Date of visit: 3/11/2019    Chief complaint:   Chief Complaint   Patient presents with     Addiction Problem       Interval history:  Ria Nj is a 43 year old female here for Chronic Pain and Addiction - Suboxone Maintenance follow up.   Last visit: follow up: 2/11/2019    CURRENT DOSE: Suboxone 8mg film BID   BRIEF HPI: 43 year old female with chronic low back pain which began after the birth of her son who is now 9 years old ([place]).  She is on disability for pain.  She has a history of chronic opioid use for over 8 years with escalating doses (175 morphine equivalents), overuse and poor pain relief. She qualifies for a diagnosis of OUD - mild. Started on suboxone after initial consult on 9/10/2018 and doing well.  PMHx is significant for PTSD, childhood trauma, anxiety and depression       Since her last visit, Ria Nj reports:  - Doing well  - She has been having her period with excessive bleeding for the last 4 weeks. She went to another physician OB/GYN to have a pap smear done.  They have not figured out why she has this excessive bleeding.   - S/P bilateral trochanteric bursa injections on 1/14/2019.  These were not helpful.  Her hips are still painful all the time and worse when she is sleeping.    The pain began over a year ago.  She does not want to do PT because she doesn't have time.   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  - She has trouble not overusing her Suboxone.   She knows she is on the maximum dose currently and will not be able to get more if she runs out early as medicare prevents people from paying cash.   - She is not doing any type of a recovery program but has started going to [facility].   - Had a bad experience with the pain psychologist and does not want to engage in alternative treatments for her chronic pain.  \"my pain is real\" \"that is not going to " "help\"  - Continues to take Ativan prescribed by her PCP with whom she has a long term relationship   - side effects include sweating and some dizziness  - UDS is positive for Oxycodone which she denies taking.       Current pain medications:  Suboxone films 8mg TID  Gabapentin 600mg BID  Ativan 1mg TID                            Status since last visit:    Since last visit patient has been: stable.     Intensity:     There has been: no craving.      Suboxone Dose: adequate  Progression of Symptoms:     Cues to use and relapse triggers: none    Recovery program has been: ignored.   Accompanying Signs & Symptoms:    Side Effects: sweating and dizzyness.    Sobriety:     Status: no use since last visit.      Drug Screen: obtained.   Precipitating factors:    Triggers have been: non-existent.   Alleviating factors:    Contact with sponsor has been: no sponsor.     Family and support system has been: helpful.   Other Therapies Tried :     Patient has been going to recovery meetings:not at all.      Patient has been participating in professional counseling/therapy: YES    [place] Board of Pharmacy Data Base Reviewed:    YES; appropriate      Pain scores:  Pain intensity on average is 3 on a scale of 0-10.     Side Effects: See above    Medications:  Current Outpatient Medications   Medication Sig Dispense Refill     albuterol (2.5 MG/3ML) 0.083% neb solution Take 1 vial (2.5 mg) by nebulization every 4 hours as needed for shortness of breath / dyspnea or wheezing 30 vial 11     albuterol (PROAIR HFA, PROVENTIL HFA, VENTOLIN HFA) 108 (90 BASE) MCG/ACT inhaler Inhale 2 puffs into the lungs every 4 hours as needed for shortness of breath / dyspnea or wheezing 1 Inhaler 0     buprenorphine HCl-naloxone HCl (SUBOXONE) 8-2 MG per film Place 1 Film under the tongue 3 times daily 90 Film 0     calcium carbonate 500 MG tablet Take 1 tablet by mouth 2 times daily. 100 tablet 3     Cholecalciferol (VITAMIN D) 2000 UNITS tablet " Take 2,000 Units by mouth daily 100 tablet 3     EPINEPHrine (EPIPEN) 0.3 MG/0.3ML injection Inject 0.3 mLs (0.3 mg) into the muscle once as needed for anaphylaxis 2 each 1     gabapentin (NEURONTIN) 300 MG capsule TAKE 1 CAPSULE BY MOUTH THREE TIMES DAILY 270 capsule 3     IBUPROFEN 200 MG OR TABS Pt is taking 4 TABLET EVERY 4 TO 6 HOURS AS NEEDED       LORazepam (ATIVAN) 1 MG tablet TAKE 1 TABLET BY MOUTH EVERY 8 HOURS AS NEEDED FOR ANXIETY 90 tablet 2     Magnesium 250 MG tablet Take 1 tablet by mouth daily. 100 tablet 3     naloxone (NARCAN) nasal spray Spray 1 spray (4 mg) into one nostril alternating nostrils as needed for opioid reversal every 2-3 minutes until assistance arrives 0.2 mL 0     Omega-3 Fatty Acids (FISH OIL CONCENTRATE PO) Take 300 mg by mouth 3 times daily       tiZANidine (ZANAFLEX) 4 MG tablet TAKE ONE TABLET BY MOUTH THREE TIMES DAILY AS NEEDED FOR SHOULDER AND UPPER BACK PAIN 270 tablet 3       Medical History: any changes in medical history since they were last seen? No      OBJECTIVE:                                                    /84   Pulse 95   Resp 14   Wt 73.5 kg (162 lb)   LMP  (LMP Unknown)   SpO2 96%   BMI 25.37 kg/m    Body mass index is 25.37 kg/m .     ROS:  Constitutional, neuro, ENT, endocrine, pulmonary, cardiac, gastrointestinal, genitourinary, musculoskeletal, integument and psychiatric systems are negative, except as otherwise noted.    EXAM:  GENERAL APPEARANCE: healthy, alert and no distress  EYES: Eyes grossly normal to inspection and conjunctivae and sclerae normal  SKIN: no suspicious lesions or rashes  PSYCH: mentation appears normal and affect normal/bright  MENTAL STATUS EXAM:  Appearance/Behavior: No apparent distress and Neatly groomed  Speech: Normal  Mood/Affect: normal affect  Insight: Adequate    Diagnostic Test Results:  Results for orders placed or performed in visit on 03/11/19 (from the past 24 hour(s))   Urine Drugs of Abuse Screen  Panel 13   Result Value Ref Range    Cannabinoids (08-pzj-0-carboxy-9-THC) Not Detected NDET^Not Detected ng/mL    Phencyclidine (Phencyclidine) Not Detected NDET^Not Detected ng/mL    Cocaine (Benzoylecgonine) Not Detected NDET^Not Detected ng/mL    Methamphetamine (d-Methamphetamine) Not Detected NDET^Not Detected ng/mL    Opiates (Morphine) Not Detected NDET^Not Detected ng/mL    Amphetamine (d-Amphetamine) Not Detected NDET^Not Detected ng/mL    Benzodiazepines (Nordiazepam) Detected, Abnormal Result (A) NDET^Not Detected ng/mL    Tricyclic Antidepressants (Desipramine) Not Detected NDET^Not Detected ng/mL    Methadone (Methadone) Not Detected NDET^Not Detected ng/mL    Barbiturates (Butalbital) Not Detected NDET^Not Detected ng/mL    Oxycodone (Oxycodone) Detected, Abnormal Result (A) NDET^Not Detected ng/mL    Propoxyphene (Norpropoxyphene) Not Detected NDET^Not Detected ng/mL    Buprenorphine (Buprenorphine) Detected, Abnormal Result (A) NDET^Not Detected ng/mL            ASSESSMENT:                                                      OPIOID USE DISORDER - MILD  TOBACCO USE DISORDER    ENCOUNTER FOR LONG TERM USE OF HIGH RISK MEDICATION   High Risk Drug Monitoring?  YES   Drug being monitored: Suboxone    Reason for drug: Opioid Use Disorder   What is being monitored?: Dosage, Cravings, Trigger, side effects, and continued abstinence.    CHRONIC USE OF BENZOS  CATASTROPHISING/SOMATIZATION  CHRONIC LOW BACK PAIN  ANXIETY AND DEPRESSION      ICD-10-CM    1. Opioid use disorder, mild, on maintenance therapy (H) F11.10 Urine Drugs of Abuse Screen Panel 13     buprenorphine HCl-naloxone HCl (SUBOXONE) 8-2 MG per film   2. Encounter for long-term (current) use of medications Z79.899 Urine Drugs of Abuse Screen Panel 13     buprenorphine HCl-naloxone HCl (SUBOXONE) 8-2 MG per film   3. Chronic pain syndrome G89.4    4. Trochanteric bursitis of both hips M70.61     M70.62    5. Generalized anxiety disorder F41.1   "  6. Tobacco use disorder F17.200    7. Moderate major depression (H) F32.1            PLAN:                                                      1. Physical Therapy:  YES, strongly recommended, however, she does not wish to engage in this at this time.   2. Clinical Health Psychologist to address issues of relaxation, behavioral change, coping style, and other factors important to improvement.  YES, recommended she follow up with Sherita Arrington, however, she refuses and states he made her feel \"uncomfortable\".  She also tried meeting with [facility] in [facility], however, did not follow up with these visits either.   3. Diagnostic Studies:    1. MRI lumbar spine reviewed - she has mild DDD and is not a surgical candidate.  This was discussed.   4. Medication Management:    1. Continue Suboxone to 8mg TID.  Discussed that she should NOT be making dose changes on her own.  This is the maximum dose I will prescribe and I anticipate dropping back down in the future. Pain relieving properties of Suboxone last 8 hours on average.   2. I am recommending she discontinue her Ativan 1mg TID over the next year. I would prefer her PCP continue to prescribe this.    3. Recommend she start an antidepressant for her chronic pain and depression.  Cymbalta, effexor good choices but I will leave this up to her and her PCP  5. Further procedures recommended:   1. Bilateral trochanteric bursa injections -  Can repeat PRN   6. Counseling: Counseled the patient on the importance of having a recovery program in addition to Suboxone maintenance.  She denies any addiction and is not open to doing any type of a recovery program.  I will continue to address this at follow up visits.   Also discussed having a sober support network, not isolating, being open, honest, and willing to change, avoiding triggers and managing cravings. she should continue to abstain from alcohol, benzodiazepines, THC, opioids and other drugs of abuse.   7. OTHER: UDS was " positive for oxycodone. Sent for confirmatory testing as she denies any use.       MEDICATIONS:   Orders Placed This Encounter   Medications     buprenorphine HCl-naloxone HCl (SUBOXONE) 8-2 MG per film     Sig: Place 1 Film under the tongue 3 times daily     Dispense:  90 Film     Refill:  0          - Continue other medications without change    FUTURE APPOINTMENTS:       - Follow-up visit in 4 weeks    Total time spent was 30 minutes, and more than 50% of face to face time was spent in counseling and/or coordination of care.      Scarlet UribeMD Pain Management

## 2019-03-15 LAB
6MAM SERPL-MCNC: NEGATIVE NG/ML
CODEINE UR CFM-MCNC: NEGATIVE NG/ML
MORPHINE UR CFM-MCNC: NEGATIVE NG/ML
OXYCODONE UR CFM-MCNC: NEGATIVE NG/ML
OXYMORPHONE UR CFM-MCNC: 507 NG/ML

## 2019-04-08 ENCOUNTER — OFFICE VISIT (OUTPATIENT)
Dept: ADDICTION MEDICINE | Facility: CLINIC | Age: 44
End: 2019-04-08
Payer: MEDICARE

## 2019-04-08 VITALS
HEIGHT: 67 IN | WEIGHT: 165 LBS | SYSTOLIC BLOOD PRESSURE: 118 MMHG | RESPIRATION RATE: 16 BRPM | DIASTOLIC BLOOD PRESSURE: 62 MMHG | OXYGEN SATURATION: 100 % | BODY MASS INDEX: 25.9 KG/M2 | HEART RATE: 79 BPM

## 2019-04-08 DIAGNOSIS — M54.50 CHRONIC BILATERAL LOW BACK PAIN WITHOUT SCIATICA: ICD-10-CM

## 2019-04-08 DIAGNOSIS — F11.20 CONTINUOUS OPIOID DEPENDENCE (H): ICD-10-CM

## 2019-04-08 DIAGNOSIS — F11.10 OPIOID USE DISORDER, MILD, ON MAINTENANCE THERAPY (H): Primary | ICD-10-CM

## 2019-04-08 DIAGNOSIS — G89.29 CHRONIC BILATERAL LOW BACK PAIN WITHOUT SCIATICA: ICD-10-CM

## 2019-04-08 DIAGNOSIS — Z79.899 ENCOUNTER FOR LONG-TERM (CURRENT) USE OF MEDICATIONS: ICD-10-CM

## 2019-04-08 DIAGNOSIS — F32.1 MODERATE MAJOR DEPRESSION (H): ICD-10-CM

## 2019-04-08 DIAGNOSIS — G89.4 CHRONIC PAIN SYNDROME: ICD-10-CM

## 2019-04-08 DIAGNOSIS — F41.1 GENERALIZED ANXIETY DISORDER: ICD-10-CM

## 2019-04-08 PROCEDURE — 99215 OFFICE O/P EST HI 40 MIN: CPT | Performed by: ANESTHESIOLOGY

## 2019-04-08 PROCEDURE — 80306 DRUG TEST PRSMV INSTRMNT: CPT | Performed by: ANESTHESIOLOGY

## 2019-04-08 RX ORDER — BUPRENORPHINE AND NALOXONE 8; 2 MG/1; MG/1
1 FILM, SOLUBLE BUCCAL; SUBLINGUAL 3 TIMES DAILY
Qty: 90 FILM | Refills: 0 | Status: SHIPPED | OUTPATIENT
Start: 2019-04-08 | End: 2019-05-06

## 2019-04-08 ASSESSMENT — MIFFLIN-ST. JEOR: SCORE: 1436.07

## 2019-04-08 NOTE — PROGRESS NOTES
"                                                    [facility]    Date of visit: 4/08/2019    Chief complaint:   Chief Complaint   Patient presents with     Addiction Problem       Interval history:  Ria Nj is a 43 year old female here for Chronic Pain and Addiction - Suboxone Maintenance follow up.   Last visit: follow up: 3/11/2019    CURRENT DOSE: Suboxone 8mg film TID   BRIEF HPI: 43 year old female with chronic low back pain which began after the birth of her son who is now 9 years old ([place]).  She is on disability for pain.  She has a history of chronic opioid use for over 8 years with escalating doses (175 morphine equivalents), overuse and poor pain relief. She qualifies for a diagnosis of OUD - mild. Started on suboxone after initial consult on 9/10/2018 and doing well.  PMHx is significant for PTSD, childhood trauma, anxiety and depression       Since her last visit, Ria Nj reports:  - Doing well  - She has been having her period with excessive bleeding for the last 4 weeks. She went to another physician OB/GYN to have a pap smear done.  They have not figured out why she has this excessive bleeding.  This is frusterating  - S/P bilateral trochanteric bursa injections on 1/14/2019.  These were not helpful.  Her hips are still painful all the time and worse when she is sleeping.    The pain began over a year ago.  She does not want to do PT because she doesn't have time.   - She denies overusing her Suboxone this month.   She knows she is on the maximum dose currently and will not be able to get more if she runs out early as medicare prevents people from paying cash.   - She is not doing any type of a recovery program but has started going to [employer].   - Had a bad experience with the pain psychologist and does not want to engage in alternative treatments for her chronic pain.  \"my pain is real\" \"that is not going to help\"  - Continues to take Ativan prescribed by " her PCP with whom she has a long term relationship   - side effects include sweating and some dizziness      Current pain medications:  Suboxone films 8mg TID  Gabapentin 600mg BID  Ativan 1mg TID                            Status since last visit:    Since last visit patient has been: stable.     Intensity:     There has been: no craving.      Suboxone Dose: adequate  Progression of Symptoms:     Cues to use and relapse triggers: none    Recovery program has been: ignored.   Accompanying Signs & Symptoms:    Side Effects: sweating and dizzyness.    Sobriety:     Status: no use since last visit.      Drug Screen: obtained.   Precipitating factors:    Triggers have been: non-existent.   Alleviating factors:    Contact with sponsor has been: no sponsor.     Family and support system has been: helpful.   Other Therapies Tried :     Patient has been going to recovery meetings:not at all.      Patient has been participating in professional counseling/therapy: YES    [place] Board of Pharmacy Data Base Reviewed:    YES; appropriate      Pain scores:  Pain intensity on average is 3 on a scale of 0-10.     Side Effects: See above    Medications:  Current Outpatient Medications   Medication Sig Dispense Refill     albuterol (2.5 MG/3ML) 0.083% neb solution Take 1 vial (2.5 mg) by nebulization every 4 hours as needed for shortness of breath / dyspnea or wheezing 30 vial 11     albuterol (PROAIR HFA, PROVENTIL HFA, VENTOLIN HFA) 108 (90 BASE) MCG/ACT inhaler Inhale 2 puffs into the lungs every 4 hours as needed for shortness of breath / dyspnea or wheezing 1 Inhaler 0     buprenorphine HCl-naloxone HCl (SUBOXONE) 8-2 MG per film Place 1 Film under the tongue 3 times daily 90 Film 0     calcium carbonate 500 MG tablet Take 1 tablet by mouth 2 times daily. 100 tablet 3     Cholecalciferol (VITAMIN D) 2000 UNITS tablet Take 2,000 Units by mouth daily 100 tablet 3     EPINEPHrine (EPIPEN) 0.3 MG/0.3ML injection Inject 0.3 mLs  "(0.3 mg) into the muscle once as needed for anaphylaxis 2 each 1     gabapentin (NEURONTIN) 300 MG capsule TAKE 1 CAPSULE BY MOUTH THREE TIMES DAILY 270 capsule 3     IBUPROFEN 200 MG OR TABS Pt is taking 4 TABLET EVERY 4 TO 6 HOURS AS NEEDED       LORazepam (ATIVAN) 1 MG tablet TAKE 1 TABLET BY MOUTH EVERY 8 HOURS AS NEEDED FOR ANXIETY 90 tablet 2     Magnesium 250 MG tablet Take 1 tablet by mouth daily. 100 tablet 3     naloxone (NARCAN) nasal spray Spray 1 spray (4 mg) into one nostril alternating nostrils as needed for opioid reversal every 2-3 minutes until assistance arrives 0.2 mL 0     Omega-3 Fatty Acids (FISH OIL CONCENTRATE PO) Take 300 mg by mouth 3 times daily       tiZANidine (ZANAFLEX) 4 MG tablet TAKE ONE TABLET BY MOUTH THREE TIMES DAILY AS NEEDED FOR SHOULDER AND UPPER BACK PAIN 270 tablet 3       Medical History: any changes in medical history since they were last seen? No      OBJECTIVE:                                                    /62   Pulse 79   Resp 16   Ht 1.702 m (5' 7\")   Wt 74.8 kg (165 lb)   SpO2 100%   BMI 25.84 kg/m    Body mass index is 25.84 kg/m .     ROS:  Constitutional, neuro, ENT, endocrine, pulmonary, cardiac, gastrointestinal, genitourinary, musculoskeletal, integument and psychiatric systems are negative, except as otherwise noted.    EXAM:  GENERAL APPEARANCE: healthy, alert and no distress  EYES: Eyes grossly normal to inspection and conjunctivae and sclerae normal  SKIN: no suspicious lesions or rashes  PSYCH: mentation appears normal and affect normal/bright  MENTAL STATUS EXAM:  Appearance/Behavior: No apparent distress and Neatly groomed  Speech: Normal  Mood/Affect: normal affect  Insight: Adequate    Diagnostic Test Results:  Results for orders placed or performed in visit on 04/08/19 (from the past 24 hour(s))   Urine Drugs of Abuse Screen Panel 13   Result Value Ref Range    Cannabinoids (04-jdc-1-carboxy-9-THC) Not Detected NDET^Not Detected ng/mL "    Phencyclidine (Phencyclidine) Not Detected NDET^Not Detected ng/mL    Cocaine (Benzoylecgonine) Not Detected NDET^Not Detected ng/mL    Methamphetamine (d-Methamphetamine) Not Detected NDET^Not Detected ng/mL    Opiates (Morphine) Not Detected NDET^Not Detected ng/mL    Amphetamine (d-Amphetamine) Not Detected NDET^Not Detected ng/mL    Benzodiazepines (Nordiazepam) Detected, Abnormal Result (A) NDET^Not Detected ng/mL    Tricyclic Antidepressants (Desipramine) Not Detected NDET^Not Detected ng/mL    Methadone (Methadone) Not Detected NDET^Not Detected ng/mL    Barbiturates (Butalbital) Not Detected NDET^Not Detected ng/mL    Oxycodone (Oxycodone) Not Detected NDET^Not Detected ng/mL    Propoxyphene (Norpropoxyphene) Not Detected NDET^Not Detected ng/mL    Buprenorphine (Buprenorphine) Detected, Abnormal Result (A) NDET^Not Detected ng/mL            ASSESSMENT:                                                      OPIOID USE DISORDER - MILD  TOBACCO USE DISORDER    ENCOUNTER FOR LONG TERM USE OF HIGH RISK MEDICATION   High Risk Drug Monitoring?  YES   Drug being monitored: Suboxone    Reason for drug: Opioid Use Disorder   What is being monitored?: Dosage, Cravings, Trigger, side effects, and continued abstinence.    CHRONIC USE OF BENZOS  CATASTROPHISING/SOMATIZATION  CHRONIC LOW BACK PAIN  ANXIETY AND DEPRESSION      ICD-10-CM    1. Opioid use disorder, mild, on maintenance therapy (H) F11.10 Urine Drugs of Abuse Screen Panel 13   2. Encounter for long-term (current) use of medications Z79.899 Urine Drugs of Abuse Screen Panel 13   3. Chronic bilateral low back pain without sciatica M54.5     G89.29    4. Chronic pain syndrome G89.4    5. Continuous opioid dependence (H) F11.20    6. Generalized anxiety disorder F41.1    7. Moderate major depression (H) F32.1            PLAN:                                                      1. Physical Therapy:  YES, strongly recommended, however, she does not wish to engage  "in this at this time.   2. Clinical Health Psychologist to address issues of relaxation, behavioral change, coping style, and other factors important to improvement.  YES, recommended she follow up with Sherita Arrington, however, she refuses and states he made her feel \"uncomfortable\".  She also tried meeting with [facility], however, did not follow up with these visits either.   3. Diagnostic Studies:    1. MRI lumbar spine reviewed - she has mild DDD and is not a surgical candidate.  This was discussed.   4. Medication Management:    1. Continue Suboxone to 8mg TID.  Discussed that she should NOT be making dose changes on her own.  This is the maximum dose I will prescribe and I anticipate dropping back down in the future. Pain relieving properties of Suboxone last 8 hours on average.   2. I am recommending she discontinue her Ativan 1mg TID over the next year. I would prefer her PCP continue to prescribe this.    3. Recommend she start an antidepressant for her chronic pain and depression.  Cymbalta, effexor good choices but I will leave this up to her and her PCP  5. Further procedures recommended:   1. Bilateral trochanteric bursa injections -  Can repeat PRN   6. Counseling: Counseled the patient on the importance of having a recovery program in addition to Suboxone maintenance.  She denies any addiction and is not open to doing any type of a recovery program.  I will continue to address this at follow up visits.   Also discussed having a sober support network, not isolating, being open, honest, and willing to change, avoiding triggers and managing cravings. she should continue to abstain from alcohol, benzodiazepines, THC, opioids and other drugs of abuse.          MEDICATIONS:   Orders Placed This Encounter   Medications     buprenorphine HCl-naloxone HCl (SUBOXONE) 8-2 MG per film     Sig: Place 1 Film under the tongue 3 times daily XDEA = [id number]     Dispense:  90 Film     Refill:  0          - Continue " other medications without change    FUTURE APPOINTMENTS:       - Follow-up visit in 4 weeks    Total time spent was 30 minutes, and more than 50% of face to face time was spent in counseling and/or coordination of care.      [facility]

## 2019-04-22 ENCOUNTER — OFFICE VISIT (OUTPATIENT)
Dept: URGENT CARE | Facility: URGENT CARE | Age: 44
End: 2019-04-22
Payer: MEDICARE

## 2019-04-22 ENCOUNTER — ANCILLARY PROCEDURE (OUTPATIENT)
Dept: GENERAL RADIOLOGY | Facility: CLINIC | Age: 44
End: 2019-04-22
Attending: PHYSICIAN ASSISTANT
Payer: MEDICARE

## 2019-04-22 VITALS
HEART RATE: 94 BPM | TEMPERATURE: 97.8 F | SYSTOLIC BLOOD PRESSURE: 126 MMHG | OXYGEN SATURATION: 99 % | WEIGHT: 163 LBS | DIASTOLIC BLOOD PRESSURE: 83 MMHG | BODY MASS INDEX: 25.53 KG/M2

## 2019-04-22 DIAGNOSIS — R07.89 CHEST WALL PAIN: ICD-10-CM

## 2019-04-22 DIAGNOSIS — S20.211A RIB CONTUSION, RIGHT, INITIAL ENCOUNTER: Primary | ICD-10-CM

## 2019-04-22 PROCEDURE — 99213 OFFICE O/P EST LOW 20 MIN: CPT | Performed by: PHYSICIAN ASSISTANT

## 2019-04-22 PROCEDURE — 71101 X-RAY EXAM UNILAT RIBS/CHEST: CPT | Mod: RT
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
## 2019-04-23 NOTE — PATIENT INSTRUCTIONS
Patient Education     Rib Belt  A rib belt is an elastic strap that may help reduce pain from chest muscle strain or rib fracture. The belt limits motion in the chest and can help relieve pain from the chest wall and ribs. However, if worn too long, the rib belt can sometimes cause pneumonia or other problems. This is because it reduces the air flow into your lungs.  You can help avoid this problem by opening the belt and taking several very deep breaths once an hour while you're awake. As you breathe out, purse your lips as if you if you were blowing up a balloon. If possible, actually blow up a balloon or a rubber glove. This exercise builds up pressure inside the lung and prevents collapse of the small air sacs of the lung. This exercise may cause some pain at the site of injury, which is normal. Sometimes a special device called an incentive spirometer may be given for this purpose.  Call 911  Call 911 if you have:    Shortness of breath    Increasing chest pain with breathing    Dizziness, weakness, or fainting    Development or worsening of abdominal pain     When to seek medical advice  Call your healthcare provider right away if any of these occur:    Pain over injured area increases    Fever of 100.4 F (38 C) or higher, or as directed by your healthcare provider    Chills  Date Last Reviewed: 5/1/2018 2000-2018 [employer]. [address] [place] [address], [place] [address]. All rights reserved. This information is not intended as a substitute for professional medical care. Always follow your healthcare professional's instructions.           Patient Education     Rib Contusion or Minor Fracture    A rib contusion is a bruise to one or more rib bones. It may cause pain, tenderness, swelling, and a purplish color to the skin. There may be a sharp pain with each breath. A rib contusion takes anywhere from a few days to a few weeks to heal. A minor rib fracture or break may cause the same symptoms as  a rib contusion. The small crack may not be seen on a regular chest X-ray. Treatment for both problems is basically the same.  Home care    You may use over-the-counter pain medicine to control pain, unless another pain medicine was prescribed. If you have chronic liver or kidney disease or ever had a stomach ulcer or GI (gastrointestinal) bleeding, talk with your healthcare provider before using these medicines.    Rest. Don't lift anything heavy or do any activity that causes pain.    Apply an ice pack over the injured area for 15 to 20 minutes every 1 to 2 hours. You should do this for the first 24 to 48 hours. To make an ice pack, put ice cubes in a plastic bag that seals at the top. Wrap the bag in a clean, thin towel or cloth. Never put ice or an ice pack directly on the skin. Continue with ice packs as needed for the relief of pain and swelling.    The first 3 to 4 weeks of healing will be the most painful. If your pain is not under control with the treatment given, call your healthcare provider. Sometimes a stronger pain medicine may be needed. A nerve block can be done in case of severe pain. It will numb the nerve between the ribs.  Follow-up care  Follow up with your healthcare provider, or as advised.  If X-rays were taken, you will be told of any new findings that may affect your care.  Call 911  Call 911 if you have:    Dizziness, weakness or fainting    Shortness of breath with or without chest discomfort    New or worsening pain  When to seek medical advice  Call your healthcare provider right away if any of these occur:    Fever of 100.4 F (38 C) or higher, or as directed by your healthcare provider    Chills    Stomach pain, vomiting  Date Last Reviewed: 6/1/2018 2000-2018 [employer]. [address] [place] [address], [place] [address]. All rights reserved. This information is not intended as a substitute for professional medical care. Always follow your healthcare professional's  instructions.

## 2019-05-06 ENCOUNTER — OFFICE VISIT (OUTPATIENT)
Dept: ADDICTION MEDICINE | Facility: CLINIC | Age: 44
End: 2019-05-06
Payer: MEDICARE

## 2019-05-06 VITALS
HEART RATE: 118 BPM | WEIGHT: 160 LBS | SYSTOLIC BLOOD PRESSURE: 134 MMHG | OXYGEN SATURATION: 97 % | RESPIRATION RATE: 18 BRPM | BODY MASS INDEX: 25.06 KG/M2 | TEMPERATURE: 98.7 F | DIASTOLIC BLOOD PRESSURE: 88 MMHG

## 2019-05-06 DIAGNOSIS — F11.10 OPIOID USE DISORDER, MILD, ON MAINTENANCE THERAPY (H): Primary | ICD-10-CM

## 2019-05-06 DIAGNOSIS — G89.4 CHRONIC PAIN SYNDROME: ICD-10-CM

## 2019-05-06 DIAGNOSIS — M79.18 MYOFASCIAL PAIN: ICD-10-CM

## 2019-05-06 DIAGNOSIS — Z79.899 ENCOUNTER FOR LONG-TERM (CURRENT) USE OF MEDICATIONS: ICD-10-CM

## 2019-05-06 PROCEDURE — 99214 OFFICE O/P EST MOD 30 MIN: CPT | Performed by: ANESTHESIOLOGY

## 2019-05-06 PROCEDURE — 80306 DRUG TEST PRSMV INSTRMNT: CPT | Performed by: ANESTHESIOLOGY

## 2019-05-06 RX ORDER — CYCLOBENZAPRINE HCL 10 MG
10 TABLET ORAL 3 TIMES DAILY PRN
Qty: 90 TABLET | Refills: 0 | Status: SHIPPED | OUTPATIENT
Start: 2019-05-06 | End: 2019-07-29

## 2019-05-06 RX ORDER — BUPRENORPHINE AND NALOXONE 8; 2 MG/1; MG/1
1 FILM, SOLUBLE BUCCAL; SUBLINGUAL 3 TIMES DAILY
Qty: 90 FILM | Refills: 0 | Status: SHIPPED | OUTPATIENT
Start: 2019-05-06 | End: 2019-06-03
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
## 2019-05-23 ENCOUNTER — TELEPHONE (OUTPATIENT)
Dept: BEHAVIORAL HEALTH | Facility: CLINIC | Age: 44
End: 2019-05-23

## 2019-05-23 NOTE — TELEPHONE ENCOUNTER
P/C to pt, left vm requesting a return call for support if needed. Per ADILENE Gao, pt called tearful about a recent loss and asking to speak with someone. After speaking with me, Silva took the patient off hold and realized the patient had hung up, so forwarded the chart for follow up.

## 2019-05-29 DIAGNOSIS — M54.5 LOW BACK PAIN, UNSPECIFIED BACK PAIN LATERALITY, UNSPECIFIED CHRONICITY, WITH SCIATICA PRESENCE UNSPECIFIED: ICD-10-CM

## 2019-05-30 NOTE — TELEPHONE ENCOUNTER
GABAPENTIN 300MG CAPSULES      Last Written Prescription Date:  7/17/2018  Last Fill Quantity: 270 capsule,   # refills: 3  Last Office Visit:3/6/2019  Future Office visit:    Next 5 appointments (look out 90 days)    Jun 03, 2019  1:00 PM CDT  Return Visit with Scarlet Uribe MD  [facility] ([facility]) [address] [facility]  [address]  [facility] [id number] [phone]           Routing refill request to provider for review/approval because:  Drug not on the [facility], [facility] or [facility] refill protocol or controlled substance

## 2019-05-30 NOTE — TELEPHONE ENCOUNTER
Routing refill request to provider for review/approval because:  Drug not on the [facility] refill protocol   Anum Wagner RN

## 2019-05-31 RX ORDER — GABAPENTIN 300 MG/1
CAPSULE ORAL
Qty: 270 CAPSULE | Refills: 3 | Status: SHIPPED | OUTPATIENT
Start: 2019-05-31 | End: 2020-03-09

## 2019-06-03 ENCOUNTER — OFFICE VISIT (OUTPATIENT)
Dept: ADDICTION MEDICINE | Facility: CLINIC | Age: 44
End: 2019-06-03
Payer: MEDICARE

## 2019-06-03 DIAGNOSIS — F32.9 REACTIVE DEPRESSION: ICD-10-CM

## 2019-06-03 DIAGNOSIS — F11.10 OPIOID USE DISORDER, MILD, ON MAINTENANCE THERAPY (H): Primary | ICD-10-CM

## 2019-06-03 DIAGNOSIS — Z79.899 ENCOUNTER FOR LONG-TERM (CURRENT) USE OF MEDICATIONS: ICD-10-CM

## 2019-06-03 DIAGNOSIS — G89.4 CHRONIC PAIN SYNDROME: ICD-10-CM

## 2019-06-03 PROCEDURE — 80306 DRUG TEST PRSMV INSTRMNT: CPT | Performed by: ANESTHESIOLOGY

## 2019-06-03 PROCEDURE — 99214 OFFICE O/P EST MOD 30 MIN: CPT | Performed by: ANESTHESIOLOGY

## 2019-06-03 RX ORDER — BUPRENORPHINE AND NALOXONE 8; 2 MG/1; MG/1
1 FILM, SOLUBLE BUCCAL; SUBLINGUAL 3 TIMES DAILY
Qty: 90 FILM | Refills: 0 | Status: SHIPPED | OUTPATIENT
Start: 2019-06-03 | End: 2019-07-01
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
## 2019-06-03 NOTE — PATIENT INSTRUCTIONS
Call [facility] to schedule therapy to discuss your grief and loss    Continue Suboxone 8mg films three/day.  It is okay to cut these and take 1/2 film 6 times a day.     Scarlet Uribe MD

## 2019-07-01 ENCOUNTER — OFFICE VISIT (OUTPATIENT)
Dept: ADDICTION MEDICINE | Facility: CLINIC | Age: 44
End: 2019-07-01
Payer: MEDICARE

## 2019-07-01 VITALS
SYSTOLIC BLOOD PRESSURE: 116 MMHG | DIASTOLIC BLOOD PRESSURE: 64 MMHG | TEMPERATURE: 97.9 F | HEART RATE: 106 BPM | WEIGHT: 166.5 LBS | OXYGEN SATURATION: 96 % | BODY MASS INDEX: 26.08 KG/M2

## 2019-07-01 DIAGNOSIS — F11.10 OPIOID USE DISORDER, MILD, ON MAINTENANCE THERAPY (H): Primary | ICD-10-CM

## 2019-07-01 DIAGNOSIS — Z79.899 ENCOUNTER FOR LONG-TERM (CURRENT) USE OF MEDICATIONS: ICD-10-CM

## 2019-07-01 LAB
AMPHETAMINES UR QL: NOT DETECTED NG/ML
BARBITURATES UR QL SCN: NOT DETECTED NG/ML
BENZODIAZ UR QL SCN: ABNORMAL NG/ML
BUPRENORPHINE UR QL: ABNORMAL NG/ML
CANNABINOIDS UR QL: NOT DETECTED NG/ML
COCAINE UR QL SCN: NOT DETECTED NG/ML
D-METHAMPHET UR QL: NOT DETECTED NG/ML
METHADONE UR QL SCN: NOT DETECTED NG/ML
OPIATES UR QL SCN: NOT DETECTED NG/ML
OXYCODONE UR QL SCN: NOT DETECTED NG/ML
PCP UR QL SCN: NOT DETECTED NG/ML
PROPOXYPH UR QL: NOT DETECTED NG/ML
TRICYCLICS UR QL SCN: ABNORMAL NG/ML

## 2019-07-01 PROCEDURE — 99214 OFFICE O/P EST MOD 30 MIN: CPT | Performed by: ANESTHESIOLOGY

## 2019-07-01 PROCEDURE — 80306 DRUG TEST PRSMV INSTRMNT: CPT | Performed by: ANESTHESIOLOGY

## 2019-07-01 RX ORDER — BUPRENORPHINE AND NALOXONE 8; 2 MG/1; MG/1
1 FILM, SOLUBLE BUCCAL; SUBLINGUAL 3 TIMES DAILY
Qty: 90 FILM | Refills: 0 | Status: SHIPPED | OUTPATIENT
Start: 2019-07-01 | End: 2019-07-29

## 2019-07-01 NOTE — PROGRESS NOTES
[facility]    Date of visit: 7/01/2019    Chief complaint:   Chief Complaint   Patient presents with     Pain     Addiction Problem     Has not gone to lab yet       Interval history:  Ria Nj is a 43 year old female here for Chronic Pain and Addiction - Suboxone Maintenance follow up.   Last visit: follow up: 6/03/2019    CURRENT DOSE: Suboxone 8mg film TID   BRIEF HPI: 43 year old female with chronic low back pain which began after the birth of her son who is now 9 years old ([place]).  She is on disability for pain.  She has a history of chronic opioid use for over 8 years with escalating doses (175 morphine equivalents), overuse and poor pain relief. She qualifies for a diagnosis of OUD - mild. Started on suboxone after initial consult on 9/10/2018 and doing well.  PMHx is significant for PTSD, childhood trauma, anxiety and depression       Since her last visit, Ria Nj reports:  - Doing well  - She has been going to the [employer] pool and throwing around a football every day. She enjoys hanging out with him and tries to get him out of the house and off the computer (fornight) everyday.   - The father of her son hung himself.  She was living with him for the last 10 years.  He was using methamphetamines, crack and alcohol and hallucinating.  Her son thinks he overdosed on drugs.  He does not know he hung himself.   - She did not go and get therapy.  She no longer wants to do this.   - She has been having her period with excessive bleeding. She went to another physician OB/GYN to have a pap smear done.  They have not figured out why she has this excessive bleeding.   - S/P bilateral trochanteric bursa injections on 1/14/2019.  These were not helpful.  Her hips are still painful all the time and worse when she is sleeping.    The pain began over a year ago.  She does not want to do PT because she doesn't have time.   - She  "denies overusing her Suboxone this month.   She knows she is on the maximum dose currently and will not be able to get more if she runs out early as medicare prevents people from paying cash.   - She is not doing any type of a recovery program but has started going to [employer].   - Had a bad experience with the pain psychologist and does not want to engage in alternative treatments for her chronic pain.  \"my pain is real\" \"that is not going to help\"  - side effects include sweating and some dizziness      Current pain medications:  Suboxone films 8mg TID  Gabapentin 600mg BID  Ativan 1mg TID                            Status since last visit:    Since last visit patient has been: stable.     Intensity:     There has been: no craving.      Suboxone Dose: adequate  Progression of Symptoms:     Cues to use and relapse triggers: none    Recovery program has been: ignored.   Accompanying Signs & Symptoms:    Side Effects: sweating and dizzyness.    Sobriety:     Status: no use since last visit.      Drug Screen: obtained.   Precipitating factors:    Triggers have been: non-existent.   Alleviating factors:    Contact with sponsor has been: no sponsor.     Family and support system has been: helpful.   Other Therapies Tried :     Patient has been going to recovery meetings:not at all.      Patient has been participating in professional counseling/therapy: YES    [place] Board of Pharmacy Data Base Reviewed:    YES; appropriate      Pain scores:  Pain intensity on average is 3 on a scale of 0-10.     Side Effects: See above    Medications:  Current Outpatient Medications   Medication Sig Dispense Refill     albuterol (2.5 MG/3ML) 0.083% neb solution Take 1 vial (2.5 mg) by nebulization every 4 hours as needed for shortness of breath / dyspnea or wheezing 30 vial 11     albuterol (PROAIR HFA, PROVENTIL HFA, VENTOLIN HFA) 108 (90 BASE) MCG/ACT inhaler Inhale 2 puffs into the lungs every 4 hours as needed for shortness of " breath / dyspnea or wheezing 1 Inhaler 0     buprenorphine HCl-naloxone HCl (SUBOXONE) 8-2 MG per film Place 1 Film under the tongue 3 times daily XDEA = [id number] 90 Film 0     calcium carbonate 500 MG tablet Take 1 tablet by mouth 2 times daily. 100 tablet 3     Cholecalciferol (VITAMIN D) 2000 UNITS tablet Take 2,000 Units by mouth daily 100 tablet 3     cyclobenzaprine (FLEXERIL) 10 MG tablet Take 1 tablet (10 mg) by mouth 3 times daily as needed for muscle spasms 90 tablet 0     EPINEPHrine (EPIPEN) 0.3 MG/0.3ML injection Inject 0.3 mLs (0.3 mg) into the muscle once as needed for anaphylaxis 2 each 1     gabapentin (NEURONTIN) 300 MG capsule TAKE 1 CAPSULE BY MOUTH THREE TIMES DAILY 270 capsule 3     IBUPROFEN 200 MG OR TABS Pt is taking 4 TABLET EVERY 4 TO 6 HOURS AS NEEDED       LORazepam (ATIVAN) 1 MG tablet TAKE 1 TABLET BY MOUTH EVERY 8 HOURS AS NEEDED FOR ANXIETY 90 tablet 2     Magnesium 250 MG tablet Take 1 tablet by mouth daily. 100 tablet 3     naloxone (NARCAN) nasal spray Spray 1 spray (4 mg) into one nostril alternating nostrils as needed for opioid reversal every 2-3 minutes until assistance arrives 0.2 mL 0     Omega-3 Fatty Acids (FISH OIL CONCENTRATE PO) Take 300 mg by mouth 3 times daily       order for DME Equipment being ordered: Rib belt 1 Device 0     tiZANidine (ZANAFLEX) 4 MG tablet TAKE ONE TABLET BY MOUTH THREE TIMES DAILY AS NEEDED FOR SHOULDER AND UPPER BACK PAIN 270 tablet 3       Medical History: any changes in medical history since they were last seen? No      OBJECTIVE:                                                    /64   Pulse 106   Temp 97.9  F (36.6  C) (Oral)   Wt 75.5 kg (166 lb 8 oz)   SpO2 96%   BMI 26.08 kg/m    Body mass index is 26.08 kg/m .     ROS:  Constitutional, neuro, ENT, endocrine, pulmonary, cardiac, gastrointestinal, genitourinary, musculoskeletal, integument and psychiatric systems are negative, except as otherwise noted.    EXAM:  GENERAL  "APPEARANCE: healthy, alert and no distress  EYES: Eyes grossly normal to inspection and conjunctivae and sclerae normal  SKIN: no suspicious lesions or rashes  PSYCH: mentation appears normal and affect normal/bright  MENTAL STATUS EXAM:  Appearance/Behavior: No apparent distress and Neatly groomed  Speech: Normal  Mood/Affect: normal affect  Insight: Adequate    Diagnostic Test Results:  No results found for this or any previous visit (from the past 24 hour(s)).         ASSESSMENT:                                                      OPIOID USE DISORDER - MILD  TOBACCO USE DISORDER    ENCOUNTER FOR LONG TERM USE OF HIGH RISK MEDICATION   High Risk Drug Monitoring?  YES   Drug being monitored: Suboxone    Reason for drug: Opioid Use Disorder   What is being monitored?: Dosage, Cravings, Trigger, side effects, and continued abstinence.    CHRONIC USE OF BENZOS  CATASTROPHISING/SOMATIZATION  CHRONIC LOW BACK PAIN  ANXIETY AND DEPRESSION      ICD-10-CM    1. Opioid use disorder, mild, on maintenance therapy (H) F11.10 buprenorphine HCl-naloxone HCl (SUBOXONE) 8-2 MG per film     Urine Drugs of Abuse Screen Panel 13   2. Encounter for long-term (current) use of medications Z79.899 buprenorphine HCl-naloxone HCl (SUBOXONE) 8-2 MG per film     Urine Drugs of Abuse Screen Panel 13           PLAN:                                                      1. Physical Therapy:  YES, strongly recommended, however, she does not wish to engage in this at this time.   2. Clinical Health Psychologist to address issues of relaxation, behavioral change, coping style, and other factors important to improvement.  YES, recommended she follow up with Sherita Arrington, however, she refuses and states he made her feel \"uncomfortable\".  She also tried meeting with [facility] in [facility], however, did not follow up with these visits either.  I have placed a referral for [place] counseling services and she did not follow through with this either despite " telling me she was eager to call.   3. Diagnostic Studies:    1. MRI lumbar spine reviewed - she has mild DDD and is not a surgical candidate.  This was discussed.   4. Medication Management:    1. Continue Suboxone to 8mg TID.  Discussed that she should NOT be making dose changes on her own.  This is the maximum dose I will prescribe and I anticipate dropping back down in the future. Pain relieving properties of Suboxone last 8 hours on average.   5. Further procedures recommended:   1. Bilateral trochanteric bursa injections -  Can repeat PRN   6. Counseling: Counseled the patient on the importance of having a recovery program in addition to Suboxone maintenance.  She denies any addiction and is not open to doing any type of a recovery program.  I will continue to address this at follow up visits.   Also discussed having a sober support network, not isolating, being open, honest, and willing to change, avoiding triggers and managing cravings. she should continue to abstain from alcohol, benzodiazepines, THC, opioids and other drugs of abuse.  We talked about her guilt and her lack of control over the death of her ex.          MEDICATIONS:   Orders Placed This Encounter   Medications     buprenorphine HCl-naloxone HCl (SUBOXONE) 8-2 MG per film     Sig: Place 1 Film under the tongue 3 times daily XDEA = [id number]     Dispense:  90 Film     Refill:  0     NADEAN: [id number]          - Continue other medications without change    FUTURE APPOINTMENTS:       - Follow-up visit in 4 weeks    Total time spent was 30 minutes, and more than 50% of face to face time was spent in counseling and/or coordination of care.      [facility]

## 2019-07-08 DIAGNOSIS — F41.1 GENERALIZED ANXIETY DISORDER: ICD-10-CM

## 2019-07-08 RX ORDER — LORAZEPAM 1 MG/1
TABLET ORAL
Qty: 90 TABLET | Refills: 5 | Status: SHIPPED | OUTPATIENT
Start: 2019-07-08 | End: 2020-01-07

## 2019-07-08 NOTE — TELEPHONE ENCOUNTER
LORazepam (ATIVAN) 1 MG tablet      Last Written Prescription Date:  3/6/2019  Last Fill Quantity: 90 tablet,   # refills: 2  Last Office Visit: 3/6/2019  Future Office visit:    Next 5 appointments (look out 90 days)    Jul 29, 2019  1:00 PM CDT  Return Visit with Scarlet Uribe MD  [facility] ([facility]) [address] [place] [address] [address] [place] [id number] [phone]   Jul 29, 2019  2:20 PM CDT  Office Visit with Joel Daniel Irwin Wegener, MD  [facility] [address] [place] [id number] [phone]           Routing refill request to provider for review/approval because:  Drug not on the [facility], [facility] or [facility] refill protocol or controlled substance

## 2019-07-08 NOTE — TELEPHONE ENCOUNTER
Spoke with patient message given regarding RX refills and that I walked over her prescription over to [facility] pharmacy

## 2019-07-08 NOTE — TELEPHONE ENCOUNTER
Please bring new prescription to pharmacy and nurse basket.  Call patient let her know state law has changed so the lorazepam refills will need to be filled within 30 days of the last fill or avoid the prescription this is what happened to her prescription.  This just change in July and we did not know about this earlier.  I put 6 months total refills on her prescription so she can follow-up with me in 6 months.Joel Wegener,MD

## 2019-07-08 NOTE — TELEPHONE ENCOUNTER
Reason for Call:  Medication or medication refill:    Do you use a [facility]?  Name of the pharmacy and phone number for the current request:  [facility] [place] - [phone]    Name of the medication requested: LORazepam (ATIVAN) 1 MG tablet    Other request: Pt is completely out and she thought she had more refills. Pt states she is struggling with anxiety attacks and has an appt with her PCP on 7/29. This was his next opening and she is requesting bridge to appt if possible.    Can we leave a detailed message on this number? YES    Phone number patient can be reached at: Home number on file [phone] (home)    Best Time: anytime    Call taken on 7/8/2019 at 8:45 AM by Suzan Chandler

## 2019-07-08 NOTE — TELEPHONE ENCOUNTER
Routing refill request to provider for review/approval because:  --See patient's msg in previous documentation this encounter.  --Does patient need office visit or e-visit for refills of this controlled medication?      Ria Nj, 43F Powered by   [employer] Report   Resources  Date: 07/08/2019   Rx Data   PRESCRIPTIONS   Total Prescriptions: 39   Total Private Pay: 1     Fill Date ID Written Drug Qty Days Prescriber Rx # Pharmacy Refill Daily Dose * Pymt Type    07/01/2019  1  07/01/2019  Buprenorp-Nalox 8-2 Mg Sl Film    90 30 An Bur  [id number]  Tr (1359)  0 24.[place]   06/03/2019  1  06/03/2019  Buprenorp-Nalox 8-2 Mg Sl Film    90 30 An Bur  [id number]  Tr (1359)  0 24.[place]   05/31/2019  1  05/31/2019  Gabapentin 300 Mg Capsule    270 90 Soraya Weg  [id number]  Wal (5874)  0  Comm [facility]   05/06/2019  1  05/06/2019  Buprenorp-Nalox 8-2 Mg Sl Film    90 30 An Bur  [id number]  Tr (1359)  0 24.[place]   04/08/2019  1  04/08/2019  Buprenorp-Nalox 8-2 Mg Sl Film    90 30 An Bur  [id number]  Tr (1359)  [place] 24.[place]   03/11/2019  1  03/11/2019  Suboxone 8 Mg-2 Mg Sl Film    90 30 An Bur  [id number]  Tr (1359)  [place] 24.[place]   03/05/2019  1  03/05/2019  Lorazepam 1 Mg Tablet    90 30 Soraya Weg  [id number]  Tr (1385)  0 3.00 L[place]   04/03/2019  1  03/05/2019  Lorazepam 1 Mg Tablet    90 30 Soraya Weg  [id number]  Tr (1385)  1 3.[place]   06/04/2019  1  03/05/2019  Lorazepam 1 Mg Tablet    90 30 Soraya Weg  [id number]  Tr (1385)  2 3.[place]   02/11/2019  1  02/11/2019  Suboxone 8 Mg-2 Mg Sl Film    90 30 An Bur  [id number]  Tr (1359)  0 24.[place]   01/14/2019  1  01/14/2019  Suboxone 8 Mg-2 Mg Sl Film    90 30 An Bur  [id number]  Tr (1468)  0 24.[place]   12/17/2018  1  12/17/2018  Suboxone 8 Mg-2 Mg Sl Film    90 30 An Bur  [id number]  Tr (6151)  [place] 24.[place]   12/04/2018  1  12/04/2018  Lorazepam 1 Mg Tablet    90 30 Soraya Busyb   [id number]  Wal (4829)  0 3.00 [place]   01/02/2019  1  12/04/2018  Lorazepam 1 Mg Tablet    90 30 Soraya Weg  [id number]  Wal (4844)  1 3.00 [place]   01/30/2019  1  12/04/2018  Lorazepam 1 Mg Tablet    15 5 Soraya Weg  [id number]  Wal (4859)  2 3.[place]   02/03/2019  2  12/04/2018  Lorazepam 1 Mg Tablet    90 30 Soraya Weg  [id number]  Gra (7864)  0 3.[place]   03/02/2019  2  12/04/2018  Lorazepam 1 Mg Tablet    13 4 Soraya Weg  [id number]  Gra (7864)  1 3.25 [place]   04/25/2019  3  12/04/2018  Lorazepam 1 Mg Tablet    13 4 Soraya Weg  [id number]  Gra (7864)  2 3.25 [place]   05/01/2019  3  12/04/2018  Lorazepam 1 Mg Tablet    90 30 Soraya Weg  [id number]  Gra (7864)  3 3.[place]   12/03/2018  1  12/03/2018  Suboxone 8 Mg-2 Mg Sl Film    22 7 An Bur  [id number]  Tr (1359)  0 25.14 [place]   12/10/2018  1  12/03/2018  Suboxone 8 Mg-2 Mg Sl Film    21 6 An Bur  [id number]  Tr (1359)  1 28.[place]   11/19/2018  1  11/19/2018  Suboxone 8 Mg-2 Mg Sl Film    90 30 An Bur  [id number]  Tr (1359)  0 24.[place]   10/22/2018  1  10/22/2018  Suboxone 8 Mg-2 Mg Sl Film    90 30 An Bur  [id number]  Tr (1359)  0 24.[place]   10/16/2018  1  10/16/2018  Suboxone 4 Mg-1 Mg Sl Film    24 6 Gr Darlene  [id number]  Tr (1359)  0 16.[place]   10/08/2018  1  10/08/2018  Lorazepam 1 Mg Tablet    90 30 Soraya Weg  [id number]  Wal (4873)  0 3.00 [place]   11/05/2018  1  10/08/2018  Lorazepam 1 Mg Tablet    90 30 Soraya Weg  [id number]  Wal (6366)  1 3.00 [place]   10/01/2018  1  10/01/2018  Suboxone 8 Mg-2 Mg Sl Film    60 30 An Bur  [id number]  Tr (4679)  0 16.[place]   09/17/2018  1  09/17/2018  Suboxone 8 Mg-2 Mg Sl Film    30 30 An Bur  [id number]  Tr (9839)  0 8.[place]   09/10/2018  1  09/10/2018  Zubsolv 2.9-0.71 Mg Tablet Sl    21 7 Gr Darlene  [id number]  Tr (1359)  0 8.70 [place]   07/17/2018  1  07/17/2018  Gabapentin 300 Mg Capsule     270 90 Soraya Weg  [id number]  Wal (4829)  0  [place]   09/30/2018  1  07/17/2018  Gabapentin 300 Mg Capsule    270 90 Soraya Weg  [id number]  Wal (4829)  1  [place]   12/18/2018  1  07/17/2018  Gabapentin 300 Mg Capsule    270 90 Soraya Weg  [id number]  Wal (4829)  2  [place]   03/04/2019  1  07/17/2018  Gabapentin 300 Mg Capsule    270 90 Soraya Weg  [id number]  Wal (4829)  3  [place]   07/26/2018  1  06/19/2018  Lorazepam 1 Mg Tablet    90 30 Soraya Weg  [id number]  Tr (1385)  1 3.00 [place]   09/05/2018  1  06/19/2018  Lorazepam 1 Mg Tablet    90 30 Soraya Weg  [id number]  Tr (1385)  2 3.00 [place]   07/14/2018  1  06/12/2018  Oxycodone-Acetaminophen     100 30 Soraya Weg  [id number]  Wal (1984)  0 50.00 [place]   07/16/2018  1  06/12/2018  Oxycontin Er 40 Mg Tablet    60 30 Soraya Weg  [id number]  Tr (1385)  0 120.00 [place]   08/14/2018  1  06/12/2018  Oxycontin Er 40 Mg Tablet    60 30 Soraya Weg  [id number]  Wal (3575)  0 120.00 [place]   08/14/2018  1  06/12/2018  Oxycodone-Acetaminophen     100 30 Soraya Weg  [id number]  Wal (8095)  0 50.00 [place]

## 2019-07-15 ENCOUNTER — TELEPHONE (OUTPATIENT)
Dept: FAMILY MEDICINE | Facility: CLINIC | Age: 44
End: 2019-07-15

## 2019-07-15 NOTE — TELEPHONE ENCOUNTER
Forms from [employer] on [employer]. Michael desbrigitte for completion.    Miracle Jiménez

## 2019-07-15 NOTE — TELEPHONE ENCOUNTER
Patient has not been seen here since 10/17/17.  Form needs to go to current PCP.  Interoffice mail to [place] - in mail slot.      DAVID

## 2019-07-29 ENCOUNTER — OFFICE VISIT (OUTPATIENT)
Dept: ADDICTION MEDICINE | Facility: CLINIC | Age: 44
End: 2019-07-29
Payer: MEDICARE

## 2019-07-29 VITALS
OXYGEN SATURATION: 99 % | DIASTOLIC BLOOD PRESSURE: 82 MMHG | SYSTOLIC BLOOD PRESSURE: 136 MMHG | WEIGHT: 160 LBS | BODY MASS INDEX: 25.06 KG/M2 | RESPIRATION RATE: 18 BRPM | HEART RATE: 102 BPM | TEMPERATURE: 98.2 F

## 2019-07-29 DIAGNOSIS — F11.10 OPIOID USE DISORDER, MILD, ON MAINTENANCE THERAPY (H): Primary | ICD-10-CM

## 2019-07-29 DIAGNOSIS — G89.4 CHRONIC PAIN SYNDROME: ICD-10-CM

## 2019-07-29 DIAGNOSIS — M79.18 MYOFASCIAL PAIN: ICD-10-CM

## 2019-07-29 DIAGNOSIS — Z79.899 ENCOUNTER FOR LONG-TERM (CURRENT) USE OF MEDICATIONS: ICD-10-CM

## 2019-07-29 LAB
AMPHETAMINES UR QL: NOT DETECTED NG/ML
BARBITURATES UR QL SCN: NOT DETECTED NG/ML
BENZODIAZ UR QL SCN: NOT DETECTED NG/ML
BUPRENORPHINE UR QL: ABNORMAL NG/ML
CANNABINOIDS UR QL: NOT DETECTED NG/ML
COCAINE UR QL SCN: NOT DETECTED NG/ML
D-METHAMPHET UR QL: NOT DETECTED NG/ML
METHADONE UR QL SCN: NOT DETECTED NG/ML
OPIATES UR QL SCN: NOT DETECTED NG/ML
OXYCODONE UR QL SCN: NOT DETECTED NG/ML
PCP UR QL SCN: NOT DETECTED NG/ML
PROPOXYPH UR QL: NOT DETECTED NG/ML
TRICYCLICS UR QL SCN: NOT DETECTED NG/ML

## 2019-07-29 PROCEDURE — 99214 OFFICE O/P EST MOD 30 MIN: CPT | Performed by: ANESTHESIOLOGY

## 2019-07-29 PROCEDURE — 80306 DRUG TEST PRSMV INSTRMNT: CPT | Performed by: ANESTHESIOLOGY

## 2019-07-29 RX ORDER — BUPRENORPHINE AND NALOXONE 8; 2 MG/1; MG/1
1 FILM, SOLUBLE BUCCAL; SUBLINGUAL 3 TIMES DAILY
Qty: 90 FILM | Refills: 0 | Status: SHIPPED | OUTPATIENT
Start: 2019-07-29 | End: 2019-08-26

## 2019-07-29 RX ORDER — CYCLOBENZAPRINE HCL 10 MG
10 TABLET ORAL 3 TIMES DAILY PRN
Qty: 90 TABLET | Refills: 0 | Status: SHIPPED | OUTPATIENT
Start: 2019-07-29 | End: 2019-12-16

## 2019-07-29 NOTE — PROGRESS NOTES
[facility]    Date of visit: 7/29/2019    Chief complaint:   Chief Complaint   Patient presents with     Addiction Problem     Pain       Interval history:  Ria Nj is a 43 year old female here for Chronic Pain and Addiction - Suboxone Maintenance follow up.   Last visit: follow up: 7/01/2019    CURRENT DOSE: Suboxone 8mg film TID   BRIEF HPI: 43 year old female with chronic low back pain which began after the birth of her son who is now 9 years old ([place]).  She is on disability for pain.  She has a history of chronic opioid use for over 8 years with escalating doses (175 morphine equivalents), overuse and poor pain relief. She qualifies for a diagnosis of OUD - mild. Started on suboxone after initial consult on 9/10/2018 and doing well.  PMHx is significant for PTSD, childhood trauma, anxiety and depression       Since her last visit, Ria Nj reports:  - Doing well  - Her anxiety has been out of control lately and she had an appointment with her PCP today to talk about this but had to cancel because she did not realize she scheduled them at the same time.  She is not sleeping at night and she is having panic attacks.   - She is taking Ativan TID daily and this has not been really helpful.  She was on Klonopin in the past and thinks this worked better for her anxiety.  She has been overusing these and runs out before she can get them refilled.   - She reinjured her arm and would like a refill on the flexeril.  She is not taking the tizanidine at the same time.   - She was at her [place] cabin over the weekend.  Her son likes fishing.   - She has been going to the community pool and throwing around a football every day. She enjoys hanging out with him and tries to get him out of the house and off the computer (fornight) everyday.   - The father of her son hung himself.  She was living with him for the last 10 years.   "He was using methamphetamines, crack and alcohol and hallucinating.  Her son thinks he overdosed on drugs. She is worried about questions he may ask in the future related to this and is worried he will think it is his fault.    - She did not go and get therapy despite encouragement to do so.  She does not want to \"open those wounds\".    - She has been having her period with excessive bleeding. She went to another physician OB/GYN to have a pap smear done.  They have not figured out why she has this excessive bleeding.   - S/P bilateral trochanteric bursa injections on 1/14/2019.  These were not helpful.  Her hips are still painful all the time and worse when she is sleeping.    The pain began over a year ago.  She does not want to do PT because she doesn't have time.   - She denies overusing her Suboxone this month.   She knows she is on the maximum dose currently and will not be able to get more if she runs out early as medicare prevents people from paying cash.   - She is not doing any type of a recovery program but has started going to [facility].   - Had a bad experience with the pain psychologist and does not want to engage in alternative treatments for her chronic pain.  \"my pain is real\" \"that is not going to help\"  - side effects include sweating and some dizziness      Current pain medications:  Suboxone films 8mg TID  Gabapentin 600mg BID  Ativan 1mg TID  Flexeril 10mg TID                            Status since last visit:    Since last visit patient has been: stable.     Intensity:     There has been: no craving.      Suboxone Dose: adequate  Progression of Symptoms:     Cues to use and relapse triggers: none    Recovery program has been: ignored.   Accompanying Signs & Symptoms:    Side Effects: sweating and dizzyness.    Sobriety:     Status: no use since last visit.      Drug Screen: obtained.   Precipitating factors:    Triggers have been: non-existent.   Alleviating factors:    Contact with sponsor has " been: no sponsor.     Family and support system has been: helpful.   Other Therapies Tried :     Patient has been going to recovery meetings:not at all.      Patient has been participating in professional counseling/therapy: YES    [place] Board of Pharmacy Data Base Reviewed:    YES; appropriate      Pain scores:  Pain intensity on average is 3 on a scale of 0-10.     Side Effects: See above    Medications:  Current Outpatient Medications   Medication Sig Dispense Refill     albuterol (2.5 MG/3ML) 0.083% neb solution Take 1 vial (2.5 mg) by nebulization every 4 hours as needed for shortness of breath / dyspnea or wheezing 30 vial 11     albuterol (PROAIR HFA, PROVENTIL HFA, VENTOLIN HFA) 108 (90 BASE) MCG/ACT inhaler Inhale 2 puffs into the lungs every 4 hours as needed for shortness of breath / dyspnea or wheezing 1 Inhaler 0     buprenorphine HCl-naloxone HCl (SUBOXONE) 8-2 MG per film Place 1 Film under the tongue 3 times daily XDEA = [id number] 90 Film 0     calcium carbonate 500 MG tablet Take 1 tablet by mouth 2 times daily. 100 tablet 3     Cholecalciferol (VITAMIN D) 2000 UNITS tablet Take 2,000 Units by mouth daily 100 tablet 3     cyclobenzaprine (FLEXERIL) 10 MG tablet Take 1 tablet (10 mg) by mouth 3 times daily as needed for muscle spasms 90 tablet 0     EPINEPHrine (EPIPEN) 0.3 MG/0.3ML injection Inject 0.3 mLs (0.3 mg) into the muscle once as needed for anaphylaxis 2 each 1     gabapentin (NEURONTIN) 300 MG capsule TAKE 1 CAPSULE BY MOUTH THREE TIMES DAILY 270 capsule 3     IBUPROFEN 200 MG OR TABS Pt is taking 4 TABLET EVERY 4 TO 6 HOURS AS NEEDED       LORazepam (ATIVAN) 1 MG tablet TAKE 1 TABLET BY MOUTH EVERY 8 HOURS AS NEEDED FOR ANXIETY 90 tablet 5     Magnesium 250 MG tablet Take 1 tablet by mouth daily. 100 tablet 3     naloxone (NARCAN) nasal spray Spray 1 spray (4 mg) into one nostril alternating nostrils as needed for opioid reversal every 2-3 minutes until assistance arrives 0.2 mL 0      Omega-3 Fatty Acids (FISH OIL CONCENTRATE PO) Take 300 mg by mouth 3 times daily       order for DME Equipment being ordered: Rib belt 1 Device 0     tiZANidine (ZANAFLEX) 4 MG tablet TAKE ONE TABLET BY MOUTH THREE TIMES DAILY AS NEEDED FOR SHOULDER AND UPPER BACK PAIN 270 tablet 3       Medical History: any changes in medical history since they were last seen? No      OBJECTIVE:                                                    /82   Pulse 102   Temp 98.2  F (36.8  C)   Resp 18   Wt 72.6 kg (160 lb)   SpO2 99%   BMI 25.06 kg/m    Body mass index is 25.06 kg/m .     ROS:  Constitutional, neuro, ENT, endocrine, pulmonary, cardiac, gastrointestinal, genitourinary, musculoskeletal, integument and psychiatric systems are negative, except as otherwise noted.    EXAM:  GENERAL APPEARANCE: healthy, alert and no distress  EYES: Eyes grossly normal to inspection and conjunctivae and sclerae normal  SKIN: no suspicious lesions or rashes  PSYCH: mentation appears normal and affect normal/bright  MENTAL STATUS EXAM:  Appearance/Behavior: No apparent distress and Neatly groomed  Speech: Normal  Mood/Affect: normal affect  Insight: Adequate    Diagnostic Test Results:  Results for orders placed or performed in visit on 07/29/19 (from the past 24 hour(s))   Urine Drugs of Abuse Screen Panel 13   Result Value Ref Range    Cannabinoids (46-ypq-1-carboxy-9-THC) Not Detected NDET^Not Detected ng/mL    Phencyclidine (Phencyclidine) Not Detected NDET^Not Detected ng/mL    Cocaine (Benzoylecgonine) Not Detected NDET^Not Detected ng/mL    Methamphetamine (d-Methamphetamine) Not Detected NDET^Not Detected ng/mL    Opiates (Morphine) Not Detected NDET^Not Detected ng/mL    Amphetamine (d-Amphetamine) Not Detected NDET^Not Detected ng/mL    Benzodiazepines (Nordiazepam) Not Detected NDET^Not Detected ng/mL    Tricyclic Antidepressants (Desipramine) Not Detected NDET^Not Detected ng/mL    Methadone (Methadone) Not Detected  "NDET^Not Detected ng/mL    Barbiturates (Butalbital) Not Detected NDET^Not Detected ng/mL    Oxycodone (Oxycodone) Not Detected NDET^Not Detected ng/mL    Propoxyphene (Norpropoxyphene) Not Detected NDET^Not Detected ng/mL    Buprenorphine (Buprenorphine) Detected, Abnormal Result (A) NDET^Not Detected ng/mL            ASSESSMENT:                                                      OPIOID USE DISORDER - MILD  TOBACCO USE DISORDER    ENCOUNTER FOR LONG TERM USE OF HIGH RISK MEDICATION   High Risk Drug Monitoring?  YES   Drug being monitored: Suboxone    Reason for drug: Opioid Use Disorder   What is being monitored?: Dosage, Cravings, Trigger, side effects, and continued abstinence.    CHRONIC USE OF BENZOS  CATASTROPHISING/SOMATIZATION  CHRONIC LOW BACK PAIN  ANXIETY AND DEPRESSION      ICD-10-CM    1. Opioid use disorder, mild, on maintenance therapy (H) F11.10 Urine Drugs of Abuse Screen Panel 13   2. Myofascial pain M79.18    3. Chronic pain syndrome G89.4    4. Encounter for long-term (current) use of medications Z79.899 Urine Drugs of Abuse Screen Panel 13         PLAN:                                                      1. Physical Therapy:  YES, strongly recommended, however, she does not wish to engage in this at this time.   2. Clinical Health Psychologist:  YES, recommended she follow up with Sherita Arrington, however, she refuses and states he made her feel \"uncomfortable\".  She also tried meeting with [facility] in [facility], however, did not follow up with these visits.  I placed a referral for [facility] counseling services and she did schedule.  Discussed that her anxiety is unlikely to improve without formal therapy.   3. Diagnostic Studies:    1. MRI lumbar spine reviewed - she has mild DDD and is not a surgical candidate.  This was discussed.   4. Medication Management:    1. Continue Suboxone to 8mg TID.  Discussed that she should NOT be making dose changes on her own.  This is the maximum dose I will " prescribe and I anticipate dropping back down in the future. Pain relieving properties of Suboxone last 8 hours on average.   2. Flexeril 10mg TID refilled today.   5. Further procedures recommended:   1. Bilateral trochanteric bursa injections -  Can repeat PRN   6. Counseling: Counseled the patient on the importance of having a recovery program in addition to Suboxone maintenance.  She denies any addiction and is not open to doing any type of a recovery program.  I will continue to address this at follow up visits.   Also discussed having a sober support network, not isolating, being open, honest, and willing to change, avoiding triggers and managing cravings. she should continue to abstain from alcohol, benzodiazepines, THC, opioids and other drugs of abuse.  We talked about her guilt and her lack of control over the death of her ex.          MEDICATIONS:   Orders Placed This Encounter   Medications     cyclobenzaprine (FLEXERIL) 10 MG tablet     Sig: Take 1 tablet (10 mg) by mouth 3 times daily as needed for muscle spasms     Dispense:  90 tablet     Refill:  0     buprenorphine HCl-naloxone HCl (SUBOXONE) 8-2 MG per film     Sig: Place 1 Film under the tongue 3 times daily     Dispense:  90 Film     Refill:  0     NADEAN: [id number]          - Continue other medications without change    FUTURE APPOINTMENTS:       - Follow-up visit in 4 weeks    Total time spent was 30 minutes, and more than 50% of face to face time was spent in counseling and/or coordination of care.      Scarlet UribeMD Pain Management

## 2019-08-02 ENCOUNTER — TELEPHONE (OUTPATIENT)
Dept: FAMILY MEDICINE | Facility: CLINIC | Age: 44
End: 2019-08-02

## 2019-08-02 NOTE — TELEPHONE ENCOUNTER
I have not seen patient since October 2017.  This form needs to be directed to her current PCP - please forward to PCP or return to sender with instructions to forward.  DAVID

## 2019-08-02 NOTE — TELEPHONE ENCOUNTER
A Disability Handicap status form arrived via mail.  The form was sent from   [employer].   [address] [place] [address]  [address], [place] [address]     Attention:  Sharla Palumbo Please fax to:  [phone]

## 2019-08-06 NOTE — TELEPHONE ENCOUNTER
Faxed signed disability handicap status verification form to [facility].  Fax # [phone]        Faxed signed disability handicap status [facility].  Fax # [phone] on 08/16/2019      .Brittni Bullard MA

## 2019-08-26 ENCOUNTER — OFFICE VISIT (OUTPATIENT)
Dept: ADDICTION MEDICINE | Facility: CLINIC | Age: 44
End: 2019-08-26
Payer: MEDICARE

## 2019-08-26 VITALS
SYSTOLIC BLOOD PRESSURE: 104 MMHG | OXYGEN SATURATION: 99 % | DIASTOLIC BLOOD PRESSURE: 68 MMHG | HEART RATE: 102 BPM | RESPIRATION RATE: 20 BRPM | BODY MASS INDEX: 25.22 KG/M2 | TEMPERATURE: 97.6 F | WEIGHT: 161 LBS

## 2019-08-26 DIAGNOSIS — F17.200 TOBACCO USE DISORDER: ICD-10-CM

## 2019-08-26 DIAGNOSIS — F41.1 GENERALIZED ANXIETY DISORDER: ICD-10-CM

## 2019-08-26 DIAGNOSIS — F11.20 CONTINUOUS OPIOID DEPENDENCE (H): ICD-10-CM

## 2019-08-26 DIAGNOSIS — F11.10 OPIOID USE DISORDER, MILD, ON MAINTENANCE THERAPY (H): Primary | ICD-10-CM

## 2019-08-26 DIAGNOSIS — Z79.899 ENCOUNTER FOR LONG-TERM (CURRENT) USE OF MEDICATIONS: ICD-10-CM

## 2019-08-26 DIAGNOSIS — G89.4 CHRONIC PAIN SYNDROME: ICD-10-CM

## 2019-08-26 PROCEDURE — 80306 DRUG TEST PRSMV INSTRMNT: CPT | Performed by: ANESTHESIOLOGY

## 2019-08-26 PROCEDURE — 99215 OFFICE O/P EST HI 40 MIN: CPT | Performed by: ANESTHESIOLOGY

## 2019-08-26 RX ORDER — BUPRENORPHINE AND NALOXONE 8; 2 MG/1; MG/1
1 FILM, SOLUBLE BUCCAL; SUBLINGUAL 3 TIMES DAILY
Qty: 90 FILM | Refills: 0 | Status: SHIPPED | OUTPATIENT
Start: 2019-08-26 | End: 2019-09-24

## 2019-08-26 ASSESSMENT — PATIENT HEALTH QUESTIONNAIRE - PHQ9: SUM OF ALL RESPONSES TO PHQ QUESTIONS 1-9: 12

## 2019-08-26 NOTE — PROGRESS NOTES
[facility]    Date of visit: 8/26/2019    Chief complaint:   Chief Complaint   Patient presents with     Pain       Interval history:  Ria Nj is a 44 year old female here for Chronic Pain and Addiction - Suboxone Maintenance follow up.   Last visit: follow up: 7/29/2019    CURRENT DOSE: Suboxone 8mg film TID   BRIEF HPI: 44 year old female with chronic low back pain which began after the birth of her son who is now 9 years old ([place]).  She is on disability for pain.  She has a history of chronic opioid use for over 8 years with escalating doses (175 morphine equivalents), overuse and poor pain relief. She qualifies for a diagnosis of OUD - mild. Started on suboxone after initial consult on 9/10/2018 and doing well.  PMHx is significant for PTSD, childhood trauma, anxiety and depression       Since her last visit, Ria Nj reports:  - Doing well  - Parked at a meter outside and did not pay so she is in a hurry today.   - She is ready for her son to go back to school next week.  The summer has been long and expensive.   - Her anxiety has been out of control lately.  She is not sleeping at night and she is having panic attacks.   - She is taking Ativan TID daily and this has not been really helpful.  She was on Klonopin in the past and thinks this worked better for her anxiety.  She has been overusing these and runs out before she can get them refilled.   - She would like a refill on the flexeril.  She is not taking the tizanidine at the same time.   - She was at [place] over the weekend.  Her son likes fishing.   - She has been going to the community pool and throwing around a football every day. She enjoys hanging out with him and tries to get him out of the house and off the computer (fornight) everyday.   - The father of her son hung himself.  She was living with him for the last 10 years.  He was using  "methamphetamines, crack and alcohol and hallucinating.  Her son thinks he overdosed on drugs. She is worried about questions he may ask in the future related to this and is worried he will think it is his fault.    - She did not go and get therapy despite encouragement to do so.  She does not want to \"open those wounds\".    - She has been having her period with excessive bleeding. She went to another physician OB/GYN to have a pap smear done.  They have not figured out why she has this excessive bleeding.   - S/P bilateral trochanteric bursa injections on 1/14/2019.  These were not helpful.  Her hips are still painful all the time and worse when she is sleeping.    The pain began over a year ago.  She does not want to do PT because she doesn't have time.   - She denies overusing her Suboxone this month.   She knows she is on the maximum dose currently and will not be able to get more if she runs out early as medicare prevents people from paying cash.   - She is not doing any type of a recovery program but has started going to [facility].   - Had a bad experience with the pain psychologist and does not want to engage in alternative treatments for her chronic pain.  \"my pain is real\" \"that is not going to help\"  - side effects include sweating and some dizziness      Current pain medications:  Suboxone films 8mg TID  Gabapentin 600mg BID  Ativan 1mg TID  Flexeril 10mg TID                            Status since last visit:    Since last visit patient has been: stable.     Intensity:     There has been: no craving.      Suboxone Dose: adequate  Progression of Symptoms:     Cues to use and relapse triggers: none    Recovery program has been: ignored.   Accompanying Signs & Symptoms:    Side Effects: sweating and dizzyness.    Sobriety:     Status: no use since last visit.      Drug Screen: obtained.   Precipitating factors:    Triggers have been: non-existent.   Alleviating factors:    Contact with sponsor has been: no " sponsor.     Family and support system has been: helpful.   Other Therapies Tried :     Patient has been going to recovery meetings:not at all.      Patient has been participating in professional counseling/therapy: YES    [place] Board of Pharmacy Data Base Reviewed:    YES; appropriate      Pain scores:  Pain intensity on average is 3 on a scale of 0-10.     Side Effects: See above    Medications:  Current Outpatient Medications   Medication Sig Dispense Refill     albuterol (2.5 MG/3ML) 0.083% neb solution Take 1 vial (2.5 mg) by nebulization every 4 hours as needed for shortness of breath / dyspnea or wheezing 30 vial 11     albuterol (PROAIR HFA, PROVENTIL HFA, VENTOLIN HFA) 108 (90 BASE) MCG/ACT inhaler Inhale 2 puffs into the lungs every 4 hours as needed for shortness of breath / dyspnea or wheezing 1 Inhaler 0     buprenorphine HCl-naloxone HCl (SUBOXONE) 8-2 MG per film Place 1 Film under the tongue 3 times daily 90 Film 0     calcium carbonate 500 MG tablet Take 1 tablet by mouth 2 times daily. 100 tablet 3     Cholecalciferol (VITAMIN D) 2000 UNITS tablet Take 2,000 Units by mouth daily 100 tablet 3     cyclobenzaprine (FLEXERIL) 10 MG tablet Take 1 tablet (10 mg) by mouth 3 times daily as needed for muscle spasms 90 tablet 0     EPINEPHrine (EPIPEN) 0.3 MG/0.3ML injection Inject 0.3 mLs (0.3 mg) into the muscle once as needed for anaphylaxis 2 each 1     gabapentin (NEURONTIN) 300 MG capsule TAKE 1 CAPSULE BY MOUTH THREE TIMES DAILY 270 capsule 3     IBUPROFEN 200 MG OR TABS Pt is taking 4 TABLET EVERY 4 TO 6 HOURS AS NEEDED       LORazepam (ATIVAN) 1 MG tablet TAKE 1 TABLET BY MOUTH EVERY 8 HOURS AS NEEDED FOR ANXIETY 90 tablet 5     Magnesium 250 MG tablet Take 1 tablet by mouth daily. 100 tablet 3     naloxone (NARCAN) nasal spray Spray 1 spray (4 mg) into one nostril alternating nostrils as needed for opioid reversal every 2-3 minutes until assistance arrives 0.2 mL 0     Omega-3 Fatty Acids (FISH  OIL CONCENTRATE PO) Take 300 mg by mouth 3 times daily       order for DME Equipment being ordered: Rib belt 1 Device 0     tiZANidine (ZANAFLEX) 4 MG tablet TAKE ONE TABLET BY MOUTH THREE TIMES DAILY AS NEEDED FOR SHOULDER AND UPPER BACK PAIN 270 tablet 3       Medical History: any changes in medical history since they were last seen? No      OBJECTIVE:                                                    /68   Pulse 102   Temp 97.6  F (36.4  C)   Resp 20   Wt 73 kg (161 lb)   SpO2 99%   BMI 25.22 kg/m    Body mass index is 25.22 kg/m .     ROS:  Constitutional, neuro, ENT, endocrine, pulmonary, cardiac, gastrointestinal, genitourinary, musculoskeletal, integument and psychiatric systems are negative, except as otherwise noted.    EXAM:  GENERAL APPEARANCE: healthy, alert and no distress  EYES: Eyes grossly normal to inspection and conjunctivae and sclerae normal  SKIN: no suspicious lesions or rashes  PSYCH: mentation appears normal and affect normal/bright  MENTAL STATUS EXAM:  Appearance/Behavior: No apparent distress and Neatly groomed  Speech: Normal  Mood/Affect: normal affect  Insight: Adequate    Diagnostic Test Results:  Results for orders placed or performed in visit on 08/26/19 (from the past 24 hour(s))   Urine Drugs of Abuse Screen Panel 13   Result Value Ref Range    Cannabinoids (09-dpw-3-carboxy-9-THC) Not Detected NDET^Not Detected ng/mL    Phencyclidine (Phencyclidine) Not Detected NDET^Not Detected ng/mL    Cocaine (Benzoylecgonine) Not Detected NDET^Not Detected ng/mL    Methamphetamine (d-Methamphetamine) Not Detected NDET^Not Detected ng/mL    Opiates (Morphine) Not Detected NDET^Not Detected ng/mL    Amphetamine (d-Amphetamine) Not Detected NDET^Not Detected ng/mL    Benzodiazepines (Nordiazepam) Detected, Abnormal Result (A) NDET^Not Detected ng/mL    Tricyclic Antidepressants (Desipramine) Not Detected NDET^Not Detected ng/mL    Methadone (Methadone) Not Detected NDET^Not Detected  "ng/mL    Barbiturates (Butalbital) Not Detected NDET^Not Detected ng/mL    Oxycodone (Oxycodone) Not Detected NDET^Not Detected ng/mL    Propoxyphene (Norpropoxyphene) Not Detected NDET^Not Detected ng/mL    Buprenorphine (Buprenorphine) Detected, Abnormal Result (A) NDET^Not Detected ng/mL            ASSESSMENT:                                                      OPIOID USE DISORDER - MILD  TOBACCO USE DISORDER    ENCOUNTER FOR LONG TERM USE OF HIGH RISK MEDICATION   High Risk Drug Monitoring?  YES   Drug being monitored: Suboxone    Reason for drug: Opioid Use Disorder   What is being monitored?: Dosage, Cravings, Trigger, side effects, and continued abstinence.    CHRONIC USE OF BENZOS  CATASTROPHISING/SOMATIZATION  CHRONIC LOW BACK PAIN  ANXIETY AND DEPRESSION      ICD-10-CM    1. Opioid use disorder, mild, on maintenance therapy (H) F11.10 Urine Drugs of Abuse Screen Panel 13     buprenorphine HCl-naloxone HCl (SUBOXONE) 8-2 MG per film   2. Encounter for long-term (current) use of medications Z79.899 Urine Drugs of Abuse Screen Panel 13     buprenorphine HCl-naloxone HCl (SUBOXONE) 8-2 MG per film   3. Continuous opioid dependence (H) F11.20    4. Chronic pain syndrome G89.4    5. Generalized anxiety disorder F41.1    6. Tobacco use disorder F17.200          PLAN:                                                      1. Physical Therapy:  YES, strongly recommended, however, she does not wish to engage in this at this time.   2. Clinical Health Psychologist:  YES, recommended she follow up with Sherita Arrington, however, she refuses and states he made her feel \"uncomfortable\".  She also tried meeting with [facility], however, did not follow up with these visits.  I placed a referral for [facility] counseling services and she did NOT schedule.  Discussed that her anxiety is unlikely to improve without formal therapy.   3. Diagnostic Studies:    1. MRI lumbar spine reviewed - she has mild DDD and is not a surgical " candidate.  This was discussed.   4. Medication Management:    1. Continue Suboxone to 8mg TID.  Discussed that she should NOT be making dose changes on her own.  This is the maximum dose I will prescribe and I anticipate dropping back down in the future. Pain relieving properties of Suboxone last 8 hours on average.   2. Flexeril 10mg TID  5. Further procedures recommended:   1. Bilateral trochanteric bursa injections -  Can repeat PRN   6. Counseling: Counseled the patient on the importance of having a recovery program in addition to Suboxone maintenance.  She denies any addiction and is not open to doing any type of a recovery program.  I will continue to address this at follow up visits.   Also discussed having a sober support network, not isolating, being open, honest, and willing to change, avoiding triggers and managing cravings. she should continue to abstain from alcohol, benzodiazepines, THC, opioids and other drugs of abuse.  We talked about her guilt and her lack of control over the death of her ex.          MEDICATIONS:   Orders Placed This Encounter   Medications     buprenorphine HCl-naloxone HCl (SUBOXONE) 8-2 MG per film     Sig: Place 1 Film under the tongue 3 times daily     Dispense:  90 Film     Refill:  0     NADEAN: [id number]          - Continue other medications without change    FUTURE APPOINTMENTS:       - Follow-up visit in 4 weeks    Total time spent was 30 minutes, and more than 50% of face to face time was spent in counseling and/or coordination of care.      Scarlet Jennings Pain Management

## 2019-09-24 ENCOUNTER — TELEPHONE (OUTPATIENT)
Dept: ADDICTION MEDICINE | Facility: CLINIC | Age: 44
End: 2019-09-24

## 2019-09-24 DIAGNOSIS — F11.10 OPIOID USE DISORDER, MILD, ON MAINTENANCE THERAPY (H): ICD-10-CM

## 2019-09-24 DIAGNOSIS — Z79.899 ENCOUNTER FOR LONG-TERM (CURRENT) USE OF MEDICATIONS: ICD-10-CM

## 2019-09-24 RX ORDER — BUPRENORPHINE AND NALOXONE 8; 2 MG/1; MG/1
1 FILM, SOLUBLE BUCCAL; SUBLINGUAL 3 TIMES DAILY
Qty: 81 FILM | Refills: 0 | Status: SHIPPED | OUTPATIENT
Start: 2019-09-24 | End: 2019-10-22

## 2019-09-24 NOTE — TELEPHONE ENCOUNTER
Reason for Call:  Medication Request due to: out of medication early due to appt being scheduled 8 wks instead 4 weeks    Name of the pharmacy and phone number for the current request:  [facility] - [phone]    Request for bridge of: buprenorphine HCl-naloxone HCl (SUBOXONE) 8-2 MG per film    Other Information:   Taking as prescribed: Yes  Date/Time/ amount of last dose: LD=FD on 9/25/19    Pt informed to follow up with pharmacy for status of refill as addiction RN will only reach out if there are any issues or questions and will be addressed within one business day.  Pt also informed that this request for a bridge is simply a request and doesn't guarantee the medication will be filled.    Can we leave a detailed message on this number? Yes    Phone number patient can be reached at: [phone]    Best Time: anytime

## 2019-09-24 NOTE — TELEPHONE ENCOUNTER
Medication pended for 27 day supply.  Routing to provider.      Refill for: buprenorphine HCl-naloxone HCl (SUBOXONE) 8-2mg    Last Appointment: 8/26/19    Next Appointment: 10/22/19    No Shows/Cancellations since last appointment: none    Last Refill in Epic (date and amount/how many days):    Disp Refills Start End CHARU   buprenorphine HCl-naloxone HCl (SUBOXONE) 8-2 MG per film 90 Film 0 8/26/2019  No   Sig - Route: Place 1 Film under the tongue 3 times daily - Sublingual     Most Recent UDS results: POS: benzodiazepines and buprenorphine on 8/26     reviewed and summarized below:   Fill Date Drug      Qty  Days  Prescriber   08/26/2019  Buprenorp-Nalox 8-2 Mg Sl Film  90 30  An Antonio Kwon RN  09/24/19  11:35 AM

## 2019-10-22 ENCOUNTER — OFFICE VISIT (OUTPATIENT)
Dept: ADDICTION MEDICINE | Facility: CLINIC | Age: 44
End: 2019-10-22
Payer: MEDICARE

## 2019-10-22 VITALS
HEART RATE: 101 BPM | BODY MASS INDEX: 23.26 KG/M2 | SYSTOLIC BLOOD PRESSURE: 102 MMHG | DIASTOLIC BLOOD PRESSURE: 62 MMHG | TEMPERATURE: 97.9 F | OXYGEN SATURATION: 100 % | WEIGHT: 148.5 LBS

## 2019-10-22 DIAGNOSIS — F11.10 OPIOID USE DISORDER, MILD, ON MAINTENANCE THERAPY (H): Primary | ICD-10-CM

## 2019-10-22 DIAGNOSIS — M54.50 CHRONIC BILATERAL LOW BACK PAIN WITHOUT SCIATICA: ICD-10-CM

## 2019-10-22 DIAGNOSIS — M79.18 MYOFASCIAL PAIN: ICD-10-CM

## 2019-10-22 DIAGNOSIS — Z79.899 ENCOUNTER FOR LONG-TERM (CURRENT) USE OF MEDICATIONS: ICD-10-CM

## 2019-10-22 DIAGNOSIS — G89.29 CHRONIC BILATERAL LOW BACK PAIN WITHOUT SCIATICA: ICD-10-CM

## 2019-10-22 DIAGNOSIS — G89.4 CHRONIC PAIN SYNDROME: ICD-10-CM

## 2019-10-22 DIAGNOSIS — F17.200 TOBACCO USE DISORDER: ICD-10-CM

## 2019-10-22 LAB
AMPHETAMINES UR QL: ABNORMAL NG/ML
BARBITURATES UR QL SCN: NOT DETECTED NG/ML
BENZODIAZ UR QL SCN: ABNORMAL NG/ML
BUPRENORPHINE UR QL: ABNORMAL NG/ML
CANNABINOIDS UR QL: NOT DETECTED NG/ML
COCAINE UR QL SCN: NOT DETECTED NG/ML
D-METHAMPHET UR QL: NOT DETECTED NG/ML
METHADONE UR QL SCN: NOT DETECTED NG/ML
OPIATES UR QL SCN: NOT DETECTED NG/ML
OXYCODONE UR QL SCN: NOT DETECTED NG/ML
PCP UR QL SCN: NOT DETECTED NG/ML
PROPOXYPH UR QL: NOT DETECTED NG/ML
TRICYCLICS UR QL SCN: NOT DETECTED NG/ML

## 2019-10-22 PROCEDURE — 99000 SPECIMEN HANDLING OFFICE-LAB: CPT | Performed by: ANESTHESIOLOGY

## 2019-10-22 PROCEDURE — 99215 OFFICE O/P EST HI 40 MIN: CPT | Performed by: ANESTHESIOLOGY

## 2019-10-22 PROCEDURE — 80306 DRUG TEST PRSMV INSTRMNT: CPT | Performed by: ANESTHESIOLOGY

## 2019-10-22 PROCEDURE — 80324 DRUG SCREEN AMPHETAMINES 1/2: CPT | Mod: 90 | Performed by: ANESTHESIOLOGY

## 2019-10-22 RX ORDER — BUPRENORPHINE AND NALOXONE 8; 2 MG/1; MG/1
1 FILM, SOLUBLE BUCCAL; SUBLINGUAL 3 TIMES DAILY
Qty: 90 FILM | Refills: 0 | Status: SHIPPED | OUTPATIENT
Start: 2019-10-22 | End: 2019-11-18

## 2019-10-26 LAB
AMPHET UR CFM-MCNC: 129 NG/ML
AMPHET UR QL CFM: NORMAL
D-METHAMPHET UR CFM-MCNC: >65 %
L-METHAMPHET UR CFM-MCNC: <35 %
METHAMPHET UR CFM-MCNC: 90 NG/ML

## 2019-11-18 ENCOUNTER — OFFICE VISIT (OUTPATIENT)
Dept: ADDICTION MEDICINE | Facility: CLINIC | Age: 44
End: 2019-11-18
Payer: MEDICARE

## 2019-11-18 VITALS
SYSTOLIC BLOOD PRESSURE: 114 MMHG | WEIGHT: 153 LBS | TEMPERATURE: 97.1 F | RESPIRATION RATE: 18 BRPM | OXYGEN SATURATION: 99 % | BODY MASS INDEX: 23.96 KG/M2 | HEART RATE: 101 BPM | DIASTOLIC BLOOD PRESSURE: 72 MMHG

## 2019-11-18 DIAGNOSIS — F41.1 GENERALIZED ANXIETY DISORDER: ICD-10-CM

## 2019-11-18 DIAGNOSIS — F11.10 OPIOID USE DISORDER, MILD, ON MAINTENANCE THERAPY (H): Primary | ICD-10-CM

## 2019-11-18 DIAGNOSIS — Z79.899 ENCOUNTER FOR LONG-TERM (CURRENT) USE OF MEDICATIONS: ICD-10-CM

## 2019-11-18 DIAGNOSIS — F17.200 TOBACCO USE DISORDER: ICD-10-CM

## 2019-11-18 DIAGNOSIS — G89.4 CHRONIC PAIN SYNDROME: ICD-10-CM

## 2019-11-18 PROCEDURE — 99000 SPECIMEN HANDLING OFFICE-LAB: CPT | Performed by: ANESTHESIOLOGY

## 2019-11-18 PROCEDURE — 99214 OFFICE O/P EST MOD 30 MIN: CPT | Performed by: ANESTHESIOLOGY

## 2019-11-18 PROCEDURE — 80359 METHYLENEDIOXYAMPHETAMINES: CPT | Mod: 90 | Performed by: ANESTHESIOLOGY

## 2019-11-18 PROCEDURE — 80306 DRUG TEST PRSMV INSTRMNT: CPT | Performed by: ANESTHESIOLOGY

## 2019-11-18 RX ORDER — BUPRENORPHINE AND NALOXONE 8; 2 MG/1; MG/1
1 FILM, SOLUBLE BUCCAL; SUBLINGUAL 3 TIMES DAILY
Qty: 90 FILM | Refills: 2 | Status: SHIPPED | OUTPATIENT
Start: 2019-11-18 | End: 2019-12-16

## 2019-11-18 NOTE — PROGRESS NOTES
[facility]    Date of visit: 11/18/2019    Chief complaint:   Chief Complaint   Patient presents with     Addiction Problem       Interval history:  Ria Nj is a 44 year old female here for Chronic Pain and Addiction - Suboxone Maintenance follow up.   Initial visit: 9/10/2018, Last follow up: 10/22/2019    CURRENT DOSE: Suboxone 8mg film TID   BRIEF HPI: 44 year old female with chronic low back pain which began after the birth of her son who is now 9 years old (Phuongnailaura).  She is on disability for pain.  She has a history of chronic opioid use for over 8 years with escalating doses (175 morphine equivalents), overuse and poor pain relief. She qualifies for a diagnosis of OUD - mild. Started on suboxone after initial consult on 9/10/2018 and doing well.  PMHx is significant for PTSD, childhood trauma, anxiety and depression       Since her last visit, Ria Nj reports:  - Doing well  - She is planning to move to [facility] in the next couple weeks.  She has friends there and has found a school for her son.   - She is living with her mother and she does not let her be a happy person.   - She is asking to see Joshua Rowell JOHNNY here at [facility] to discuss grief.  She was referred in the past but did not follow up at that time.   - She is happy her son is back in school.  He is in 4th grade.  It was a long summer with him home.    - Her anxiety continues to bother her.  She is not sleeping well at night and she is having panic attacks.   - She is taking Ativan TID daily and this has not been really helpful.  She was on Klonopin in the past and thinks this worked better for her anxiety.  She plans to talk with her PCP about this at her next follow up.   - She thinks flexeril works better for her muscle aches then tizanidine and continues to take this.   - The father of her son hung himself.  She was living with him for the last  "10 years.  He was using methamphetamines, crack and alcohol and hallucinating.  Her son thinks he overdosed on drugs. She is worried about questions he may ask in the future related to this and is worried he will think it is his fault.    - She did not go and get therapy despite encouragement to do so.  She does not want to \"open those wounds\".    - She has been having her period with excessive bleeding. She went to another physician OB/GYN to have a pap smear done.  They have not figured out why she has this excessive bleeding.   - S/P bilateral trochanteric bursa injections on 1/14/2019.  These were not helpful.  Her hips are still painful all the time and worse when she is sleeping.    The pain began over a year ago.  She does not want to do PT because she doesn't have time.   - She denies overusing her Suboxone.   She knows she is on the maximum dose currently and will not be able to get more if she runs out early as medicare prevents people from paying cash.   - She is not doing any type of a recovery program but has started going to [facility].       Current pain medications:  Suboxone films 8mg TID  Gabapentin 600mg BID  Ativan 1mg TID  Flexeril 10mg TID                            Status since last visit:    Since last visit patient has been: stable.     Intensity:     There has been: no craving.      Suboxone Dose: adequate  Progression of Symptoms:     Cues to use and relapse triggers: none    Recovery program has been: ignored.   Accompanying Signs & Symptoms:    Side Effects: sweating and dizzyness.    Sobriety:     Status: no use since last visit.      Drug Screen: obtained.   Precipitating factors:    Triggers have been: non-existent.   Alleviating factors:    Contact with sponsor has been: no sponsor.     Family and support system has been: helpful.   Other Therapies Tried :     Patient has been going to recovery meetings:not at all.      Patient has been participating in professional counseling/therapy: " YES    [place] Board  Pharmacy Data Base Reviewed:    YES; appropriate      Pain scores:  Pain intensity on average is 3 on a scale of 0-10.     Side Effects: See above    Medications:  Current Outpatient Medications   Medication Sig Dispense Refill     albuterol (2.5 MG/3ML) 0.083% neb solution Take 1 vial (2.5 mg) by nebulization every 4 hours as needed for shortness of breath / dyspnea or wheezing 30 vial 11     albuterol (PROAIR HFA, PROVENTIL HFA, VENTOLIN HFA) 108 (90 BASE) MCG/ACT inhaler Inhale 2 puffs into the lungs every 4 hours as needed for shortness of breath / dyspnea or wheezing 1 Inhaler 0     buprenorphine HCl-naloxone HCl (SUBOXONE) 8-2 MG per film Place 1 Film under the tongue 3 times daily 90 Film 0     calcium carbonate 500 MG tablet Take 1 tablet by mouth 2 times daily. 100 tablet 3     Cholecalciferol (VITAMIN D) 2000 UNITS tablet Take 2,000 Units by mouth daily 100 tablet 3     cyclobenzaprine (FLEXERIL) 10 MG tablet Take 1 tablet (10 mg) by mouth 3 times daily as needed for muscle spasms 90 tablet 0     EPINEPHrine (EPIPEN) 0.3 MG/0.3ML injection Inject 0.3 mLs (0.3 mg) into the muscle once as needed for anaphylaxis 2 each 1     gabapentin (NEURONTIN) 300 MG capsule TAKE 1 CAPSULE BY MOUTH THREE TIMES DAILY 270 capsule 3     IBUPROFEN 200 MG OR TABS Pt is taking 4 TABLET EVERY 4 TO 6 HOURS AS NEEDED       LORazepam (ATIVAN) 1 MG tablet TAKE 1 TABLET BY MOUTH EVERY 8 HOURS AS NEEDED FOR ANXIETY 90 tablet 5     Magnesium 250 MG tablet Take 1 tablet by mouth daily. 100 tablet 3     naloxone (NARCAN) nasal spray Spray 1 spray (4 mg) into one nostril alternating nostrils as needed for opioid reversal every 2-3 minutes until assistance arrives 0.2 mL 0     Omega-3 Fatty Acids (FISH OIL CONCENTRATE PO) Take 300 mg by mouth 3 times daily       order for DME Equipment being ordered: Rib belt 1 Device 0       Medical History: any changes in medical history since they were last seen?  No      OBJECTIVE:                                                    /72   Pulse 101   Temp 97.1  F (36.2  C)   Resp 18   Wt 69.4 kg (153 lb)   SpO2 99%   BMI 23.96 kg/m    Body mass index is 23.96 kg/m .     ROS:  Constitutional, neuro, ENT, endocrine, pulmonary, cardiac, gastrointestinal, genitourinary, musculoskeletal, integument and psychiatric systems are negative, except as otherwise noted.    EXAM:  GENERAL APPEARANCE: healthy, alert and no distress  EYES: Eyes grossly normal to inspection and conjunctivae and sclerae normal  SKIN: no suspicious lesions or rashes  PSYCH: mentation appears normal and affect normal/bright  MENTAL STATUS EXAM:  Appearance/Behavior: No apparent distress and Neatly groomed  Speech: Normal  Mood/Affect: normal affect  Insight: Adequate    Diagnostic Test Results:  Results for orders placed or performed in visit on 11/18/19 (from the past 24 hour(s))   Urine Drugs of Abuse Screen Panel 13   Result Value Ref Range    Cannabinoids (46-wbh-8-carboxy-9-THC) Not Detected NDET^Not Detected ng/mL    Phencyclidine (Phencyclidine) Not Detected NDET^Not Detected ng/mL    Cocaine (Benzoylecgonine) Not Detected NDET^Not Detected ng/mL    Methamphetamine (d-Methamphetamine) Detected, Abnormal Result (A) NDET^Not Detected ng/mL    Opiates (Morphine) Not Detected NDET^Not Detected ng/mL    Amphetamine (d-Amphetamine) Detected, Abnormal Result (A) NDET^Not Detected ng/mL    Benzodiazepines (Nordiazepam) Detected, Abnormal Result (A) NDET^Not Detected ng/mL    Tricyclic Antidepressants (Desipramine) Not Detected NDET^Not Detected ng/mL    Methadone (Methadone) Not Detected NDET^Not Detected ng/mL    Barbiturates (Butalbital) Not Detected NDET^Not Detected ng/mL    Oxycodone (Oxycodone) Not Detected NDET^Not Detected ng/mL    Propoxyphene (Norpropoxyphene) Not Detected NDET^Not Detected ng/mL    Buprenorphine (Buprenorphine) Detected, Abnormal Result (A) NDET^Not Detected ng/mL     "        ASSESSMENT:                                                      OPIOID USE DISORDER - MILD  TOBACCO USE DISORDER    ENCOUNTER FOR LONG TERM USE OF HIGH RISK MEDICATION   High Risk Drug Monitoring?  YES   Drug being monitored: Suboxone    Reason for drug: Opioid Use Disorder   What is being monitored?: Dosage, Cravings, Trigger, side effects, and continued abstinence.    CHRONIC USE OF BENZOS  CATASTROPHISING/SOMATIZATION  CHRONIC LOW BACK PAIN  ANXIETY AND DEPRESSION      ICD-10-CM    1. Opioid use disorder, mild, on maintenance therapy (H) F11.10 Urine Drugs of Abuse Screen Panel 13     CANCELED: Urine Drugs of Abuse Screen Panel 13   2. Encounter for long-term (current) use of medications Z79.899 Urine Drugs of Abuse Screen Panel 13     CANCELED: Urine Drugs of Abuse Screen Panel 13         PLAN:                                                      1. Physical Therapy:  YES, strongly recommended, however, she does not wish to engage in this at this time.   2. Clinical Health Psychologist:  YES, recommended she follow up with Sherita Arrington, however, she refuses and states he made her feel \"uncomfortable\".  I placed a referral for [place] counseling services and she did NOT schedule.  Discussed that her anxiety is unlikely to improve without formal therapy.  She is willing to see a [facility] here (female)  3. Diagnostic Studies:    1. MRI lumbar spine reviewed - she has mild DDD and is not a surgical candidate.  This was discussed.   4. Medication Management:    1. Continue Suboxone to 8mg TID.  Discussed that she should NOT be making dose changes on her own.  This is the maximum dose I will prescribe and I anticipate dropping back down in the future. Pain relieving properties of Suboxone last 8 hours on average.   2. Flexeril 10mg TID  5. Further procedures recommended:   1. Bilateral trochanteric bursa injections -  Can repeat PRN   6. Counseling: Counseled the patient on the importance of having a recovery " program in addition to Suboxone maintenance.  She denies any addiction and is not open to doing any type of a recovery program.  I will continue to address this at follow up visits.   Also discussed having a sober support network, not isolating, being open, honest, and willing to change, avoiding triggers and managing cravings. she should continue to abstain from alcohol, benzodiazepines, THC, opioids and other drugs of abuse.  We talked about her guilt and her lack of control over the death of her ex.        MEDICATIONS:   Orders Placed This Encounter   Medications     buprenorphine HCl-naloxone HCl (SUBOXONE) 8-2 MG per film     Sig: Place 1 Film under the tongue 3 times daily     Dispense:  90 Film     Refill:  2     NADEAN: [id number]          - Continue other medications without change    OTHER:   - UDS was positive for methamphetamines AGAIN today.  She denies any use again so this was sent to the lab for confirmatory testing.   - Referral placed for her to see Joshua in the [facility].     FUTURE APPOINTMENTS:       - Follow-up visit in 12 weeks - she was given a prescription for 3 months so she can find a new provider in [place].  I also showed her how to use the [facility] website to search for providers that are suboxone waivered.     Total time spent was 30 minutes, and more than 50% of face to face time was spent in counseling and/or coordination of care.      [facility]

## 2019-11-21 ENCOUNTER — TELEPHONE (OUTPATIENT)
Dept: BEHAVIORAL HEALTH | Facility: CLINIC | Age: 44
End: 2019-11-21

## 2019-11-21 NOTE — TELEPHONE ENCOUNTER
C called patient to follow up on missed appointment. [facility] tried to leave a message but patient's voicemail is full.

## 2019-11-23 LAB
AMPHET UR CFM-MCNC: 1356 NG/ML
AMPHET UR QL CFM: NORMAL
D-METHAMPHET UR CFM-MCNC: >96 %
L-METHAMPHET UR CFM-MCNC: <4 %
METHAMPHET UR CFM-MCNC: 1220 NG/ML

## 2019-11-29 DIAGNOSIS — F41.1 GENERALIZED ANXIETY DISORDER: ICD-10-CM

## 2019-11-29 DIAGNOSIS — G89.29 CHRONIC BILATERAL LOW BACK PAIN WITHOUT SCIATICA: ICD-10-CM

## 2019-11-29 DIAGNOSIS — M54.50 CHRONIC BILATERAL LOW BACK PAIN WITHOUT SCIATICA: ICD-10-CM

## 2019-11-29 RX ORDER — LORAZEPAM 1 MG/1
TABLET ORAL
Qty: 90 TABLET | Refills: 5 | Status: CANCELLED | OUTPATIENT
Start: 2019-11-29

## 2019-11-29 NOTE — TELEPHONE ENCOUNTER
Requested Prescriptions   Pending Prescriptions Disp Refills     tiZANidine (ZANAFLEX) 4 MG tablet [Pharmacy Med Name: TIZANIDINE 4MG TABLETS] 270 tablet 0     Sig: TAKE ONE TABLET BY MOUTH THREE TIMES DAILY AS NEEDED FOR SHOULDER AND UPPER BACK PAIN      This medication is discontinued     Last Written Prescription Date:  12/17/2018  Last Fill Quantity: 270 tabs,   # refills: 3  Last Office Visit: 7/29/2019  Future Office visit:    Next 5 appointments (look out 90 days)    Jan 20, 2020  2:00 PM CST  Return Visit with Scarlet Uribe MD  [facility] ([facility]) [address]  [address]  [facility] [id number] [phone]           Routing refill request to provider for review/approval because:  Drug not on the [facility], [facility] or [facility] refill protocol or controlled substance

## 2019-11-30 NOTE — TELEPHONE ENCOUNTER
Requested Prescriptions   Pending Prescriptions Disp Refills     LORazepam (ATIVAN) 1 MG tablet 90 tablet 5     Sig: TAKE 1 TABLET BY MOUTH EVERY 8 HOURS AS NEEDED FOR ANXIETY       There is no refill protocol information for this order              Last Written Prescription Date:  7/8/19  Last Fill Quantity: 90,   # refills: 5  Last Office Visit: 7/29/19  Future Office visit:    Next 5 appointments (look out 90 days)    Dec 02, 2019  3:20 PM CST  Office Visit with Kamilah Meyer PA-C  [facility] ([facility]) [address] [place] [id number] [phone]   Jan 20, 2020  2:00 PM CST  Return Visit with Scarlet Uribe MD  [facility] ([facility]) [address] [place] [id number] [phone]           Routing refill request to provider for review/approval because:  Drug not on the G, P or  Health refill protocol or controlled substance  Writer notes refills should last through 1/8/19 - no location of pharmacy on rx order - writer notes in refill encounter 7/8 rx was walked to [facility] pharmacy

## 2019-12-01 NOTE — TELEPHONE ENCOUNTER
Recommend e- visit or visit for new prescription.  Not on med list.     Cyclobenzaprine is on med list.     Along with lorazepam - all sedating mediations which increase risk of overdose when combined so we need to clarify this.     Joel Wegener,MD

## 2019-12-01 NOTE — TELEPHONE ENCOUNTER
Visit needed for controlled substance refill and also the tizanidine request (in a separate encounter. )     It actually should be in person since it has been a year since I have seen her in person. She could combine this with annual wellness visit if desired.     Joel Wegener,MD

## 2019-12-16 ENCOUNTER — OFFICE VISIT (OUTPATIENT)
Dept: ADDICTION MEDICINE | Facility: CLINIC | Age: 44
End: 2019-12-16
Payer: MEDICARE

## 2019-12-16 VITALS
SYSTOLIC BLOOD PRESSURE: 100 MMHG | HEART RATE: 110 BPM | OXYGEN SATURATION: 97 % | BODY MASS INDEX: 23.73 KG/M2 | RESPIRATION RATE: 16 BRPM | WEIGHT: 151.5 LBS | TEMPERATURE: 97.8 F | DIASTOLIC BLOOD PRESSURE: 58 MMHG

## 2019-12-16 DIAGNOSIS — Z79.899 ENCOUNTER FOR LONG-TERM (CURRENT) USE OF MEDICATIONS: ICD-10-CM

## 2019-12-16 DIAGNOSIS — F41.1 GENERALIZED ANXIETY DISORDER: ICD-10-CM

## 2019-12-16 DIAGNOSIS — M79.18 MYOFASCIAL PAIN: ICD-10-CM

## 2019-12-16 DIAGNOSIS — F15.10 METHAMPHETAMINE USE (H): ICD-10-CM

## 2019-12-16 DIAGNOSIS — G89.4 CHRONIC PAIN SYNDROME: ICD-10-CM

## 2019-12-16 DIAGNOSIS — F11.10 OPIOID USE DISORDER, MILD, ON MAINTENANCE THERAPY (H): Primary | ICD-10-CM

## 2019-12-16 LAB
AMPHETAMINES UR QL: ABNORMAL NG/ML
BARBITURATES UR QL SCN: NOT DETECTED NG/ML
BENZODIAZ UR QL SCN: ABNORMAL NG/ML
BUPRENORPHINE UR QL: ABNORMAL NG/ML
CANNABINOIDS UR QL: NOT DETECTED NG/ML
COCAINE UR QL SCN: NOT DETECTED NG/ML
D-METHAMPHET UR QL: NOT DETECTED NG/ML
METHADONE UR QL SCN: NOT DETECTED NG/ML
OPIATES UR QL SCN: NOT DETECTED NG/ML
OXYCODONE UR QL SCN: ABNORMAL NG/ML
PCP UR QL SCN: NOT DETECTED NG/ML
PROPOXYPH UR QL: NOT DETECTED NG/ML
TRICYCLICS UR QL SCN: NOT DETECTED NG/ML

## 2019-12-16 PROCEDURE — 80306 DRUG TEST PRSMV INSTRMNT: CPT | Performed by: ANESTHESIOLOGY

## 2019-12-16 PROCEDURE — 99000 SPECIMEN HANDLING OFFICE-LAB: CPT | Performed by: ANESTHESIOLOGY

## 2019-12-16 PROCEDURE — 99214 OFFICE O/P EST MOD 30 MIN: CPT | Performed by: ANESTHESIOLOGY

## 2019-12-16 PROCEDURE — 80324 DRUG SCREEN AMPHETAMINES 1/2: CPT | Mod: 90 | Performed by: ANESTHESIOLOGY

## 2019-12-16 RX ORDER — BUPRENORPHINE AND NALOXONE 8; 2 MG/1; MG/1
1 FILM, SOLUBLE BUCCAL; SUBLINGUAL 3 TIMES DAILY
Qty: 90 FILM | Refills: 0 | Status: SHIPPED | OUTPATIENT
Start: 2019-12-16 | End: 2020-01-14

## 2019-12-16 RX ORDER — CYCLOBENZAPRINE HCL 10 MG
10 TABLET ORAL 3 TIMES DAILY PRN
Qty: 90 TABLET | Refills: 0 | Status: SHIPPED | OUTPATIENT
Start: 2019-12-16 | End: 2020-07-27
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
## 2019-12-22 LAB
OXYCODONE UR CFM-MCNC: 285 NG/ML
OXYMORPHONE UR CFM-MCNC: 73 NG/ML

## 2019-12-26 LAB
AMPHET UR CFM-MCNC: 249 NG/ML
AMPHET UR QL CFM: NORMAL
D-METHAMPHET UR CFM-MCNC: >78 %
L-METHAMPHET UR CFM-MCNC: <22 %
METHAMPHET UR CFM-MCNC: 169 NG/ML

## 2020-01-06 DIAGNOSIS — F41.1 GENERALIZED ANXIETY DISORDER: ICD-10-CM

## 2020-01-06 RX ORDER — LORAZEPAM 1 MG/1
TABLET ORAL
Qty: 90 TABLET | Refills: 5 | OUTPATIENT
Start: 2020-01-06

## 2020-01-06 NOTE — TELEPHONE ENCOUNTER
She would be due for visit per our controlled substance policy.  Could do e-visit, telephone visit or in person.     Joel Wegener,MD

## 2020-01-06 NOTE — TELEPHONE ENCOUNTER
See other 1/6/20 refill encounter.    Duplicate request.    Med refused with note to pharmacy appt is required.    FELICITA BlackN, RN

## 2020-01-06 NOTE — TELEPHONE ENCOUNTER
Joel Wegener MD,  Routing refill request to provider for review/approval because:  Drug not on the [facility] refill protocol     : Unable to check , writer has not been approved by provider as delegate    Last Written Prescription Date:  07/08/2019  Last Fill Quantity: 90,  # refills: 5   Last office visit: 3/6/2019    Team Coordinators: please call patient, she is due for office visit in clinic    Thank You!  Ericka Reis, DUC  Triage Nurse

## 2020-01-07 ENCOUNTER — TELEPHONE (OUTPATIENT)
Dept: FAMILY MEDICINE | Facility: CLINIC | Age: 45
End: 2020-01-07

## 2020-01-07 RX ORDER — LORAZEPAM 1 MG/1
TABLET ORAL
Qty: 24 TABLET | Refills: 0 | Status: SHIPPED | OUTPATIENT
Start: 2020-01-07 | End: 2020-01-15

## 2020-01-07 NOTE — TELEPHONE ENCOUNTER
Patient has set up appointment with Dr Wegener for 1- (soonest appt).  Wondering if she could get a partial refill of her lorazepam to last her till she comes in.  She is out of med.    Please call.  OK to LM on VM    Patient uses [facility]

## 2020-01-14 ENCOUNTER — TELEPHONE (OUTPATIENT)
Dept: ADDICTION MEDICINE | Facility: CLINIC | Age: 45
End: 2020-01-14

## 2020-01-14 DIAGNOSIS — F11.10 OPIOID USE DISORDER, MILD, ON MAINTENANCE THERAPY (H): ICD-10-CM

## 2020-01-14 DIAGNOSIS — Z79.899 ENCOUNTER FOR LONG-TERM (CURRENT) USE OF MEDICATIONS: ICD-10-CM

## 2020-01-14 RX ORDER — BUPRENORPHINE AND NALOXONE 8; 2 MG/1; MG/1
1 FILM, SOLUBLE BUCCAL; SUBLINGUAL 3 TIMES DAILY
Qty: 90 FILM | Refills: 0 | Status: SHIPPED | OUTPATIENT
Start: 2020-01-14 | End: 2020-03-09
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
## 2020-01-15 ENCOUNTER — OFFICE VISIT (OUTPATIENT)
Dept: FAMILY MEDICINE | Facility: CLINIC | Age: 45
End: 2020-01-15
Payer: MEDICARE

## 2020-01-15 ENCOUNTER — TELEPHONE (OUTPATIENT)
Dept: FAMILY MEDICINE | Facility: CLINIC | Age: 45
End: 2020-01-15

## 2020-01-15 VITALS
BODY MASS INDEX: 24.01 KG/M2 | HEART RATE: 70 BPM | HEIGHT: 67 IN | SYSTOLIC BLOOD PRESSURE: 118 MMHG | RESPIRATION RATE: 16 BRPM | DIASTOLIC BLOOD PRESSURE: 69 MMHG | TEMPERATURE: 98 F | OXYGEN SATURATION: 98 % | WEIGHT: 153 LBS

## 2020-01-15 DIAGNOSIS — F41.1 GENERALIZED ANXIETY DISORDER: ICD-10-CM
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
## 2020-01-15 DIAGNOSIS — S36.114D: ICD-10-CM

## 2020-01-15 DIAGNOSIS — R07.81 RIB PAIN ON RIGHT SIDE: Primary | ICD-10-CM

## 2020-01-15 DIAGNOSIS — Z23 NEED FOR PROPHYLACTIC VACCINATION AND INOCULATION AGAINST INFLUENZA: Primary | ICD-10-CM

## 2020-01-15 DIAGNOSIS — S06.9X0D TRAUMATIC BRAIN INJURY, WITHOUT LOSS OF CONSCIOUSNESS, SUBSEQUENT ENCOUNTER: ICD-10-CM

## 2020-01-15 PROCEDURE — G0008 ADMIN INFLUENZA VIRUS VAC: HCPCS | Performed by: FAMILY MEDICINE

## 2020-01-15 PROCEDURE — 99214 OFFICE O/P EST MOD 30 MIN: CPT | Performed by: FAMILY MEDICINE

## 2020-01-15 PROCEDURE — 99214 OFFICE O/P EST MOD 30 MIN: CPT | Mod: 25 | Performed by: FAMILY MEDICINE

## 2020-01-15 PROCEDURE — 90686 IIV4 VACC NO PRSV 0.5 ML IM: CPT | Performed by: FAMILY MEDICINE

## 2020-01-15 RX ORDER — LORAZEPAM 1 MG/1
TABLET ORAL
Qty: 90 TABLET | Refills: 5 | Status: SHIPPED | OUTPATIENT
Start: 2020-01-15

## 2020-01-15 ASSESSMENT — MIFFLIN-ST. JEOR: SCORE: 1376.63

## 2020-01-15 NOTE — LETTER
[facility]  01/15/20    Patient: Ria Nj  YOB: 1975  Medical Record Number: [id number]  CSN: [id number]                                                                              Non-opioid Controlled Substance Agreement    I understand that my care provider has prescribed a controlled substance to help manage my condition(s). I am taking this medicine to help me function or work. I know this is strong medicine, and that it can cause serious side effects. Controlled substances can be sedating, addicting and may cause a dependency on the drug. They can affect my ability to drive or think, and cause depression. They need to be taken exactly as prescribed. Combining controlled substances with certain medicines or chemicals (such as cocaine, sedatives and tranquilizers, sleeping pills, meth) can be dangerous or even fatal. Also, if I stop controlled substances suddenly, I may have severe withdrawal symptoms.  If not helpful, I may be asked to stop them.    The risks, benefits, and side effects of these medicine(s) were explained to me. I agree that:    1. I will take part in other treatments as advised by my care team. This may be psychiatry or counseling, physical therapy, behavioral therapy, group treatment or a referral to a pain clinic. I will reduce or stop my medicine when my care team tells me to do so.  2. I will take my medicines as prescribed. I will not change the dose or schedule unless my care team tells me to. There will be no refills if I  run out early.   I may be contactedwithout warning and asked to complete a urine drug test or pill count at any time.   3. I will keep all my appointments, and understand this is part of the monitoring of controlled substances. My care team may require an office visit for EVERY controlled substance refill. If I miss appointments or don t follow instructions, my care team may stop my medicine.  4. I will not ask other providers to  prescribe controlled substances, and I will not accept controlled substances from other people. If I need another prescribed controlled substance for a new reason, I will tell my care team within 1 business day.  5. I will use one pharmacy to fill all of my controlled substance prescriptions, and it is up to me to make sure that I do not run out of my medicines on weekends or holidays. If my care team is willing to refill my controlled substance prescription without a visit, I must request refills only during office hours, refills may take up to 3 days to process, and it may take up to 5 to 7 days for my medicine to be mailed and ready at my pharmacy. Prescriptions will not be mailed anywhere except my pharmacy.    6. I am responsible for my prescriptions. If the medicine/prescription is lost or stolen, it will not be replaced. I also agree not to share controlled substance medicines with anyone.              [facility]  01/15/20  Patient:  Ria Nj  YOB: 1975  Medical Record Number: [id number]  [facility]: [id number]    7. I agree to not use ANY illegal or recreational drugs. This includes marijuana, cocaine, bath salts or other drugs. I agree not to use alcohol unless my care team says I may. I agree to give urine samples whenever asked. If I don t give a urine sample, the care team may stop my medicine.    8. If I enroll in the [place] Medical Marijuana program, I will tell my care team. I will also sign an agreement to share my medical records with my care team.    9. I will bring in my list of medicines (or my medicine bottles) each time I come to the clinic.   10. I will tell my care team right away if I become pregnant or have a new medical problem treated outside of my regular clinic.  11. I understand that this medicine can affect my thinking and judgment. It may be unsafe for me to drive, use machinery and do dangerous tasks. I will not do any of these things until I know how  the medicine affects me. If my dose changes, I will wait to see how it affects me. I will contact my care team if I have concerns about medicine side effects.    I understand that if I do not follow any of the conditions above, my prescriptions or treatment may be stopped.      I agree that my provider, clinic care team, and pharmacy may work with any city, state or federal law enforcement agency that investigates the misuse, sale, or other diversion of my controlled medicine. I will allow my provider to discuss my care with or share a copy of this agreement with any other treating provider, pharmacy or emergency room where I receive care. I agree to give up (waive) any right of privacy or confidentiality with respect to these consents.   I have read this agreement and have asked questions about anything I did not understand.    ____________________________________________________    ____________  ________  Patient signature                                                         Date      Time    ____________________________________________________     ____________  ________  Witness                                                          Date      Time    ____________________________________________________  Provider signature
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
## 2020-01-16 ENCOUNTER — TELEPHONE (OUTPATIENT)
Dept: FAMILY MEDICINE | Facility: CLINIC | Age: 45
End: 2020-01-16

## 2020-01-16 ENCOUNTER — TELEPHONE (OUTPATIENT)
Dept: ADDICTION MEDICINE | Facility: CLINIC | Age: 45
End: 2020-01-16

## 2020-01-16 DIAGNOSIS — F41.1 GENERALIZED ANXIETY DISORDER: ICD-10-CM
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
## 2020-01-16 NOTE — TELEPHONE ENCOUNTER
I made an outreach attempt to Ria Nj. I left a generic voicemail to call our clinic back and ask to speak to a nurse.
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
## 2020-01-17 NOTE — TELEPHONE ENCOUNTER
2nd outreach attempt to Ria Nj. Left a generic voicemail to call our clinic back and ask to speak to a nurse.    Tangela Madrid RN on 1/17/2020 at 11:59 AM
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
## 2020-01-17 NOTE — TELEPHONE ENCOUNTER
LVM informing Ria she can take over the counter ibuprofen 800mg every 4-6 hours, per Dr Uribe.    Tangela Madrid RN on 1/17/2020 at 3:41 PM

## 2020-01-28 ENCOUNTER — OFFICE VISIT (OUTPATIENT)
Dept: ADDICTION MEDICINE | Facility: CLINIC | Age: 45
End: 2020-01-28
Payer: MEDICARE

## 2020-01-28 VITALS
OXYGEN SATURATION: 98 % | BODY MASS INDEX: 26.16 KG/M2 | SYSTOLIC BLOOD PRESSURE: 106 MMHG | DIASTOLIC BLOOD PRESSURE: 80 MMHG | HEART RATE: 107 BPM | WEIGHT: 167 LBS | TEMPERATURE: 97.8 F

## 2020-01-28 DIAGNOSIS — F17.200 TOBACCO USE DISORDER: ICD-10-CM

## 2020-01-28 DIAGNOSIS — F15.10 METHAMPHETAMINE USE (H): ICD-10-CM

## 2020-01-28 DIAGNOSIS — F11.10 OPIOID USE DISORDER, MILD, ON MAINTENANCE THERAPY (H): Primary | ICD-10-CM

## 2020-01-28 DIAGNOSIS — Z79.899 ENCOUNTER FOR LONG-TERM (CURRENT) USE OF MEDICATIONS: ICD-10-CM

## 2020-01-28 DIAGNOSIS — F41.1 GENERALIZED ANXIETY DISORDER: ICD-10-CM

## 2020-01-28 PROCEDURE — 80306 DRUG TEST PRSMV INSTRMNT: CPT | Performed by: ANESTHESIOLOGY

## 2020-01-28 PROCEDURE — 99214 OFFICE O/P EST MOD 30 MIN: CPT | Performed by: ANESTHESIOLOGY

## 2020-01-28 NOTE — PATIENT INSTRUCTIONS
Call and make and appointment with [facility] [phone] to have a chemical dependency evaluation done.     Call me when you have a days worth of suboxone left and I will call in a bridge to get you to your follow up.     Follow up in 2 weeks.     Scarlet Uribe MD
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
## 2020-02-04 ENCOUNTER — TELEPHONE (OUTPATIENT)
Dept: ADDICTION MEDICINE | Facility: CLINIC | Age: 45
End: 2020-02-04

## 2020-02-04 DIAGNOSIS — F11.10 OPIOID USE DISORDER, MILD, ON MAINTENANCE THERAPY (H): ICD-10-CM

## 2020-02-04 DIAGNOSIS — Z79.899 ENCOUNTER FOR LONG-TERM (CURRENT) USE OF MEDICATIONS: ICD-10-CM

## 2020-02-04 RX ORDER — BUPRENORPHINE AND NALOXONE 12; 3 MG/1; MG/1
1 FILM, SOLUBLE BUCCAL; SUBLINGUAL 2 TIMES DAILY
Qty: 14 FILM | Refills: 0 | Status: SHIPPED | OUTPATIENT
Start: 2020-02-04 | End: 2020-02-11

## 2020-02-04 NOTE — TELEPHONE ENCOUNTER
Reason for Call:  Medication Request due to: Patient states  told her to call to get a bridge    Name of the pharmacy and phone number for the current request:  [facility] - [phone]    Request for bridge of: buprenorphine HCl-naloxone HCl (SUBOXONE) 8-2 MG per film  Other Information:   Taking as prescribed: Yes  Date/Time/ amount of last dose: 02/05 - FD    Pt informed to follow up with pharmacy for status of refill as addiction RN will only reach out if there are any issues or questions and will be addressed within one business day.  Pt also informed that this request for a bridge is simply a request and doesn't guarantee the medication will be filled.    Can we leave a detailed message on this number? Yes    Phone number patient can be reached at: [phone]    Best Time: Anytime

## 2020-02-04 NOTE — TELEPHONE ENCOUNTER
Per visit note from 1/28/2020, patient calls provider was going to change prescription to 12 mg films at twice daily dosing-- this strength/sig pended.    Pended for 6 day supply.  Routing to provider.      Refill for: buprenorphine HCl-naloxone HCl (SUBOXONE) 8-2mg    Last Appointment: 1/28/2020  Last visit note reviewed? yes    Next Appointment: 2/11/2020    No Shows/Cancellations since last appointment: none    Last Refill in Epic (date and amount/how many days):    Disp Refills Start End CHARU   buprenorphine HCl-naloxone HCl (SUBOXONE) 8-2 MG per film 90 Film 0 1/14/2020  No   Sig - Route: Place 1 Film under the tongue 3 times daily - Sublingual     Most Recent UDS results: POS: benzodiazepines and buprenorphine on 1/28     reviewed and summarized below:       Ana M Kwon RN  02/04/20  2:24 PM

## 2020-02-11 ENCOUNTER — OFFICE VISIT (OUTPATIENT)
Dept: ADDICTION MEDICINE | Facility: CLINIC | Age: 45
End: 2020-02-11
Payer: MEDICARE

## 2020-02-11 VITALS
BODY MASS INDEX: 26.21 KG/M2 | HEART RATE: 87 BPM | RESPIRATION RATE: 18 BRPM | TEMPERATURE: 98 F | OXYGEN SATURATION: 97 % | WEIGHT: 167 LBS | HEIGHT: 67 IN | DIASTOLIC BLOOD PRESSURE: 68 MMHG | SYSTOLIC BLOOD PRESSURE: 124 MMHG

## 2020-02-11 DIAGNOSIS — F11.10 OPIOID USE DISORDER, MILD, ON MAINTENANCE THERAPY (H): Primary | ICD-10-CM

## 2020-02-11 DIAGNOSIS — F15.10 METHAMPHETAMINE USE (H): ICD-10-CM

## 2020-02-11 DIAGNOSIS — G89.4 CHRONIC PAIN SYNDROME: ICD-10-CM

## 2020-02-11 DIAGNOSIS — Z79.899 ENCOUNTER FOR LONG-TERM (CURRENT) USE OF MEDICATIONS: ICD-10-CM

## 2020-02-11 DIAGNOSIS — F17.200 TOBACCO USE DISORDER: ICD-10-CM

## 2020-02-11 DIAGNOSIS — F41.1 GENERALIZED ANXIETY DISORDER: ICD-10-CM

## 2020-02-11 LAB
AMPHETAMINES UR QL: NOT DETECTED NG/ML
BARBITURATES UR QL SCN: NOT DETECTED NG/ML
BENZODIAZ UR QL SCN: NOT DETECTED NG/ML
BUPRENORPHINE UR QL: ABNORMAL NG/ML
CANNABINOIDS UR QL: NOT DETECTED NG/ML
COCAINE UR QL SCN: NOT DETECTED NG/ML
D-METHAMPHET UR QL: NOT DETECTED NG/ML
METHADONE UR QL SCN: NOT DETECTED NG/ML
OPIATES UR QL SCN: NOT DETECTED NG/ML
OXYCODONE UR QL SCN: NOT DETECTED NG/ML
PCP UR QL SCN: NOT DETECTED NG/ML
PROPOXYPH UR QL: NOT DETECTED NG/ML
TRICYCLICS UR QL SCN: ABNORMAL NG/ML

## 2020-02-11 PROCEDURE — 99214 OFFICE O/P EST MOD 30 MIN: CPT | Performed by: ANESTHESIOLOGY

## 2020-02-11 PROCEDURE — 80306 DRUG TEST PRSMV INSTRMNT: CPT | Performed by: ANESTHESIOLOGY

## 2020-02-11 RX ORDER — BUPRENORPHINE AND NALOXONE 12; 3 MG/1; MG/1
1 FILM, SOLUBLE BUCCAL; SUBLINGUAL 2 TIMES DAILY
Qty: 60 FILM | Refills: 0 | Status: SHIPPED | OUTPATIENT
Start: 2020-02-11 | End: 2020-04-08 | Stop reason: DRUGHIGH

## 2020-02-11 ASSESSMENT — MIFFLIN-ST. JEOR: SCORE: 1440.14
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
## 2020-02-11 NOTE — PATIENT INSTRUCTIONS
[facility] - intake person is Maryuri ascencio RULE 25  [phone]    Follow up in 4 weeks    Scarlet Uribe MD

## 2020-03-09 ENCOUNTER — OFFICE VISIT (OUTPATIENT)
Dept: ADDICTION MEDICINE | Facility: CLINIC | Age: 45
End: 2020-03-09
Payer: MEDICARE

## 2020-03-09 VITALS
DIASTOLIC BLOOD PRESSURE: 76 MMHG | WEIGHT: 163 LBS | BODY MASS INDEX: 25.58 KG/M2 | HEART RATE: 99 BPM | TEMPERATURE: 98.7 F | HEIGHT: 67 IN | SYSTOLIC BLOOD PRESSURE: 118 MMHG | OXYGEN SATURATION: 100 %

## 2020-03-09 DIAGNOSIS — F11.10 OPIOID USE DISORDER, MILD, ON MAINTENANCE THERAPY (H): Primary | ICD-10-CM

## 2020-03-09 DIAGNOSIS — F41.1 GENERALIZED ANXIETY DISORDER: ICD-10-CM

## 2020-03-09 DIAGNOSIS — F17.200 TOBACCO USE DISORDER: ICD-10-CM

## 2020-03-09 DIAGNOSIS — F15.10 METHAMPHETAMINE USE (H): ICD-10-CM

## 2020-03-09 DIAGNOSIS — Z79.899 ENCOUNTER FOR LONG-TERM (CURRENT) USE OF MEDICATIONS: ICD-10-CM

## 2020-03-09 DIAGNOSIS — G89.4 CHRONIC PAIN SYNDROME: ICD-10-CM

## 2020-03-09 PROCEDURE — 80306 DRUG TEST PRSMV INSTRMNT: CPT | Performed by: ANESTHESIOLOGY

## 2020-03-09 PROCEDURE — 99214 OFFICE O/P EST MOD 30 MIN: CPT | Performed by: ANESTHESIOLOGY

## 2020-03-09 RX ORDER — BUPRENORPHINE AND NALOXONE 8; 2 MG/1; MG/1
1 FILM, SOLUBLE BUCCAL; SUBLINGUAL 3 TIMES DAILY
Qty: 90 FILM | Refills: 0 | Status: SHIPPED | OUTPATIENT
Start: 2020-03-09 | End: 2020-04-08

## 2020-03-09 RX ORDER — GABAPENTIN 300 MG/1
CAPSULE ORAL
Qty: 270 CAPSULE | Refills: 3 | Status: SHIPPED | OUTPATIENT
Start: 2020-03-09 | End: 2020-07-27

## 2020-03-09 ASSESSMENT — MIFFLIN-ST. JEOR: SCORE: 1421.99
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
## 2020-03-09 NOTE — PATIENT INSTRUCTIONS
Follow up with your PCP about your back lumps    Call your PCP and ask for a referral to see a psychiatrist for your anxiety and possible ongoing prescribing of a benzodiazepine.     Make an appointment with the [facility] counselor here.     Follow up with me in 4 weeks.     Scarlet Uribe MD

## 2020-04-06 ENCOUNTER — VIRTUAL VISIT (OUTPATIENT)
Dept: ADDICTION MEDICINE | Facility: CLINIC | Age: 45
End: 2020-04-06
Payer: MEDICARE

## 2020-04-06 DIAGNOSIS — Z53.9 NO SHOW: Primary | ICD-10-CM

## 2020-04-06 PROCEDURE — 99207 ZZC NO SHOW FOR SCHEDULED APPT: CPT | Performed by: ANESTHESIOLOGY

## 2020-04-08 ENCOUNTER — VIRTUAL VISIT (OUTPATIENT)
Dept: ADDICTION MEDICINE | Facility: CLINIC | Age: 45
End: 2020-04-08
Payer: MEDICARE

## 2020-04-08 DIAGNOSIS — F41.1 GENERALIZED ANXIETY DISORDER: ICD-10-CM

## 2020-04-08 DIAGNOSIS — F11.10 OPIOID USE DISORDER, MILD, ON MAINTENANCE THERAPY (H): Primary | ICD-10-CM

## 2020-04-08 DIAGNOSIS — F32.1 MODERATE MAJOR DEPRESSION (H): ICD-10-CM

## 2020-04-08 DIAGNOSIS — Z79.899 HIGH RISK MEDICATION USE: ICD-10-CM

## 2020-04-08 DIAGNOSIS — Z53.9 NO SHOW: Primary | ICD-10-CM

## 2020-04-08 PROCEDURE — 99442 ZZC PHYSICIAN TELEPHONE EVALUATION 11-20 MIN: CPT | Performed by: NURSE PRACTITIONER

## 2020-04-08 RX ORDER — BUPRENORPHINE AND NALOXONE 8; 2 MG/1; MG/1
1 FILM, SOLUBLE BUCCAL; SUBLINGUAL 3 TIMES DAILY
Qty: 90 FILM | Refills: 0 | Status: SHIPPED | OUTPATIENT
Start: 2020-04-08 | End: 2020-05-21

## 2020-04-08 NOTE — PROGRESS NOTES
"[facility] ADDICTION MEDICINE  TELEPHONE Progress Note                                                      SUBJECTIVE                                                    Ria Nj is a 44 year old female patient of Dr. Uribe who is being evaluated via a billable TELEPHONE visit for addiction.      The patient has been notified of following:     \"This telephone visit will be conducted via a call between you and your physician/provider. We have found that certain health care needs can be provided without the need for a physical exam.  This service lets us provide the care you need with a short phone conversation.  If a prescription is necessary we can send it directly to your pharmacy.  If lab work is needed we can place an order for that and you can then stop by our lab to have the test done at a later time.    If during the course of the call the physician/provider feels a telephone visit is not appropriate, you will not be charged for this service.\"     Ria Nj complains of  Recheck Medication     I have reviewed and updated the patient's Past Medical History, Social History, Family History and Medication List.    ALLERGIES  Bee venom    Rebecca Ray MA    Date of last visit:  4/6/2020  BUPRENORPHINE FOLLOW UP: current dose suboxone 8-2 mg TID    Primary Care Provider: Joel Daniel Wegener, MD   [place] Board of Pharmacy Data Base Reviewed:    Yes; reviewed and no concerns for diversionary activity.  Most recent data includes:  03/13/2020   1     01/15/2020   Lorazepam 1 Mg Tablet         90.00  30  Soraya Busby  03/09/2020   1     03/09/2020   Buprenorp-Nalox 8-2 Mg Sl Film         90.00  30  An Bur        Brief History:    Her initial visit with Dr. Uribe 9/10/2018  Per Dr. Uribe's note from 3/9/2020  \"44 year old female with chronic low back pain which began after the birth of her son who is now 10 years old ([place]).  She is on disability for pain.  She has a history of " "chronic opioid use for over 8 years with escalating doses (175 morphine equivalents), overuse and poor pain relief. She is in denial about her addiction but does qualify for a diagnosis of OUD. She was started on suboxone after initial consult on 9/10/2018.  She never completed any type of treatment/recovery program.  Recent relapse to opioids and methamphetamines (October 2019 - December 2019) which she continues to deny despite multiple confirmatory tests with gas chromotography.  She is on chronic benzodiazepines (Ativan 1mg TID) for anxiety prescribed by her PCP. PMHx is significant for PTSD, childhood trauma, anxiety and depression.  Crack use and alcoholic 15 years ago\"      HPI:    4/8/2020  She reports she missed her last appointment with Dr. Uribe on 4/6/2020 because we didn't have her correct number.  [facility] referral in [facility]PC but missed her initial visit with ERNA Grimm.  She reports she is doing alright considering COVID-19 and has been riding her bike to manage stress.  Has been more difficult trying to redirect and manage with her son at home.     She has an upcoming appointment with her PCP.  Ativan no longer prescribed by her PCP due to relapse on methamphetamine and oxycodone in Dec 2019.  Her last rx has 3 remaining refills on it.  She just filled ativan 1 mg tabs #90 on 3/13/2020.  She reports she is taking ativan 1 mg BID at this time and that her PCP wants her to see a psychiatrist to manage her mental health medications in the future.  She made an appointment with psychiatrist Dr. Lowell Lenz and she doesn't know the name of his clinic.  She said she has seen him in the past and is going to re-establish care with him.     Hx of overuse of her suboxone. She reports using suboxone 8-2 mg films TID.  She reports mild constipation with the suboxone but manageable without medications.      Patient status since last visit: improving    Cravings: denies    MAT Dose: adequate    Side Effects: " mild constipation     Cues to use and relapse triggers: None      Social History:   Reviewed today.  See HPI for changes since last visit.     Problem list and histories reviewed & adjusted, as indicated.  Additional history: as documented    Patient Active Problem List    Diagnosis Date Noted     Methamphetamine use (H) 12/16/2019     Priority: Medium     Myofascial pain 07/29/2019     Priority: Medium     Trochanteric bursitis of both hips 01/14/2019     Priority: Medium     Encounter for long-term (current) use of medications 10/22/2018     Priority: Medium     Opioid use disorder, mild, on maintenance therapy (H) 10/22/2018     Priority: Medium     Chronic pain syndrome 06/12/2018     Priority: Medium     Cervicalgia 05/31/2017     Priority: Medium     Moderate major depression (H)      Priority: Medium     Tobacco use disorder      Priority: Medium     Smokes a 1/2 ppd. Started         Generalized anxiety disorder      Priority: Medium     Diagnosis updated by automated process. Provider to review and confirm.       Bee sting-induced anaphylaxis      Priority: Medium     Chronic low back pain      Priority: Medium     Patient with methadone in urine on random urine drug screen.  No additional narcotics/controlled substances    Patient is followed by REJI BASSETT for ongoing prescription of pain medication.  All refills should be approved by this provider, or covering partner.  Currently weaning medication:  Medication(s): Oxycontin 40 mg #60, Percocet 10/325  #120  (jan 2018)  Maximum quantity per month: above  Clinic visit frequency required: Q 3 months     Controlled substance agreement on file: Yes       Date(s): 2012    Pain Clinic evaluation in the past: Yes       Date/Location:      [facility] Total Score(s):  No flowsheet data found.    Last [facility] website verification:  done on 11/11/16   [email or web address]         Lumbar disc herniation with myelopathy 10/28/2010     Priority: Medium      CARDIOVASCULAR SCREENING; LDL GOAL LESS THAN 160 05/09/2010     Priority: Medium       Current Medications:  albuterol (2.5 MG/3ML) 0.083% neb solution, Take 1 vial (2.5 mg) by nebulization every 4 hours as needed for shortness of breath / dyspnea or wheezing  albuterol (PROAIR HFA, PROVENTIL HFA, VENTOLIN HFA) 108 (90 BASE) MCG/ACT inhaler, Inhale 2 puffs into the lungs every 4 hours as needed for shortness of breath / dyspnea or wheezing  Buprenorphine HCl-Naloxone HCl (SUBOXONE) 12-3 MG FILM per film, Place 1 Film under the tongue 2 times daily  buprenorphine HCl-naloxone HCl (SUBOXONE) 8-2 MG per film, Place 1 Film under the tongue 3 times daily  calcium carbonate 500 MG tablet, Take 1 tablet by mouth 2 times daily.  Cholecalciferol (VITAMIN D) 2000 UNITS tablet, Take 2,000 Units by mouth daily  cyclobenzaprine (FLEXERIL) 10 MG tablet, Take 1 tablet (10 mg) by mouth 3 times daily as needed for muscle spasms  EPINEPHrine (EPIPEN) 0.3 MG/0.3ML injection, Inject 0.3 mLs (0.3 mg) into the muscle once as needed for anaphylaxis  gabapentin (NEURONTIN) 300 MG capsule, TAKE 1 CAPSULE BY MOUTH THREE TIMES DAILY  IBUPROFEN 200 MG OR TABS, Pt is taking 4 TABLET EVERY 4 TO 6 HOURS AS NEEDED  LORazepam (ATIVAN) 1 MG tablet, TAKE 1 TABLET BY MOUTH EVERY 8 HOURS AS NEEDED FOR ANXIETY  Magnesium 250 MG tablet, Take 1 tablet by mouth daily.  naloxone (NARCAN) nasal spray, Spray 1 spray (4 mg) into one nostril alternating nostrils as needed for opioid reversal every 2-3 minutes until assistance arrives  Omega-3 Fatty Acids (FISH OIL CONCENTRATE PO), Take 300 mg by mouth 3 times daily  order for DME, Equipment being ordered: Rib belt (Patient not taking: Reported on 1/15/2020)    No current facility-administered medications on file prior to visit.       OBJECTIVE                                                    LABS:  Labs reviewed. No UDS collected as patient evaluated over TELEPHONE visit.     MENTAL STATUS EXAM:  Appearance:   "not assessed - telephone visit   Attitude:  cooperative  Mood:  \"alright\"  Affect: not assessed - telephone visit   Psychomotor Behavior: not assessed - telephone visit   Thought Content:  no evidence of suicidal ideation or homicidal ideation, no evidence of psychotic thought, no auditory hallucinations present and no visual hallucinations present  Insight:  limited  Judgement:  fair  Oriented to:  time, person, and place    ASSESSMENT/PLAN                                                      (F11.10) Opioid use disorder, mild, on maintenance therapy with chronic pain (H)  (primary encounter diagnosis)  Comment: chronic pain the last 10 years.  Improved with suboxone 8-2 TID.  Mild constipation but able to void without issue.   Plan: continue buprenorphine HCl-naloxone HCl (SUBOXONE) 8-2  MG per film; take 1 film under the tongue three times daily. #90    Recommend follow through on therapy as discussed extensively at each visit with Dr. Uribe.      (F32.1) Moderate major depression (H)  (F41.1) Generalized anxiety disorder  Comment: by history - poor follow through on recommendations for psychiatry and therapy.  Missed her [facility] only appointment.  Given number to reschedule.  Plan: Schedule and attend [facility] only individual therapy appointment and attend reported psychiatry appointment in the community.      (Z79.899) High risk medication use  Comment: suboxone  Plan: UDS at in-person visits and prn, monitor cravings and return to use       ENCOUNTER FOR LONG TERM USE OF HIGH RISK MEDICATION   High Risk Drug Monitoring?  YES   Drug being monitored: Buprenorphine   Reason for drug: Opioid use disorder, mild with chronic pain   What is being monitored?: Dosage, Cravings, Trigger, side effects, and continued abstinence.      Follow-up: 4 weeks with Dr. Uribe via telephone visit that may be converted to in-person depending on COVID-19 status.     Patient counseling completed today:  Counseled the patient on the " importance of having a recovery program in addition to Suboxone maintenance.  Also discussed having a sober support network, not isolating, being open, honest, and willing to change, avoiding triggers and managing cravings.  In addition, abstinence from alcohol, benzodiazepines, THC, opioids and other drugs of abuse was recommended.  Risk of overdose following a period of abstinence due to decrease tolerance was discussed including risk of death.  Risk of overdose if using Suboxone with other substances particuarly benzodiazepines, alcohol, gabapentin, or other CNS depressants was reviewed.      Phone call contact time (15 min)  Call Started at 8:55 AM  Call Ended at 9:10 AM    I have reviewed the note as documented above.  This accurately captures the substance of my conversation with the patient.      Debi Diaz CNP  [facility] - Addiction Medicine  [facility]  [phone]

## 2020-05-21 ENCOUNTER — TELEPHONE (OUTPATIENT)
Dept: ADDICTION MEDICINE | Facility: CLINIC | Age: 45
End: 2020-05-21

## 2020-05-21 DIAGNOSIS — F11.10 OPIOID USE DISORDER, MILD, ON MAINTENANCE THERAPY (H): ICD-10-CM

## 2020-05-21 RX ORDER — BUPRENORPHINE AND NALOXONE 8; 2 MG/1; MG/1
1 FILM, SOLUBLE BUCCAL; SUBLINGUAL 3 TIMES DAILY
Qty: 33 FILM | Refills: 0 | Status: SHIPPED | OUTPATIENT
Start: 2020-05-21 | End: 2020-06-03

## 2020-05-21 NOTE — TELEPHONE ENCOUNTER
Patient called about an appt. Stated she was unaware she had an appt scheduled for 5/5/2020 and noted that she didn't see her own provider at the last appt (saw Selene Diaz) and that they hadn't set up another appt with her and her usual provider. Patient updated her phone number with clinic [phone] -- EMR previously listed her old number, which may be why she didn't get the clinic's calls for 5/5/2020.    Reason for Call:  Medication Request due to: missed appointment and out of medication early    Name of the pharmacy and phone number for the current request:  [facility] [place], [place] - [address]  P: [phone]  F: [phone]    Request for bridge of: Buprenorphine HCl-naloxone HCl (Suboxone) 8-2 mg per film    Other Information:   Taking as prescribed: Yes  Date/Time/ amount of last dose: 5/20/2020 at night - woke up in a lot of pain. Normally doesn't take med at night. 8-2 mg    Pt informed to follow up with pharmacy for status of refill as addiction RN will only reach out if there are any issues or questions and will be addressed within one business day.  Pt also informed that this request for a bridge is simply a request and doesn't guarantee the medication will be filled.    Last appt: 4/8/2020 (diff addiction medicine provider)  Missed appt: 5/5/2020 at 3:30 (phone visit)  Next appt: 6/1/2020 at 3:30 (video visit - Rony's first opening)    Can we leave a detailed message on this number? Yes    Phone number patient can be reached at: [phone]    Best Time: anytime      Etta Dayton  Patient Representative  [facility]

## 2020-05-21 NOTE — TELEPHONE ENCOUNTER
Medication pended for 11 day supply.  Routing to provider.      Refill for: buprenorphine HCl-naloxone HCl (SUBOXONE) 8-2mg    Last Appointment: 4/8/20 (with Debi)  Last visit note reviewed? Yes    Follow up in: 4 weeks with  via phone visit    Next Appointment: 6/1/20    No Shows/Cancellations since last appointment: no show: 5/5    Last Refill in Epic (date and amount/how many days):    Disp  Refills  Start  End  CHARU    buprenorphine HCl-naloxone HCl (SUBOXONE) 8-2 MG per film  90 Film  0  4/8/2020   No    Sig - Route: Place 1 Film under the tongue 3 times daily - Sublingual      Most Recent UDS results: POS: benzodiazepines and buprenorphine on 3/9     reviewed and summarized below:         Ana M Kwon RN    Nurse Liaison  [facility]    Addiction Medicine Services

## 2020-06-01 ENCOUNTER — VIRTUAL VISIT (OUTPATIENT)
Dept: ADDICTION MEDICINE | Facility: CLINIC | Age: 45
End: 2020-06-01
Payer: MEDICARE

## 2020-06-01 DIAGNOSIS — Z53.9 NO SHOW: Primary | ICD-10-CM

## 2020-06-01 PROCEDURE — 99207 ZZC NO SHOW FOR SCHEDULED APPT: CPT | Performed by: ANESTHESIOLOGY

## 2020-06-01 NOTE — PROGRESS NOTES
ATTEMPTED TO CALL MANY TIMES.  NO ANSWER. LAST ATTEMPT AT [phone].  SHE WILL NEED TO RESCHEDULE.        This patient was a no show for this scheduled appointment.

## 2020-06-02 ENCOUNTER — TELEPHONE (OUTPATIENT)
Dept: ADDICTION MEDICINE | Facility: CLINIC | Age: 45
End: 2020-06-02

## 2020-06-02 DIAGNOSIS — F11.10 OPIOID USE DISORDER, MILD, ON MAINTENANCE THERAPY (H): ICD-10-CM

## 2020-06-02 NOTE — TELEPHONE ENCOUNTER
Patient said was unaware that she had an appt scheduled for yesterday (video visit 6/1/2020 at 3:30pm). She also noted that her previous appt she didn't know was scheduled for that day, and said that was it was supposed to be for [facility].      Reason for Call:  Medication Request due to: missed appointment    Name of the pharmacy and phone number for the current request:  [facility] [place], [place] - [address]   P: [phone]  F: [phone]    Request for bridge of: Buprenorphine HCl-naloxone HCl (Suboxone) 8-2 mg per film    Other Information:   Taking as prescribed: no; has been spacing her med to make it last longer  Date/Time/ amount of last dose: 5/31/2020 / half a dose    Pt informed to follow up with pharmacy for status of refill as addiction RN will only reach out if there are any issues or questions and will be addressed within one business day.  Pt also informed that this request for a bridge is simply a request and doesn't guarantee the medication will be filled.    Last appt: 4/8/2020 (different provider as Dr. Uribe was not available)  Missed appt: 6/1/2020 and 5/5/2020 video  Next appt: 6/8/2020 video (Dr. Uribe's first available)      Can we leave a detailed message on this number? Yes but prefers to speak with someone    Phone number patient can be reached at: [phone]    Best Time: no preferred time      Etta Clayton  Patient Representative  [facility] [place]

## 2020-06-03 RX ORDER — BUPRENORPHINE AND NALOXONE 8; 2 MG/1; MG/1
1 FILM, SOLUBLE BUCCAL; SUBLINGUAL 3 TIMES DAILY
Qty: 18 FILM | Refills: 0 | Status: SHIPPED | OUTPATIENT
Start: 2020-06-03 | End: 2020-06-08

## 2020-06-03 RX ORDER — BUPRENORPHINE AND NALOXONE 8; 2 MG/1; MG/1
1 FILM, SOLUBLE BUCCAL; SUBLINGUAL 3 TIMES DAILY
Qty: 18 FILM | Refills: 0 | Status: SHIPPED | OUTPATIENT
Start: 2020-06-03 | End: 2020-06-03

## 2020-06-03 NOTE — TELEPHONE ENCOUNTER
Suboxone bridge request.     Last appt: 4/8/20 w/Debi Diaz  No shows 5/5 and 6/1/20  Next appt: 6/8/20 w/Dr Uribe    Last Suboxone bridge rx:  buprenorphine HCl-naloxone HCl (SUBOXONE) 8-2 MG per film  33 Film  0  5/21/2020      Sig - Route: Place 1 Film under the tongue 3 times daily - Sublingual      Bridge request for: buprenorphine HCl-naloxone HCl (SUBOXONE) 8-2mg     reviewed and summarized below:       Dr Uribe out of office. Routing to covering providers.    Tangela Madrid RN  06/03/20  11:42 AM

## 2020-06-08 ENCOUNTER — VIRTUAL VISIT (OUTPATIENT)
Dept: ADDICTION MEDICINE | Facility: CLINIC | Age: 45
End: 2020-06-08
Payer: MEDICARE

## 2020-06-08 DIAGNOSIS — G89.4 CHRONIC PAIN SYNDROME: ICD-10-CM

## 2020-06-08 DIAGNOSIS — Z79.899 ENCOUNTER FOR LONG-TERM (CURRENT) USE OF MEDICATIONS: ICD-10-CM

## 2020-06-08 DIAGNOSIS — F41.1 GENERALIZED ANXIETY DISORDER: ICD-10-CM

## 2020-06-08 DIAGNOSIS — F11.10 OPIOID USE DISORDER, MILD, ON MAINTENANCE THERAPY (H): Primary | ICD-10-CM

## 2020-06-08 DIAGNOSIS — M79.18 MYOFASCIAL PAIN: ICD-10-CM

## 2020-06-08 DIAGNOSIS — F15.10 METHAMPHETAMINE USE (H): ICD-10-CM

## 2020-06-08 DIAGNOSIS — F17.200 TOBACCO USE DISORDER: ICD-10-CM

## 2020-06-08 PROCEDURE — 99214 OFFICE O/P EST MOD 30 MIN: CPT | Mod: 95 | Performed by: ANESTHESIOLOGY

## 2020-06-08 RX ORDER — BUPRENORPHINE AND NALOXONE 8; 2 MG/1; MG/1
1 FILM, SOLUBLE BUCCAL; SUBLINGUAL 3 TIMES DAILY
Qty: 90 FILM | Refills: 0 | Status: SHIPPED | OUTPATIENT
Start: 2020-06-08 | End: 2020-07-20
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
## 2020-07-08 NOTE — TELEPHONE ENCOUNTER
Call re:  Psychiatry referral/lorazepam/meth. Joel Wegener,MD    
Called pt.  Discussed positive meth screen.  She is frustrated this just coming up now since it was a one time abuse/not using any more.     Unfortunately I was not aware.     Discussed with her that not appropriate for me to continue prescribing lorazepam although if she were to see a psychiatrist and felt it was appropriate in context of comprehensive psychologic care I would support that.     I did NOT cancel her refills at this time - given her dose I would not want to cause withdrawal.     I gave her psychiatry scheduling number.  She should schedule sometime in the next month and let me know when that is scheduled.  I would cancel refills for next month and transfer prescribing to her psychiatrist.     I will plan to communicate with the psychiatry team after appointment is made.     Dr. Uribe has been aware that I would be discussing this with her and I see she discussed at her last suboxone appointment as well.     Mr. Nj did voice understanding and agreed that increased psychologic care might benefit here.     Joel Wegener,MD    
DISPLAY PLAN FREE TEXT

## 2020-07-17 ENCOUNTER — TELEPHONE (OUTPATIENT)
Dept: ADDICTION MEDICINE | Facility: CLINIC | Age: 45
End: 2020-07-17

## 2020-07-17 DIAGNOSIS — F11.10 OPIOID USE DISORDER, MILD, ON MAINTENANCE THERAPY (H): ICD-10-CM

## 2020-07-17 NOTE — TELEPHONE ENCOUNTER
Attempted to contact patient to discuss refill gap, LVM to call back. Transfer to RN if patient calls.     Medication pended for 10 day supply.  Routing to provider.    Bridge request for: buprenorphine HCl-naloxone HCl (SUBOXONE) 8-2mg    Last Appointment: 6/8/20    Next Appointment: 7/27/20    No Shows/Cancellations since last appointment: no show: 7/13/20    Last  buprenorphine HCl-naloxone HCl (SUBOXONE) 8-2 MG per film  90 Film  0  6/8/2020   No    Sig - Route: Place 1 Film under the tongue 3 times daily - Sublingual    Sent to pharmacy as: Buprenorphine HCl-Naloxone HCl 8-2 MG Sublingual Film (Suboxone)    Class: E-Prescribe    Notes to Pharmacy: ALEKSANDR: [id number]    Order: [id number]    E-Prescribing Status: Receipt confirmed by pharmacy (6/8/2020 10:28 AM CDT)       Refill in Epic (date and amount/how many days):       Most Recent UDS results: POS: benzodiazepines, tricyclic antidepressants and buprenorphine on 3/9/20     reviewed and summarized below:       Zuleyma Ramires RN  07/17/20  12:35 PM

## 2020-07-17 NOTE — TELEPHONE ENCOUNTER
Patient called to schedule an appt - missed last appt (7/13/2020) because her phone was out (had run out of minutes).    Reason for Call:  Medication Request due to: missed appointment    Name of the pharmacy and phone number for the current request:  [facility] - [phone]    Request for bridge of: Buprenorphine HCl-naloxone HCl (Suboxone) 8-2 mg per film    Other Information:   Taking as prescribed: Yes  Date/Time/ amount of last dose: 7/17/2020, morning (unsure of exact time), a strip as directed    Last appt: 6/8/2020   Missed appt: 7/13/2020  Next appt: 7/27/2020 (Rony's first avail for virtual appt)    Pt informed to follow up with pharmacy for status of refill as addiction RN will only reach out if there are any issues or questions and will be addressed within one business day.  Pt also informed that this request for a bridge is simply a request and doesn't guarantee the medication will be filled.    Can we leave a detailed message on this number? Yes    Phone number patient can be reached at: [phone]    Best Time:  Anytime    Etta Savannah  Patient Representative  [facility]

## 2020-07-20 RX ORDER — BUPRENORPHINE AND NALOXONE 8; 2 MG/1; MG/1
1 FILM, SOLUBLE BUCCAL; SUBLINGUAL 3 TIMES DAILY
Qty: 24 FILM | Refills: 0 | Status: SHIPPED | OUTPATIENT
Start: 2020-07-20 | End: 2020-07-27

## 2020-07-20 NOTE — TELEPHONE ENCOUNTER
LVM letting patient know she is scheduled for date below, and if they day / time does not work to call back to reschedule.

## 2020-07-20 NOTE — TELEPHONE ENCOUNTER
Please call Ria and have her reschedule her appointment for Tuesday the 28th.  I will need to see her in person and that is the day I have availability for in person visits.     Prescription BRIDGE has been e-prescribed     Scarlet Uribe MD

## 2020-07-20 NOTE — TELEPHONE ENCOUNTER
Suboxone bridge rx request.    Phone call to Ria. She reports she missed an appt 7/13/20 and stretched out her Suboxone rx. Last dose 7/17/20. Currently experiencing withdrawal sxs. Denies substance use.    Medication pended for 8 day supply.  Routing to Dr Uribe.    Cresencio request for: buprenorphine HCl-naloxone HCl (SUBOXONE) 8-2mg     Last Appointment: 6/8/20     Next Appointment: 7/27/20     No Shows/Cancellations since last appointment: no show: 7/13/20     Last  buprenorphine HCl-naloxone HCl (SUBOXONE) 8-2 MG per film  90 Film  0  6/8/2020    No    Sig - Route: Place 1 Film under the tongue 3 times daily - Sublingual    Sent to pharmacy as: Buprenorphine HCl-Naloxone HCl 8-2 MG Sublingual Film (Suboxone)    Class: E-Prescribe    Notes to Pharmacy: ALEKSANDR: [id number]    Order: [id number]    E-Prescribing Status: Receipt confirmed by pharmacy (6/8/2020 10:28 AM CDT)       reviewed and summarized below:        Tangela Madrid RN on 7/20/2020 at 10:23 AM

## 2020-07-20 NOTE — TELEPHONE ENCOUNTER
Routing to AM TCs to contact patient regarding scheduling per message below.     Ana M Kwon, RN    Nurse Liaison  [facility]    Addiction Medicine Services
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
## 2020-07-27 ENCOUNTER — VIRTUAL VISIT (OUTPATIENT)
Dept: ADDICTION MEDICINE | Facility: CLINIC | Age: 45
End: 2020-07-27
Payer: MEDICARE

## 2020-07-27 DIAGNOSIS — F17.200 TOBACCO USE DISORDER: ICD-10-CM

## 2020-07-27 DIAGNOSIS — F11.10 OPIOID USE DISORDER, MILD, ON MAINTENANCE THERAPY (H): Primary | ICD-10-CM

## 2020-07-27 DIAGNOSIS — F41.1 GENERALIZED ANXIETY DISORDER: ICD-10-CM

## 2020-07-27 DIAGNOSIS — G89.4 CHRONIC PAIN SYNDROME: ICD-10-CM

## 2020-07-27 DIAGNOSIS — M79.18 MYOFASCIAL PAIN: ICD-10-CM

## 2020-07-27 DIAGNOSIS — F15.10 METHAMPHETAMINE USE (H): ICD-10-CM

## 2020-07-27 DIAGNOSIS — Z79.899 ENCOUNTER FOR LONG-TERM (CURRENT) USE OF MEDICATIONS: ICD-10-CM

## 2020-07-27 PROCEDURE — 99442 ZZC PHYSICIAN TELEPHONE EVALUATION 11-20 MIN: CPT | Performed by: ANESTHESIOLOGY

## 2020-07-27 RX ORDER — CYCLOBENZAPRINE HCL 10 MG
10 TABLET ORAL 3 TIMES DAILY PRN
Qty: 90 TABLET | Refills: 0 | Status: SHIPPED | OUTPATIENT
Start: 2020-07-27

## 2020-07-27 RX ORDER — GABAPENTIN 300 MG/1
CAPSULE ORAL
Qty: 270 CAPSULE | Refills: 3 | Status: SHIPPED | OUTPATIENT
Start: 2020-07-27

## 2020-07-27 RX ORDER — BUPRENORPHINE AND NALOXONE 8; 2 MG/1; MG/1
1 FILM, SOLUBLE BUCCAL; SUBLINGUAL 3 TIMES DAILY
Qty: 90 FILM | Refills: 0 | Status: SHIPPED | OUTPATIENT
Start: 2020-07-27

## 2020-08-01 DIAGNOSIS — F41.1 GENERALIZED ANXIETY DISORDER: ICD-10-CM

## 2020-08-05 NOTE — TELEPHONE ENCOUNTER
Lloyd pt.  Per phone note (copied below) the plan was for a psychiatrist to take over the lorazepam prescribing.     Joel Wegener,MD    Called pt.  Discussed positive meth screen.  She is frustrated this just coming up now since it was a one time abuse/not using any more.      Unfortunately I was not aware.      Discussed with her that not appropriate for me to continue prescribing lorazepam although if she were to see a psychiatrist and felt it was appropriate in context of comprehensive psychologic care I would support that.      I did NOT cancel her refills at this time - given her dose I would not want to cause withdrawal.      I gave her psychiatry scheduling number.  She should schedule sometime in the next month and let me know when that is scheduled.  I would cancel refills for next month and transfer prescribing to her psychiatrist.      I will plan to communicate with the psychiatry team after appointment is made.      Dr. Uribe has been aware that I would be discussing this with her and I see she discussed at her last suboxone appointment as well.      Mr. Nj did voice understanding and agreed that increased psychologic care might benefit here.      Joel Wegener,MD

## 2020-08-07 RX ORDER — LORAZEPAM 1 MG/1
TABLET ORAL
Qty: 90 TABLET | Refills: 1 | OUTPATIENT
Start: 2020-08-07

## 2020-08-07 NOTE — TELEPHONE ENCOUNTER
Left message on machine to call back  Ask to speak to any triage nurse, let them know it's a return call.    Leave a number and time that you can be reached.   Emily Squires RN

## 2020-11-04 ENCOUNTER — VIRTUAL VISIT (OUTPATIENT)
Dept: FAMILY MEDICINE | Facility: CLINIC | Age: 45
End: 2020-11-04
Payer: MEDICARE

## 2020-11-04 DIAGNOSIS — R50.9 FEVER IN ADULT: ICD-10-CM

## 2020-11-04 DIAGNOSIS — Z20.822 SUSPECTED COVID-19 VIRUS INFECTION: Primary | ICD-10-CM

## 2020-11-04 PROCEDURE — 99441 PR PHYSICIAN TELEPHONE EVALUATION 5-10 MIN: CPT | Mod: 95 | Performed by: FAMILY MEDICINE

## 2020-11-04 NOTE — PATIENT INSTRUCTIONS
"Discharge Instructions for COVID-19 Patients  You have--or may have--COVID-19. Please follow the instructions listed below.   If you have a weakened immune system, discuss with your doctor any other actions you need to take.  How can I protect others?  If you have symptoms (fever, cough, body aches or trouble breathing):    Stay home and away from others (self-isolate) until:  ? At least 10 days have passed since your symptoms started, And   ? You've had no fever--and no medicine that reduces fever--for 1 full day (24 hours), And    ? Your other symptoms have resolved (gotten better).  If you don't show symptoms, but testing showed that you have COVID-19:    Stay home and away from others (self-isolate). Follow the tips under \"How do I self-isolate?\" below for 10 days (20 days if you have a weak immune system).    You don't need to be retested for COVID-19 before going back to school or work. As long as you're fever-free and feeling better, you can go back to school, work and other activities after waiting the 10 or 20 days.   How do I self-isolate?    Stay in your own room, even for meals. Use your own bathroom if you can.    Stay away from others in your home. No hugging, kissing or shaking hands. No visitors.    Don't go to work, school or anywhere else.    Clean \"high touch\" surfaces often (doorknobs, counters, handles). Use household cleaning spray or wipes. You'll find a full list of  on the EPA website: www.epa.gov/pesticide-registration/list-n-disinfectants-use-against-sars-cov-2.    Cover your mouth and nose with a mask or other face covering to avoid spreading germs.    Wash your hands and face often. Use soap and water.    Caregivers in these groups are at risk for severe illness due to COVID-19:  ? People 65 years and older  ? People who live in a nursing home or long-term care facility  ? People with chronic disease (lung, heart, cancer, diabetes, kidney, liver, immunologic)  ? People who have a " weakened immune system, including those who:    Are in cancer treatment    Take medicine that weakens the immune system, such as corticosteroids    Had a bone marrow or organ transplant    Have an immune deficiency    Have poorly controlled HIV or AIDS    Are obese (body mass index of 40 or higher)    Smoke regularly    Caregivers should wear gloves while washing dishes, handling laundry and cleaning bedrooms and bathrooms.    Use caution when washing and drying laundry: Don't shake dirty laundry and use the warmest water setting that you can.    For more tips on managing your health at home, go to www.cdc.gov/coronavirus/2019-ncov/downloads/10Things.pdf.  How can I take care of myself at home?  1. Get lots of rest. Drink extra fluids (unless a doctor has told you not to).    2. Take Tylenol (acetaminophen) for fever or pain. If you have liver or kidney problems, ask your family doctor if it's okay to take Tylenol.     Adults can take either:  ? 650 mg (two 325 mg pills) every 4 to 6 hours, or   ? 1,000 mg (two 500 mg pills) every 8 hours as needed.  ? Note: Don't take more than 3,000 mg in one day. Acetaminophen is found in many medicines (both prescribed and over-the-counter medicines). Read all labels to be sure you don't take too much.   For children, check the Tylenol bottle for the right dose. The dose is based on the child's age or weight.  3. If you have other health problems (like cancer, heart failure, an organ transplant or severe kidney disease): Call your specialty clinic if you don't feel better in the next 2 days.    4. Know when to call 911. Emergency warning signs include:  ? Trouble breathing or shortness of breath  ? Pain or pressure in the chest that doesn't go away  ? Feeling confused like you haven't felt before, or not being able to wake up  ? Bluish-colored lips or face    5. Your doctor may have prescribed a blood thinner medicine. Follow their instructions.  Where can I get more  information?    [facility] - About COVID-19: [email or web address]    CDC - What to Do If You're Sick: www.cdc.gov/coronavirus/2019-ncov/about/steps-when-sick.html    CDC - Ending Home Isolation: www.cdc.gov/coronavirus/2019-ncov/hcp/disposition-in-home-patients.html    CDC - Caring for Someone: www.cdc.gov/coronavirus/2019-ncov/if-you-are-sick/care-for-someone.html    [facility] - Interim Guidance for Hospital Discharge to Home: www.health.[facility]./diseases/coronavirus/hcp/hospdischarge.pdf    [facility] clinical trials (COVID-19 research studies): [email or web address]    Below are the COVID-19 hotlines at the [place] Department of Health ([facility]). Interpreters are available.  ? For health questions: Call 399-200-2450 or 1-238.514.3468 (7 a.m. to 7 p.m.)  ? For questions about schools and childcare: Call 133-896-4686 or 1-588.712.2896 (7 a.m. to 7 p.m.)    For informational purposes only. Not to replace the advice of your health care provider. Clinically reviewed by the Infection Prevention Team. Copyright   2020 [employer]. All rights reserved. [employer] [id number] - REV 08/04/20.

## 2020-11-04 NOTE — PROGRESS NOTES
"Ria Nj is a 45 year old female who is being evaluated via a billable telephone visit.      The patient has been notified of following:     \"This telephone visit will be conducted via a call between you and your physician/provider. We have found that certain health care needs can be provided without the need for a physical exam.  This service lets us provide the care you need with a short phone conversation.  If a prescription is necessary we can send it directly to your pharmacy.  If lab work is needed we can place an order for that and you can then stop by our lab to have the test done at a later time.    Telephone visits are billed at different rates depending on your insurance coverage. During this emergency period, for some insurers they may be billed the same as an in-person visit.  Please reach out to your insurance provider with any questions.    If during the course of the call the physician/provider feels a telephone visit is not appropriate, you will not be charged for this service.\"    Patient has given verbal consent for Telephone visit?  Yes    What phone number would you like to be contacted at? [phone]    How would you like to obtain your AVS? Mail a copy    Subjective     Ria Nj is a 45 year old female who presents via phone visit today for the following health issues:    HPI       She has been experiencing fever (tmax 101 F), chills, dry cough, weak, sore throat and lungs feel very tight for 2 days. She feels that throat is closing. Unsure if she has been experiencing shortness of breath. She has remained isolated. Yesterday diarrhea and vomiting resolved. She hasn't experienced throat closing with panic attacks. She took a look at the tonsils and notes some mild enlargement. She is staying hydrated.       Review of Systems   Constitutional, HEENT, cardiovascular, pulmonary, gi and gu systems are negative, except as otherwise noted.       Objective          Vitals:  No " vitals were obtained today due to virtual visit.    healthy, alert and no distress  PSYCH: Alert and oriented times 3; coherent speech, normal   rate and volume, able to articulate logical thoughts, able   to abstract reason, no tangential thoughts, no hallucinations   or delusions  Her affect is normal  RESP: No cough, no audible wheezing, able to talk in full sentences  Remainder of exam unable to be completed due to telephone visits    No results found for this or any previous visit (from the past 24 hour(s)).        Assessment/Plan:    1. Suspected COVID-19 virus infection  - Per patient her tonsils were mildly enlarged. We discussed that I believe symptoms are mainly due to COVID. I recommended tylenol, fluids and rest. Discussed isolation. Follow if symptoms worsen or fail to improve.  - Symptomatic COVID-19 Virus (Coronavirus) by PCR; Future    2. Fever in adult  - Symptomatic COVID-19 Virus (Coronavirus) by PCR; Future  - if symptoms are not improving then pt would need further evaluation to r/u pneumonia vs OM vs sinusitis vs pharyngitis vs other etiology    Phone call duration:  9 minutes

## 2020-11-27 ENCOUNTER — OFFICE VISIT (OUTPATIENT)
Dept: URGENT CARE | Facility: URGENT CARE | Age: 45
End: 2020-11-27
Payer: MEDICARE

## 2020-11-27 VITALS
DIASTOLIC BLOOD PRESSURE: 84 MMHG | SYSTOLIC BLOOD PRESSURE: 128 MMHG | HEART RATE: 94 BPM | WEIGHT: 140 LBS | HEIGHT: 67 IN | OXYGEN SATURATION: 99 % | RESPIRATION RATE: 14 BRPM | BODY MASS INDEX: 21.97 KG/M2 | TEMPERATURE: 98 F

## 2020-11-27 DIAGNOSIS — K04.7 DENTAL ABSCESS: Primary | ICD-10-CM

## 2020-11-27 PROCEDURE — 99213 OFFICE O/P EST LOW 20 MIN: CPT | Performed by: NURSE PRACTITIONER

## 2020-11-27 RX ORDER — CLINDAMYCIN HCL 300 MG
300 CAPSULE ORAL 3 TIMES DAILY
Qty: 30 CAPSULE | Refills: 0 | Status: SHIPPED | OUTPATIENT
Start: 2020-11-27 | End: 2020-12-07

## 2020-11-27 ASSESSMENT — ENCOUNTER SYMPTOMS
COUGH: 0
CHILLS: 0
FACIAL SWELLING: 1
TROUBLE SWALLOWING: 1
SORE THROAT: 1
ADENOPATHY: 1
COLOR CHANGE: 0
DIAPHORESIS: 0
FEVER: 0
FATIGUE: 0
HEADACHES: 0
VOICE CHANGE: 0
MYALGIAS: 0
NAUSEA: 0
SLEEP DISTURBANCE: 0

## 2020-11-27 ASSESSMENT — MIFFLIN-ST. JEOR: SCORE: 1312.67

## 2020-11-27 NOTE — PROGRESS NOTES
Chief Complaint   Patient presents with     Urgent Care     Mouth Problem     Had back left lower sided tooth removed 1 week ago, had infection in it. Left sided facial swelling and pain, doesn't feel well. Bad throat pain, very hard to swallow.      SUBJECTIVE:  Ria Nj is a 45 year old female presenting with dental abscess complication. She just had her left bottom molar extracted at the dentists a week ago. Increasing soft tissue redness, swelling, pain. Had a poor taste in mouth. Feels like her throat is also sore and swollen. She has not taken any medicine for this.    Past Medical History:   Diagnosis Date     Adjustment disorder with anxiety 10/7/2010     Bee sting-induced anaphylaxis      CARDIOVASCULAR SCREENING; LDL GOAL LESS THAN 160 5/9/2010     Chronic low back pain     has narcotic agreement on file     MICHAEL (generalised anxiety disorder)      Lumbar disc herniation with myelopathy 10/28/2010     Moderate major depression (H)      Tobacco use disorder     Smokes a 1/2 ppd. Started            gabapentin (NEURONTIN) 300 MG capsule, TAKE 1 CAPSULE BY MOUTH THREE TIMES DAILY       IBUPROFEN 200 MG OR TABS, Pt is taking 4 TABLET EVERY 4 TO 6 HOURS AS NEEDED       albuterol (2.5 MG/3ML) 0.083% neb solution, Take 1 vial (2.5 mg) by nebulization every 4 hours as needed for shortness of breath / dyspnea or wheezing       albuterol (PROAIR HFA, PROVENTIL HFA, VENTOLIN HFA) 108 (90 BASE) MCG/ACT inhaler, Inhale 2 puffs into the lungs every 4 hours as needed for shortness of breath / dyspnea or wheezing       buprenorphine HCl-naloxone HCl (SUBOXONE) 8-2 MG per film, Place 1 Film under the tongue 3 times daily       calcium carbonate 500 MG tablet, Take 1 tablet by mouth 2 times daily.       Cholecalciferol (VITAMIN D) 2000 UNITS tablet, Take 2,000 Units by mouth daily       cyclobenzaprine (FLEXERIL) 10 MG tablet, Take 1 tablet (10 mg) by mouth 3 times daily as needed for muscle spasms       EPINEPHrine  "(EPIPEN) 0.3 MG/0.3ML injection, Inject 0.3 mLs (0.3 mg) into the muscle once as needed for anaphylaxis       LORazepam (ATIVAN) 1 MG tablet, TAKE 1 TABLET BY MOUTH EVERY 8 HOURS AS NEEDED FOR ANXIETY       Magnesium 250 MG tablet, Take 1 tablet by mouth daily.       naloxone (NARCAN) nasal spray, Spray 1 spray (4 mg) into one nostril alternating nostrils as needed for opioid reversal every 2-3 minutes until assistance arrives       Omega-3 Fatty Acids (FISH OIL CONCENTRATE PO), Take 300 mg by mouth 3 times daily       order for DME, Equipment being ordered: Rib belt    No current facility-administered medications on file prior to visit.     Social History     Tobacco Use     Smoking status: Current Every Day Smoker     Packs/day: 2.00     Years: 15.00     Pack years: 30.00     Types: Cigarettes     Smokeless tobacco: Never Used   Substance Use Topics     Alcohol use: No     Alcohol/week: 0.0 standard drinks     Allergies   Allergen Reactions     Bee Venom      Hydrocodone Swelling     Penicillins      Other reaction(s): Unknown     Review of Systems   Constitutional: Negative for chills, diaphoresis, fatigue and fever.   HENT: Positive for dental problem, facial swelling, sore throat and trouble swallowing. Negative for congestion, ear pain and voice change.    Respiratory: Negative for cough.    Gastrointestinal: Negative for nausea.   Musculoskeletal: Negative for myalgias.   Skin: Negative for color change, pallor and rash.   Neurological: Negative for headaches.   Hematological: Positive for adenopathy.   Psychiatric/Behavioral: Negative for sleep disturbance.     OBJECTIVE:   /84   Pulse 94   Temp 98  F (36.7  C) (Oral)   Resp 14   Ht 1.702 m (5' 7\")   Wt 63.5 kg (140 lb)   LMP 11/13/2020   SpO2 99%   Breastfeeding No   BMI 21.93 kg/m       Physical Exam  Constitutional:       General: She is not in acute distress.     Appearance: She is well-developed. She is not ill-appearing, toxic-appearing " or diaphoretic.   HENT:      Head: Normocephalic and atraumatic.      Mouth/Throat:      Mouth: Mucous membranes are moist.      Pharynx: Oropharynx is clear. No oropharyngeal exudate or posterior oropharyngeal erythema.      Comments: Left lower molar removed with pale tissue. Tender jawline. No obvious facial asymmetry from swelling.  Eyes:      Conjunctiva/sclera: Conjunctivae normal.      Pupils: Pupils are equal, round, and reactive to light.   Neck:      Musculoskeletal: Normal range of motion and neck supple.   Cardiovascular:      Rate and Rhythm: Normal rate.   Pulmonary:      Effort: Pulmonary effort is normal. No respiratory distress.   Musculoskeletal: Normal range of motion.   Lymphadenopathy:      Cervical: No cervical adenopathy.   Skin:     General: Skin is warm.      Capillary Refill: Capillary refill takes less than 2 seconds.      Findings: No rash.   Neurological:      General: No focal deficit present.      Mental Status: She is alert and oriented to person, place, and time.       ASSESSMENT:    ICD-10-CM    1. Dental abscess  K04.7 clindamycin (CLEOCIN) 300 MG capsule     PLAN:   Patient Instructions   It is important that you make an appointment with a dentist.  Take antibiotic as directed.  Brush teeth twice daily  Salt water gargles- mix about 8 oz of water with about 1 tsp of salt. Swish and spit.  Take Tylenol or an NSAID such as ibuprofen or naproxen as needed for pain.  May take up to 1000 mg of Tylenol every 6-8 hours- do not exceed 4000 mg in 24 hours.  May take up to 600 mg ibuprofen every 6-8 hours.  Alternate Tylenol and Ibuprofen every 3-4 hours.  Cepacol and chloraseptic spray and lozenges  Please follow up with primary care provider if not improving, worsening or new symptoms or for any adverse reactions to medications.    Some patients with mild infection may also receive oral therapy initially, pending dental or surgical evaluation. The preferred oral regimen is  amoxicillin-clavulanate (875 mg orally twice daily). For penicillin-allergic patients, we usually give clindamycin 300 mg to 450 mg orally three times daily. Antibiotics should be continued until local inflammation has resolved completely, typically for a total of 7 to 14 days (UpToDate, 2019).    ER if no improvement in 48 hours or if fever, worsening redness, cannot open mouth    Follow up with primary care provider with any problems, questions or concerns or if symptoms worsen or fail to improve. Patient agreed to plan and verbalized understanding.    Garima Martinez, [facility]

## 2020-11-27 NOTE — PATIENT INSTRUCTIONS
It is important that you make an appointment with a dentist.  Take antibiotic as directed.  Brush teeth twice daily  Salt water gargles- mix about 8 oz of water with about 1 tsp of salt. Swish and spit.  Take Tylenol or an NSAID such as ibuprofen or naproxen as needed for pain.  May take up to 1000 mg of Tylenol every 6-8 hours- do not exceed 4000 mg in 24 hours.  May take up to 600 mg ibuprofen every 6-8 hours.  Alternate Tylenol and Ibuprofen every 3-4 hours.  Cepacol and chloraseptic spray and lozenges  Please follow up with primary care provider if not improving, worsening or new symptoms or for any adverse reactions to medications.    Some patients with mild infection may also receive oral therapy initially, pending dental or surgical evaluation. The preferred oral regimen is amoxicillin-clavulanate (875 mg orally twice daily). For penicillin-allergic patients, we usually give clindamycin 300 mg to 450 mg orally three times daily. Antibiotics should be continued until local inflammation has resolved completely, typically for a total of 7 to 14 days (UpToDate, 2019).    ER if no improvement in 48 hours or if fever, worsening redness, cannot open mouth

## 2020-12-07 NOTE — TELEPHONE ENCOUNTER
Reception - e-visit or office visit for request please    Refused Prescriptions:                       Disp   Refills    tiZANidine (ZANAFLEX) 4 MG tablet [Pharmac*0.1 ta*0        Sig: TAKE ONE TABLET BY MOUTH THREE TIMES DAILY AS NEEDED           FOR SHOULDER AND UPPER BACK PAIN  Refused By: YU NICOLE  Reason for Refusal: Appt required, please call patient       04-Dec-2020

## 2021-01-09 ENCOUNTER — OFFICE VISIT (OUTPATIENT)
Dept: URGENT CARE | Facility: URGENT CARE | Age: 46
End: 2021-01-09
Payer: MEDICARE

## 2021-01-09 VITALS
RESPIRATION RATE: 16 BRPM | HEIGHT: 67 IN | OXYGEN SATURATION: 97 % | DIASTOLIC BLOOD PRESSURE: 82 MMHG | SYSTOLIC BLOOD PRESSURE: 128 MMHG | TEMPERATURE: 98 F | WEIGHT: 140 LBS | BODY MASS INDEX: 21.97 KG/M2 | HEART RATE: 121 BPM

## 2021-01-09 DIAGNOSIS — R07.0 THROAT PAIN: Primary | ICD-10-CM

## 2021-01-09 DIAGNOSIS — R59.9 GLANDS SWOLLEN: ICD-10-CM

## 2021-01-09 LAB
DEPRECATED S PYO AG THROAT QL EIA: NEGATIVE
SPECIMEN SOURCE: NORMAL

## 2021-01-09 PROCEDURE — 99N1174 PR STATISTIC STREP A RAPID: Performed by: FAMILY MEDICINE

## 2021-01-09 PROCEDURE — 87651 STREP A DNA AMP PROBE: CPT | Performed by: FAMILY MEDICINE

## 2021-01-09 PROCEDURE — 99214 OFFICE O/P EST MOD 30 MIN: CPT | Performed by: FAMILY MEDICINE

## 2021-01-09 RX ORDER — NYSTATIN 100000/ML
500000 SUSPENSION, ORAL (FINAL DOSE FORM) ORAL 4 TIMES DAILY
Qty: 140 ML | Refills: 0 | Status: SHIPPED | OUTPATIENT
Start: 2021-01-09 | End: 2021-01-16

## 2021-01-09 ASSESSMENT — MIFFLIN-ST. JEOR: SCORE: 1312.67

## 2021-01-10 LAB
SPECIMEN SOURCE: NORMAL
STREP GROUP A PCR: NOT DETECTED

## 2021-01-14 ENCOUNTER — NURSE TRIAGE (OUTPATIENT)
Dept: NURSING | Facility: CLINIC | Age: 46
End: 2021-01-14

## 2021-01-14 NOTE — TELEPHONE ENCOUNTER
"Triage Call:    Patient is wondering about test results she had done at a  urgent care.   Pt states she has a sensation of someone pushing on her throat, squeezing it.   Feels like she gets flushed.   Pt states she also has felt dizzy.   Feels like she may be getting feverish.   Has had some blood tinged saliva.  States it feels like something is stuck but \"much worse\"    1/9/21- Symptoms may be a little worse since appointment.  No improvement with antibiotics.      RN recommended ED evaluation. Pt stated she does not like to go into the ED.  RN advised note could be sent to provider letting provider know, however, phone number patient gave is her home number and she is not home.  Patient states she does not know her cell phone number.    RN asked when patient would be home and pt states she just keeps laying down.  States she gets really dizzy when standing.     RN again advised that recommendation would be to have patient evaluated in the ED.  Patient stated understanding.  RN recommended having someone else drive her to ED for evaluation. Patient agreed to plan and will go to ED.     Roico Stephens RN 01/14/21 1:19 PM  [facility] Nurse Advisor    COVID 19 Nurse Triage Plan/Patient Instructions    Please be aware that novel coronavirus (COVID-19) may be circulating in the community. If you develop symptoms such as fever, cough, or SOB or if you have concerns about the presence of another infection including coronavirus (COVID-19), please contact your health care provider or visit [email or web address].     Disposition/Instructions    ED Visit recommended. Follow protocol based instructions.     Bring Your Own Device:  Please also bring your smart device(s) (smart phones, tablets, laptops) and their charging cables for your personal use and to communicate with your care team during your visit.    Thank you for taking steps to prevent the spread of this virus.  o Limit your contact with others.  o Wear a simple " mask to cover your cough.  o Wash your hands well and often.    Resources    [facility]: About COVID-19: [email or web address]    CDC: What to Do If You're Sick: www.cdc.gov/coronavirus/2019-ncov/about/steps-when-sick.html    CDC: Ending Home Isolation: www.cdc.gov/coronavirus/2019-ncov/hcp/disposition-in-home-patients.html     CDC: Caring for Someone: www.cdc.gov/coronavirus/2019-ncov/if-you-are-sick/care-for-someone.html     [facility]: Interim Guidance for Hospital Discharge to Home: www.[email or web address]/diseases/coronavirus/hcp/hospdischarge.pdf    [facility] clinical trials (COVID-19 research studies): [email or web address]-clinical-trials     Below are the COVID-19 hotlines at the [place] [facility] ([facility]). Interpreters are available.   o For health questions: Call 759-474-7817 or 1-461.225.1341 (7 a.m. to 7 p.m.)  o For questions about schools and childcare: Call 339-074-6486 or 1-597.115.7879 (7 a.m. to 7 p.m.)         Reason for Disposition    Symptoms of food or bone stuck in throat or esophagus (e.g., pain in throat or chest, FB sensation, blood-tinged saliva)    Additional Information    Negative: [1] Severe difficulty swallowing (e.g., drooling or spitting) AND [2] started suddenly after taking a medicine or allergic food    Negative: Wheezing, stridor, hoarseness, or difficulty breathing    Negative: [1] Swollen tongue AND [2] sudden onset    Negative: Sounds like a life-threatening emergency to the triager    Negative: Mouth ulcers are seen    Negative: Sore throat (throat pain with swallowing)    Negative: Swallowed a (non-edible) foreign body    Negative: Feeding tube, questions or concerns related to    Negative: Swelling of tongue    Negative: SEVERE difficulty swallowing (e.g., drooling or spitting, can't swallow water)    Negative: [1] Symptoms of blocked esophagus (e.g., can't swallow normal secretions, drooling) AND [2] present now    Protocols used:  SWALLOWING DIFFICULTY-A-AH

## 2021-05-27 ENCOUNTER — RECORDS - HEALTHEAST (OUTPATIENT)
Dept: ADMINISTRATIVE | Facility: CLINIC | Age: 46
End: 2021-05-27

## 2021-08-08 ENCOUNTER — OFFICE VISIT (OUTPATIENT)
Dept: URGENT CARE | Facility: URGENT CARE | Age: 46
End: 2021-08-08
Payer: MEDICARE

## 2021-08-08 VITALS
OXYGEN SATURATION: 98 % | WEIGHT: 140 LBS | DIASTOLIC BLOOD PRESSURE: 78 MMHG | TEMPERATURE: 97.2 F | SYSTOLIC BLOOD PRESSURE: 121 MMHG | HEART RATE: 98 BPM | BODY MASS INDEX: 21.93 KG/M2

## 2021-08-08 DIAGNOSIS — L29.9 ITCHY SCALP: Primary | ICD-10-CM

## 2021-08-08 PROCEDURE — 99213 OFFICE O/P EST LOW 20 MIN: CPT | Performed by: FAMILY MEDICINE

## 2021-08-08 PROCEDURE — 87168 MACROSCOPIC EXAM ARTHROPOD: CPT | Performed by: FAMILY MEDICINE

## 2021-08-08 RX ORDER — PERMETHRIN 50 MG/G
CREAM TOPICAL
Qty: 60 G | Refills: 1 | Status: CANCELLED | OUTPATIENT
Start: 2021-08-08

## 2021-08-08 RX ORDER — PERMETHRIN 50 MG/G
CREAM TOPICAL
Qty: 60 G | Refills: 1 | Status: SHIPPED | OUTPATIENT
Start: 2021-08-08

## 2021-08-08 RX ORDER — HYDROXYZINE HYDROCHLORIDE 25 MG/1
25 TABLET, FILM COATED ORAL EVERY 6 HOURS PRN
Qty: 30 TABLET | Refills: 0 | Status: SHIPPED | OUTPATIENT
Start: 2021-08-08

## 2021-08-08 RX ORDER — IVERMECTIN 3 MG/1
6 TABLET ORAL ONCE
Qty: 2 TABLET | Refills: 0 | Status: CANCELLED | OUTPATIENT
Start: 2021-08-08 | End: 2021-08-08

## 2021-08-08 NOTE — PROGRESS NOTES
"SUBJECTIVE:  Chief Complaint   Patient presents with     Urgent Care     Trauma     c/o insect on head , itching for 9 months     Ria Nj is a 46 year old female who presents with a chief complaint of persistent scalp itchiness.    States that has been itchy and has been scratching her scalp, symptoms present for 9 months now.  Has been using Nix but is not helping.  Tried tea tree oil and this helps briefly but when stop, then will develop symptoms again.  Insistent that bugs comes out at night.  Endorsed itchiness all over her body at times.  Losing her hair as she feels that something is there, \"blue\" color.  Wants what she has collected to be checked.    Has been seen in ED twice, diagnosed with delusional parasitosis.    Past Medical History:   Diagnosis Date     Adjustment disorder with anxiety 10/7/2010     Bee sting-induced anaphylaxis      CARDIOVASCULAR SCREENING; LDL GOAL LESS THAN 160 5/9/2010     Chronic low back pain     has narcotic agreement on file     MICHAEL (generalised anxiety disorder)      Lumbar disc herniation with myelopathy 10/28/2010     Moderate major depression (H)      Tobacco use disorder     Smokes a 1/2 ppd. Started       Current Outpatient Medications   Medication Sig Dispense Refill     albuterol (2.5 MG/3ML) 0.083% neb solution Take 1 vial (2.5 mg) by nebulization every 4 hours as needed for shortness of breath / dyspnea or wheezing 30 vial 11     albuterol (PROAIR HFA, PROVENTIL HFA, VENTOLIN HFA) 108 (90 BASE) MCG/ACT inhaler Inhale 2 puffs into the lungs every 4 hours as needed for shortness of breath / dyspnea or wheezing 1 Inhaler 0     buprenorphine HCl-naloxone HCl (SUBOXONE) 8-2 MG per film Place 1 Film under the tongue 3 times daily 90 Film 0     calcium carbonate 500 MG tablet Take 1 tablet by mouth 2 times daily. 100 tablet 3     Cholecalciferol (VITAMIN D) 2000 UNITS tablet Take 2,000 Units by mouth daily 100 tablet 3     cyclobenzaprine (FLEXERIL) 10 MG tablet " Take 1 tablet (10 mg) by mouth 3 times daily as needed for muscle spasms 90 tablet 0     EPINEPHrine (EPIPEN) 0.3 MG/0.3ML injection Inject 0.3 mLs (0.3 mg) into the muscle once as needed for anaphylaxis 2 each 1     gabapentin (NEURONTIN) 300 MG capsule TAKE 1 CAPSULE BY MOUTH THREE TIMES DAILY 270 capsule 3     IBUPROFEN 200 MG OR TABS Pt is taking 4 TABLET EVERY 4 TO 6 HOURS AS NEEDED       LORazepam (ATIVAN) 1 MG tablet TAKE 1 TABLET BY MOUTH EVERY 8 HOURS AS NEEDED FOR ANXIETY 90 tablet 5     Magnesium 250 MG tablet Take 1 tablet by mouth daily. 100 tablet 3     naloxone (NARCAN) nasal spray Spray 1 spray (4 mg) into one nostril alternating nostrils as needed for opioid reversal every 2-3 minutes until assistance arrives 0.2 mL 0     Omega-3 Fatty Acids (FISH OIL CONCENTRATE PO) Take 300 mg by mouth 3 times daily       order for DME Equipment being ordered: Rib belt 1 Device 0     Social History     Tobacco Use     Smoking status: Current Every Day Smoker     Packs/day: 2.00     Years: 15.00     Pack years: 30.00     Types: Cigarettes     Smokeless tobacco: Never Used   Substance Use Topics     Alcohol use: No     Alcohol/week: 0.0 standard drinks       ROS:  Review of systems negative except as stated above.    EXAM:   /78   Pulse 98   Temp 97.2  F (36.2  C) (Tympanic)   Wt 63.5 kg (140 lb)   SpO2 98%   BMI 21.93 kg/m    GENERAL APPEARANCE: alert, slightly disheveled, upset   HEAD: scalp with no scaling, no nits on hair, patches of hair cut shorter  PSYCH: alert, anxious, mildly agitated      ASSESSMENT/PLAN:  (L29.9) Itchy scalp  (primary encounter diagnosis)  Plan: hydrOXYzine (ATARAX) 25 MG tablet, permethrin         (ELIMITE) 5 % external cream, Arthropod ID         macroscopic            Patient was distraught and quite upset, scratching scalp and insistent that the bugs are in her hair and comes out at night to bite her continuously.  Tried to reassure patient that I do not see any lice or  nits on her scalp.  She had proceeded to scratch her head and produces small brown/black debris and insistent that these are the dead bugs.  Relented after I discussed that can send this for identification - specimen collect and will follow up on on pathology report.  RX hydroxyzine given for itchiness, encourage to treat with RX Elimite, can repeat in 1 week.    Follow up with primary provider in 1 week    Ghassan Calabrese MD

## 2021-08-10 LAB — PARASITE IDENTIFICATION: NORMAL

## 2021-08-23 ENCOUNTER — OFFICE VISIT (OUTPATIENT)
Dept: URGENT CARE | Facility: URGENT CARE | Age: 46
End: 2021-08-23
Payer: MEDICARE

## 2021-08-23 VITALS
OXYGEN SATURATION: 100 % | DIASTOLIC BLOOD PRESSURE: 76 MMHG | WEIGHT: 140 LBS | TEMPERATURE: 97.9 F | BODY MASS INDEX: 21.93 KG/M2 | HEART RATE: 94 BPM | SYSTOLIC BLOOD PRESSURE: 111 MMHG

## 2021-08-23 DIAGNOSIS — R82.90 MALODOROUS URINE: ICD-10-CM

## 2021-08-23 DIAGNOSIS — R11.0 NAUSEA: ICD-10-CM

## 2021-08-23 DIAGNOSIS — N89.8 VAGINAL DISCHARGE: Primary | ICD-10-CM

## 2021-08-23 DIAGNOSIS — R19.8 ABNORMAL BOWEL MOVEMENT: ICD-10-CM

## 2021-08-23 LAB
ALBUMIN UR-MCNC: NEGATIVE MG/DL
AMORPH CRY #/AREA URNS HPF: ABNORMAL /HPF
APPEARANCE UR: ABNORMAL
BACTERIA #/AREA URNS HPF: ABNORMAL /HPF
BILIRUB UR QL STRIP: NEGATIVE
CLUE CELLS: ABNORMAL
COLOR UR AUTO: YELLOW
GLUCOSE UR STRIP-MCNC: NEGATIVE MG/DL
HGB UR QL STRIP: NEGATIVE
KETONES UR STRIP-MCNC: NEGATIVE MG/DL
LEUKOCYTE ESTERASE UR QL STRIP: NEGATIVE
NITRATE UR QL: NEGATIVE
PH UR STRIP: 7.5 [PH] (ref 5–7)
RBC #/AREA URNS AUTO: ABNORMAL /HPF
SP GR UR STRIP: 1.02 (ref 1–1.03)
TRICHOMONAS, WET PREP: ABNORMAL
UROBILINOGEN UR STRIP-ACNC: 0.2 E.U./DL
WBC #/AREA URNS AUTO: ABNORMAL /HPF
WBC'S/HIGH POWER FIELD, WET PREP: ABNORMAL
YEAST, WET PREP: ABNORMAL

## 2021-08-23 PROCEDURE — 99213 OFFICE O/P EST LOW 20 MIN: CPT | Performed by: PHYSICIAN ASSISTANT

## 2021-08-23 PROCEDURE — 80053 COMPREHEN METABOLIC PANEL: CPT | Performed by: PHYSICIAN ASSISTANT

## 2021-08-23 PROCEDURE — 81001 URINALYSIS AUTO W/SCOPE: CPT | Performed by: PHYSICIAN ASSISTANT

## 2021-08-23 PROCEDURE — 36415 COLL VENOUS BLD VENIPUNCTURE: CPT | Performed by: PHYSICIAN ASSISTANT

## 2021-08-23 PROCEDURE — 87491 CHLMYD TRACH DNA AMP PROBE: CPT | Performed by: PHYSICIAN ASSISTANT

## 2021-08-23 PROCEDURE — 87591 N.GONORRHOEAE DNA AMP PROB: CPT | Performed by: PHYSICIAN ASSISTANT

## 2021-08-23 PROCEDURE — 87210 SMEAR WET MOUNT SALINE/INK: CPT | Performed by: PHYSICIAN ASSISTANT

## 2021-08-23 RX ORDER — PRAZIQUANTEL 600 MG/1
600 TABLET, FILM COATED ORAL 3 TIMES DAILY
Qty: 3 TABLET | Refills: 0 | Status: CANCELLED | OUTPATIENT
Start: 2021-08-23 | End: 2021-08-24

## 2021-08-23 ASSESSMENT — ENCOUNTER SYMPTOMS
COUGH: 0
ABDOMINAL PAIN: 0
FEVER: 0
SHORTNESS OF BREATH: 0
APPETITE CHANGE: 0
DIAPHORESIS: 0
NAUSEA: 1
ROS SKIN COMMENTS: CYSTS
CHILLS: 1

## 2021-08-23 NOTE — PROGRESS NOTES
SUBJECTIVE:   Ria Nj is a 46 year old female presenting with a chief complaint of   Chief Complaint   Patient presents with     Urgent Care     Bowel Problems     c/o worms in stools       She is an established patient of [facility].  Patient states she saw a worm like structure in her stool yesterday morning.  Patient states she has one BM per month that is only facilitated with a fleet enema.  Patient in [place] a month ago, no international travel.   No abdominal pain.  Patient states 30 lb weight loss in 2 months.   She also states hair loss.  Rash on back x 4 days -- No itching, no burning.  Patient requesting to be checked for  STDs and also states she has malodorous voiding.  She also complains of cysts on her body.      Of note, patient has previous diagnoses of delusions of parasitosis.  She states she has an appointment with a PCP, but wanted to come in today.  She has many other somatic complaints that include, rashes, sores and cysts.          Review of Systems   Constitutional: Positive for chills. Negative for appetite change, diaphoresis and fever.   Respiratory: Negative for cough and shortness of breath.    Cardiovascular: Negative for chest pain.   Gastrointestinal: Positive for nausea. Negative for abdominal pain.   Genitourinary: Positive for vaginal discharge.        Malodorous voiding.   Skin: Positive for rash.        cysts   All other systems reviewed and are negative.      Past Medical History:   Diagnosis Date     Adjustment disorder with anxiety 10/7/2010     Bee sting-induced anaphylaxis      CARDIOVASCULAR SCREENING; LDL GOAL LESS THAN 160 5/9/2010     Chronic low back pain     has narcotic agreement on file     MICHAEL (generalised anxiety disorder)      Lumbar disc herniation with myelopathy 10/28/2010     Moderate major depression (H)      Tobacco use disorder     Smokes a 1/2 ppd. Started       Family History   Problem Relation Age of Onset     Cancer Maternal Grandmother       Cancer Maternal Grandfather      Cancer Paternal Grandmother      Cancer Paternal Grandfather      Current Outpatient Medications   Medication Sig Dispense Refill     albuterol (2.5 MG/3ML) 0.083% neb solution Take 1 vial (2.5 mg) by nebulization every 4 hours as needed for shortness of breath / dyspnea or wheezing 30 vial 11     albuterol (PROAIR HFA, PROVENTIL HFA, VENTOLIN HFA) 108 (90 BASE) MCG/ACT inhaler Inhale 2 puffs into the lungs every 4 hours as needed for shortness of breath / dyspnea or wheezing 1 Inhaler 0     buprenorphine HCl-naloxone HCl (SUBOXONE) 8-2 MG per film Place 1 Film under the tongue 3 times daily 90 Film 0     calcium carbonate 500 MG tablet Take 1 tablet by mouth 2 times daily. 100 tablet 3     Cholecalciferol (VITAMIN D) 2000 UNITS tablet Take 2,000 Units by mouth daily 100 tablet 3     cyclobenzaprine (FLEXERIL) 10 MG tablet Take 1 tablet (10 mg) by mouth 3 times daily as needed for muscle spasms 90 tablet 0     EPINEPHrine (EPIPEN) 0.3 MG/0.3ML injection Inject 0.3 mLs (0.3 mg) into the muscle once as needed for anaphylaxis 2 each 1     gabapentin (NEURONTIN) 300 MG capsule TAKE 1 CAPSULE BY MOUTH THREE TIMES DAILY 270 capsule 3     hydrOXYzine (ATARAX) 25 MG tablet Take 1 tablet (25 mg) by mouth every 6 hours as needed for itching 30 tablet 0     IBUPROFEN 200 MG OR TABS Pt is taking 4 TABLET EVERY 4 TO 6 HOURS AS NEEDED       LORazepam (ATIVAN) 1 MG tablet TAKE 1 TABLET BY MOUTH EVERY 8 HOURS AS NEEDED FOR ANXIETY 90 tablet 5     Magnesium 250 MG tablet Take 1 tablet by mouth daily. 100 tablet 3     naloxone (NARCAN) nasal spray Spray 1 spray (4 mg) into one nostril alternating nostrils as needed for opioid reversal every 2-3 minutes until assistance arrives 0.2 mL 0     Omega-3 Fatty Acids (FISH OIL CONCENTRATE PO) Take 300 mg by mouth 3 times daily       order for DME Equipment being ordered: Rib belt 1 Device 0     permethrin (ELIMITE) 5 % external cream Apply cream from head to  toe (except the face); leave on for 8-14 hours then wash off with water; reapply in 1 week if live mites appear. 60 g 1     Social History     Tobacco Use     Smoking status: Current Every Day Smoker     Packs/day: 2.00     Years: 15.00     Pack years: 30.00     Types: Cigarettes     Smokeless tobacco: Never Used   Substance Use Topics     Alcohol use: No     Alcohol/week: 0.0 standard drinks       OBJECTIVE  /76   Pulse 94   Temp 97.9  F (36.6  C) (Tympanic)   Wt 63.5 kg (140 lb)   SpO2 100%   BMI 21.93 kg/m      Physical Exam  Vitals and nursing note reviewed.   Constitutional:       Appearance: Normal appearance. She is normal weight.   Eyes:      Extraocular Movements: Extraocular movements intact.      Conjunctiva/sclera: Conjunctivae normal.   Cardiovascular:      Rate and Rhythm: Normal rate and regular rhythm.      Pulses: Normal pulses.      Heart sounds: Normal heart sounds.   Pulmonary:      Effort: Pulmonary effort is normal.      Breath sounds: Normal breath sounds.   Abdominal:      General: Abdomen is flat. Bowel sounds are normal.      Palpations: Abdomen is soft.   Skin:     General: Skin is warm and dry.      Comments: I cannot confirm any cysts claimed by patient. What she states is a rash appears to be a healing excoriation.      Many healed lacerations to UEs.   Neurological:      General: No focal deficit present.      Mental Status: She is alert.   Psychiatric:      Comments: Patient clearly has somatic focus         Labs:  No results found for this or any previous visit (from the past 24 hour(s)).    X-Ray was not done.    ASSESSMENT:      ICD-10-CM    1. Vaginal discharge  N89.8 Wet prep - lab collect     NEISSERIA GONORRHOEA PCR     CHLAMYDIA TRACHOMATIS PCR     CHLAMYDIA TRACHOMATIS PCR     NEISSERIA GONORRHOEA PCR     Wet prep - lab collect   2. Malodorous urine  R82.90 UA Macro with Reflex to Micro and Culture - lab collect     UA Macro with Reflex to Micro and Culture - lab  collect   3. Abnormal bowel movement  R19.8 Ova and Parasite Exam Routine     Enteric Bacteria and Virus Panel by [facility] Stool   4. Nausea  R11.0 Comprehensive metabolic panel (BMP + Alb, Alk Phos, ALT, AST, Total. Bili, TP)     Comprehensive metabolic panel (BMP + Alb, Alk Phos, ALT, AST, Total. Bili, TP)        Medical Decision Making:    Differential Diagnosis:  Delusional parasitosis,     Serious Comorbid Conditions:  Adult:  as above    PLAN:    Follow up with PCP tomorrow. Will call with any abnormal lab results.      Followup:    If not improving or if condition worsens, follow up with your Primary Care Provider, If not improving or if conditions worsens over the next 12-24 hours, go to the Emergency Department    There are no Patient Instructions on file for this visit.

## 2021-08-24 LAB
ALBUMIN SERPL-MCNC: 3.8 G/DL (ref 3.4–5)
ALP SERPL-CCNC: 62 U/L (ref 40–150)
ALT SERPL W P-5'-P-CCNC: 24 U/L (ref 0–50)
ANION GAP SERPL CALCULATED.3IONS-SCNC: 1 MMOL/L (ref 3–14)
AST SERPL W P-5'-P-CCNC: 16 U/L (ref 0–45)
BILIRUB SERPL-MCNC: 0.2 MG/DL (ref 0.2–1.3)
BUN SERPL-MCNC: 11 MG/DL (ref 7–30)
CALCIUM SERPL-MCNC: 9.5 MG/DL (ref 8.5–10.1)
CHLORIDE BLD-SCNC: 106 MMOL/L (ref 94–109)
CO2 SERPL-SCNC: 32 MMOL/L (ref 20–32)
CREAT SERPL-MCNC: 0.82 MG/DL (ref 0.52–1.04)
GFR SERPL CREATININE-BSD FRML MDRD: 86 ML/MIN/1.73M2
GLUCOSE BLD-MCNC: 95 MG/DL (ref 70–99)
POTASSIUM BLD-SCNC: 4.1 MMOL/L (ref 3.4–5.3)
PROT SERPL-MCNC: 6.9 G/DL (ref 6.8–8.8)
SODIUM SERPL-SCNC: 139 MMOL/L (ref 133–144)

## 2021-08-25 LAB
C TRACH DNA SPEC QL NAA+PROBE: NEGATIVE
N GONORRHOEA DNA SPEC QL NAA+PROBE: NEGATIVE

## 2021-09-04 ENCOUNTER — NURSE TRIAGE (OUTPATIENT)
Dept: NURSING | Facility: CLINIC | Age: 46
End: 2021-09-04

## 2021-09-05 NOTE — TELEPHONE ENCOUNTER
"No PCP. \"I came in for tests. I am pretty sure I had pin worms. I had brought in a stool sample, but the clinic was already closed.  I need my test results from [facility], seen within the last 2 weeks.\" 8/23)   \"I have had abdominal pain x 2-3 days, it seems to be getting worse.. I used OTC medication for pin worms 2 days ago\", but she does not think it helped. \"I don't know what I am dealing with. I am petrified of bugs and worms, so I just wanted to take care of it.\" Pain is located in the right lower back and around to the right abdomen=just below the umbilicus. Currently pain=\"4-5\". She has taken Tylenol 2 Extra strength 1/2 hr ago--she does not think it will work. \"I feel dizzy when I stand up. I can't stand up for very long.\"    She has not turned in her stool sample yet. (refrigerated).   \"I don't go to the bathroom very often\".     \"I thought that I had lice, but I am not getting rid of this lice or whatever it is\". She was given Permethrin.     \"Now both of my ankles are swollen very big and I have a red rash around both of them.  The swelling goes 1/2 way up the calves. I have never had swelling before. I feel some strange sensation in my feet: numbness, it feels like my veins are inflaming on both feet.\"    Triaged to a disposition of  See HCP within 4 hrs (or PCP triage). Caller states she lives near the , but does not want to go back there because she feels she was not treated very nicely. Discussed option of ER--which she also does not want to do. She states she will think about it (recommendation to be seen tonight). She states if the abdominal pain continues to get worse, then she will go to the ER.    Nova Elizondo RN Triage Nurse Advisor 8:02 PM 9/4/2021    Reason for Disposition    [1] MILD-MODERATE pain AND [2] constant AND [3] present > 2 hours    [1] MILD swelling of both ankles (i.e., pedal edema) AND [2] new onset or worsening    Additional Information    Negative: Shock " "suspected (e.g., cold/pale/clammy skin, too weak to stand, low BP, rapid pulse)    Negative: Difficult to awaken or acting confused (e.g., disoriented, slurred speech)    Negative: Passed out (i.e., lost consciousness, collapsed and was not responding)    Negative: Sounds like a life-threatening emergency to the triager    Negative: Chest pain    Negative: Pain is mainly in upper abdomen  (if needed ask: \"is it mainly above the belly button?\")    Negative: Followed an abdomen (stomach) injury    Negative: [1] Abdominal pain AND [2] pregnant < 20 weeks    Negative: [1] Abdominal pain AND [2] pregnant > 20 weeks    Negative: [1] Abdominal pain AND [2] postpartum (from 0 to 6 weeks after delivery)    Negative: [1] SEVERE pain (e.g., excruciating) AND [2] present > 1 hour    Negative: [1] SEVERE pain AND [2] age > 60    Negative: [1] Vomiting AND [2] contains red blood or black (\"coffee ground\") material  (Exception: few red streaks in vomit that only happened once)    Negative: Blood in bowel movements   (Exception: blood on surface of BM with constipation)    Negative: Black or tarry bowel movements  (Exception: chronic-unchanged  black-grey bowel movements AND is taking iron pills or Pepto-bismol)    Negative: Patient sounds very sick or weak to the triager    Negative: Severe difficulty breathing (e.g., struggling for each breath, speaks in single words)    Negative: Looks like a broken bone or dislocated joint (e.g., crooked or deformed)    Negative: Sounds like a life-threatening emergency to the triager    Negative: Chest pain    Negative: Followed a leg injury    Negative: [1] Small area of swelling AND [2] followed an insect bite to the area    Negative: Swelling of one ankle joint    Negative: Swelling of knee is main symptom    Negative: Pregnant    Negative: Postpartum (from 0 to 6 weeks after delivery)    Negative: Difficulty breathing at rest    Negative: Entire foot is cool or blue in comparison to other " side    Negative: [1] Can't walk or can barely walk AND [2] new onset    Negative: [1] Difficulty breathing with exertion (e.g., walking) AND [2] new onset or worsening    Negative: [1] Red area or streak AND [2] fever    Negative: [1] Swelling is painful to touch AND [2] fever    Negative: [1] Cast on leg or ankle AND [2] now increased pain    Negative: Patient sounds very sick or weak to the triager    Negative: SEVERE leg swelling (e.g., swelling extends above knee, entire leg is swollen, weeping fluid)    Negative: [1] Red area or streak [2] large (> 2 in. or 5 cm)    Negative: [1] Thigh or calf pain AND [2] only 1 side AND [3] present > 1 hour    Negative: [1] Thigh, calf, or ankle swelling AND [2] only 1 side    Negative: [1] Thigh, calf, or ankle swelling AND [2] bilateral AND [3] 1 side is more swollen    Negative: [1] MODERATE leg swelling (e.g., swelling extends up to knees) AND [2] new onset or worsening    Negative: Swelling of face, arm or hands  (Exception: slight puffiness of fingers occurring during hot weather)    Negative: Looks like a boil, infected sore, deep ulcer or other infected rash (spreading redness, pus)    Protocols used: ABDOMINAL PAIN - FEMALE-A-AH, LEG SWELLING AND EDEMA-A-AH    COVID 19 Nurse Triage Plan/Patient Instructions    Please be aware that novel coronavirus (COVID-19) may be circulating in the community. If you develop symptoms such as fever, cough, or SOB or if you have concerns about the presence of another infection including coronavirus (COVID-19), please contact your health care provider or visit [email or web address].     Disposition/Instructions    In-Person Visit with provider recommended. Reference Visit Selection Guide.    Thank you for taking steps to prevent the spread of this virus.  o Limit your contact with others.  o Wear a simple mask to cover your cough.  o Wash your hands well and often.    Resources     [facility]: About COVID-19:  [email or web address]    CDC: What to Do If You're Sick: www.cdc.gov/coronavirus/2019-ncov/about/steps-when-sick.html    CDC: Ending Home Isolation: www.cdc.gov/coronavirus/2019-ncov/hcp/disposition-in-home-patients.html     CDC: Caring for Someone: www.cdc.gov/coronavirus/2019-ncov/if-you-are-sick/care-for-someone.html     [facility]: Interim Guidance for Hospital Discharge to Home: www.[email or web address]./diseases/coronavirus/hcp/hospdischarge.pdf    [facility] clinical trials (COVID-19 research studies): [email or web address]-clinical-trials     Below are the COVID-19 hotlines at the [place] [facility] ([facility]). Interpreters are available.   o For health questions: Call 427-019-1263 or 1-716.892.2760 (7 a.m. to 7 p.m.)  o For questions about schools and childcare: Call 744-977-8749 or 1-981.678.1659 (7 a.m. to 7 p.m.)

## 2021-09-06 ENCOUNTER — HOSPITAL ENCOUNTER (EMERGENCY)
Facility: CLINIC | Age: 46
Discharge: HOME | End: 2021-09-06
Attending: EMERGENCY MEDICINE | Admitting: EMERGENCY MEDICINE
Payer: MEDICARE

## 2021-09-06 VITALS
HEIGHT: 67 IN | RESPIRATION RATE: 16 BRPM | OXYGEN SATURATION: 99 % | DIASTOLIC BLOOD PRESSURE: 92 MMHG | TEMPERATURE: 98.1 F | SYSTOLIC BLOOD PRESSURE: 135 MMHG | WEIGHT: 140 LBS | BODY MASS INDEX: 21.97 KG/M2 | HEART RATE: 106 BPM

## 2021-09-06 DIAGNOSIS — F22 DELUSIONS OF PARASITOSIS (H): ICD-10-CM

## 2021-09-06 DIAGNOSIS — K59.00 CONSTIPATION, UNSPECIFIED CONSTIPATION TYPE: ICD-10-CM

## 2021-09-06 PROCEDURE — 99283 EMERGENCY DEPT VISIT LOW MDM: CPT | Performed by: EMERGENCY MEDICINE

## 2021-09-06 PROCEDURE — 87177 OVA AND PARASITES SMEARS: CPT | Performed by: EMERGENCY MEDICINE

## 2021-09-06 PROCEDURE — 250N000013 HC RX MED GY IP 250 OP 250 PS 637: Performed by: EMERGENCY MEDICINE

## 2021-09-06 PROCEDURE — 87209 SMEAR COMPLEX STAIN: CPT | Performed by: EMERGENCY MEDICINE

## 2021-09-06 RX ORDER — IBUPROFEN 600 MG/1
600 TABLET, FILM COATED ORAL ONCE
Status: COMPLETED | OUTPATIENT
Start: 2021-09-06 | End: 2021-09-06

## 2021-09-06 RX ORDER — ACETAMINOPHEN 325 MG/1
650 TABLET ORAL ONCE
Status: COMPLETED | OUTPATIENT
Start: 2021-09-06 | End: 2021-09-06

## 2021-09-06 RX ADMIN — ACETAMINOPHEN 650 MG: 325 TABLET, FILM COATED ORAL at 18:17

## 2021-09-06 RX ADMIN — IBUPROFEN 600 MG: 600 TABLET, FILM COATED ORAL at 18:17

## 2021-09-06 ASSESSMENT — MIFFLIN-ST. JEOR: SCORE: 1307.67

## 2021-09-06 ASSESSMENT — ENCOUNTER SYMPTOMS
DIFFICULTY URINATING: 0
FEVER: 0
VOMITING: 1
CHILLS: 0

## 2021-09-06 NOTE — ED PROVIDER NOTES
"    [place] EMERGENCY DEPARTMENT ([facility])  September 6, 2021  History     Chief Complaint   Patient presents with     Rash     The history is provided by the patient and medical records.     Ria Nj is a 46 year old female who presents to the Emergency Department for evaluation of rash on her legs, abdomen, and back.  Patient states that she thinks she has pinworms and was seen at [place] urgent care recently for evaluation of this, but she states that it was \"too late to check for pinworms\" when she gave her stool sample.  Patient states she \"never has bowel movements\", but did bring a stool sample with her from today.  She reports that she thinks she has something in her stool.  She endorses vomiting.  Patient denies rectal itching, trouble urinating, fever, or chills.      She reports that she uses methamphetamine via snorting, but not in the past 3 days. Patient states she takes gabapentin for back pain and has no other medical issues.   She denies alcohol use.  Patient indicates she smokes about a pack of cigarettes a day.    Per review of the chart, patient has previous diagnosis of delusions of parasitosis.    PAST MEDICAL HISTORY:   Past Medical History:   Diagnosis Date     Adjustment disorder with anxiety 10/7/2010     Bee sting-induced anaphylaxis      CARDIOVASCULAR SCREENING; LDL GOAL LESS THAN 160 5/9/2010     Chronic low back pain     has narcotic agreement on file     MICHAEL (generalised anxiety disorder)      Lumbar disc herniation with myelopathy 10/28/2010     Moderate major depression (H)      Tobacco use disorder     Smokes a 1/2 ppd. Started         PAST SURGICAL HISTORY:   Past Surgical History:   Procedure Laterality Date     OVARY SURGERY N/A 2004    Ovarian cyst removed.      SURGICAL HISTORY OF -   2007    ovarian cyst removal        Past medical history, past surgical history, medications, and allergies were reviewed with the patient. Additional pertinent items: " None    FAMILY HISTORY:   Family History   Problem Relation Age of Onset     Cancer Maternal Grandmother      Cancer Maternal Grandfather      Cancer Paternal Grandmother      Cancer Paternal Grandfather        SOCIAL HISTORY:   Social History     Tobacco Use     Smoking status: Current Every Day Smoker     Packs/day: 2.00     Years: 15.00     Pack years: 30.00     Types: Cigarettes     Smokeless tobacco: Never Used   Substance Use Topics     Alcohol use: No     Alcohol/week: 0.0 standard drinks     Social history was reviewed with the patient. Additional pertinent items: None      Discharge Medication List as of 9/6/2021  6:12 PM      CONTINUE these medications which have NOT CHANGED    Details   albuterol (2.5 MG/3ML) 0.083% neb solution Take 1 vial (2.5 mg) by nebulization every 4 hours as needed for shortness of breath / dyspnea or wheezing, Disp-30 vial, R-11, E-Prescribe      albuterol (PROAIR HFA, PROVENTIL HFA, VENTOLIN HFA) 108 (90 BASE) MCG/ACT inhaler Inhale 2 puffs into the lungs every 4 hours as needed for shortness of breath / dyspnea or wheezing, Disp-1 Inhaler, R-0, E-Prescribe      buprenorphine HCl-naloxone HCl (SUBOXONE) 8-2 MG per film Place 1 Film under the tongue 3 times daily, Disp-90 Film,R-0, E-PrescribeNADEAN: [id number]      calcium carbonate 500 MG tablet Take 1 tablet by mouth 2 times daily., Disp-100 tablet, R-3, Historical      Cholecalciferol (VITAMIN D) 2000 UNITS tablet Take 2,000 Units by mouth daily, Disp-100 tablet, R-3, E-Prescribe      cyclobenzaprine (FLEXERIL) 10 MG tablet Take 1 tablet (10 mg) by mouth 3 times daily as needed for muscle spasms, Disp-90 tablet,R-0, E-Prescribe      EPINEPHrine (EPIPEN) 0.3 MG/0.3ML injection Inject 0.3 mLs (0.3 mg) into the muscle once as needed for anaphylaxis, Disp-2 each, R-1, E-Prescribe      gabapentin (NEURONTIN) 300 MG capsule TAKE 1 CAPSULE BY MOUTH THREE TIMES DAILY, Disp-270 capsule,R-3, E-Prescribe      hydrOXYzine (ATARAX) 25 MG  "tablet Take 1 tablet (25 mg) by mouth every 6 hours as needed for itching, Disp-30 tablet, R-0, E-Prescribe      IBUPROFEN 200 MG OR TABS Pt is taking 4 TABLET EVERY 4 TO 6 HOURS AS NEEDED, Historical      LORazepam (ATIVAN) 1 MG tablet TAKE 1 TABLET BY MOUTH EVERY 8 HOURS AS NEEDED FOR ANXIETY, Disp-90 tablet, R-5, E-Prescribe      Magnesium 250 MG tablet Take 1 tablet by mouth daily., Disp-100 tablet, R-3, Historical      naloxone (NARCAN) nasal spray Spray 1 spray (4 mg) into one nostril alternating nostrils as needed for opioid reversal every 2-3 minutes until assistance arrives, Disp-0.2 mL, R-0, Local Print      Omega-3 Fatty Acids (FISH OIL CONCENTRATE PO) Take 300 mg by mouth 3 times daily, Historical      order for DME Equipment being ordered: Rib beltDisp-1 Device, R-0, Local Print      permethrin (ELIMITE) 5 % external cream Apply cream from head to toe (except the face); leave on for 8-14 hours then wash off with water; reapply in 1 week if live mites appear.Disp-60 g, [id number]                Allergies   Allergen Reactions     Bee Venom         Review of Systems   Constitutional: Negative for chills and fever.   Gastrointestinal: Positive for vomiting.   Genitourinary: Negative for difficulty urinating.        Negative for rectal itchiness   Skin: Positive for rash.   All other systems reviewed and are negative.    A complete review of systems was performed with pertinent positives and negatives noted in the HPI, and all other systems negative.    Physical Exam   BP: (!) 135/92  Pulse: 106  Temp: 98.1  F (36.7  C)  Resp: 16  Height: 170.2 cm (5' 7\")  Weight: 63.5 kg (140 lb)  SpO2: 98 %      Physical Exam  Vitals and nursing note reviewed.   Constitutional:       General: She is not in acute distress.  HENT:      Head: Normocephalic.      Nose: Nose normal.      Mouth/Throat:      Mouth: Mucous membranes are moist.   Eyes:      Extraocular Movements: Extraocular movements intact.      Pupils: " Pupils are equal, round, and reactive to light.   Cardiovascular:      Rate and Rhythm: Normal rate and regular rhythm.   Pulmonary:      Effort: Pulmonary effort is normal.   Abdominal:      General: Abdomen is flat.      Tenderness: There is abdominal tenderness.   Musculoskeletal:      Cervical back: Normal range of motion.   Skin:     Comments: Healing excoriations on bilateral lower legs without surrounding erythema, edema or tenderness. No other rashes/lesions identified.    Neurological:      General: No focal deficit present.      Mental Status: She is alert.   Psychiatric:      Comments: Anxious. Disorganized thought process and loose associations          ED Course   5:55 PM  The patient was seen and examined by Korina Langley MD in Room ED14.        Procedures            No results found for this or any previous visit (from the past 24 hour(s)).  Medications   ibuprofen (ADVIL/MOTRIN) tablet 600 mg (600 mg Oral Given 9/6/21 1817)   acetaminophen (TYLENOL) tablet 650 mg (650 mg Oral Given 9/6/21 1817)             Assessments & Plan (with Medical Decision Making)   Ria Nj is a 46 year old female with a history of methamphetamine use that is presenting with concerns of rash on bilateral lower extremities and pinworm infection. On further review of her record, it appears that she has presented to urgent care/appointmets multiple times convinced of the presence of parasites on her scalp, in her stool etc. She has previously underwent evaluation which confirmed no parasites were present. She does not have any of the classic pinworm symptoms and no recent travel. Overall low suspicion for pinworm infection. No red flag features for the wounds on her legs and no other rashes identified. Recommended symptomatic management and discussed strategies to present secondary infection. The patient insisted that she have a stool study performed today. She understands that the results will need to followed up on  with her PCP. Not currently displaying sympathomimetic toxidrome. No evidence of trauma. She is not endorsing thoughts of hurting self or others. She will be discharged and recommended she follow up in primary care clinic.     I have reviewed the nursing notes.    I have reviewed the findings, diagnosis, plan and need for follow up with the patient.    Discharge Medication List as of 9/6/2021  6:12 PM          Final diagnoses:   Delusions of parasitosis (H)   Constipation, unspecified constipation type   I, Roderick Hernandez, am serving as a trained medical scribe to document services personally performed by Korina Langley MD, based on the provider's statements to me.     I, Korina Langley MD, was physically present and have reviewed and verified the accuracy of this note documented by Roderick Hernandez.    --  Korina Langley MD  9/6/2021   [facility] EMERGENCY DEPARTMENT     Korina Langley MD  09/07/21 0503

## 2021-09-06 NOTE — DISCHARGE INSTRUCTIONS
Make sure to drink plenty of water to keep yourself hydrated. You can try prune juice to help you have more regular bowel movements. You can also use miralax 2 capfuls twice per day to have softer bowel movements. Follow up the results of your stool study with your primary care clinic. Call the [facility] to schedule this appointment. Avoid use of methamphetamine as this could be making your symptoms worse.

## 2021-09-07 LAB — O+P STL MICRO: NEGATIVE

## 2021-09-20 ENCOUNTER — NURSE TRIAGE (OUTPATIENT)
Dept: NURSING | Facility: CLINIC | Age: 46
End: 2021-09-20

## 2021-09-20 NOTE — TELEPHONE ENCOUNTER
"\"I am calling for my stool test results from 9/6.\"   Negative per epic  Roxane Brownlee RN [facility]        Reason for Disposition    Health Information question, no triage required and triager able to answer question    Protocols used: INFORMATION ONLY CALL - NO TRIAGE-A-AH      "

## 2022-01-07 ENCOUNTER — OFFICE VISIT (OUTPATIENT)
Dept: URGENT CARE | Facility: URGENT CARE | Age: 47
End: 2022-01-07
Payer: MEDICARE

## 2022-01-07 VITALS — SYSTOLIC BLOOD PRESSURE: 145 MMHG | DIASTOLIC BLOOD PRESSURE: 93 MMHG | TEMPERATURE: 97.4 F | HEART RATE: 102 BPM

## 2022-01-07 DIAGNOSIS — D17.30 LIPOMA OF SKIN AND SUBCUTANEOUS TISSUE: Primary | ICD-10-CM

## 2022-01-07 DIAGNOSIS — M25.512 LEFT SHOULDER PAIN, UNSPECIFIED CHRONICITY: ICD-10-CM

## 2022-01-07 PROCEDURE — 99213 OFFICE O/P EST LOW 20 MIN: CPT | Performed by: FAMILY MEDICINE

## 2022-01-07 ASSESSMENT — PAIN SCALES - GENERAL: PAINLEVEL: WORST PAIN (10)

## 2022-01-08 NOTE — PROGRESS NOTES
Assessment & Plan     Lipoma of skin and subcutaneous tissue  Left shoulder pain, unspecified chronicity    Patient reassured that the skin mass is benign and should not be causing any pain or weakness.  She then began to speak of lice and concerns for parasitosis which she has been to the ED x 3 times in past 6 months.  Recommend Follow-up in ED prn for pain management if pain persists.    Freida Edwards MD  [facility] [place]    Natividad Rollins is a 46 year old who presents for the following health issues     HPI   Presents with lump on LEFT shoulder blade which she feels is causing her sudden increased in LEFT shoulder pain.    States has been told the mass is benign but all of a sudden notes increased LEFT shoulder pain with any sort of ROM which she feels is being caused by the mass.    No trauma or injury.    States pain is 10/10.      Review of Systems   Constitutional, HEENT, cardiovascular, pulmonary, GI, , musculoskeletal, neuro, skin, endocrine and psych systems are negative, except as otherwise noted.      Objective    BP (!) 145/93   Pulse 102   Temp 97.4  F (36.3  C) (Tympanic)   There is no height or weight on file to calculate BMI.  Physical Exam   GENERAL: healthy, alert and no distress  EYES: Eyes grossly normal to inspection, PERRL and conjunctivae and sclerae normal  MS: no gross musculoskeletal defects noted, states LEFT shoulder is painful but can move it on command- no concern for loss of ROM or weakness today  SKIN: larger- sized superficial mass j3 inches x 2 inches ust above bra-line on base of LEFT scapula most consistent with a lipoma with no overlying skin changes or erythema- non tender to palpation,   PSYCH: histrionic

## 2022-03-30 ENCOUNTER — OFFICE VISIT (OUTPATIENT)
Dept: URGENT CARE | Facility: URGENT CARE | Age: 47
End: 2022-03-30
Payer: MEDICARE

## 2022-03-30 VITALS
RESPIRATION RATE: 20 BRPM | WEIGHT: 140 LBS | BODY MASS INDEX: 21.22 KG/M2 | TEMPERATURE: 98.3 F | HEIGHT: 68 IN | DIASTOLIC BLOOD PRESSURE: 70 MMHG | OXYGEN SATURATION: 100 % | HEART RATE: 100 BPM | SYSTOLIC BLOOD PRESSURE: 123 MMHG

## 2022-03-30 DIAGNOSIS — K04.7 DENTAL INFECTION: ICD-10-CM

## 2022-03-30 DIAGNOSIS — L29.9 SCALP PRURITUS: Primary | ICD-10-CM

## 2022-03-30 PROCEDURE — 99214 OFFICE O/P EST MOD 30 MIN: CPT | Performed by: NURSE PRACTITIONER

## 2022-03-30 RX ORDER — CLINDAMYCIN HCL 300 MG
300 CAPSULE ORAL 3 TIMES DAILY
Qty: 21 CAPSULE | Refills: 0 | Status: SHIPPED | OUTPATIENT
Start: 2022-03-30 | End: 2022-04-06

## 2022-03-30 RX ORDER — CLOBETASOL PROPIONATE 0.05 G/100ML
SHAMPOO TOPICAL
Qty: 118 ML | Refills: 0 | Status: SHIPPED | OUTPATIENT
Start: 2022-03-30

## 2022-03-31 NOTE — PROGRESS NOTES
Chief Complaint   Patient presents with     Urgent Care     Derm Problem     rash on scale, headache, chill for long time. Pt was prescribed with antibiotic and was not help.     Dental Pain     tooth pain as well.     SUBJECTIVE:  Ria Nj is a 46 year old female who presents to the clinic today with an itchy painful scalp rash for months. She has oval an oval shaped yellow colored scab on the right posterior scalp and some dry flaking. She has been seen for this numerous times over the past year in both the ER and urgent care setting. Prior meth use history, thought to be hallucinations. She tells me this is not in her head and she believes it is scalp psoriasis with possible bacterial component. Also relays having a left lower dental infection that is relapsing. She had the tooth extracted in 2020, but the area is , swollen, red. She declines fevers, trismus, facial swelling. Has not seen her dentist since.    Past Medical History:   Diagnosis Date     Adjustment disorder with anxiety 10/7/2010     Bee sting-induced anaphylaxis      CARDIOVASCULAR SCREENING; LDL GOAL LESS THAN 160 5/9/2010     Chronic low back pain     has narcotic agreement on file     MICHAEL (generalised anxiety disorder)      Lumbar disc herniation with myelopathy 10/28/2010     Moderate major depression (H)      Tobacco use disorder     Smokes a 1/2 ppd. Started       albuterol (2.5 MG/3ML) 0.083% neb solution, Take 1 vial (2.5 mg) by nebulization every 4 hours as needed for shortness of breath / dyspnea or wheezing  albuterol (PROAIR HFA, PROVENTIL HFA, VENTOLIN HFA) 108 (90 BASE) MCG/ACT inhaler, Inhale 2 puffs into the lungs every 4 hours as needed for shortness of breath / dyspnea or wheezing  buprenorphine HCl-naloxone HCl (SUBOXONE) 8-2 MG per film, Place 1 Film under the tongue 3 times daily  calcium carbonate 500 MG tablet, Take 1 tablet by mouth 2 times daily.  Cholecalciferol (VITAMIN D) 2000 UNITS tablet, Take  "2,000 Units by mouth daily  cyclobenzaprine (FLEXERIL) 10 MG tablet, Take 1 tablet (10 mg) by mouth 3 times daily as needed for muscle spasms  EPINEPHrine (EPIPEN) 0.3 MG/0.3ML injection, Inject 0.3 mLs (0.3 mg) into the muscle once as needed for anaphylaxis  gabapentin (NEURONTIN) 300 MG capsule, TAKE 1 CAPSULE BY MOUTH THREE TIMES DAILY  hydrOXYzine (ATARAX) 25 MG tablet, Take 1 tablet (25 mg) by mouth every 6 hours as needed for itching  IBUPROFEN 200 MG OR TABS, Pt is taking 4 TABLET EVERY 4 TO 6 HOURS AS NEEDED  LORazepam (ATIVAN) 1 MG tablet, TAKE 1 TABLET BY MOUTH EVERY 8 HOURS AS NEEDED FOR ANXIETY  Magnesium 250 MG tablet, Take 1 tablet by mouth daily.  naloxone (NARCAN) nasal spray, Spray 1 spray (4 mg) into one nostril alternating nostrils as needed for opioid reversal every 2-3 minutes until assistance arrives  Omega-3 Fatty Acids (FISH OIL CONCENTRATE PO), Take 300 mg by mouth 3 times daily  order for DME, Equipment being ordered: Rib belt  permethrin (ELIMITE) 5 % external cream, Apply cream from head to toe (except the face); leave on for 8-14 hours then wash off with water; reapply in 1 week if live mites appear.    No current facility-administered medications on file prior to visit.    Social History     Tobacco Use     Smoking status: Current Every Day Smoker     Packs/day: 2.00     Years: 15.00     Pack years: 30.00     Types: Cigarettes     Smokeless tobacco: Never Used   Substance Use Topics     Alcohol use: No     Alcohol/week: 0.0 standard drinks     Allergies   Allergen Reactions     Bee Venom Hives     Other reaction(s): Angioedema     Acetaminophen      Other reaction(s): deleted     Hydrocodone Swelling     Review of Systems  All systems negative except for those listed above in HPI.    EXAM:   /70   Pulse 100   Temp 98.3  F (36.8  C) (Temporal)   Resp 20   Ht 1.727 m (5' 8\")   Wt 63.5 kg (140 lb)   LMP 03/23/2022   SpO2 100%   BMI 21.29 kg/m      Physical Exam  Vitals " reviewed.   Constitutional:       General: She is in acute distress.      Appearance: Normal appearance. She is not ill-appearing, toxic-appearing or diaphoretic.   HENT:      Head: Normocephalic.   Cardiovascular:      Rate and Rhythm: Normal rate.   Pulmonary:      Effort: Pulmonary effort is normal.   Musculoskeletal:         General: Normal range of motion.   Skin:     General: Skin is warm and dry.      Findings: Erythema (flaky dry scalp) and lesion (oval shaped yellow scab eraser sized at right posterior scalp) present. No rash.   Neurological:      Mental Status: She is alert.       ASSESSMENT:    ICD-10-CM    1. Scalp pruritus  L29.9 Adult Dermatology Referral     clobetasol propionate (CLOBEX) 0.05 % external shampoo   2. Dental infection  K04.7 clindamycin (CLEOCIN) 300 MG capsule     PLAN:    Clinda for dental infection per request given pain and swelling  It is important that you make an appointment with a dentist.  Take antibiotic as directed.  Brush teeth twice daily  Salt water gargles- mix about 8 oz of water with about 1 tsp of salt. Swish and spit.  May take up to 600 mg ibuprofen every 6-8 hours.  Please follow up with primary care provider if not improving, worsening or new symptoms or for any adverse reactions to medications.    Scalp rash  Will cover with clobetasol shampoo for scalp itching, scabs, dryness  Derm referral for further eval and treatment    Patient Instructions       Patient Education     Psoriasis   Psoriasis is an inflammatory condition that affects the skin and nails. You may have patches of thick, red skin (plaques) covered with silvery scales. These often appear on the elbows, knees, legs, lower back, and scalp.  The plaques itch and can be painful. People with this condition are more likely to have emotional stress and depression.  Psoriasis is not contagious. It can t spread to someone else who touches it. But it can be inherited. It's an autoimmune skin disease. This  means that the immune system has an abnormal reaction. It treats healthy skin like it is a foreign substance. This causes skin cells to grow faster than normal and to stack up in raised red patches. Psoriasis is a long-term (chronic) disease. You will have flare-ups that come and go over time.  Smoking, sun exposure, and alcohol use may affect how often the psoriasis occurs and how long the flare-ups last.  There is no cure, but treatments can offer relief. Treatment can include topical creams, light therapy (phototherapy), and oral or injectable medicines.  Home care    No specific diet is needed. Eat a healthy, well-balanced diet that includes fresh fruits and vegetables, whole grains, and lean meats. Psoriasis can increase your risk for diabetes and heart disease.    Increasing omega-3 fatty acids in your diet can help improve dry skin. The best dietary sources are fatty fish (salmon, mackerel, lake trout, albacore tuna) or fish oil (such as cod liver oil). A great way to take fish oil is to add it to a juice, shake, or smoothie. Flaxseeds and flaxseed oil, canola oil, walnuts, soybean, and tofu are converted to omega-3 fatty acid in the body.    Stay at a healthy weight. Overlapping skin folds can be a site for psoriasis plaques. If you are overweight, talk to your healthcare provider about a weight-loss program.    Bathing daily can help remove scales and calm inflamed skin. Use lukewarm water and mild soaps that have added oils, fats, and moisturizers. Avoid deodorants, antiperspirants, and antibacterial soaps. These have a drying effect. Many people find it helpful to soak in a tub with added bath oils, oatmeal, apple cider vinegar, or Epsom salts.    After bathing, put on skin cream (or a skin oil for a stronger effect).    Some exposure to UV rays from the sun can improve psoriasis. But too much sun can trigger an outbreak. It also raises your risk for skin cancer. Limit sun exposure and use sunscreen on  healthy skin (at least 30 SPF).    If you are prescribed medicine, take it as directed.    Unless another steroid cream was prescribed, you may use over-the-counter hydrocortisone cream for a few weeks during symptom flare-ups.    Stop smoking. If you are a long-time smoker, this can be hard. Think about joining a stop-smoking program. Ask your healthcare provider for help.    Tell your provider if your joints start to ache or get stiff.    Tell your provider if you notice changes in your fingernails.    Depression is more common among people with psoriasis. Get help if you notice changes in your mood.    Follow-up care  Follow up with your healthcare provider, or as advised.  When to seek medical advice  Call your healthcare provider right away if any of these occur:    Skin pain gets worse    Bleeding from the skin plaques that is hard to control    Signs of skin infection (redness, increasing pain, swelling, pus)    Fever of 100.4 F (38 C), or as directed by your provider  [employer] last reviewed this educational content on 8/1/2019 2000-2021 [employer]. All rights reserved. This information is not intended as a substitute for professional medical care. Always follow your healthcare professional's instructions.             Follow up with primary care provider with any problems, questions or concerns or if symptoms worsen or fail to improve. Patient agreed to plan and verbalized understanding.    CLYDE Macedo  [facility]

## 2022-03-31 NOTE — PATIENT INSTRUCTIONS
Patient Education     Psoriasis   Psoriasis is an inflammatory condition that affects the skin and nails. You may have patches of thick, red skin (plaques) covered with silvery scales. These often appear on the elbows, knees, legs, lower back, and scalp.  The plaques itch and can be painful. People with this condition are more likely to have emotional stress and depression.  Psoriasis is not contagious. It can t spread to someone else who touches it. But it can be inherited. It's an autoimmune skin disease. This means that the immune system has an abnormal reaction. It treats healthy skin like it is a foreign substance. This causes skin cells to grow faster than normal and to stack up in raised red patches. Psoriasis is a long-term (chronic) disease. You will have flare-ups that come and go over time.  Smoking, sun exposure, and alcohol use may affect how often the psoriasis occurs and how long the flare-ups last.  There is no cure, but treatments can offer relief. Treatment can include topical creams, light therapy (phototherapy), and oral or injectable medicines.  Home care    No specific diet is needed. Eat a healthy, well-balanced diet that includes fresh fruits and vegetables, whole grains, and lean meats. Psoriasis can increase your risk for diabetes and heart disease.    Increasing omega-3 fatty acids in your diet can help improve dry skin. The best dietary sources are fatty fish (salmon, mackerel, lake trout, albacore tuna) or fish oil (such as cod liver oil). A great way to take fish oil is to add it to a juice, shake, or smoothie. Flaxseeds and flaxseed oil, canola oil, walnuts, soybean, and tofu are converted to omega-3 fatty acid in the body.    Stay at a healthy weight. Overlapping skin folds can be a site for psoriasis plaques. If you are overweight, talk to your healthcare provider about a weight-loss program.    Bathing daily can help remove scales and calm inflamed skin. Use lukewarm water and  mild soaps that have added oils, fats, and moisturizers. Avoid deodorants, antiperspirants, and antibacterial soaps. These have a drying effect. Many people find it helpful to soak in a tub with added bath oils, oatmeal, apple cider vinegar, or Epsom salts.    After bathing, put on skin cream (or a skin oil for a stronger effect).    Some exposure to UV rays from the sun can improve psoriasis. But too much sun can trigger an outbreak. It also raises your risk for skin cancer. Limit sun exposure and use sunscreen on healthy skin (at least 30 SPF).    If you are prescribed medicine, take it as directed.    Unless another steroid cream was prescribed, you may use over-the-counter hydrocortisone cream for a few weeks during symptom flare-ups.    Stop smoking. If you are a long-time smoker, this can be hard. Think about joining a stop-smoking program. Ask your healthcare provider for help.    Tell your provider if your joints start to ache or get stiff.    Tell your provider if you notice changes in your fingernails.    Depression is more common among people with psoriasis. Get help if you notice changes in your mood.    Follow-up care  Follow up with your healthcare provider, or as advised.  When to seek medical advice  Call your healthcare provider right away if any of these occur:    Skin pain gets worse    Bleeding from the skin plaques that is hard to control    Signs of skin infection (redness, increasing pain, swelling, pus)    Fever of 100.4 F (38 C), or as directed by your provider  [employer] last reviewed this educational content on 8/1/2019 2000-2021 [employer]. All rights reserved. This information is not intended as a substitute for professional medical care. Always follow your healthcare professional's instructions.

## 2023-01-30 NOTE — LETTER
My Depression Action Plan  Name: Ria Nj   Date of Birth 1975  Date: 1/29/2018    My doctor: Sunshine Michael   My clinic: [address] [address] [phone]          GREEN    ZONE   Good Control    What it looks like:     Things are going generally well. You have normal up s and down s. You may even feel depressed from time to time, but bad moods usually last less than a day.   What you need to do:  1. Continue to care for yourself (see self care plan)  2. Check your depression survival kit and update it as needed  3. Follow your physician s recommendations including any medication.  4. Do not stop taking medication unless you consult with your physician first.           YELLOW         ZONE Getting Worse    What it looks like:     Depression is starting to interfere with your life.     It may be hard to get out of bed; you may be starting to isolate yourself from others.    Symptoms of depression are starting to last most all day and this has happened for several days.     You may have suicidal thoughts but they are not constant.   What you need to do:     1. Call your care team, your response to treatment will improve if you keep your care team informed of your progress. Yellow periods are signs an adjustment may need to be made.     2. Continue your self-care, even if you have to fake it!    3. Talk to someone in your support network    4. Open up your depression survival kit           RED    ZONE Medical Alert - Get Help    What it looks like:     Depression is seriously interfering with your life.     You may experience these or other symptoms: You can t get out of bed most days, can t work or engage in other necessary activities, you have trouble taking care of basic hygiene, or basic responsibilities, thoughts of suicide or death that will not go away, self-injurious behavior.     What you need to do:  1. Call your care team  Chief Complaint   Patient presents with    Cough     X over a month wants his lungs checked.        HPI: Patient is a 71 y.o. male complaining of 30 days of illness including: productive, proxysmal, nocturnal cough, sore throat, headaches, and nausea . Mucus is: clear to yellow. Cough is worse when lying flat but also has coughing fits during the day. One time heard wheezing when lying flat. Denies trouble breathing. Denies fever or chills.   He felt better overall a few weeks ago except for a continued lingering cough that has not improved.   He believes he had the flu but wasn't tested. Did not check for COVID.    Similarly ill exposures: yes, many sick neighbors and friends.  Treatments tried: Theraflu, cough suppressants. Tea, herbal supplements. Mucinex with minimal to no relief. He has never used an inhaler.   He  reports that he has never smoked. He has never used smokeless tobacco..     ROS:  No fever, nausea, changes in bowel movements or skin rash.      I reviewed the patient's medications, allergies and medical history:  Current Outpatient Medications   Medication Sig Dispense Refill    insulin glargine 100 UNIT/ML SC SOLN Inject  under the skin every evening.      Dulaglutide (TRULICITY) 3 MG/0.5ML Solution Pen-injector Inject 0.5 mL under the skin every 7 days. 2 mL 6    furosemide (LASIX) 20 MG Tab Take 1 Tablet by mouth every day. 90 Tablet 3    LANTUS SOLOSTAR 100 UNIT/ML Solution Pen-injector injection INJECT 30 UNITS SUBCUTANEOUSLY IN THE EVENING      MAGNESIUM PO       mupirocin (BACTROBAN) 2 % Ointment mupirocin 2 % topical ointment   APPLY 1 GRAM TOPICALLY TWICE DAILY      sildenafil citrate (VIAGRA) 100 MG tablet sildenafil 100 mg tablet      metoprolol SR (TOPROL XL) 25 MG TABLET SR 24 HR Take 0.5 Tablets by mouth every evening. 50 Tablet 3    simvastatin (ZOCOR) 20 MG Tab Take 1 Tablet by mouth every evening. 100 Tablet 3    tamsulosin (FLOMAX) 0.4 MG capsule Take 1 Capsule by mouth every  "day. 90 Capsule 3    rivaroxaban (XARELTO) 20 MG Tab tablet Take 1 Tablet by mouth with dinner. 90 Tablet 2    metformin (GLUCOPHAGE) 1000 MG tablet Take 1 Tablet by mouth 2 times a day. 200 Tablet 3    gabapentin (NEURONTIN) 300 MG Cap Take 1 Capsule by mouth 4 times a day. 360 Capsule 3    omeprazole (PRILOSEC) 20 MG delayed-release capsule Take 1 Capsule by mouth every day. 90 Capsule 3    POTASSIUM PO Take  by mouth.      B Complex Vitamins (B COMPLEX PO) Take  by mouth 2 (two) times a day.       No current facility-administered medications for this visit.     Patient has no known allergies.  Past Medical History:   Diagnosis Date    Alcohol use 2/10/2016    3 per day    Arthritis 2016    left knee    Dental disorder     upper lower dentures    Diabetes 2005    insulin and oral agent    Elevated INR 7/30/2019    Heart burn     Hiatus hernia syndrome     High cholesterol     Hypertension     Observed sleep apnea     no study done yet    Other persistent atrial fibrillation (HCC) 12/19/2019    Pain     left knee    Paroxysmal atrial fibrillation (HCC) 2/27/2016 11/14/16-resolved now    Pulmonary hypertension (HCC) 3/1/2016    Snoring     Urinary retention 7/30/2019        EXAM:  /72 (BP Location: Left arm, Patient Position: Sitting, BP Cuff Size: Adult long)   Pulse 94   Temp 36.6 °C (97.9 °F) (Temporal)   Resp 16   Ht 1.803 m (5' 11\")   Wt 108 kg (238 lb 3.2 oz)   SpO2 96%   General: Alert, no conversational dyspnea or audible wheeze, non-toxic appearance.  Eyes: PERRL, conjunctiva slightly injected, no eye discharge.  Ears: Normal pinnae,TM's normal bilaterally.  Nares: Patent with thin mucus.  Sinuses: non tender over maxillary / frontal sinuses.  Throat: Erythematous injection without exudate.   Neck: Supple, with no adenopathy.  Lungs: Right lung fields with inspiratory wheezing. Dimensioned air movement in the lower lung fields. No crackles or rhonchi.   Heart: Regular rate without " and request a same-day appointment. If they are not available (weekends or after hours) call your local crisis line, emergency room or 911.      Electronically signed by: Shahriar Beck, January 29, 2018    Depression Self Care Plan / Survival Kit    Self-Care for Depression  Here s the deal. Your body and mind are really not as separate as most people think.  What you do and think affects how you feel and how you feel influences what you do and think. This means if you do things that people who feel good do, it will help you feel better.  Sometimes this is all it takes.  There is also a place for medication and therapy depending on how severe your depression is, so be sure to consult with your medical provider and/ or Behavioral Health Consultant if your symptoms are worsening or not improving.     In order to better manage my stress, I will:    Exercise  Get some form of exercise, every day. This will help reduce pain and release endorphins, the  feel good  chemicals in your brain. This is almost as good as taking antidepressants!  This is not the same as joining a gym and then never going! (they count on that by the way ) It can be as simple as just going for a walk or doing some gardening, anything that will get you moving.      Hygiene   Maintain good hygiene (Get out of bed in the morning, Make your bed, Brush your teeth, Take a shower, and Get dressed like you were going to work, even if you are unemployed).  If your clothes don't fit try to get ones that do.    Diet  I will strive to eat foods that are good for me, drink plenty of water, and avoid excessive sugar, caffeine, alcohol, and other mood-altering substances.  Some foods that are helpful in depression are: complex carbohydrates, B vitamins, flaxseed, fish or fish oil, fresh fruits and vegetables.    Psychotherapy  I agree to participate in Individual Therapy (if recommended).    Medication  If prescribed medications, I agree to take them.  Missing doses  can result in serious side effects.  I understand that drinking alcohol, or other illicit drug use, may cause potential side effects.  I will not stop my medication abruptly without first discussing it with my provider.    Staying Connected With Others  I will stay in touch with my friends, family members, and my primary care provider/team.    Use your imagination  Be creative.  We all have a creative side; it doesn t matter if it s oil painting, sand castles, or mud pies! This will also kick up the endorphins.    Witness Beauty  (AKA stop and smell the roses) Take a look outside, even in mid-winter. Notice colors, textures. Watch the squirrels and birds.     Service to others  Be of service to others.  There is always someone else in need.  By helping others we can  get out of ourselves  and remember the really important things.  This also provides opportunities for practicing all the other parts of the program.    Humor  Laugh and be silly!  Adjust your TV habits for less news and crime-drama and more comedy.    Control your stress  Try breathing deep, massage therapy, biofeedback, and meditation. Find time to relax each day.     My support system    Clinic Contact:  Phone number:    Contact 1:  Phone number:    Contact 2:  Phone number:    Confucianism/:  Phone number:    Therapist:  Phone number:    Local crisis center:    Phone number:    Other community support:  Phone number:       murmur.  Skin: Warm and dry without rash.     ASSESSMENT:      1. Post-infection bronchospasm  - Subacute bronchitis post URI. Inspiratory wheezing on exam.   - Differential diagnosis discussed.   -Recommended start with Medrol Dosepak and scheduled albuterol 1 puff twice a day.  Continue OTC Mucinex.  Encouraged hydration.  -If symptoms do not significantly improve with steroid and albuterol after 5 days of treatment then can take antibiotic prescribed.  -Had thorough discussion regarding mechanism of action and side effects of prescribed medication. He was well informed that the steroid pack will temporarily raise his blood sugars.   - methylPREDNISolone (MEDROL DOSEPAK) 4 MG Tablet Therapy Pack; Per  directions on package  Dispense: 21 Tablet; Refill: 0  - albuterol 108 (90 Base) MCG/ACT Aero Soln inhalation aerosol; Inhale 1-2 Puffs every four hours as needed for Shortness of Breath.  Dispense: 1 Each; Refill: 0    Educated patient that majority of upper respiratory infections are viral and do not need antibiotics. .  - Antibiotics for CAP if the above regimen does not significantly improve symptoms:   Take full course of antibiotic. Educated patient regarding potential side effects of antibiotics. Recommended using a probiotic and to stay well hydrated.   - doxycycline (MONODOX) 100 MG capsule; Take 1 Capsule by mouth 2 times a day for 5 days.  Dispense: 10 Capsule; Refill: 0        [facility] Form    Diagnosis to address: E11.40, Z79.4 - Type 2 diabetes mellitus with diabetic neuropathy, with long-term current use of insulin ([facility])  Assessment and plan: Chronic, stable. Continue with current defined treatment plan: Metformin 1000 mg twice daily, Lantus 30 units every afternoon, dulaglutide 5 mL. Follow-up as scheduled with diabetes RN educator in 2 weeks.  Diagnosis: I48.20 - Chronic atrial fibrillation (HCC)  Assessment and plan: Chronic, stable, as based on today's assessment and impact on other  conditions evaluated today. Continue with current treatment plan: Metoprolol 25 mg daily and Xarelto.  follow-up with specialist as directed, but at least annually.  Last edited 01/30/23 14:17 PST by BHARATHI Rice.          Follow-up in office or urgent care for worsening symptoms, difficulty breathing, lack of expected recovery, or should new symptoms or problems arise.

## 2024-05-03 NOTE — MR AVS SNAPSHOT
After Visit Summary   9/10/2018    Ria Nj    MRN: [id number]           Patient Information     Date Of Birth          1975        Visit Information        Provider Department      9/10/2018 2:30 PM Scarlet Uribe MD [facility]        Today's Diagnoses     Chronic pain syndrome    -  1    Opioid use disorder, mild, on maintenance therapy        Encounter for long-term (current) use of medications          Care Instructions    Buprenorphine/Naloxone - Brand name Suboxone   Treats Opioid dependence and pain    Instructions for Home Induction:     Do not start Buprenorphine until withdrawal symptoms - body aches, runny nose, diarrhea, abdominal cramps, chills, goose bumps etc are well established.  This is usually >24hrs after the last dose of opioids are taken.  Taking buprenorphine before this can result in precipitated withdrawal.      You have been given 8mg films with a maximum dose of one film daily. Start with 1/2 film/tablet (4mg) Place the medication under your tongue and allow it to dissolve.  Do not swallow the film or tablet as this medication does not absorb when taken orally.  Wait 2 hours and if tolerated you can take the other half (4mg).  The next day take 1/4 film every 6 hours - 4 times/day. Continue with this dosing schedule until your follow up appointment.     Please call my clinic with a progress report tomorrow.      Follow up in ONE week - September 17th at 1:30pm     The addiction medicine clinic number is [phone].    If you cannot make your appointment please call the office and reschedule immediately. If you are out of medication a bridge can be sent to your pharmacy to last until the date of your rescheduled appointment. Our clinic is open from Monday-Friday 0800-4:30pm and there is not an ON CALL after hours service.  If medical care is needed after hours or on the weekend you will need to contact your primary care physician or go to  ED Handoff Info      Note: Please review patient's handover report in Epic under Summary tab under ED to IP Handoff      ED Nurse: Candida Kapadia RN   Extension:  (B-Team)      Precautions:    Fall      Violent Patient Behavior BPA   No data recorded      Mental Status: alert and oriented person/place/time calm & cooperative   Baseline? [x] Not Applicable      Cardiac/Tele on arrival:  2 Degree AVB   Baseline? [x] No       Respiratory needs on arrival: Oxygen by nasal cannula and 2liter(s) per minute   Baseline? [x] No       Medications pending and/or not given in ED: None      Pending Labs/Tests to be done on Unit: None      Additional Information:    Anne Catheter: [x] Not Applicable   Indication: Not applicable      Preferred Language:  English      Living Arrangement: With spouse      COMMENTS:   Pt is alert and oriented in NAD, VSS, denies chest pain, SOB, and dizziness. Ambulated to the bathroom well. Azithromycin infusing well. On box            an Urgent Care or ER.     MyChart messages and telephone calls from patients are taken care of by the nursing team within 24 business hours if received between Monday 8am - Friday 4:30pm.  Therefore, if a refill/bridge of medication is needed it is important to call in advance so you do not run out.     It is strongly recommended that you abstain from alcohol, benzodiazepines (xanax, valium, klonopin and others), marijuana, opioids (percocet, tramadol, dilauded, fentanyl and others) and other drugs of abuse (methamphetamines, alcohol and others)  Using any of these substances increases your risk of overdose/death. (especially with alcohol and benzodiazepines) You are at risk for overdose and death with return to the use of opioids after a period of abstinence because your tolerance has decreased.     You are encouraged to have some type of recovery program in addition to medication management. This may include having a sober network of friends, avoiding isolation, avoiding triggers including people, places and things you associate with using.  Such supports may include Alcoholics Anonymous, Narcotics Anonymous, SMART recovery, Pentecostal groups or other self help activities like counseling.  We can help provide resources to these services.   Medication alone is usually not enough to lead to long term recovery.      Narcan kit prescriptions are available if you need one.   Suboxone risk/benefit/side effect profile discussed.      [facility] does not accept [employer] or [employer] Medical assistance insurance.       Scarlet Uribe MD                   Follow-ups after your visit        Your next 10 appointments already scheduled     Sep 11, 2018  9:40 AM CDT   SHORT with Joel [place] Irwin Wegener, MD   [facility]    [address] [place] [address] [phone]              Who to contact     If you have questions or need follow up information  about today's clinic visit or your schedule please contact [facility] directly at [phone].  Normal or non-critical lab and imaging results will be communicated to you by MyChart, letter or phone within 4 business days after the clinic has received the results. If you do not hear from us within 7 days, please contact the clinic through MyChart or phone. If you have a critical or abnormal lab result, we will notify you by phone as soon as possible.  Submit refill requests through [employer] or call your pharmacy and they will forward the refill request to us. Please allow 3 business days for your refill to be completed.          Additional Information About Your Visit        Care EveryWhere ID     This is your Care EveryWhere ID. This could be used by other organizations to access your [facility] medical records  [id number]        Your Vitals Were     Pulse Pulse Oximetry BMI (Body Mass Index)             99 100% 23.33 kg/m2          Blood Pressure from Last 3 Encounters:   09/10/18 (!) 154/100   09/05/18 133/77   07/30/18 128/89    Weight from Last 3 Encounters:   09/10/18 71.7 kg (158 lb)   09/05/18 73.5 kg (162 lb)   07/30/18 73.9 kg (163 lb)              Today, you had the following     No orders found for display         Today's Medication Changes          These changes are accurate as of 9/10/18  3:22 PM.  If you have any questions, ask your nurse or doctor.               Start taking these medicines.        Dose/Directions    buprenorphine HCl-naloxone HCl 8-2 MG per film   Commonly known as:  SUBOXONE   Used for:  Opioid use disorder, mild, on maintenance therapy, Encounter for long-term (current) use of medications   Started by:  Scarlet Uribe MD        Dose:  1 Film   Place 1 Film under the tongue daily   Quantity:  7 Film   Refills:  0         Stop taking these medicines if you haven't already. Please contact your care team if you have questions.     tiZANidine 4 MG tablet    Commonly known as:  ZANAFLEX   Stopped by:  Scarlet Uribe MD                Where to get your medicines      Some of these will need a paper prescription and others can be bought over the counter.  Ask your nurse if you have questions.     Bring a paper prescription for each of these medications     buprenorphine HCl-naloxone HCl 8-2 MG per film                Primary Care Provider Office Phone # Fax #    Jose A Daniel Irwin Wegener, [phone]       [address]  [place] [address]        Equal Access to Services     [facility] : Hadii aad ku hadasho Soomaali, waaxda luqadaha, qaybta kaalmada adeegyada, waxay idiin hayaan adeeg kharash tawnya . So [facility] [phone].    ATENCIÓN: Si habla español, tiene a freire disposición servicios gratuitos de asistencia lingüística. [facility] [phone].    We comply with applicable federal civil rights laws and [place] laws. We do not discriminate on the basis of race, color, national origin, age, disability, sex, sexual orientation, or gender identity.            Thank you!     Thank you for choosing [facility]  for your care. Our goal is always to provide you with excellent care. Hearing back from our patients is one way we can continue to improve our services. Please take a few minutes to complete the written survey that you may receive in the mail after your visit with us. Thank you!             Your Updated Medication List - Protect others around you: Learn how to safely use, store and throw away your medicines at www.disposemymeds.org.          This list is accurate as of 9/10/18  3:22 PM.  Always use your most recent med list.                   Brand Name Dispense Instructions for use Diagnosis    * albuterol 108 (90 Base) MCG/ACT inhaler    PROAIR HFA/PROVENTIL HFA/VENTOLIN HFA    1 Inhaler    Inhale 2 puffs into the lungs every 4 hours as needed for shortness of breath / dyspnea or wheezing    Acute bronchospasm       *  albuterol (2.5 MG/3ML) 0.083% neb solution     30 vial    Take 1 vial (2.5 mg) by nebulization every 4 hours as needed for shortness of breath / dyspnea or wheezing    Acute bronchospasm       buprenorphine HCl-naloxone HCl 8-2 MG per film    SUBOXONE    7 Film    Place 1 Film under the tongue daily    Opioid use disorder, mild, on maintenance therapy, Encounter for long-term (current) use of medications       calcium carbonate 500 mg {elemental} 500 MG tablet    OS-ADOLFO    100 tablet    Take 1 tablet by mouth 2 times daily.    Routine general medical examination at a health care facility       cloNIDine 0.1 MG tablet    CATAPRES    4 tablet    Take 1 tablet (0.1 mg) by mouth 2 times daily        desvenlafaxine succinate 25 MG 24 hr tablet    PRISTIQ    30 tablet    Take 1 tablet (25 mg) by mouth daily Start prior auth if needed    Generalized anxiety disorder, Moderate major depression (H)       EPINEPHrine 0.3 MG/0.3ML injection 2-pack    EPIPEN/ADRENACLICK/or ANY BX GENERIC EQUIV    2 each    Inject 0.3 mLs (0.3 mg) into the muscle once as needed for anaphylaxis    Bee sting-induced anaphylaxis, accidental or unintentional, subsequent encounter       FISH OIL CONCENTRATE PO      Take 300 mg by mouth 3 times daily        * gabapentin 300 MG capsule    NEURONTIN    180 capsule    Take 2 capsules (600 mg) by mouth 3 times daily    Chronic bilateral low back pain without sciatica       * gabapentin 300 MG capsule    NEURONTIN    270 capsule    TAKE 1 CAPSULE BY MOUTH THREE TIMES DAILY    Low back pain, unspecified back pain laterality, unspecified chronicity, with sciatica presence unspecified       ibuprofen 200 MG tablet    ADVIL/MOTRIN     Pt is taking 4 TABLET EVERY 4 TO 6 HOURS AS NEEDED        LORazepam 1 MG tablet    ATIVAN    90 tablet    TAKE 1 TABLET BY MOUTH EVERY 8 HOURS AS NEEDED FOR ANXIETY,    Generalized anxiety disorder       magnesium 250 MG tablet     100 tablet    Take 1 tablet by mouth daily.     Routine general medical examination at a health care facility       naloxone nasal spray    NARCAN    0.2 mL    Spray 1 spray (4 mg) into one nostril alternating nostrils as needed for opioid reversal every 2-3 minutes until assistance arrives    Continuous opioid dependence (H)       nicotine 21 MG/24HR 24 hr patch    NICODERM CQ    30 patch    Place 1 patch onto the skin every 24 hours    Tobacco use disorder       oxyCODONE 40 MG 12 hr tablet    OxyCONTIN    60 tablet    Take 1 tablet (40 mg) by mouth every 12 hours    Chronic bilateral low back pain without sciatica, Continuous opioid dependence (H), Chronic pain syndrome       oxyCODONE-acetaminophen  MG per tablet    PERCOCET    100 tablet    One tablet 3-4 times daily #100/month    Chronic bilateral low back pain without sciatica, Continuous opioid dependence (H), Chronic pain syndrome       vitamin D 2000 units tablet     100 tablet    Take 2,000 Units by mouth daily    Major depressive disorder, recurrent episode, moderate (H)       * Notice:  This list has 4 medication(s) that are the same as other medications prescribed for you. Read the directions carefully, and ask your doctor or other care provider to review them with you.